# Patient Record
Sex: FEMALE | Race: WHITE | NOT HISPANIC OR LATINO | Employment: OTHER | URBAN - NONMETROPOLITAN AREA
[De-identification: names, ages, dates, MRNs, and addresses within clinical notes are randomized per-mention and may not be internally consistent; named-entity substitution may affect disease eponyms.]

---

## 2017-01-01 ENCOUNTER — TELEPHONE (OUTPATIENT)
Dept: FAMILY MEDICINE CLINIC | Facility: CLINIC | Age: 77
End: 2017-01-01

## 2017-01-01 ENCOUNTER — TRANSCRIBE ORDERS (OUTPATIENT)
Dept: ADMINISTRATIVE | Facility: HOSPITAL | Age: 77
End: 2017-01-01

## 2017-01-01 ENCOUNTER — TELEPHONE (OUTPATIENT)
Dept: CALL CENTER | Facility: HOSPITAL | Age: 77
End: 2017-01-01

## 2017-01-01 ENCOUNTER — APPOINTMENT (OUTPATIENT)
Dept: CT IMAGING | Facility: HOSPITAL | Age: 77
End: 2017-01-01

## 2017-01-01 ENCOUNTER — OFFICE VISIT (OUTPATIENT)
Dept: SURGERY | Facility: CLINIC | Age: 77
End: 2017-01-01

## 2017-01-01 ENCOUNTER — OUTSIDE FACILITY SERVICE (OUTPATIENT)
Dept: INTERNAL MEDICINE | Facility: HOSPITAL | Age: 77
End: 2017-01-01

## 2017-01-01 ENCOUNTER — APPOINTMENT (OUTPATIENT)
Dept: GENERAL RADIOLOGY | Facility: HOSPITAL | Age: 77
End: 2017-01-01
Attending: INTERNAL MEDICINE

## 2017-01-01 ENCOUNTER — LAB REQUISITION (OUTPATIENT)
Dept: LAB | Facility: HOSPITAL | Age: 77
End: 2017-01-01

## 2017-01-01 ENCOUNTER — HOSPITAL ENCOUNTER (EMERGENCY)
Facility: HOSPITAL | Age: 77
Discharge: HOME | End: 2017-08-30
Attending: EMERGENCY MEDICINE | Admitting: EMERGENCY MEDICINE

## 2017-01-01 ENCOUNTER — APPOINTMENT (OUTPATIENT)
Dept: ULTRASOUND IMAGING | Facility: HOSPITAL | Age: 77
End: 2017-01-01

## 2017-01-01 ENCOUNTER — HOSPITAL ENCOUNTER (EMERGENCY)
Facility: HOSPITAL | Age: 77
Discharge: HOME | End: 2017-09-18
Attending: EMERGENCY MEDICINE | Admitting: EMERGENCY MEDICINE

## 2017-01-01 ENCOUNTER — APPOINTMENT (OUTPATIENT)
Dept: GENERAL RADIOLOGY | Facility: HOSPITAL | Age: 77
End: 2017-01-01

## 2017-01-01 ENCOUNTER — HOSPITAL ENCOUNTER (OUTPATIENT)
Dept: INFUSION THERAPY | Facility: HOSPITAL | Age: 77
Discharge: HOME | End: 2017-11-21
Admitting: INTERNAL MEDICINE

## 2017-01-01 ENCOUNTER — TRANSITIONAL CARE MANAGEMENT TELEPHONE ENCOUNTER (OUTPATIENT)
Dept: INTERNAL MEDICINE | Facility: CLINIC | Age: 77
End: 2017-01-01

## 2017-01-01 ENCOUNTER — CLINICAL SUPPORT (OUTPATIENT)
Dept: FAMILY MEDICINE CLINIC | Facility: CLINIC | Age: 77
End: 2017-01-01

## 2017-01-01 ENCOUNTER — OFFICE VISIT (OUTPATIENT)
Dept: FAMILY MEDICINE CLINIC | Facility: CLINIC | Age: 77
End: 2017-01-01

## 2017-01-01 ENCOUNTER — HOSPITAL ENCOUNTER (OUTPATIENT)
Dept: MRI IMAGING | Facility: HOSPITAL | Age: 77
Discharge: HOME | End: 2017-04-06
Admitting: NURSE PRACTITIONER

## 2017-01-01 ENCOUNTER — APPOINTMENT (OUTPATIENT)
Dept: PREADMISSION TESTING | Facility: HOSPITAL | Age: 77
End: 2017-01-01

## 2017-01-01 ENCOUNTER — TRANSITIONAL CARE MANAGEMENT TELEPHONE ENCOUNTER (OUTPATIENT)
Dept: FAMILY MEDICINE CLINIC | Facility: CLINIC | Age: 77
End: 2017-01-01

## 2017-01-01 ENCOUNTER — HOSPITAL ENCOUNTER (EMERGENCY)
Facility: HOSPITAL | Age: 77
Discharge: ANOTHER HOSPITAL | End: 2017-04-10
Attending: EMERGENCY MEDICINE | Admitting: EMERGENCY MEDICINE

## 2017-01-01 ENCOUNTER — HOSPITAL ENCOUNTER (OUTPATIENT)
Dept: INFUSION THERAPY | Facility: HOSPITAL | Age: 77
Discharge: HOME | End: 2017-12-05
Admitting: INTERNAL MEDICINE

## 2017-01-01 ENCOUNTER — HOSPITAL ENCOUNTER (EMERGENCY)
Facility: HOSPITAL | Age: 77
Discharge: HOME | End: 2017-05-09
Attending: EMERGENCY MEDICINE | Admitting: EMERGENCY MEDICINE

## 2017-01-01 ENCOUNTER — HOSPITAL ENCOUNTER (INPATIENT)
Facility: HOSPITAL | Age: 77
LOS: 14 days | Discharge: HOSPICE | End: 2017-12-22
Attending: EMERGENCY MEDICINE | Admitting: INTERNAL MEDICINE

## 2017-01-01 ENCOUNTER — HOSPITAL ENCOUNTER (OUTPATIENT)
Facility: HOSPITAL | Age: 77
Setting detail: OBSERVATION
Discharge: HOME HEALTH | End: 2017-09-28
Attending: EMERGENCY MEDICINE | Admitting: INTERNAL MEDICINE

## 2017-01-01 ENCOUNTER — APPOINTMENT (OUTPATIENT)
Dept: INFUSION THERAPY | Facility: HOSPITAL | Age: 77
End: 2017-01-01

## 2017-01-01 ENCOUNTER — HOSPITAL ENCOUNTER (INPATIENT)
Facility: HOSPITAL | Age: 77
LOS: 4 days | Discharge: HOME HEALTH | End: 2017-11-29
Attending: EMERGENCY MEDICINE | Admitting: INTERNAL MEDICINE

## 2017-01-01 ENCOUNTER — HOSPITAL ENCOUNTER (INPATIENT)
Facility: HOSPITAL | Age: 77
LOS: 2 days | Discharge: HOME HEALTH | End: 2017-10-12
Attending: EMERGENCY MEDICINE | Admitting: INTERNAL MEDICINE

## 2017-01-01 ENCOUNTER — HOSPITAL ENCOUNTER (OUTPATIENT)
Facility: HOSPITAL | Age: 77
Setting detail: HOSPITAL OUTPATIENT SURGERY
End: 2017-01-01
Attending: SURGERY | Admitting: SURGERY

## 2017-01-01 ENCOUNTER — HOSPITAL ENCOUNTER (OUTPATIENT)
Dept: GENERAL RADIOLOGY | Facility: HOSPITAL | Age: 77
Discharge: HOME | End: 2017-01-11
Admitting: NURSE PRACTITIONER

## 2017-01-01 ENCOUNTER — TRANSCRIBE ORDERS (OUTPATIENT)
Dept: MRI IMAGING | Facility: HOSPITAL | Age: 77
End: 2017-01-01

## 2017-01-01 ENCOUNTER — DOCUMENTATION (OUTPATIENT)
Dept: SOCIAL WORK | Facility: HOSPITAL | Age: 77
End: 2017-01-01

## 2017-01-01 ENCOUNTER — HOSPITAL ENCOUNTER (EMERGENCY)
Facility: HOSPITAL | Age: 77
Discharge: HOME | End: 2017-04-06
Attending: EMERGENCY MEDICINE | Admitting: EMERGENCY MEDICINE

## 2017-01-01 ENCOUNTER — HOSPITAL ENCOUNTER (OUTPATIENT)
Dept: GENERAL RADIOLOGY | Facility: HOSPITAL | Age: 77
Discharge: HOME | End: 2017-11-01
Attending: INTERNAL MEDICINE | Admitting: INTERNAL MEDICINE

## 2017-01-01 ENCOUNTER — APPOINTMENT (OUTPATIENT)
Dept: CARDIOLOGY | Facility: HOSPITAL | Age: 77
End: 2017-01-01
Attending: INTERNAL MEDICINE

## 2017-01-01 ENCOUNTER — LAB (OUTPATIENT)
Dept: LAB | Facility: HOSPITAL | Age: 77
End: 2017-01-01
Attending: INTERNAL MEDICINE

## 2017-01-01 ENCOUNTER — HOSPITAL ENCOUNTER (OUTPATIENT)
Facility: HOSPITAL | Age: 77
Setting detail: OBSERVATION
Discharge: ANOTHER HOSPITAL | End: 2017-08-01
Attending: EMERGENCY MEDICINE | Admitting: INTERNAL MEDICINE

## 2017-01-01 ENCOUNTER — DOCUMENTATION (OUTPATIENT)
Dept: INTERNAL MEDICINE | Facility: HOSPITAL | Age: 77
End: 2017-01-01

## 2017-01-01 ENCOUNTER — HOSPITAL ENCOUNTER (INPATIENT)
Facility: HOSPITAL | Age: 77
LOS: 4 days | Discharge: HOME HEALTH | End: 2017-11-07
Attending: EMERGENCY MEDICINE | Admitting: INTERNAL MEDICINE

## 2017-01-01 VITALS
WEIGHT: 224 LBS | OXYGEN SATURATION: 98 % | RESPIRATION RATE: 20 BRPM | TEMPERATURE: 98.6 F | HEIGHT: 67 IN | SYSTOLIC BLOOD PRESSURE: 115 MMHG | HEART RATE: 88 BPM | BODY MASS INDEX: 35.16 KG/M2 | DIASTOLIC BLOOD PRESSURE: 63 MMHG

## 2017-01-01 VITALS
TEMPERATURE: 98.1 F | HEIGHT: 67 IN | DIASTOLIC BLOOD PRESSURE: 77 MMHG | SYSTOLIC BLOOD PRESSURE: 113 MMHG | OXYGEN SATURATION: 98 % | WEIGHT: 224 LBS | HEART RATE: 99 BPM | BODY MASS INDEX: 35.16 KG/M2 | RESPIRATION RATE: 18 BRPM

## 2017-01-01 VITALS
TEMPERATURE: 98.2 F | WEIGHT: 225 LBS | HEART RATE: 64 BPM | SYSTOLIC BLOOD PRESSURE: 138 MMHG | DIASTOLIC BLOOD PRESSURE: 84 MMHG | RESPIRATION RATE: 18 BRPM | HEIGHT: 67 IN | SYSTOLIC BLOOD PRESSURE: 116 MMHG | OXYGEN SATURATION: 95 % | BODY MASS INDEX: 35.31 KG/M2 | TEMPERATURE: 98 F | DIASTOLIC BLOOD PRESSURE: 52 MMHG

## 2017-01-01 VITALS
BODY MASS INDEX: 38.14 KG/M2 | HEIGHT: 67 IN | TEMPERATURE: 98 F | SYSTOLIC BLOOD PRESSURE: 103 MMHG | OXYGEN SATURATION: 98 % | DIASTOLIC BLOOD PRESSURE: 67 MMHG | HEART RATE: 65 BPM | WEIGHT: 243 LBS

## 2017-01-01 VITALS
TEMPERATURE: 97.6 F | HEIGHT: 66 IN | RESPIRATION RATE: 19 BRPM | DIASTOLIC BLOOD PRESSURE: 74 MMHG | WEIGHT: 240.1 LBS | SYSTOLIC BLOOD PRESSURE: 104 MMHG | OXYGEN SATURATION: 100 % | HEART RATE: 103 BPM | BODY MASS INDEX: 38.59 KG/M2

## 2017-01-01 VITALS
TEMPERATURE: 98.2 F | SYSTOLIC BLOOD PRESSURE: 109 MMHG | OXYGEN SATURATION: 98 % | DIASTOLIC BLOOD PRESSURE: 66 MMHG | WEIGHT: 260.2 LBS | BODY MASS INDEX: 40.84 KG/M2 | HEART RATE: 75 BPM | RESPIRATION RATE: 19 BRPM | HEIGHT: 67 IN

## 2017-01-01 VITALS
HEART RATE: 74 BPM | RESPIRATION RATE: 18 BRPM | WEIGHT: 248 LBS | BODY MASS INDEX: 38.92 KG/M2 | OXYGEN SATURATION: 98 % | HEIGHT: 67 IN | TEMPERATURE: 97.8 F | SYSTOLIC BLOOD PRESSURE: 108 MMHG | DIASTOLIC BLOOD PRESSURE: 59 MMHG

## 2017-01-01 VITALS
HEART RATE: 58 BPM | RESPIRATION RATE: 16 BRPM | TEMPERATURE: 98.5 F | DIASTOLIC BLOOD PRESSURE: 45 MMHG | BODY MASS INDEX: 36.02 KG/M2 | HEIGHT: 67 IN | SYSTOLIC BLOOD PRESSURE: 88 MMHG | WEIGHT: 229.5 LBS | OXYGEN SATURATION: 100 %

## 2017-01-01 VITALS
BODY MASS INDEX: 35.16 KG/M2 | RESPIRATION RATE: 18 BRPM | WEIGHT: 224 LBS | DIASTOLIC BLOOD PRESSURE: 81 MMHG | TEMPERATURE: 98.2 F | HEART RATE: 59 BPM | SYSTOLIC BLOOD PRESSURE: 167 MMHG | HEIGHT: 67 IN | OXYGEN SATURATION: 97 %

## 2017-01-01 VITALS
TEMPERATURE: 98.1 F | SYSTOLIC BLOOD PRESSURE: 94 MMHG | HEART RATE: 61 BPM | OXYGEN SATURATION: 98 % | HEIGHT: 67 IN | DIASTOLIC BLOOD PRESSURE: 54 MMHG

## 2017-01-01 VITALS
HEIGHT: 66 IN | HEART RATE: 69 BPM | OXYGEN SATURATION: 100 % | TEMPERATURE: 98 F | DIASTOLIC BLOOD PRESSURE: 46 MMHG | SYSTOLIC BLOOD PRESSURE: 94 MMHG

## 2017-01-01 VITALS
HEART RATE: 65 BPM | OXYGEN SATURATION: 100 % | TEMPERATURE: 97.8 F | SYSTOLIC BLOOD PRESSURE: 86 MMHG | RESPIRATION RATE: 18 BRPM | DIASTOLIC BLOOD PRESSURE: 44 MMHG

## 2017-01-01 VITALS
OXYGEN SATURATION: 98 % | HEART RATE: 74 BPM | BODY MASS INDEX: 38.45 KG/M2 | DIASTOLIC BLOOD PRESSURE: 65 MMHG | TEMPERATURE: 97.9 F | HEIGHT: 67 IN | WEIGHT: 245 LBS | SYSTOLIC BLOOD PRESSURE: 108 MMHG

## 2017-01-01 VITALS
HEART RATE: 80 BPM | DIASTOLIC BLOOD PRESSURE: 65 MMHG | HEIGHT: 67 IN | WEIGHT: 224 LBS | TEMPERATURE: 98.4 F | BODY MASS INDEX: 35.16 KG/M2 | OXYGEN SATURATION: 97 % | RESPIRATION RATE: 20 BRPM | SYSTOLIC BLOOD PRESSURE: 119 MMHG

## 2017-01-01 VITALS
TEMPERATURE: 98.8 F | BODY MASS INDEX: 31.39 KG/M2 | HEART RATE: 71 BPM | HEIGHT: 67 IN | OXYGEN SATURATION: 100 % | SYSTOLIC BLOOD PRESSURE: 109 MMHG | WEIGHT: 200 LBS | DIASTOLIC BLOOD PRESSURE: 58 MMHG | RESPIRATION RATE: 18 BRPM

## 2017-01-01 VITALS
WEIGHT: 248.8 LBS | HEART RATE: 84 BPM | RESPIRATION RATE: 18 BRPM | OXYGEN SATURATION: 100 % | SYSTOLIC BLOOD PRESSURE: 111 MMHG | BODY MASS INDEX: 39.05 KG/M2 | TEMPERATURE: 97.7 F | HEIGHT: 67 IN | DIASTOLIC BLOOD PRESSURE: 59 MMHG

## 2017-01-01 VITALS
HEART RATE: 64 BPM | SYSTOLIC BLOOD PRESSURE: 113 MMHG | OXYGEN SATURATION: 100 % | DIASTOLIC BLOOD PRESSURE: 51 MMHG | HEIGHT: 67 IN | TEMPERATURE: 97.9 F

## 2017-01-01 VITALS
SYSTOLIC BLOOD PRESSURE: 99 MMHG | OXYGEN SATURATION: 99 % | RESPIRATION RATE: 18 BRPM | BODY MASS INDEX: 36.68 KG/M2 | WEIGHT: 233.69 LBS | TEMPERATURE: 97.8 F | HEART RATE: 80 BPM | HEIGHT: 67 IN | DIASTOLIC BLOOD PRESSURE: 59 MMHG

## 2017-01-01 VITALS — BODY MASS INDEX: 35.16 KG/M2 | WEIGHT: 224 LBS | HEIGHT: 67 IN

## 2017-01-01 DIAGNOSIS — I48.91 ATRIAL FIBRILLATION (HCC): ICD-10-CM

## 2017-01-01 DIAGNOSIS — M89.9 CHRONIC KIDNEY DISEASE-MINERAL AND BONE DISORDER: ICD-10-CM

## 2017-01-01 DIAGNOSIS — S89.92XA INJURY OF LEFT KNEE, INITIAL ENCOUNTER: ICD-10-CM

## 2017-01-01 DIAGNOSIS — Z74.09 IMPAIRED MOBILITY AND ADLS: ICD-10-CM

## 2017-01-01 DIAGNOSIS — Z87.19 HX OF CIRRHOSIS: ICD-10-CM

## 2017-01-01 DIAGNOSIS — G89.4 CHRONIC PAIN SYNDROME: Primary | ICD-10-CM

## 2017-01-01 DIAGNOSIS — K76.82 HEPATIC ENCEPHALOPATHY (HCC): Primary | ICD-10-CM

## 2017-01-01 DIAGNOSIS — Z78.9 IMPAIRED MOBILITY AND ADLS: ICD-10-CM

## 2017-01-01 DIAGNOSIS — S89.92XA INJURY OF LEFT KNEE, INITIAL ENCOUNTER: Primary | ICD-10-CM

## 2017-01-01 DIAGNOSIS — K76.82 HEPATIC ENCEPHALOPATHY (HCC): ICD-10-CM

## 2017-01-01 DIAGNOSIS — I48.21 PERMANENT ATRIAL FIBRILLATION (HCC): ICD-10-CM

## 2017-01-01 DIAGNOSIS — J91.8 PLEURAL EFFUSION ASSOCIATED WITH HEPATIC DISORDER: ICD-10-CM

## 2017-01-01 DIAGNOSIS — R53.1 WEAKNESS GENERALIZED: ICD-10-CM

## 2017-01-01 DIAGNOSIS — E83.9 CHRONIC KIDNEY DISEASE-MINERAL AND BONE DISORDER: ICD-10-CM

## 2017-01-01 DIAGNOSIS — N18.9 ACUTE RENAL FAILURE SUPERIMPOSED ON CHRONIC KIDNEY DISEASE, UNSPECIFIED CKD STAGE, UNSPECIFIED ACUTE RENAL FAILURE TYPE (HCC): ICD-10-CM

## 2017-01-01 DIAGNOSIS — N17.9 ACUTE RENAL FAILURE SUPERIMPOSED ON CHRONIC KIDNEY DISEASE, UNSPECIFIED CKD STAGE, UNSPECIFIED ACUTE RENAL FAILURE TYPE (HCC): ICD-10-CM

## 2017-01-01 DIAGNOSIS — R06.00 DYSPNEA, UNSPECIFIED TYPE: Primary | ICD-10-CM

## 2017-01-01 DIAGNOSIS — J41.0 SIMPLE CHRONIC BRONCHITIS (HCC): ICD-10-CM

## 2017-01-01 DIAGNOSIS — Z74.09 IMPAIRED FUNCTIONAL MOBILITY, BALANCE, GAIT, AND ENDURANCE: ICD-10-CM

## 2017-01-01 DIAGNOSIS — R06.02 SHORTNESS OF BREATH: ICD-10-CM

## 2017-01-01 DIAGNOSIS — N39.0 ACUTE UTI: ICD-10-CM

## 2017-01-01 DIAGNOSIS — K74.60 CIRRHOSIS OF LIVER WITH ASCITES, UNSPECIFIED HEPATIC CIRRHOSIS TYPE (HCC): ICD-10-CM

## 2017-01-01 DIAGNOSIS — N18.30 CHRONIC KIDNEY DISEASE, STAGE III (MODERATE) (HCC): ICD-10-CM

## 2017-01-01 DIAGNOSIS — N18.6 ESRF (END STAGE RENAL FAILURE) (HCC): ICD-10-CM

## 2017-01-01 DIAGNOSIS — E86.0 DEHYDRATION: Primary | ICD-10-CM

## 2017-01-01 DIAGNOSIS — R10.13 EPIGASTRIC PAIN: Primary | ICD-10-CM

## 2017-01-01 DIAGNOSIS — R18.8 CIRRHOSIS OF LIVER WITH ASCITES, UNSPECIFIED HEPATIC CIRRHOSIS TYPE (HCC): ICD-10-CM

## 2017-01-01 DIAGNOSIS — L03.115 CELLULITIS OF RIGHT LOWER EXTREMITY: ICD-10-CM

## 2017-01-01 DIAGNOSIS — K76.9 PLEURAL EFFUSION ASSOCIATED WITH HEPATIC DISORDER: ICD-10-CM

## 2017-01-01 DIAGNOSIS — E53.8 VITAMIN B12 DEFICIENCY: Primary | ICD-10-CM

## 2017-01-01 DIAGNOSIS — R52 PAIN: ICD-10-CM

## 2017-01-01 DIAGNOSIS — D68.32 WARFARIN-INDUCED COAGULOPATHY (HCC): ICD-10-CM

## 2017-01-01 DIAGNOSIS — I10 ESSENTIAL HYPERTENSION: ICD-10-CM

## 2017-01-01 DIAGNOSIS — N18.4 CHRONIC KIDNEY DISEASE, STAGE IV (SEVERE) (HCC): ICD-10-CM

## 2017-01-01 DIAGNOSIS — N18.9 CHRONIC KIDNEY DISEASE-MINERAL AND BONE DISORDER: ICD-10-CM

## 2017-01-01 DIAGNOSIS — R53.1 WEAKNESS: ICD-10-CM

## 2017-01-01 DIAGNOSIS — B95.62 METHICILLIN RESISTANT STAPHYLOCOCCUS AUREUS INFECTION AS THE CAUSE OF DISEASES CLASSIFIED ELSEWHERE: ICD-10-CM

## 2017-01-01 DIAGNOSIS — K74.69 OTHER CIRRHOSIS OF LIVER (HCC): Primary | ICD-10-CM

## 2017-01-01 DIAGNOSIS — D64.9 CHRONIC ANEMIA: ICD-10-CM

## 2017-01-01 DIAGNOSIS — K72.10 END STAGE LIVER DISEASE (HCC): ICD-10-CM

## 2017-01-01 DIAGNOSIS — I89.0 LYMPHEDEMA: ICD-10-CM

## 2017-01-01 DIAGNOSIS — J18.9 PNEUMONIA OF LEFT UPPER LOBE DUE TO INFECTIOUS ORGANISM: ICD-10-CM

## 2017-01-01 DIAGNOSIS — I89.0 LYMPHEDEMA OF BOTH LOWER EXTREMITIES: ICD-10-CM

## 2017-01-01 DIAGNOSIS — K74.60 CIRRHOSIS OF LIVER WITHOUT ASCITES, UNSPECIFIED HEPATIC CIRRHOSIS TYPE (HCC): ICD-10-CM

## 2017-01-01 DIAGNOSIS — R19.7 DIARRHEA: ICD-10-CM

## 2017-01-01 DIAGNOSIS — J81.1 CHRONIC PULMONARY EDEMA: ICD-10-CM

## 2017-01-01 DIAGNOSIS — Z79.01 ANTICOAGULATED ON COUMADIN: ICD-10-CM

## 2017-01-01 DIAGNOSIS — K74.60 CIRRHOSIS OF LIVER WITHOUT MENTION OF ALCOHOL: Primary | ICD-10-CM

## 2017-01-01 DIAGNOSIS — N30.01 ACUTE CYSTITIS WITH HEMATURIA: Primary | ICD-10-CM
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
## 2017-01-01 DIAGNOSIS — K76.82 ACUTE HEPATIC ENCEPHALOPATHY (HCC): Primary | ICD-10-CM

## 2017-01-01 DIAGNOSIS — T45.515A WARFARIN-INDUCED COAGULOPATHY (HCC): ICD-10-CM

## 2017-01-01 DIAGNOSIS — K43.6 INCARCERATED VENTRAL HERNIA: Primary | ICD-10-CM

## 2017-01-01 DIAGNOSIS — T81.40XA INFECTION FOLLOWING PROCEDURE: ICD-10-CM

## 2017-01-01 DIAGNOSIS — N17.9 AKI (ACUTE KIDNEY INJURY) (HCC): ICD-10-CM

## 2017-01-01 DIAGNOSIS — E87.79 OTHER HYPERVOLEMIA: ICD-10-CM

## 2017-01-01 DIAGNOSIS — R06.02 SHORTNESS OF BREATH: Primary | ICD-10-CM

## 2017-01-01 DIAGNOSIS — S81.802D OPEN WOUND OF LEFT LOWER EXTREMITY, SUBSEQUENT ENCOUNTER: ICD-10-CM

## 2017-01-01 DIAGNOSIS — E87.1 HYPONATREMIA: ICD-10-CM

## 2017-01-01 DIAGNOSIS — K63.1 SMALL BOWEL PERFORATION (HCC): ICD-10-CM

## 2017-01-01 DIAGNOSIS — N39.0 URINARY TRACT INFECTION: ICD-10-CM

## 2017-01-01 DIAGNOSIS — R18.8 OTHER ASCITES: Primary | ICD-10-CM

## 2017-01-01 DIAGNOSIS — R52 PAIN: Primary | ICD-10-CM

## 2017-01-01 DIAGNOSIS — K43.2 INCISIONAL HERNIA, WITHOUT OBSTRUCTION OR GANGRENE: Primary | ICD-10-CM

## 2017-01-01 DIAGNOSIS — T81.31XD DISRUPTION OF EXTERNAL OPERATION (SURGICAL) WOUND, NOT ELSEWHERE CLASSIFIED, SUBSEQUENT ENCOUNTER: ICD-10-CM

## 2017-01-01 DIAGNOSIS — K43.9 VENTRAL HERNIA WITHOUT OBSTRUCTION OR GANGRENE: ICD-10-CM

## 2017-01-01 DIAGNOSIS — J44.1 CHRONIC OBSTRUCTIVE PULMONARY DISEASE WITH ACUTE EXACERBATION (HCC): ICD-10-CM

## 2017-01-01 DIAGNOSIS — N39.0 URINARY TRACT INFECTION WITHOUT HEMATURIA, SITE UNSPECIFIED: ICD-10-CM

## 2017-01-01 DIAGNOSIS — R53.1 WEAKNESS: Primary | ICD-10-CM

## 2017-01-01 DIAGNOSIS — K74.60 CIRRHOSIS OF LIVER WITHOUT MENTION OF ALCOHOL: ICD-10-CM

## 2017-01-01 DIAGNOSIS — R53.81 DEBILITY: Primary | ICD-10-CM

## 2017-01-01 DIAGNOSIS — I11.0 HYPERTENSIVE HEART DISEASE WITH HEART FAILURE (HCC): ICD-10-CM

## 2017-01-01 LAB
ACINETOBACTER SCREEN CX: NO GROWTH
ALBUMIN SERPL-MCNC: 1.8 G/DL (ref 3.5–5)
ALBUMIN SERPL-MCNC: 1.8 G/DL (ref 3.5–5)
ALBUMIN SERPL-MCNC: 1.9 G/DL (ref 3.5–5)
ALBUMIN SERPL-MCNC: 2 G/DL (ref 3.5–5)
ALBUMIN SERPL-MCNC: 2.1 G/DL (ref 3.5–5)
ALBUMIN SERPL-MCNC: 2.1 G/DL (ref 3.5–5)
ALBUMIN SERPL-MCNC: 2.2 G/DL (ref 3.5–5)
ALBUMIN SERPL-MCNC: 2.3 G/DL (ref 3.5–5)
ALBUMIN SERPL-MCNC: 2.3 G/DL (ref 3.5–5)
ALBUMIN SERPL-MCNC: 2.4 G/DL (ref 3.5–5)
ALBUMIN SERPL-MCNC: 2.4 G/DL (ref 3.5–5)
ALBUMIN SERPL-MCNC: 2.6 G/DL (ref 3.5–5)
ALBUMIN SERPL-MCNC: 3.2 G/DL (ref 3.5–5)
ALBUMIN SERPL-MCNC: 3.2 G/DL (ref 3.5–5)
ALBUMIN/GLOB SERPL: 0.5 G/DL (ref 1–2)
ALBUMIN/GLOB SERPL: 0.6 G/DL (ref 1–2)
ALBUMIN/GLOB SERPL: 0.7 G/DL (ref 1–2)
ALP SERPL-CCNC: 121 U/L (ref 38–126)
ALP SERPL-CCNC: 123 U/L (ref 38–126)
ALP SERPL-CCNC: 124 U/L (ref 38–126)
ALP SERPL-CCNC: 135 U/L (ref 38–126)
ALP SERPL-CCNC: 136 U/L (ref 38–126)
ALP SERPL-CCNC: 136 U/L (ref 38–126)
ALP SERPL-CCNC: 137 U/L (ref 38–126)
ALP SERPL-CCNC: 138 U/L (ref 38–126)
ALP SERPL-CCNC: 145 U/L (ref 38–126)
ALP SERPL-CCNC: 156 U/L (ref 38–126)
ALP SERPL-CCNC: 158 U/L (ref 38–126)
ALP SERPL-CCNC: 159 U/L (ref 38–126)
ALP SERPL-CCNC: 160 U/L (ref 38–126)
ALP SERPL-CCNC: 160 U/L (ref 38–126)
ALP SERPL-CCNC: 165 U/L (ref 38–126)
ALP SERPL-CCNC: 171 U/L (ref 38–126)
ALP SERPL-CCNC: 172 U/L (ref 38–126)
ALP SERPL-CCNC: 173 U/L (ref 38–126)
ALP SERPL-CCNC: 177 U/L (ref 38–126)
ALP SERPL-CCNC: 182 U/L (ref 38–126)
ALP SERPL-CCNC: 184 U/L (ref 38–126)
ALP SERPL-CCNC: 196 U/L (ref 38–126)
ALP SERPL-CCNC: 214 U/L (ref 38–126)
ALP SERPL-CCNC: 217 U/L (ref 38–126)
ALP SERPL-CCNC: 227 U/L (ref 38–126)
ALP SERPL-CCNC: 95 U/L (ref 38–126)
ALT SERPL W P-5'-P-CCNC: 19 U/L (ref 13–69)
ALT SERPL W P-5'-P-CCNC: 19 U/L (ref 13–69)
ALT SERPL W P-5'-P-CCNC: 21 U/L (ref 13–69)
ALT SERPL W P-5'-P-CCNC: 22 U/L (ref 13–69)
ALT SERPL W P-5'-P-CCNC: 24 U/L (ref 13–69)
ALT SERPL W P-5'-P-CCNC: 25 U/L (ref 13–69)
ALT SERPL W P-5'-P-CCNC: 25 U/L (ref 13–69)
ALT SERPL W P-5'-P-CCNC: 26 U/L (ref 13–69)
ALT SERPL W P-5'-P-CCNC: 27 U/L (ref 13–69)
ALT SERPL W P-5'-P-CCNC: 28 U/L (ref 13–69)
ALT SERPL W P-5'-P-CCNC: 28 U/L (ref 13–69)
ALT SERPL W P-5'-P-CCNC: 29 U/L (ref 13–69)
ALT SERPL W P-5'-P-CCNC: 29 U/L (ref 13–69)
ALT SERPL W P-5'-P-CCNC: 30 U/L (ref 13–69)
ALT SERPL W P-5'-P-CCNC: 31 U/L (ref 13–69)
ALT SERPL W P-5'-P-CCNC: 33 U/L (ref 13–69)
ALT SERPL W P-5'-P-CCNC: 34 U/L (ref 13–69)
ALT SERPL W P-5'-P-CCNC: 35 U/L (ref 13–69)
ALT SERPL W P-5'-P-CCNC: 36 U/L (ref 13–69)
ALT SERPL W P-5'-P-CCNC: 40 U/L (ref 13–69)
AMMONIA BLD-SCNC: 10 UMOL/L (ref 9–30)
AMMONIA BLD-SCNC: 13 UMOL/L (ref 9–30)
AMMONIA BLD-SCNC: 17 UMOL/L (ref 9–30)
AMMONIA BLD-SCNC: 17 UMOL/L (ref 9–30)
AMMONIA BLD-SCNC: 178 UMOL/L (ref 9–30)
AMMONIA BLD-SCNC: 19 UMOL/L (ref 9–30)
AMMONIA BLD-SCNC: 19 UMOL/L (ref 9–30)
AMMONIA BLD-SCNC: 20 UMOL/L (ref 9–30)
AMMONIA BLD-SCNC: 41 UMOL/L (ref 9–30)
AMMONIA BLD-SCNC: 69 UMOL/L (ref 9–30)
AMMONIA BLD-SCNC: 84 UMOL/L (ref 9–30)
AMMONIA BLD-SCNC: <9 UMOL/L (ref 9–30)
AMORPH URATE CRY URNS QL MICRO: ABNORMAL /HPF
AMORPH URATE CRY URNS QL MICRO: ABNORMAL /HPF
AMYLASE SERPL-CCNC: 52 U/L (ref 30–110)
ANION GAP SERPL CALCULATED.3IONS-SCNC: 10.1 MMOL/L
ANION GAP SERPL CALCULATED.3IONS-SCNC: 10.3 MMOL/L
ANION GAP SERPL CALCULATED.3IONS-SCNC: 10.4 MMOL/L
ANION GAP SERPL CALCULATED.3IONS-SCNC: 10.4 MMOL/L
ANION GAP SERPL CALCULATED.3IONS-SCNC: 10.6 MMOL/L
ANION GAP SERPL CALCULATED.3IONS-SCNC: 10.7 MMOL/L
ANION GAP SERPL CALCULATED.3IONS-SCNC: 11.4 MMOL/L
ANION GAP SERPL CALCULATED.3IONS-SCNC: 11.8 MMOL/L
ANION GAP SERPL CALCULATED.3IONS-SCNC: 11.9 MMOL/L
ANION GAP SERPL CALCULATED.3IONS-SCNC: 12 MMOL/L
ANION GAP SERPL CALCULATED.3IONS-SCNC: 12.5 MMOL/L
ANION GAP SERPL CALCULATED.3IONS-SCNC: 12.5 MMOL/L
ANION GAP SERPL CALCULATED.3IONS-SCNC: 12.6 MMOL/L
ANION GAP SERPL CALCULATED.3IONS-SCNC: 12.7 MMOL/L
ANION GAP SERPL CALCULATED.3IONS-SCNC: 12.8 MMOL/L
ANION GAP SERPL CALCULATED.3IONS-SCNC: 12.9 MMOL/L
ANION GAP SERPL CALCULATED.3IONS-SCNC: 13.4 MMOL/L
ANION GAP SERPL CALCULATED.3IONS-SCNC: 13.4 MMOL/L
ANION GAP SERPL CALCULATED.3IONS-SCNC: 13.8 MMOL/L
ANION GAP SERPL CALCULATED.3IONS-SCNC: 13.9 MMOL/L
ANION GAP SERPL CALCULATED.3IONS-SCNC: 14.1 MMOL/L
ANION GAP SERPL CALCULATED.3IONS-SCNC: 14.3 MMOL/L
ANION GAP SERPL CALCULATED.3IONS-SCNC: 14.4 MMOL/L
ANION GAP SERPL CALCULATED.3IONS-SCNC: 14.7 MMOL/L
ANION GAP SERPL CALCULATED.3IONS-SCNC: 14.8 MMOL/L
ANION GAP SERPL CALCULATED.3IONS-SCNC: 15 MMOL/L
ANION GAP SERPL CALCULATED.3IONS-SCNC: 15.1 MMOL/L
ANION GAP SERPL CALCULATED.3IONS-SCNC: 15.2 MMOL/L
ANION GAP SERPL CALCULATED.3IONS-SCNC: 15.4 MMOL/L
ANION GAP SERPL CALCULATED.3IONS-SCNC: 15.7 MMOL/L
ANION GAP SERPL CALCULATED.3IONS-SCNC: 16 MMOL/L
ANION GAP SERPL CALCULATED.3IONS-SCNC: 16 MMOL/L
ANION GAP SERPL CALCULATED.3IONS-SCNC: 16.1 MMOL/L
ANION GAP SERPL CALCULATED.3IONS-SCNC: 16.2 MMOL/L
ANION GAP SERPL CALCULATED.3IONS-SCNC: 16.7 MMOL/L
ANION GAP SERPL CALCULATED.3IONS-SCNC: 16.9 MMOL/L
ANION GAP SERPL CALCULATED.3IONS-SCNC: 17.1 MMOL/L
ANION GAP SERPL CALCULATED.3IONS-SCNC: 17.2 MMOL/L
ANION GAP SERPL CALCULATED.3IONS-SCNC: 17.6 MMOL/L
ANION GAP SERPL CALCULATED.3IONS-SCNC: 17.9 MMOL/L
ANION GAP SERPL CALCULATED.3IONS-SCNC: 19.3 MMOL/L
ANION GAP SERPL CALCULATED.3IONS-SCNC: 19.5 MMOL/L
ANISOCYTOSIS BLD QL: ABNORMAL
ANISOCYTOSIS BLD QL: NORMAL
ARTERIAL PATENCY WRIST A: ABNORMAL
ARTERIAL PATENCY WRIST A: POSITIVE
AST SERPL-CCNC: 27 U/L (ref 15–46)
AST SERPL-CCNC: 27 U/L (ref 15–46)
AST SERPL-CCNC: 29 U/L (ref 15–46)
AST SERPL-CCNC: 30 U/L (ref 15–46)
AST SERPL-CCNC: 32 U/L (ref 15–46)
AST SERPL-CCNC: 33 U/L (ref 15–46)
AST SERPL-CCNC: 34 U/L (ref 15–46)
AST SERPL-CCNC: 35 U/L (ref 15–46)
AST SERPL-CCNC: 36 U/L (ref 15–46)
AST SERPL-CCNC: 36 U/L (ref 15–46)
AST SERPL-CCNC: 37 U/L (ref 15–46)
AST SERPL-CCNC: 37 U/L (ref 15–46)
AST SERPL-CCNC: 38 U/L (ref 15–46)
AST SERPL-CCNC: 39 U/L (ref 15–46)
AST SERPL-CCNC: 39 U/L (ref 15–46)
AST SERPL-CCNC: 42 U/L (ref 15–46)
AST SERPL-CCNC: 43 U/L (ref 15–46)
AST SERPL-CCNC: 51 U/L (ref 15–46)
AST SERPL-CCNC: 53 U/L (ref 15–46)
ATMOSPHERIC PRESS: 739 MMHG
BACTERIA SPEC AEROBE CULT: ABNORMAL
BACTERIA SPEC AEROBE CULT: NO GROWTH
BACTERIA SPEC AEROBE CULT: NO GROWTH
BACTERIA SPEC AEROBE CULT: NORMAL
BACTERIA UR QL AUTO: ABNORMAL /HPF
BACTERIA UR QL AUTO: NORMAL /HPF
BASE EXCESS BLDA CALC-SCNC: -7.1 MMOL/L
BASE EXCESS BLDA CALC-SCNC: 4.6 MMOL/L
BASOPHILS # BLD AUTO: 0.01 10*3/MM3 (ref 0–0.2)
BASOPHILS # BLD AUTO: 0.02 10*3/MM3 (ref 0–0.2)
BASOPHILS # BLD AUTO: 0.03 10*3/MM3 (ref 0–0.2)
BASOPHILS # BLD AUTO: 0.04 10*3/MM3 (ref 0–0.2)
BASOPHILS # BLD AUTO: 0.04 10*3/MM3 (ref 0–0.2)
BASOPHILS NFR BLD AUTO: 0.2 % (ref 0–2.5)
BASOPHILS NFR BLD AUTO: 0.3 % (ref 0–2.5)
BASOPHILS NFR BLD AUTO: 0.4 % (ref 0–2.5)
BASOPHILS NFR BLD AUTO: 0.4 % (ref 0–2.5)
BASOPHILS NFR BLD AUTO: 0.5 % (ref 0–2.5)
BASOPHILS NFR BLD AUTO: 0.5 % (ref 0–2.5)
BASOPHILS NFR BLD AUTO: 0.6 % (ref 0–2.5)
BASOPHILS NFR BLD AUTO: 0.8 % (ref 0–2.5)
BASOPHILS NFR BLD AUTO: 1 % (ref 0–2.5)
BASOPHILS NFR BLD AUTO: 1.1 % (ref 0–2.5)
BASOPHILS NFR BLD AUTO: 1.2 % (ref 0–2.5)
BASOPHILS NFR BLD AUTO: 1.2 % (ref 0–2.5)
BDY SITE: ABNORMAL
BDY SITE: ABNORMAL
BH CV ECHO MEAS - % IVS THICK: 41.7 %
BH CV ECHO MEAS - % LVPW THICK: 16.7 %
BH CV ECHO MEAS - ACS: 1.7 CM
BH CV ECHO MEAS - AO ACC SLOPE: 1172 CM/SEC^2
BH CV ECHO MEAS - AO ACC TIME: 0.09 SEC
BH CV ECHO MEAS - AO MAX PG (FULL): 0.65 MMHG
BH CV ECHO MEAS - AO MAX PG: 5.3 MMHG
BH CV ECHO MEAS - AO MEAN PG (FULL): 0.72 MMHG
BH CV ECHO MEAS - AO MEAN PG: 2.7 MMHG
BH CV ECHO MEAS - AO ROOT AREA (BSA CORRECTED): 1.4
BH CV ECHO MEAS - AO ROOT AREA: 7.4 CM^2
BH CV ECHO MEAS - AO ROOT DIAM: 3.1 CM
BH CV ECHO MEAS - AO V2 MAX: 115.4 CM/SEC
BH CV ECHO MEAS - AO V2 MEAN: 75.4 CM/SEC
BH CV ECHO MEAS - AO V2 VTI: 25.2 CM
BH CV ECHO MEAS - AVA(I,A): 2.8 CM^2
BH CV ECHO MEAS - AVA(I,D): 2.8 CM^2
BH CV ECHO MEAS - AVA(V,A): 2.8 CM^2
BH CV ECHO MEAS - AVA(V,D): 2.8 CM^2
BH CV ECHO MEAS - BSA(HAYCOCK): 2.3 M^2
BH CV ECHO MEAS - BSA: 2.2 M^2
BH CV ECHO MEAS - BZI_BMI: 38.5 KILOGRAMS/M^2
BH CV ECHO MEAS - BZI_METRIC_HEIGHT: 170.2 CM
BH CV ECHO MEAS - BZI_METRIC_WEIGHT: 111.6 KG
BH CV ECHO MEAS - EDV(CUBED): 40.3 ML
BH CV ECHO MEAS - EDV(TEICH): 48.4 ML
BH CV ECHO MEAS - EF(CUBED): 82.5 %
BH CV ECHO MEAS - EF(MOD-SP4): 76 %
BH CV ECHO MEAS - EF(TEICH): 76.4 %
BH CV ECHO MEAS - ESV(CUBED): 7 ML
BH CV ECHO MEAS - ESV(TEICH): 11.4 ML
BH CV ECHO MEAS - FS: 44.1 %
BH CV ECHO MEAS - IVS/LVPW: 1
BH CV ECHO MEAS - IVSD: 1.2 CM
BH CV ECHO MEAS - IVSS: 1.7 CM
BH CV ECHO MEAS - LA DIMENSION: 5.8 CM
BH CV ECHO MEAS - LA/AO: 1.9
BH CV ECHO MEAS - LV MASS(C)D: 133.5 GRAMS
BH CV ECHO MEAS - LV MASS(C)DI: 60.5 GRAMS/M^2
BH CV ECHO MEAS - LV MASS(C)S: 101.4 GRAMS
BH CV ECHO MEAS - LV MASS(C)SI: 45.9 GRAMS/M^2
BH CV ECHO MEAS - LV MAX PG: 4.7 MMHG
BH CV ECHO MEAS - LV MEAN PG: 2 MMHG
BH CV ECHO MEAS - LV V1 MAX: 108.1 CM/SEC
BH CV ECHO MEAS - LV V1 MEAN: 62.8 CM/SEC
BH CV ECHO MEAS - LV V1 VTI: 23.4 CM
BH CV ECHO MEAS - LVIDD: 3.4 CM
BH CV ECHO MEAS - LVIDS: 1.9 CM
BH CV ECHO MEAS - LVOT AREA (M): 3.1 CM^2
BH CV ECHO MEAS - LVOT AREA: 3 CM^2
BH CV ECHO MEAS - LVOT DIAM: 2 CM
BH CV ECHO MEAS - LVPWD: 1.2 CM
BH CV ECHO MEAS - LVPWS: 1.4 CM
BH CV ECHO MEAS - MV A MAX VEL: 77 CM/SEC
BH CV ECHO MEAS - MV DEC SLOPE: 436.1 CM/SEC^2
BH CV ECHO MEAS - MV DEC TIME: 0.2 SEC
BH CV ECHO MEAS - MV E MAX VEL: 90.3 CM/SEC
BH CV ECHO MEAS - MV E/A: 1.2
BH CV ECHO MEAS - MV MAX PG: 4.1 MMHG
BH CV ECHO MEAS - MV MEAN PG: 1.8 MMHG
BH CV ECHO MEAS - MV P1/2T MAX VEL: 103.2 CM/SEC
BH CV ECHO MEAS - MV P1/2T: 69.3 MSEC
BH CV ECHO MEAS - MV V2 MAX: 101.8 CM/SEC
BH CV ECHO MEAS - MV V2 MEAN: 63.4 CM/SEC
BH CV ECHO MEAS - MV V2 VTI: 30.2 CM
BH CV ECHO MEAS - MVA P1/2T LCG: 2.1 CM^2
BH CV ECHO MEAS - MVA(P1/2T): 3.2 CM^2
BH CV ECHO MEAS - MVA(VTI): 2.3 CM^2
BH CV ECHO MEAS - PA ACC SLOPE: 530 CM/SEC^2
BH CV ECHO MEAS - PA ACC TIME: 0.14 SEC
BH CV ECHO MEAS - PA MAX PG (FULL): 0.59 MMHG
BH CV ECHO MEAS - PA MAX PG: 3.1 MMHG
BH CV ECHO MEAS - PA MEAN PG (FULL): 0.45 MMHG
BH CV ECHO MEAS - PA MEAN PG: 1.9 MMHG
BH CV ECHO MEAS - PA PR(ACCEL): 14 MMHG
BH CV ECHO MEAS - PA V2 MAX: 88.2 CM/SEC
BH CV ECHO MEAS - PA V2 MEAN: 64.3 CM/SEC
BH CV ECHO MEAS - PA V2 VTI: 24.2 CM
BH CV ECHO MEAS - RV MAX PG: 2.5 MMHG
BH CV ECHO MEAS - RV MEAN PG: 1.5 MMHG
BH CV ECHO MEAS - RV V1 MAX: 79.5 CM/SEC
BH CV ECHO MEAS - RV V1 MEAN: 56.1 CM/SEC
BH CV ECHO MEAS - RV V1 VTI: 16.2 CM
BH CV ECHO MEAS - RVSP: 44 MMHG
BH CV ECHO MEAS - SI(AO): 84.9 ML/M^2
BH CV ECHO MEAS - SI(CUBED): 15.1 ML/M^2
BH CV ECHO MEAS - SI(LVOT): 32 ML/M^2
BH CV ECHO MEAS - SI(TEICH): 16.8 ML/M^2
BH CV ECHO MEAS - SV(AO): 187.4 ML
BH CV ECHO MEAS - SV(CUBED): 33.3 ML
BH CV ECHO MEAS - SV(LVOT): 70.7 ML
BH CV ECHO MEAS - SV(TEICH): 37 ML
BH CV ECHO MEAS - TR MAX VEL: 233.9 CM/SEC
BILIRUB SERPL-MCNC: 0.6 MG/DL (ref 0.2–1.3)
BILIRUB SERPL-MCNC: 0.6 MG/DL (ref 0.2–1.3)
BILIRUB SERPL-MCNC: 0.7 MG/DL (ref 0.2–1.3)
BILIRUB SERPL-MCNC: 0.7 MG/DL (ref 0.2–1.3)
BILIRUB SERPL-MCNC: 0.8 MG/DL (ref 0.2–1.3)
BILIRUB SERPL-MCNC: 0.9 MG/DL (ref 0.2–1.3)
BILIRUB SERPL-MCNC: 1 MG/DL (ref 0.2–1.3)
BILIRUB SERPL-MCNC: 1.1 MG/DL (ref 0.2–1.3)
BILIRUB SERPL-MCNC: 1.2 MG/DL (ref 0.2–1.3)
BILIRUB SERPL-MCNC: 1.3 MG/DL (ref 0.2–1.3)
BILIRUB SERPL-MCNC: 1.4 MG/DL (ref 0.2–1.3)
BILIRUB SERPL-MCNC: 1.5 MG/DL (ref 0.2–1.3)
BILIRUB SERPL-MCNC: 1.6 MG/DL (ref 0.2–1.3)
BILIRUB SERPL-MCNC: 1.6 MG/DL (ref 0.2–1.3)
BILIRUB SERPL-MCNC: 1.8 MG/DL (ref 0.2–1.3)
BILIRUB SERPL-MCNC: 2 MG/DL (ref 0.2–1.3)
BILIRUB SERPL-MCNC: 2.2 MG/DL (ref 0.2–1.3)
BILIRUB SERPL-MCNC: 3 MG/DL (ref 0.2–1.3)
BILIRUB UR QL STRIP: ABNORMAL
BILIRUB UR QL STRIP: ABNORMAL
BILIRUB UR QL STRIP: NEGATIVE
BUN BLD-MCNC: 100 MG/DL (ref 7–20)
BUN BLD-MCNC: 103 MG/DL (ref 7–20)
BUN BLD-MCNC: 113 MG/DL (ref 7–20)
BUN BLD-MCNC: 118 MG/DL (ref 7–20)
BUN BLD-MCNC: 19 MG/DL (ref 7–20)
BUN BLD-MCNC: 20 MG/DL (ref 7–20)
BUN BLD-MCNC: 22 MG/DL (ref 7–20)
BUN BLD-MCNC: 22 MG/DL (ref 7–20)
BUN BLD-MCNC: 23 MG/DL (ref 7–20)
BUN BLD-MCNC: 24 MG/DL (ref 7–20)
BUN BLD-MCNC: 26 MG/DL (ref 7–20)
BUN BLD-MCNC: 27 MG/DL (ref 7–20)
BUN BLD-MCNC: 28 MG/DL (ref 7–20)
BUN BLD-MCNC: 28 MG/DL (ref 7–20)
BUN BLD-MCNC: 30 MG/DL (ref 7–20)
BUN BLD-MCNC: 30 MG/DL (ref 7–20)
BUN BLD-MCNC: 31 MG/DL (ref 7–20)
BUN BLD-MCNC: 33 MG/DL (ref 7–20)
BUN BLD-MCNC: 33 MG/DL (ref 7–20)
BUN BLD-MCNC: 34 MG/DL (ref 7–20)
BUN BLD-MCNC: 36 MG/DL (ref 7–20)
BUN BLD-MCNC: 41 MG/DL (ref 7–20)
BUN BLD-MCNC: 41 MG/DL (ref 7–20)
BUN BLD-MCNC: 42 MG/DL (ref 7–20)
BUN BLD-MCNC: 51 MG/DL (ref 7–20)
BUN BLD-MCNC: 52 MG/DL (ref 7–20)
BUN BLD-MCNC: 53 MG/DL (ref 7–20)
BUN BLD-MCNC: 54 MG/DL (ref 7–20)
BUN BLD-MCNC: 55 MG/DL (ref 7–20)
BUN BLD-MCNC: 74 MG/DL (ref 7–20)
BUN BLD-MCNC: 75 MG/DL (ref 7–20)
BUN BLD-MCNC: 78 MG/DL (ref 7–20)
BUN BLD-MCNC: 79 MG/DL (ref 7–20)
BUN BLD-MCNC: 82 MG/DL (ref 7–20)
BUN BLD-MCNC: 90 MG/DL (ref 7–20)
BUN BLD-MCNC: 90 MG/DL (ref 7–20)
BUN BLD-MCNC: 91 MG/DL (ref 7–20)
BUN BLD-MCNC: 93 MG/DL (ref 7–20)
BUN BLD-MCNC: 95 MG/DL (ref 7–20)
BUN BLD-MCNC: 95 MG/DL (ref 7–20)
BUN BLD-MCNC: 96 MG/DL (ref 7–20)
BUN BLD-MCNC: 97 MG/DL (ref 7–20)
BUN BLD-MCNC: 99 MG/DL (ref 7–20)
BUN/CREAT SERPL: 13.6 (ref 7.1–23.5)
BUN/CREAT SERPL: 14 (ref 7.1–23.5)
BUN/CREAT SERPL: 14.1 (ref 7.1–23.5)
BUN/CREAT SERPL: 14.3 (ref 7.1–23.5)
BUN/CREAT SERPL: 14.4 (ref 7.1–23.5)
BUN/CREAT SERPL: 14.7 (ref 7.1–23.5)
BUN/CREAT SERPL: 14.8 (ref 7.1–23.5)
BUN/CREAT SERPL: 15 (ref 7.1–23.5)
BUN/CREAT SERPL: 15.3 (ref 7.1–23.5)
BUN/CREAT SERPL: 15.6 (ref 7.1–23.5)
BUN/CREAT SERPL: 16.3 (ref 7.1–23.5)
BUN/CREAT SERPL: 16.4 (ref 7.1–23.5)
BUN/CREAT SERPL: 16.7 (ref 7.1–23.5)
BUN/CREAT SERPL: 16.7 (ref 7.1–23.5)
BUN/CREAT SERPL: 16.8 (ref 7.1–23.5)
BUN/CREAT SERPL: 17 (ref 7.1–23.5)
BUN/CREAT SERPL: 17.2 (ref 7.1–23.5)
BUN/CREAT SERPL: 17.2 (ref 7.1–23.5)
BUN/CREAT SERPL: 18 (ref 7.1–23.5)
BUN/CREAT SERPL: 18.2 (ref 7.1–23.5)
BUN/CREAT SERPL: 18.3 (ref 7.1–23.5)
BUN/CREAT SERPL: 18.6 (ref 7.1–23.5)
BUN/CREAT SERPL: 19.5 (ref 7.1–23.5)
BUN/CREAT SERPL: 19.6 (ref 7.1–23.5)
BUN/CREAT SERPL: 20.4 (ref 7.1–23.5)
BUN/CREAT SERPL: 20.5 (ref 7.1–23.5)
BUN/CREAT SERPL: 21.2 (ref 7.1–23.5)
BUN/CREAT SERPL: 22 (ref 7.1–23.5)
BUN/CREAT SERPL: 22.1 (ref 7.1–23.5)
BUN/CREAT SERPL: 22.8 (ref 7.1–23.5)
BUN/CREAT SERPL: 23.1 (ref 7.1–23.5)
BUN/CREAT SERPL: 23.2 (ref 7.1–23.5)
BUN/CREAT SERPL: 23.5 (ref 7.1–23.5)
BUN/CREAT SERPL: 23.8 (ref 7.1–23.5)
BUN/CREAT SERPL: 24.8 (ref 7.1–23.5)
BUN/CREAT SERPL: 25.5 (ref 7.1–23.5)
BUN/CREAT SERPL: 27.4 (ref 7.1–23.5)
BUN/CREAT SERPL: 29.1 (ref 7.1–23.5)
BUN/CREAT SERPL: 30 (ref 7.1–23.5)
BUN/CREAT SERPL: 31.1 (ref 7.1–23.5)
BUN/CREAT SERPL: 33 (ref 7.1–23.5)
BUN/CREAT SERPL: 34.2 (ref 7.1–23.5)
BUN/CREAT SERPL: 34.5 (ref 7.1–23.5)
BUN/CREAT SERPL: 35.5 (ref 7.1–23.5)
C DIFF TOX A+B STL QL IA: NEGATIVE
CALCIUM SPEC-SCNC: 7.3 MG/DL (ref 8.4–10.2)
CALCIUM SPEC-SCNC: 7.4 MG/DL (ref 8.4–10.2)
CALCIUM SPEC-SCNC: 7.4 MG/DL (ref 8.4–10.2)
CALCIUM SPEC-SCNC: 7.5 MG/DL (ref 8.4–10.2)
CALCIUM SPEC-SCNC: 7.6 MG/DL (ref 8.4–10.2)
CALCIUM SPEC-SCNC: 7.6 MG/DL (ref 8.4–10.2)
CALCIUM SPEC-SCNC: 7.7 MG/DL (ref 8.4–10.2)
CALCIUM SPEC-SCNC: 7.8 MG/DL (ref 8.4–10.2)
CALCIUM SPEC-SCNC: 7.9 MG/DL (ref 8.4–10.2)
CALCIUM SPEC-SCNC: 8 MG/DL (ref 8.4–10.2)
CALCIUM SPEC-SCNC: 8.1 MG/DL (ref 8.4–10.2)
CALCIUM SPEC-SCNC: 8.2 MG/DL (ref 8.4–10.2)
CALCIUM SPEC-SCNC: 8.2 MG/DL (ref 8.4–10.2)
CALCIUM SPEC-SCNC: 8.3 MG/DL (ref 8.4–10.2)
CALCIUM SPEC-SCNC: 8.3 MG/DL (ref 8.4–10.2)
CALCIUM SPEC-SCNC: 8.4 MG/DL (ref 8.4–10.2)
CALCIUM SPEC-SCNC: 8.5 MG/DL (ref 8.4–10.2)
CALCIUM SPEC-SCNC: 8.6 MG/DL (ref 8.4–10.2)
CALCIUM SPEC-SCNC: 8.7 MG/DL (ref 8.4–10.2)
CALCIUM SPEC-SCNC: 9.2 MG/DL (ref 8.4–10.2)
CHLORIDE SERPL-SCNC: 101 MMOL/L (ref 98–107)
CHLORIDE SERPL-SCNC: 102 MMOL/L (ref 98–107)
CHLORIDE SERPL-SCNC: 103 MMOL/L (ref 98–107)
CHLORIDE SERPL-SCNC: 104 MMOL/L (ref 98–107)
CHLORIDE SERPL-SCNC: 105 MMOL/L (ref 98–107)
CHLORIDE SERPL-SCNC: 106 MMOL/L (ref 98–107)
CHLORIDE SERPL-SCNC: 107 MMOL/L (ref 98–107)
CHLORIDE SERPL-SCNC: 108 MMOL/L (ref 98–107)
CHLORIDE SERPL-SCNC: 109 MMOL/L (ref 98–107)
CHLORIDE SERPL-SCNC: 110 MMOL/L (ref 98–107)
CHLORIDE SERPL-SCNC: 110 MMOL/L (ref 98–107)
CHLORIDE SERPL-SCNC: 111 MMOL/L (ref 98–107)
CHLORIDE SERPL-SCNC: 111 MMOL/L (ref 98–107)
CHLORIDE SERPL-SCNC: 112 MMOL/L (ref 98–107)
CHLORIDE SERPL-SCNC: 115 MMOL/L (ref 98–107)
CHLORIDE SERPL-SCNC: 96 MMOL/L (ref 98–107)
CHLORIDE SERPL-SCNC: 98 MMOL/L (ref 98–107)
CK SERPL-CCNC: 56 U/L (ref 30–170)
CK SERPL-CCNC: 83 U/L (ref 30–170)
CLARITY UR: ABNORMAL
CLARITY UR: CLEAR
CO2 SERPL-SCNC: 12 MMOL/L (ref 26–30)
CO2 SERPL-SCNC: 13 MMOL/L (ref 26–30)
CO2 SERPL-SCNC: 15 MMOL/L (ref 26–30)
CO2 SERPL-SCNC: 15 MMOL/L (ref 26–30)
CO2 SERPL-SCNC: 16 MMOL/L (ref 26–30)
CO2 SERPL-SCNC: 17 MMOL/L (ref 26–30)
CO2 SERPL-SCNC: 18 MMOL/L (ref 26–30)
CO2 SERPL-SCNC: 19 MMOL/L (ref 26–30)
CO2 SERPL-SCNC: 20 MMOL/L (ref 26–30)
CO2 SERPL-SCNC: 21 MMOL/L (ref 26–30)
CO2 SERPL-SCNC: 22 MMOL/L (ref 26–30)
CO2 SERPL-SCNC: 23 MMOL/L (ref 26–30)
CO2 SERPL-SCNC: 24 MMOL/L (ref 26–30)
CO2 SERPL-SCNC: 25 MMOL/L (ref 26–30)
CO2 SERPL-SCNC: 26 MMOL/L (ref 26–30)
CO2 SERPL-SCNC: 27 MMOL/L (ref 26–30)
CO2 SERPL-SCNC: 28 MMOL/L (ref 26–30)
COHGB MFR BLD: 1.6 %
COLLECT DURATION TIME UR: 24 HRS
COLLECT DURATION TIME UR: 24 HRS
COLOR UR: ABNORMAL
COLOR UR: YELLOW
CREAT BLD-MCNC: 1.2 MG/DL (ref 0.6–1.3)
CREAT BLD-MCNC: 1.2 MG/DL (ref 0.6–1.3)
CREAT BLD-MCNC: 1.3 MG/DL (ref 0.6–1.3)
CREAT BLD-MCNC: 1.4 MG/DL (ref 0.6–1.3)
CREAT BLD-MCNC: 1.5 MG/DL (ref 0.6–1.3)
CREAT BLD-MCNC: 1.5 MG/DL (ref 0.6–1.3)
CREAT BLD-MCNC: 1.7 MG/DL (ref 0.6–1.3)
CREAT BLD-MCNC: 1.7 MG/DL (ref 0.6–1.3)
CREAT BLD-MCNC: 1.8 MG/DL (ref 0.6–1.3)
CREAT BLD-MCNC: 1.9 MG/DL (ref 0.6–1.3)
CREAT BLD-MCNC: 1.9 MG/DL (ref 0.6–1.3)
CREAT BLD-MCNC: 2 MG/DL (ref 0.6–1.3)
CREAT BLD-MCNC: 2 MG/DL (ref 0.6–1.3)
CREAT BLD-MCNC: 2.1 MG/DL (ref 0.6–1.3)
CREAT BLD-MCNC: 2.1 MG/DL (ref 0.6–1.3)
CREAT BLD-MCNC: 2.2 MG/DL (ref 0.6–1.3)
CREAT BLD-MCNC: 2.2 MG/DL (ref 0.6–1.3)
CREAT BLD-MCNC: 2.3 MG/DL (ref 0.6–1.3)
CREAT BLD-MCNC: 2.5 MG/DL (ref 0.6–1.3)
CREAT BLD-MCNC: 2.5 MG/DL (ref 0.6–1.3)
CREAT BLD-MCNC: 2.7 MG/DL (ref 0.6–1.3)
CREAT BLD-MCNC: 2.9 MG/DL (ref 0.6–1.3)
CREAT BLD-MCNC: 2.9 MG/DL (ref 0.6–1.3)
CREAT BLD-MCNC: 3 MG/DL (ref 0.6–1.3)
CREAT BLD-MCNC: 3 MG/DL (ref 0.6–1.3)
CREAT BLD-MCNC: 3.2 MG/DL (ref 0.6–1.3)
CREAT BLD-MCNC: 3.3 MG/DL (ref 0.6–1.3)
CREAT BLD-MCNC: 3.5 MG/DL (ref 0.6–1.3)
CREAT BLD-MCNC: 3.8 MG/DL (ref 0.6–1.3)
CREAT BLD-MCNC: 3.8 MG/DL (ref 0.6–1.3)
CREAT BLD-MCNC: 4 MG/DL (ref 0.6–1.3)
CREAT BLD-MCNC: 4.1 MG/DL (ref 0.6–1.3)
CREAT BLD-MCNC: 4.3 MG/DL (ref 0.6–1.3)
CREAT BLD-MCNC: 4.4 MG/DL (ref 0.6–1.3)
CREAT BLD-MCNC: 4.5 MG/DL (ref 0.6–1.3)
CREAT BLD-MCNC: 4.6 MG/DL (ref 0.6–1.3)
CREAT CL 24H UR+SERPL-VRATE: 6.1 ML/MIN (ref 88–128)
CREAT CL 24H UR+SERPL-VRATE: 8.8 L/24 HR (ref 126.7–184.3)
CREAT UR-MCNC: 105.4 MG/DL
CREATINE 24H UR-MRATE: 0.32 G/24 HR (ref 0.8–2)
CREATINE 24H UR-SRATE: 31 MG/24 HR (ref 0–80)
CREATINE UR-MCNC: 2.5 MG/DL
CRP SERPL-MCNC: 7.3 MG/DL (ref 0–1)
D-LACTATE SERPL-SCNC: 1.6 MMOL/L (ref 0.5–2)
D-LACTATE SERPL-SCNC: 2.1 MMOL/L (ref 0.5–2)
D-LACTATE SERPL-SCNC: 2.2 MMOL/L (ref 0.5–2)
D-LACTATE SERPL-SCNC: 2.8 MMOL/L (ref 0.5–2)
D-LACTATE SERPL-SCNC: 3.1 MMOL/L (ref 0.5–2)
D-LACTATE SERPL-SCNC: 3.6 MMOL/L (ref 0.5–2)
D-LACTATE SERPL-SCNC: 3.7 MMOL/L (ref 0.5–2)
D-LACTATE SERPL-SCNC: 3.8 MMOL/L (ref 0.5–2)
DEPRECATED RDW RBC AUTO: 49.5 FL (ref 37–54)
DEPRECATED RDW RBC AUTO: 50.4 FL (ref 37–54)
DEPRECATED RDW RBC AUTO: 50.4 FL (ref 37–54)
DEPRECATED RDW RBC AUTO: 50.9 FL (ref 37–54)
DEPRECATED RDW RBC AUTO: 51.1 FL (ref 37–54)
DEPRECATED RDW RBC AUTO: 52.3 FL (ref 37–54)
DEPRECATED RDW RBC AUTO: 53.4 FL (ref 37–54)
DEPRECATED RDW RBC AUTO: 53.8 FL (ref 37–54)
DEPRECATED RDW RBC AUTO: 54.4 FL (ref 37–54)
DEPRECATED RDW RBC AUTO: 54.6 FL (ref 37–54)
DEPRECATED RDW RBC AUTO: 54.8 FL (ref 37–54)
DEPRECATED RDW RBC AUTO: 55.4 FL (ref 37–54)
DEPRECATED RDW RBC AUTO: 55.6 FL (ref 37–54)
DEPRECATED RDW RBC AUTO: 55.7 FL (ref 37–54)
DEPRECATED RDW RBC AUTO: 56 FL (ref 37–54)
DEPRECATED RDW RBC AUTO: 56.1 FL (ref 37–54)
DEPRECATED RDW RBC AUTO: 56.4 FL (ref 37–54)
DEPRECATED RDW RBC AUTO: 56.7 FL (ref 37–54)
DEPRECATED RDW RBC AUTO: 56.9 FL (ref 37–54)
DEPRECATED RDW RBC AUTO: 57.2 FL (ref 37–54)
DEPRECATED RDW RBC AUTO: 57.3 FL (ref 37–54)
DEPRECATED RDW RBC AUTO: 57.4 FL (ref 37–54)
DEPRECATED RDW RBC AUTO: 57.4 FL (ref 37–54)
DEPRECATED RDW RBC AUTO: 57.5 FL (ref 37–54)
DEPRECATED RDW RBC AUTO: 58.8 FL (ref 37–54)
DEPRECATED RDW RBC AUTO: 60.2 FL (ref 37–54)
DEPRECATED RDW RBC AUTO: 61.4 FL (ref 37–54)
DEPRECATED RDW RBC AUTO: 61.7 FL (ref 37–54)
DEPRECATED RDW RBC AUTO: 62.7 FL (ref 37–54)
DEPRECATED RDW RBC AUTO: 63.6 FL (ref 37–54)
DEPRECATED RDW RBC AUTO: 64.1 FL (ref 37–54)
DEPRECATED RDW RBC AUTO: 64.5 FL (ref 37–54)
DEPRECATED RDW RBC AUTO: 64.8 FL (ref 37–54)
DEPRECATED RDW RBC AUTO: 64.8 FL (ref 37–54)
DEPRECATED RDW RBC AUTO: 64.9 FL (ref 37–54)
DEPRECATED RDW RBC AUTO: 65.1 FL (ref 37–54)
DEPRECATED RDW RBC AUTO: 65.3 FL (ref 37–54)
DEPRECATED RDW RBC AUTO: 65.5 FL (ref 37–54)
DEPRECATED RDW RBC AUTO: 66.2 FL (ref 37–54)
DEPRECATED RDW RBC AUTO: 66.2 FL (ref 37–54)
DEPRECATED RDW RBC AUTO: 66.3 FL (ref 37–54)
DEPRECATED RDW RBC AUTO: 66.5 FL (ref 37–54)
DEPRECATED RDW RBC AUTO: 66.7 FL (ref 37–54)
DEPRECATED RDW RBC AUTO: 67 FL (ref 37–54)
DEPRECATED RDW RBC AUTO: 68.4 FL (ref 37–54)
DIGOXIN SERPL-MCNC: 0.8 NG/ML (ref 0.8–2)
DIGOXIN SERPL-MCNC: 1.1 NG/ML (ref 0.8–2)
DIGOXIN SERPL-MCNC: 1.3 NG/ML (ref 0.8–2)
EOSINOPHIL # BLD AUTO: 0 10*3/MM3 (ref 0–0.7)
EOSINOPHIL # BLD AUTO: 0.01 10*3/MM3 (ref 0–0.7)
EOSINOPHIL # BLD AUTO: 0.02 10*3/MM3 (ref 0–0.7)
EOSINOPHIL # BLD AUTO: 0.02 10*3/MM3 (ref 0–0.7)
EOSINOPHIL # BLD AUTO: 0.03 10*3/MM3 (ref 0–0.7)
EOSINOPHIL # BLD AUTO: 0.04 10*3/MM3 (ref 0–0.7)
EOSINOPHIL # BLD AUTO: 0.04 10*3/MM3 (ref 0–0.7)
EOSINOPHIL # BLD AUTO: 0.05 10*3/MM3 (ref 0–0.7)
EOSINOPHIL # BLD AUTO: 0.06 10*3/MM3 (ref 0–0.7)
EOSINOPHIL # BLD AUTO: 0.07 10*3/MM3 (ref 0–0.7)
EOSINOPHIL # BLD AUTO: 0.08 10*3/MM3 (ref 0–0.7)
EOSINOPHIL # BLD AUTO: 0.08 10*3/MM3 (ref 0–0.7)
EOSINOPHIL # BLD AUTO: 0.1 10*3/MM3 (ref 0–0.7)
EOSINOPHIL # BLD AUTO: 0.11 10*3/MM3 (ref 0–0.7)
EOSINOPHIL # BLD AUTO: 0.11 10*3/MM3 (ref 0–0.7)
EOSINOPHIL # BLD AUTO: 0.12 10*3/MM3 (ref 0–0.7)
EOSINOPHIL # BLD AUTO: 0.13 10*3/MM3 (ref 0–0.7)
EOSINOPHIL # BLD AUTO: 0.14 10*3/MM3 (ref 0–0.7)
EOSINOPHIL # BLD AUTO: 0.14 10*3/MM3 (ref 0–0.7)
EOSINOPHIL NFR BLD AUTO: 0 % (ref 0–7)
EOSINOPHIL NFR BLD AUTO: 0.2 % (ref 0–7)
EOSINOPHIL NFR BLD AUTO: 0.3 % (ref 0–7)
EOSINOPHIL NFR BLD AUTO: 0.3 % (ref 0–7)
EOSINOPHIL NFR BLD AUTO: 0.4 % (ref 0–7)
EOSINOPHIL NFR BLD AUTO: 0.9 % (ref 0–7)
EOSINOPHIL NFR BLD AUTO: 1.2 % (ref 0–7)
EOSINOPHIL NFR BLD AUTO: 1.2 % (ref 0–7)
EOSINOPHIL NFR BLD AUTO: 1.4 % (ref 0–7)
EOSINOPHIL NFR BLD AUTO: 1.4 % (ref 0–7)
EOSINOPHIL NFR BLD AUTO: 1.6 % (ref 0–7)
EOSINOPHIL NFR BLD AUTO: 1.7 % (ref 0–7)
EOSINOPHIL NFR BLD AUTO: 1.8 % (ref 0–7)
EOSINOPHIL NFR BLD AUTO: 1.9 % (ref 0–7)
EOSINOPHIL NFR BLD AUTO: 1.9 % (ref 0–7)
EOSINOPHIL NFR BLD AUTO: 2 % (ref 0–7)
EOSINOPHIL NFR BLD AUTO: 2.1 % (ref 0–7)
EOSINOPHIL NFR BLD AUTO: 2.2 % (ref 0–7)
EOSINOPHIL NFR BLD AUTO: 2.4 % (ref 0–7)
EOSINOPHIL NFR BLD AUTO: 2.7 % (ref 0–7)
EOSINOPHIL NFR BLD AUTO: 2.8 % (ref 0–7)
EOSINOPHIL NFR BLD AUTO: 2.9 % (ref 0–7)
EOSINOPHIL NFR BLD AUTO: 3.2 % (ref 0–7)
EOSINOPHIL NFR BLD AUTO: 3.8 % (ref 0–7)
EOSINOPHIL NFR BLD AUTO: 4.1 % (ref 0–7)
ERYTHROCYTE [DISTWIDTH] IN BLOOD BY AUTOMATED COUNT: 15.2 % (ref 11.5–14.5)
ERYTHROCYTE [DISTWIDTH] IN BLOOD BY AUTOMATED COUNT: 15.4 % (ref 11.5–14.5)
ERYTHROCYTE [DISTWIDTH] IN BLOOD BY AUTOMATED COUNT: 15.5 % (ref 11.5–14.5)
ERYTHROCYTE [DISTWIDTH] IN BLOOD BY AUTOMATED COUNT: 15.5 % (ref 11.5–14.5)
ERYTHROCYTE [DISTWIDTH] IN BLOOD BY AUTOMATED COUNT: 15.6 % (ref 11.5–14.5)
ERYTHROCYTE [DISTWIDTH] IN BLOOD BY AUTOMATED COUNT: 15.6 % (ref 11.5–14.5)
ERYTHROCYTE [DISTWIDTH] IN BLOOD BY AUTOMATED COUNT: 15.7 % (ref 11.5–14.5)
ERYTHROCYTE [DISTWIDTH] IN BLOOD BY AUTOMATED COUNT: 15.8 % (ref 11.5–14.5)
ERYTHROCYTE [DISTWIDTH] IN BLOOD BY AUTOMATED COUNT: 15.8 % (ref 11.5–14.5)
ERYTHROCYTE [DISTWIDTH] IN BLOOD BY AUTOMATED COUNT: 15.9 % (ref 11.5–14.5)
ERYTHROCYTE [DISTWIDTH] IN BLOOD BY AUTOMATED COUNT: 15.9 % (ref 11.5–14.5)
ERYTHROCYTE [DISTWIDTH] IN BLOOD BY AUTOMATED COUNT: 16.4 % (ref 11.5–14.5)
ERYTHROCYTE [DISTWIDTH] IN BLOOD BY AUTOMATED COUNT: 16.4 % (ref 11.5–14.5)
ERYTHROCYTE [DISTWIDTH] IN BLOOD BY AUTOMATED COUNT: 16.5 % (ref 11.5–14.5)
ERYTHROCYTE [DISTWIDTH] IN BLOOD BY AUTOMATED COUNT: 16.5 % (ref 11.5–14.5)
ERYTHROCYTE [DISTWIDTH] IN BLOOD BY AUTOMATED COUNT: 16.6 % (ref 11.5–14.5)
ERYTHROCYTE [DISTWIDTH] IN BLOOD BY AUTOMATED COUNT: 16.6 % (ref 11.5–14.5)
ERYTHROCYTE [DISTWIDTH] IN BLOOD BY AUTOMATED COUNT: 16.7 % (ref 11.5–14.5)
ERYTHROCYTE [DISTWIDTH] IN BLOOD BY AUTOMATED COUNT: 16.7 % (ref 11.5–14.5)
ERYTHROCYTE [DISTWIDTH] IN BLOOD BY AUTOMATED COUNT: 16.8 % (ref 11.5–14.5)
ERYTHROCYTE [DISTWIDTH] IN BLOOD BY AUTOMATED COUNT: 16.8 % (ref 11.5–14.5)
ERYTHROCYTE [DISTWIDTH] IN BLOOD BY AUTOMATED COUNT: 17.5 % (ref 11.5–14.5)
ERYTHROCYTE [DISTWIDTH] IN BLOOD BY AUTOMATED COUNT: 17.6 % (ref 11.5–14.5)
ERYTHROCYTE [DISTWIDTH] IN BLOOD BY AUTOMATED COUNT: 17.9 % (ref 11.5–14.5)
ERYTHROCYTE [DISTWIDTH] IN BLOOD BY AUTOMATED COUNT: 18 % (ref 11.5–14.5)
ERYTHROCYTE [DISTWIDTH] IN BLOOD BY AUTOMATED COUNT: 18.1 % (ref 11.5–14.5)
ERYTHROCYTE [DISTWIDTH] IN BLOOD BY AUTOMATED COUNT: 18.3 % (ref 11.5–14.5)
ERYTHROCYTE [DISTWIDTH] IN BLOOD BY AUTOMATED COUNT: 18.4 % (ref 11.5–14.5)
ERYTHROCYTE [DISTWIDTH] IN BLOOD BY AUTOMATED COUNT: 18.6 % (ref 11.5–14.5)
ERYTHROCYTE [DISTWIDTH] IN BLOOD BY AUTOMATED COUNT: 18.7 % (ref 11.5–14.5)
ERYTHROCYTE [DISTWIDTH] IN BLOOD BY AUTOMATED COUNT: 18.8 % (ref 11.5–14.5)
ERYTHROCYTE [DISTWIDTH] IN BLOOD BY AUTOMATED COUNT: 18.9 % (ref 11.5–14.5)
ERYTHROCYTE [DISTWIDTH] IN BLOOD BY AUTOMATED COUNT: 18.9 % (ref 11.5–14.5)
ERYTHROCYTE [DISTWIDTH] IN BLOOD BY AUTOMATED COUNT: 19.1 % (ref 11.5–14.5)
ERYTHROCYTE [DISTWIDTH] IN BLOOD BY AUTOMATED COUNT: 19.2 % (ref 11.5–14.5)
ERYTHROCYTE [DISTWIDTH] IN BLOOD BY AUTOMATED COUNT: 19.3 % (ref 11.5–14.5)
ERYTHROCYTE [DISTWIDTH] IN BLOOD BY AUTOMATED COUNT: 19.3 % (ref 11.5–14.5)
ERYTHROCYTE [DISTWIDTH] IN BLOOD BY AUTOMATED COUNT: 19.4 % (ref 11.5–14.5)
ERYTHROCYTE [DISTWIDTH] IN BLOOD BY AUTOMATED COUNT: 19.7 % (ref 11.5–14.5)
ERYTHROCYTE [DISTWIDTH] IN BLOOD BY AUTOMATED COUNT: 19.8 % (ref 11.5–14.5)
ERYTHROCYTE [DISTWIDTH] IN BLOOD BY AUTOMATED COUNT: 20.4 % (ref 11.5–14.5)
ERYTHROCYTE [SEDIMENTATION RATE] IN BLOOD: 30 MM/HR (ref 0–20)
FERRITIN SERPL-MCNC: 129 NG/ML (ref 11.1–264)
FOLATE BLD-MCNC: 299.8 NG/ML
FOLATE RBC-MCNC: 1234 NG/ML
GFR SERPL CREATININE-BSD FRML MDRD: 10 ML/MIN/1.73
GFR SERPL CREATININE-BSD FRML MDRD: 11 ML/MIN/1.73
GFR SERPL CREATININE-BSD FRML MDRD: 13 ML/MIN/1.73
GFR SERPL CREATININE-BSD FRML MDRD: 13 ML/MIN/1.73
GFR SERPL CREATININE-BSD FRML MDRD: 14 ML/MIN/1.73
GFR SERPL CREATININE-BSD FRML MDRD: 15 ML/MIN/1.73
GFR SERPL CREATININE-BSD FRML MDRD: 15 ML/MIN/1.73
GFR SERPL CREATININE-BSD FRML MDRD: 16 ML/MIN/1.73
GFR SERPL CREATININE-BSD FRML MDRD: 16 ML/MIN/1.73
GFR SERPL CREATININE-BSD FRML MDRD: 17 ML/MIN/1.73
GFR SERPL CREATININE-BSD FRML MDRD: 19 ML/MIN/1.73
GFR SERPL CREATININE-BSD FRML MDRD: 19 ML/MIN/1.73
GFR SERPL CREATININE-BSD FRML MDRD: 20 ML/MIN/1.73
GFR SERPL CREATININE-BSD FRML MDRD: 22 ML/MIN/1.73
GFR SERPL CREATININE-BSD FRML MDRD: 22 ML/MIN/1.73
GFR SERPL CREATININE-BSD FRML MDRD: 23 ML/MIN/1.73
GFR SERPL CREATININE-BSD FRML MDRD: 23 ML/MIN/1.73
GFR SERPL CREATININE-BSD FRML MDRD: 24 ML/MIN/1.73
GFR SERPL CREATININE-BSD FRML MDRD: 24 ML/MIN/1.73
GFR SERPL CREATININE-BSD FRML MDRD: 26 ML/MIN/1.73
GFR SERPL CREATININE-BSD FRML MDRD: 26 ML/MIN/1.73
GFR SERPL CREATININE-BSD FRML MDRD: 27 ML/MIN/1.73
GFR SERPL CREATININE-BSD FRML MDRD: 29 ML/MIN/1.73
GFR SERPL CREATININE-BSD FRML MDRD: 29 ML/MIN/1.73
GFR SERPL CREATININE-BSD FRML MDRD: 33 ML/MIN/1.73
GFR SERPL CREATININE-BSD FRML MDRD: 33 ML/MIN/1.73
GFR SERPL CREATININE-BSD FRML MDRD: 36 ML/MIN/1.73
GFR SERPL CREATININE-BSD FRML MDRD: 40 ML/MIN/1.73
GFR SERPL CREATININE-BSD FRML MDRD: 43 ML/MIN/1.73
GFR SERPL CREATININE-BSD FRML MDRD: 43 ML/MIN/1.73
GFR SERPL CREATININE-BSD FRML MDRD: 9 ML/MIN/1.73
GFR SERPL CREATININE-BSD FRML MDRD: 9 ML/MIN/1.73
GLOBULIN UR ELPH-MCNC: 3.2 GM/DL
GLOBULIN UR ELPH-MCNC: 3.5 GM/DL
GLOBULIN UR ELPH-MCNC: 3.5 GM/DL
GLOBULIN UR ELPH-MCNC: 3.6 GM/DL
GLOBULIN UR ELPH-MCNC: 3.7 GM/DL
GLOBULIN UR ELPH-MCNC: 3.8 GM/DL
GLOBULIN UR ELPH-MCNC: 3.9 GM/DL
GLOBULIN UR ELPH-MCNC: 4 GM/DL
GLOBULIN UR ELPH-MCNC: 4.1 GM/DL
GLOBULIN UR ELPH-MCNC: 4.2 GM/DL
GLOBULIN UR ELPH-MCNC: 4.3 GM/DL
GLOBULIN UR ELPH-MCNC: 4.4 GM/DL
GLOBULIN UR ELPH-MCNC: 4.4 GM/DL
GLOBULIN UR ELPH-MCNC: 4.7 GM/DL
GLOBULIN UR ELPH-MCNC: 5 GM/DL
GLUCOSE BLD-MCNC: 101 MG/DL (ref 74–98)
GLUCOSE BLD-MCNC: 101 MG/DL (ref 74–98)
GLUCOSE BLD-MCNC: 102 MG/DL (ref 74–98)
GLUCOSE BLD-MCNC: 102 MG/DL (ref 74–98)
GLUCOSE BLD-MCNC: 104 MG/DL (ref 74–98)
GLUCOSE BLD-MCNC: 105 MG/DL (ref 74–98)
GLUCOSE BLD-MCNC: 109 MG/DL (ref 74–98)
GLUCOSE BLD-MCNC: 110 MG/DL (ref 74–98)
GLUCOSE BLD-MCNC: 110 MG/DL (ref 74–98)
GLUCOSE BLD-MCNC: 112 MG/DL (ref 74–98)
GLUCOSE BLD-MCNC: 114 MG/DL (ref 74–98)
GLUCOSE BLD-MCNC: 115 MG/DL (ref 74–98)
GLUCOSE BLD-MCNC: 117 MG/DL (ref 74–98)
GLUCOSE BLD-MCNC: 121 MG/DL (ref 74–98)
GLUCOSE BLD-MCNC: 124 MG/DL (ref 74–98)
GLUCOSE BLD-MCNC: 124 MG/DL (ref 74–98)
GLUCOSE BLD-MCNC: 66 MG/DL (ref 74–98)
GLUCOSE BLD-MCNC: 70 MG/DL (ref 74–98)
GLUCOSE BLD-MCNC: 76 MG/DL (ref 74–98)
GLUCOSE BLD-MCNC: 77 MG/DL (ref 74–98)
GLUCOSE BLD-MCNC: 77 MG/DL (ref 74–98)
GLUCOSE BLD-MCNC: 78 MG/DL (ref 74–98)
GLUCOSE BLD-MCNC: 78 MG/DL (ref 74–98)
GLUCOSE BLD-MCNC: 79 MG/DL (ref 74–98)
GLUCOSE BLD-MCNC: 79 MG/DL (ref 74–98)
GLUCOSE BLD-MCNC: 81 MG/DL (ref 74–98)
GLUCOSE BLD-MCNC: 81 MG/DL (ref 74–98)
GLUCOSE BLD-MCNC: 82 MG/DL (ref 74–98)
GLUCOSE BLD-MCNC: 84 MG/DL (ref 74–98)
GLUCOSE BLD-MCNC: 86 MG/DL (ref 74–98)
GLUCOSE BLD-MCNC: 86 MG/DL (ref 74–98)
GLUCOSE BLD-MCNC: 87 MG/DL (ref 74–98)
GLUCOSE BLD-MCNC: 87 MG/DL (ref 74–98)
GLUCOSE BLD-MCNC: 88 MG/DL (ref 74–98)
GLUCOSE BLD-MCNC: 89 MG/DL (ref 74–98)
GLUCOSE BLD-MCNC: 90 MG/DL (ref 74–98)
GLUCOSE BLD-MCNC: 92 MG/DL (ref 74–98)
GLUCOSE BLD-MCNC: 93 MG/DL (ref 74–98)
GLUCOSE BLD-MCNC: 93 MG/DL (ref 74–98)
GLUCOSE BLD-MCNC: 94 MG/DL (ref 74–98)
GLUCOSE BLD-MCNC: 96 MG/DL (ref 74–98)
GLUCOSE BLD-MCNC: 97 MG/DL (ref 74–98)
GLUCOSE BLD-MCNC: 98 MG/DL (ref 74–98)
GLUCOSE BLD-MCNC: 99 MG/DL (ref 74–98)
GLUCOSE BLDC GLUCOMTR-MCNC: 112 MG/DL (ref 70–130)
GLUCOSE BLDC GLUCOMTR-MCNC: 87 MG/DL (ref 70–130)
GLUCOSE BLDC GLUCOMTR-MCNC: 90 MG/DL (ref 70–130)
GLUCOSE BLDC GLUCOMTR-MCNC: 95 MG/DL (ref 70–130)
GLUCOSE UR STRIP-MCNC: NEGATIVE MG/DL
GRAN CASTS URNS QL MICRO: ABNORMAL /LPF
GRAN CASTS URNS QL MICRO: ABNORMAL /LPF
HAPTOGLOB SERPL-MCNC: 105 MG/DL (ref 34–200)
HAV IGM SERPL QL IA: NEGATIVE
HBV CORE IGM SERPL QL IA: NEGATIVE
HBV SURFACE AB SER QL: NON REACTIVE
HBV SURFACE AG SERPL QL IA: NEGATIVE
HCO3 BLDA-SCNC: 17.5 MMOL/L (ref 22–28)
HCO3 BLDA-SCNC: 27.7 MMOL/L (ref 22–28)
HCT VFR BLD AUTO: 24.3 % (ref 34–46.6)
HCT VFR BLD AUTO: 25 % (ref 37–47)
HCT VFR BLD AUTO: 25.1 % (ref 37–47)
HCT VFR BLD AUTO: 25.2 % (ref 37–47)
HCT VFR BLD AUTO: 25.4 % (ref 37–47)
HCT VFR BLD AUTO: 25.9 % (ref 37–47)
HCT VFR BLD AUTO: 25.9 % (ref 37–47)
HCT VFR BLD AUTO: 26.1 % (ref 37–47)
HCT VFR BLD AUTO: 26.1 % (ref 37–47)
HCT VFR BLD AUTO: 26.4 % (ref 37–47)
HCT VFR BLD AUTO: 26.4 % (ref 37–47)
HCT VFR BLD AUTO: 26.5 % (ref 37–47)
HCT VFR BLD AUTO: 26.8 % (ref 37–47)
HCT VFR BLD AUTO: 27 % (ref 37–47)
HCT VFR BLD AUTO: 27 % (ref 37–47)
HCT VFR BLD AUTO: 27.1 % (ref 37–47)
HCT VFR BLD AUTO: 27.3 % (ref 37–47)
HCT VFR BLD AUTO: 27.3 % (ref 37–47)
HCT VFR BLD AUTO: 27.6 % (ref 37–47)
HCT VFR BLD AUTO: 27.9 % (ref 37–47)
HCT VFR BLD AUTO: 28 % (ref 37–47)
HCT VFR BLD AUTO: 28 % (ref 37–47)
HCT VFR BLD AUTO: 28.1 % (ref 37–47)
HCT VFR BLD AUTO: 28.2 % (ref 37–47)
HCT VFR BLD AUTO: 28.6 % (ref 37–47)
HCT VFR BLD AUTO: 28.7 % (ref 37–47)
HCT VFR BLD AUTO: 28.9 % (ref 37–47)
HCT VFR BLD AUTO: 29 % (ref 37–47)
HCT VFR BLD AUTO: 29 % (ref 37–47)
HCT VFR BLD AUTO: 29.4 % (ref 37–47)
HCT VFR BLD AUTO: 29.5 % (ref 37–47)
HCT VFR BLD AUTO: 29.5 % (ref 37–47)
HCT VFR BLD AUTO: 29.9 % (ref 37–47)
HCT VFR BLD AUTO: 31.2 % (ref 37–47)
HCT VFR BLD AUTO: 31.4 % (ref 37–47)
HCT VFR BLD AUTO: 31.5 % (ref 37–47)
HCT VFR BLD AUTO: 32.7 % (ref 37–47)
HCT VFR BLD AUTO: 33.2 % (ref 37–47)
HCT VFR BLD AUTO: 33.8 % (ref 37–47)
HCT VFR BLD AUTO: 33.9 % (ref 37–47)
HCV AB S/CO SERPL IA: 0.2 S/CO RATIO (ref 0–0.9)
HGB BLD-MCNC: 10 G/DL (ref 12–16)
HGB BLD-MCNC: 10.1 G/DL (ref 12–16)
HGB BLD-MCNC: 10.1 G/DL (ref 12–16)
HGB BLD-MCNC: 10.7 G/DL (ref 12–16)
HGB BLD-MCNC: 11.1 G/DL (ref 12–16)
HGB BLD-MCNC: 7.7 G/DL (ref 12–16)
HGB BLD-MCNC: 7.8 G/DL (ref 12–16)
HGB BLD-MCNC: 7.9 G/DL (ref 12–16)
HGB BLD-MCNC: 8.2 G/DL (ref 12–16)
HGB BLD-MCNC: 8.3 G/DL (ref 12–16)
HGB BLD-MCNC: 8.4 G/DL (ref 12–16)
HGB BLD-MCNC: 8.5 G/DL (ref 12–16)
HGB BLD-MCNC: 8.6 G/DL (ref 12–16)
HGB BLD-MCNC: 8.7 G/DL (ref 12–16)
HGB BLD-MCNC: 8.7 G/DL (ref 12–16)
HGB BLD-MCNC: 8.8 G/DL (ref 12–16)
HGB BLD-MCNC: 8.8 G/DL (ref 12–16)
HGB BLD-MCNC: 8.9 G/DL (ref 12–16)
HGB BLD-MCNC: 9 G/DL (ref 12–16)
HGB BLD-MCNC: 9.1 G/DL (ref 12–16)
HGB BLD-MCNC: 9.2 G/DL (ref 12–16)
HGB BLD-MCNC: 9.2 G/DL (ref 12–16)
HGB BLD-MCNC: 9.3 G/DL (ref 12–16)
HGB BLD-MCNC: 9.3 G/DL (ref 12–16)
HGB BLD-MCNC: 9.4 G/DL (ref 12–16)
HGB BLD-MCNC: 9.6 G/DL (ref 12–16)
HGB BLD-MCNC: 9.7 G/DL (ref 12–16)
HGB BLD-MCNC: 9.9 G/DL (ref 12–16)
HGB BLDA-MCNC: 8.9 G/DL (ref 12–18)
HGB BLDA-MCNC: 9.4 G/DL (ref 12–18)
HGB UR QL STRIP.AUTO: ABNORMAL
HGB UR QL STRIP.AUTO: NEGATIVE
HOLD SPECIMEN: NORMAL
HOROWITZ INDEX BLD+IHG-RTO: 21 %
HOROWITZ INDEX BLD+IHG-RTO: 21 %
HYALINE CASTS UR QL AUTO: ABNORMAL /LPF
HYALINE CASTS UR QL AUTO: NORMAL /LPF
IMM GRANULOCYTES # BLD: 0.01 10*3/MM3 (ref 0–0.06)
IMM GRANULOCYTES # BLD: 0.02 10*3/MM3 (ref 0–0.06)
IMM GRANULOCYTES # BLD: 0.03 10*3/MM3 (ref 0–0.06)
IMM GRANULOCYTES # BLD: 0.04 10*3/MM3 (ref 0–0.06)
IMM GRANULOCYTES # BLD: 0.04 10*3/MM3 (ref 0–0.06)
IMM GRANULOCYTES # BLD: 0.05 10*3/MM3 (ref 0–0.06)
IMM GRANULOCYTES # BLD: 0.11 10*3/MM3 (ref 0–0.06)
IMM GRANULOCYTES # BLD: 0.12 10*3/MM3 (ref 0–0.06)
IMM GRANULOCYTES NFR BLD: 0.2 % (ref 0–0.6)
IMM GRANULOCYTES NFR BLD: 0.2 % (ref 0–0.6)
IMM GRANULOCYTES NFR BLD: 0.3 % (ref 0–0.6)
IMM GRANULOCYTES NFR BLD: 0.4 % (ref 0–0.6)
IMM GRANULOCYTES NFR BLD: 0.5 % (ref 0–0.6)
IMM GRANULOCYTES NFR BLD: 0.5 % (ref 0–0.6)
IMM GRANULOCYTES NFR BLD: 0.6 % (ref 0–0.6)
IMM GRANULOCYTES NFR BLD: 0.7 % (ref 0–0.6)
IMM GRANULOCYTES NFR BLD: 0.7 % (ref 0–0.6)
IMM GRANULOCYTES NFR BLD: 0.8 % (ref 0–0.6)
IMM GRANULOCYTES NFR BLD: 0.8 % (ref 0–0.6)
IMM GRANULOCYTES NFR BLD: 0.9 % (ref 0–0.6)
IMM GRANULOCYTES NFR BLD: 1 % (ref 0–0.6)
IMM GRANULOCYTES NFR BLD: 1 % (ref 0–0.6)
IMM GRANULOCYTES NFR BLD: 1.2 % (ref 0–0.6)
IMM GRANULOCYTES NFR BLD: 1.3 % (ref 0–0.6)
IMM GRANULOCYTES NFR BLD: 2.3 % (ref 0–0.6)
INR PPP: 1.39 (ref 0.9–1.1)
INR PPP: 1.42 (ref 0.9–1.1)
INR PPP: 1.42 (ref 0.9–1.1)
INR PPP: 1.43 (ref 0.9–1.1)
INR PPP: 1.46 (ref 0.9–1.1)
INR PPP: 1.46 (ref 0.9–1.1)
INR PPP: 1.51 (ref 0.9–1.1)
INR PPP: 1.55 (ref 0.9–1.1)
INR PPP: 1.55 (ref 0.9–1.1)
INR PPP: 1.57 (ref 0.9–1.1)
INR PPP: 1.59 (ref 0.9–1.1)
INR PPP: 1.6 (ref 0.9–1.1)
INR PPP: 1.6 (ref 0.9–1.1)
INR PPP: 1.61 (ref 0.9–1.1)
INR PPP: 1.62 (ref 0.9–1.1)
INR PPP: 1.66 (ref 0.9–1.1)
INR PPP: 1.71 (ref 0.9–1.1)
INR PPP: 1.72 (ref 0.9–1.1)
INR PPP: 1.76 (ref 0.9–1.1)
INR PPP: 1.81 (ref 0.9–1.1)
INR PPP: 1.83 (ref 0.9–1.1)
INR PPP: 1.85 (ref 0.9–1.1)
INR PPP: 1.86 (ref 0.9–1.1)
INR PPP: 1.88 (ref 0.9–1.1)
INR PPP: 1.9 (ref 0.9–1.1)
INR PPP: 2.01 (ref 0.9–1.1)
INR PPP: 2.03 (ref 0.9–1.1)
INR PPP: 2.12 (ref 0.9–1.1)
INR PPP: 2.14 (ref 0.9–1.1)
INR PPP: 2.15 (ref 0.9–1.1)
INR PPP: 2.17 (ref 0.9–1.1)
INR PPP: 2.19 (ref 0.9–1.1)
INR PPP: 2.23 (ref 0.9–1.1)
INR PPP: 2.31 (ref 0.9–1.1)
INR PPP: 2.35 (ref 0.9–1.1)
INR PPP: 2.39 (ref 0.9–1.1)
INR PPP: 2.52 (ref 0.9–1.1)
INR PPP: 2.67 (ref 0.9–1.1)
INR PPP: 2.72 (ref 0.9–1.1)
INR PPP: 2.75 (ref 0.9–1.1)
INR PPP: 2.81 (ref 0.9–1.1)
INR PPP: 2.86 (ref 0.9–1.1)
INR PPP: 3.23 (ref 0.9–1.1)
INR PPP: 3.5 (ref 0.9–1.1)
INR PPP: 3.57 (ref 0.9–1.1)
INR PPP: 3.96 (ref 0.9–1.1)
INR PPP: 4.03 (ref 0.9–1.1)
INR PPP: 4.55 (ref 0.9–1.1)
INR PPP: 5.2 (ref 0.9–1.1)
INR PPP: 5.39 (ref 0.9–1.1)
INR PPP: 5.45 (ref 0.9–1.1)
INR PPP: 6.01 (ref 0.9–1.1)
IRON 24H UR-MRATE: 121 MCG/DL (ref 37–181)
IRON 24H UR-MRATE: 70 MCG/DL (ref 37–181)
IRON SATN MFR SERPL: 38 % (ref 11–46)
IRON SATN MFR SERPL: 58 % (ref 11–46)
KETONES UR QL STRIP: ABNORMAL
KETONES UR QL STRIP: NEGATIVE
LEUKOCYTE ESTERASE UR QL STRIP.AUTO: ABNORMAL
LEUKOCYTE ESTERASE UR QL STRIP.AUTO: NEGATIVE
LIPASE SERPL-CCNC: 102 U/L (ref 23–300)
LIPASE SERPL-CCNC: 113 U/L (ref 23–300)
LIPASE SERPL-CCNC: 268 U/L (ref 23–300)
LYMPHOCYTES # BLD AUTO: 0.39 10*3/MM3 (ref 0.6–3.4)
LYMPHOCYTES # BLD AUTO: 0.5 10*3/MM3 (ref 0.6–3.4)
LYMPHOCYTES # BLD AUTO: 0.55 10*3/MM3 (ref 0.6–3.4)
LYMPHOCYTES # BLD AUTO: 0.57 10*3/MM3 (ref 0.6–3.4)
LYMPHOCYTES # BLD AUTO: 0.58 10*3/MM3 (ref 0.6–3.4)
LYMPHOCYTES # BLD AUTO: 0.61 10*3/MM3 (ref 0.6–3.4)
LYMPHOCYTES # BLD AUTO: 0.61 10*3/MM3 (ref 0.6–3.4)
LYMPHOCYTES # BLD AUTO: 0.62 10*3/MM3 (ref 0.6–3.4)
LYMPHOCYTES # BLD AUTO: 0.65 10*3/MM3 (ref 0.6–3.4)
LYMPHOCYTES # BLD AUTO: 0.69 10*3/MM3 (ref 0.6–3.4)
LYMPHOCYTES # BLD AUTO: 0.73 10*3/MM3 (ref 0.6–3.4)
LYMPHOCYTES # BLD AUTO: 0.75 10*3/MM3 (ref 0.6–3.4)
LYMPHOCYTES # BLD AUTO: 0.76 10*3/MM3 (ref 0.6–3.4)
LYMPHOCYTES # BLD AUTO: 0.77 10*3/MM3 (ref 0.6–3.4)
LYMPHOCYTES # BLD AUTO: 0.86 10*3/MM3 (ref 0.6–3.4)
LYMPHOCYTES # BLD AUTO: 0.87 10*3/MM3 (ref 0.6–3.4)
LYMPHOCYTES # BLD AUTO: 0.87 10*3/MM3 (ref 0.6–3.4)
LYMPHOCYTES # BLD AUTO: 0.92 10*3/MM3 (ref 0.6–3.4)
LYMPHOCYTES # BLD AUTO: 0.93 10*3/MM3 (ref 0.6–3.4)
LYMPHOCYTES # BLD AUTO: 0.96 10*3/MM3 (ref 0.6–3.4)
LYMPHOCYTES # BLD AUTO: 1.01 10*3/MM3 (ref 0.6–3.4)
LYMPHOCYTES # BLD AUTO: 1.05 10*3/MM3 (ref 0.6–3.4)
LYMPHOCYTES # BLD AUTO: 1.08 10*3/MM3 (ref 0.6–3.4)
LYMPHOCYTES # BLD AUTO: 1.12 10*3/MM3 (ref 0.6–3.4)
LYMPHOCYTES # BLD AUTO: 1.22 10*3/MM3 (ref 0.6–3.4)
LYMPHOCYTES # BLD AUTO: 1.25 10*3/MM3 (ref 0.6–3.4)
LYMPHOCYTES # BLD AUTO: 1.41 10*3/MM3 (ref 0.6–3.4)
LYMPHOCYTES # BLD MANUAL: 0.64 10*3/MM3 (ref 0.6–3.4)
LYMPHOCYTES NFR BLD AUTO: 10.3 % (ref 10–50)
LYMPHOCYTES NFR BLD AUTO: 11.9 % (ref 10–50)
LYMPHOCYTES NFR BLD AUTO: 13.3 % (ref 10–50)
LYMPHOCYTES NFR BLD AUTO: 13.9 % (ref 10–50)
LYMPHOCYTES NFR BLD AUTO: 15.9 % (ref 10–50)
LYMPHOCYTES NFR BLD AUTO: 16.3 % (ref 10–50)
LYMPHOCYTES NFR BLD AUTO: 16.4 % (ref 10–50)
LYMPHOCYTES NFR BLD AUTO: 16.9 % (ref 10–50)
LYMPHOCYTES NFR BLD AUTO: 17.7 % (ref 10–50)
LYMPHOCYTES NFR BLD AUTO: 19 % (ref 10–50)
LYMPHOCYTES NFR BLD AUTO: 19.1 % (ref 10–50)
LYMPHOCYTES NFR BLD AUTO: 19.5 % (ref 10–50)
LYMPHOCYTES NFR BLD AUTO: 20.4 % (ref 10–50)
LYMPHOCYTES NFR BLD AUTO: 21.1 % (ref 10–50)
LYMPHOCYTES NFR BLD AUTO: 21.1 % (ref 10–50)
LYMPHOCYTES NFR BLD AUTO: 21.4 % (ref 10–50)
LYMPHOCYTES NFR BLD AUTO: 22.7 % (ref 10–50)
LYMPHOCYTES NFR BLD AUTO: 24 % (ref 10–50)
LYMPHOCYTES NFR BLD AUTO: 24.5 % (ref 10–50)
LYMPHOCYTES NFR BLD AUTO: 24.5 % (ref 10–50)
LYMPHOCYTES NFR BLD AUTO: 24.7 % (ref 10–50)
LYMPHOCYTES NFR BLD AUTO: 24.8 % (ref 10–50)
LYMPHOCYTES NFR BLD AUTO: 26.4 % (ref 10–50)
LYMPHOCYTES NFR BLD AUTO: 27.3 % (ref 10–50)
LYMPHOCYTES NFR BLD AUTO: 29.9 % (ref 10–50)
LYMPHOCYTES NFR BLD AUTO: 3.7 % (ref 10–50)
LYMPHOCYTES NFR BLD AUTO: 33.3 % (ref 10–50)
LYMPHOCYTES NFR BLD MANUAL: 11 % (ref 10–50)
LYMPHOCYTES NFR BLD MANUAL: 3 % (ref 0–12)
MAGNESIUM SERPL-MCNC: 1.5 MG/DL (ref 1.6–2.3)
MAGNESIUM SERPL-MCNC: 1.5 MG/DL (ref 1.6–2.3)
MAGNESIUM SERPL-MCNC: 1.6 MG/DL (ref 1.6–2.3)
MAGNESIUM SERPL-MCNC: 1.7 MG/DL (ref 1.6–2.3)
MAGNESIUM SERPL-MCNC: 1.8 MG/DL (ref 1.6–2.3)
MAGNESIUM SERPL-MCNC: 1.8 MG/DL (ref 1.6–2.3)
MAGNESIUM SERPL-MCNC: 1.9 MG/DL (ref 1.6–2.3)
MAGNESIUM SERPL-MCNC: 1.9 MG/DL (ref 1.6–2.3)
MAGNESIUM SERPL-MCNC: 2.4 MG/DL (ref 1.6–2.3)
MAGNESIUM SERPL-MCNC: 2.4 MG/DL (ref 1.6–2.3)
MAGNESIUM SERPL-MCNC: 2.5 MG/DL (ref 1.6–2.3)
MAGNESIUM SERPL-MCNC: 2.6 MG/DL (ref 1.6–2.3)
MCH RBC QN AUTO: 28.1 PG (ref 27–31)
MCH RBC QN AUTO: 28.1 PG (ref 27–31)
MCH RBC QN AUTO: 29.2 PG (ref 27–31)
MCH RBC QN AUTO: 29.4 PG (ref 27–31)
MCH RBC QN AUTO: 29.5 PG (ref 27–31)
MCH RBC QN AUTO: 29.6 PG (ref 27–31)
MCH RBC QN AUTO: 29.8 PG (ref 27–31)
MCH RBC QN AUTO: 29.9 PG (ref 27–31)
MCH RBC QN AUTO: 30 PG (ref 27–31)
MCH RBC QN AUTO: 30 PG (ref 27–31)
MCH RBC QN AUTO: 30.1 PG (ref 27–31)
MCH RBC QN AUTO: 30.2 PG (ref 27–31)
MCH RBC QN AUTO: 30.3 PG (ref 27–31)
MCH RBC QN AUTO: 30.3 PG (ref 27–31)
MCH RBC QN AUTO: 30.4 PG (ref 27–31)
MCH RBC QN AUTO: 30.5 PG (ref 27–31)
MCH RBC QN AUTO: 30.7 PG (ref 27–31)
MCH RBC QN AUTO: 30.8 PG (ref 27–31)
MCH RBC QN AUTO: 31 PG (ref 27–31)
MCH RBC QN AUTO: 31 PG (ref 27–31)
MCH RBC QN AUTO: 31.3 PG (ref 27–31)
MCH RBC QN AUTO: 31.3 PG (ref 27–31)
MCHC RBC AUTO-ENTMCNC: 29.9 G/DL (ref 30–37)
MCHC RBC AUTO-ENTMCNC: 30.7 G/DL (ref 30–37)
MCHC RBC AUTO-ENTMCNC: 31 G/DL (ref 30–37)
MCHC RBC AUTO-ENTMCNC: 31 G/DL (ref 30–37)
MCHC RBC AUTO-ENTMCNC: 31.1 G/DL (ref 30–37)
MCHC RBC AUTO-ENTMCNC: 31.1 G/DL (ref 30–37)
MCHC RBC AUTO-ENTMCNC: 31.4 G/DL (ref 30–37)
MCHC RBC AUTO-ENTMCNC: 31.4 G/DL (ref 30–37)
MCHC RBC AUTO-ENTMCNC: 31.5 G/DL (ref 30–37)
MCHC RBC AUTO-ENTMCNC: 31.5 G/DL (ref 30–37)
MCHC RBC AUTO-ENTMCNC: 31.6 G/DL (ref 30–37)
MCHC RBC AUTO-ENTMCNC: 31.7 G/DL (ref 30–37)
MCHC RBC AUTO-ENTMCNC: 31.7 G/DL (ref 30–37)
MCHC RBC AUTO-ENTMCNC: 31.8 G/DL (ref 30–37)
MCHC RBC AUTO-ENTMCNC: 31.8 G/DL (ref 30–37)
MCHC RBC AUTO-ENTMCNC: 31.9 G/DL (ref 30–37)
MCHC RBC AUTO-ENTMCNC: 31.9 G/DL (ref 30–37)
MCHC RBC AUTO-ENTMCNC: 32.1 G/DL (ref 30–37)
MCHC RBC AUTO-ENTMCNC: 32.2 G/DL (ref 30–37)
MCHC RBC AUTO-ENTMCNC: 32.5 G/DL (ref 30–37)
MCHC RBC AUTO-ENTMCNC: 32.6 G/DL (ref 30–37)
MCHC RBC AUTO-ENTMCNC: 32.7 G/DL (ref 30–37)
MCHC RBC AUTO-ENTMCNC: 32.8 G/DL (ref 30–37)
MCHC RBC AUTO-ENTMCNC: 33 G/DL (ref 30–37)
MCHC RBC AUTO-ENTMCNC: 33.4 G/DL (ref 30–37)
MCHC RBC AUTO-ENTMCNC: 33.7 G/DL (ref 30–37)
MCHC RBC AUTO-ENTMCNC: 34 G/DL (ref 30–37)
MCV RBC AUTO: 87.5 FL (ref 81–99)
MCV RBC AUTO: 87.7 FL (ref 81–99)
MCV RBC AUTO: 90.1 FL (ref 81–99)
MCV RBC AUTO: 90.3 FL (ref 81–99)
MCV RBC AUTO: 90.6 FL (ref 81–99)
MCV RBC AUTO: 90.9 FL (ref 81–99)
MCV RBC AUTO: 91.1 FL (ref 81–99)
MCV RBC AUTO: 91.4 FL (ref 81–99)
MCV RBC AUTO: 91.8 FL (ref 81–99)
MCV RBC AUTO: 92 FL (ref 81–99)
MCV RBC AUTO: 92 FL (ref 81–99)
MCV RBC AUTO: 92.2 FL (ref 81–99)
MCV RBC AUTO: 92.4 FL (ref 81–99)
MCV RBC AUTO: 92.6 FL (ref 81–99)
MCV RBC AUTO: 92.9 FL (ref 81–99)
MCV RBC AUTO: 93 FL (ref 81–99)
MCV RBC AUTO: 93.2 FL (ref 81–99)
MCV RBC AUTO: 93.3 FL (ref 81–99)
MCV RBC AUTO: 93.4 FL (ref 81–99)
MCV RBC AUTO: 93.5 FL (ref 81–99)
MCV RBC AUTO: 93.8 FL (ref 81–99)
MCV RBC AUTO: 94 FL (ref 81–99)
MCV RBC AUTO: 94.1 FL (ref 81–99)
MCV RBC AUTO: 94.1 FL (ref 81–99)
MCV RBC AUTO: 94.2 FL (ref 81–99)
MCV RBC AUTO: 94.2 FL (ref 81–99)
MCV RBC AUTO: 94.3 FL (ref 81–99)
MCV RBC AUTO: 94.3 FL (ref 81–99)
MCV RBC AUTO: 94.4 FL (ref 81–99)
MCV RBC AUTO: 94.4 FL (ref 81–99)
MCV RBC AUTO: 94.8 FL (ref 81–99)
MCV RBC AUTO: 94.8 FL (ref 81–99)
MCV RBC AUTO: 94.9 FL (ref 81–99)
MCV RBC AUTO: 95 FL (ref 81–99)
MCV RBC AUTO: 95.9 FL (ref 81–99)
MCV RBC AUTO: 96.6 FL (ref 81–99)
MCV RBC AUTO: 96.8 FL (ref 81–99)
MCV RBC AUTO: 97 FL (ref 81–99)
MCV RBC AUTO: 97 FL (ref 81–99)
MCV RBC AUTO: 97.1 FL (ref 81–99)
MCV RBC AUTO: 97.2 FL (ref 81–99)
MCV RBC AUTO: 98.2 FL (ref 81–99)
MCV RBC AUTO: 98.6 FL (ref 81–99)
MCV RBC AUTO: 99.2 FL (ref 81–99)
MCV RBC AUTO: 99.3 FL (ref 81–99)
METHGB BLD QL: 0.9 %
MODALITY: ABNORMAL
MODALITY: ABNORMAL
MONOCYTES # BLD AUTO: 0.07 10*3/MM3 (ref 0–0.9)
MONOCYTES # BLD AUTO: 0.17 10*3/MM3 (ref 0–0.9)
MONOCYTES # BLD AUTO: 0.22 10*3/MM3 (ref 0–0.9)
MONOCYTES # BLD AUTO: 0.22 10*3/MM3 (ref 0–0.9)
MONOCYTES # BLD AUTO: 0.23 10*3/MM3 (ref 0–0.9)
MONOCYTES # BLD AUTO: 0.24 10*3/MM3 (ref 0–0.9)
MONOCYTES # BLD AUTO: 0.25 10*3/MM3 (ref 0–0.9)
MONOCYTES # BLD AUTO: 0.27 10*3/MM3 (ref 0–0.9)
MONOCYTES # BLD AUTO: 0.29 10*3/MM3 (ref 0–0.9)
MONOCYTES # BLD AUTO: 0.3 10*3/MM3 (ref 0–0.9)
MONOCYTES # BLD AUTO: 0.32 10*3/MM3 (ref 0–0.9)
MONOCYTES # BLD AUTO: 0.32 10*3/MM3 (ref 0–0.9)
MONOCYTES # BLD AUTO: 0.34 10*3/MM3 (ref 0–0.9)
MONOCYTES # BLD AUTO: 0.36 10*3/MM3 (ref 0–0.9)
MONOCYTES # BLD AUTO: 0.38 10*3/MM3 (ref 0–0.9)
MONOCYTES # BLD AUTO: 0.4 10*3/MM3 (ref 0–0.9)
MONOCYTES # BLD AUTO: 0.42 10*3/MM3 (ref 0–0.9)
MONOCYTES # BLD AUTO: 0.45 10*3/MM3 (ref 0–0.9)
MONOCYTES # BLD AUTO: 0.49 10*3/MM3 (ref 0–0.9)
MONOCYTES # BLD AUTO: 0.51 10*3/MM3 (ref 0–0.9)
MONOCYTES # BLD AUTO: 0.62 10*3/MM3 (ref 0–0.9)
MONOCYTES # BLD AUTO: 0.65 10*3/MM3 (ref 0–0.9)
MONOCYTES # BLD AUTO: 0.68 10*3/MM3 (ref 0–0.9)
MONOCYTES # BLD AUTO: 0.87 10*3/MM3 (ref 0–0.9)
MONOCYTES NFR BLD AUTO: 10.1 % (ref 0–12)
MONOCYTES NFR BLD AUTO: 10.7 % (ref 0–12)
MONOCYTES NFR BLD AUTO: 11 % (ref 0–12)
MONOCYTES NFR BLD AUTO: 11.1 % (ref 0–12)
MONOCYTES NFR BLD AUTO: 11.1 % (ref 0–12)
MONOCYTES NFR BLD AUTO: 11.2 % (ref 0–12)
MONOCYTES NFR BLD AUTO: 11.5 % (ref 0–12)
MONOCYTES NFR BLD AUTO: 2.4 % (ref 0–12)
MONOCYTES NFR BLD AUTO: 4.6 % (ref 0–12)
MONOCYTES NFR BLD AUTO: 5.1 % (ref 0–12)
MONOCYTES NFR BLD AUTO: 5.3 % (ref 0–12)
MONOCYTES NFR BLD AUTO: 5.6 % (ref 0–12)
MONOCYTES NFR BLD AUTO: 6.2 % (ref 0–12)
MONOCYTES NFR BLD AUTO: 6.4 % (ref 0–12)
MONOCYTES NFR BLD AUTO: 7.6 % (ref 0–12)
MONOCYTES NFR BLD AUTO: 7.7 % (ref 0–12)
MONOCYTES NFR BLD AUTO: 7.8 % (ref 0–12)
MONOCYTES NFR BLD AUTO: 7.8 % (ref 0–12)
MONOCYTES NFR BLD AUTO: 8.2 % (ref 0–12)
MONOCYTES NFR BLD AUTO: 8.3 % (ref 0–12)
MONOCYTES NFR BLD AUTO: 8.9 % (ref 0–12)
MONOCYTES NFR BLD AUTO: 9.1 % (ref 0–12)
MONOCYTES NFR BLD AUTO: 9.4 % (ref 0–12)
MONOCYTES NFR BLD AUTO: 9.6 % (ref 0–12)
MONOCYTES NFR BLD AUTO: 9.7 % (ref 0–12)
MONOCYTES NFR BLD AUTO: 9.8 % (ref 0–12)
MONOCYTES NFR BLD AUTO: 9.9 % (ref 0–12)
MRSA SPEC QL CULT: NO GROWTH
MUCOUS THREADS URNS QL MICRO: ABNORMAL /HPF
NEUTROPHILS # BLD AUTO: 1.42 10*3/MM3 (ref 2–6.9)
NEUTROPHILS # BLD AUTO: 1.47 10*3/MM3 (ref 2–6.9)
NEUTROPHILS # BLD AUTO: 1.49 10*3/MM3 (ref 2–6.9)
NEUTROPHILS # BLD AUTO: 1.49 10*3/MM3 (ref 2–6.9)
NEUTROPHILS # BLD AUTO: 1.57 10*3/MM3 (ref 2–6.9)
NEUTROPHILS # BLD AUTO: 1.89 10*3/MM3 (ref 2–6.9)
NEUTROPHILS # BLD AUTO: 2.11 10*3/MM3 (ref 2–6.9)
NEUTROPHILS # BLD AUTO: 2.12 10*3/MM3 (ref 2–6.9)
NEUTROPHILS # BLD AUTO: 2.17 10*3/MM3 (ref 2–6.9)
NEUTROPHILS # BLD AUTO: 2.25 10*3/MM3 (ref 2–6.9)
NEUTROPHILS # BLD AUTO: 2.26 10*3/MM3 (ref 2–6.9)
NEUTROPHILS # BLD AUTO: 2.37 10*3/MM3 (ref 2–6.9)
NEUTROPHILS # BLD AUTO: 2.61 10*3/MM3 (ref 2–6.9)
NEUTROPHILS # BLD AUTO: 2.85 10*3/MM3 (ref 2–6.9)
NEUTROPHILS # BLD AUTO: 3.1 10*3/MM3 (ref 2–6.9)
NEUTROPHILS # BLD AUTO: 3.19 10*3/MM3 (ref 2–6.9)
NEUTROPHILS # BLD AUTO: 3.44 10*3/MM3 (ref 2–6.9)
NEUTROPHILS # BLD AUTO: 3.54 10*3/MM3 (ref 2–6.9)
NEUTROPHILS # BLD AUTO: 3.54 10*3/MM3 (ref 2–6.9)
NEUTROPHILS # BLD AUTO: 3.8 10*3/MM3 (ref 2–6.9)
NEUTROPHILS # BLD AUTO: 4.05 10*3/MM3 (ref 2–6.9)
NEUTROPHILS # BLD AUTO: 4.37 10*3/MM3 (ref 2–6.9)
NEUTROPHILS # BLD AUTO: 4.96 10*3/MM3 (ref 2–6.9)
NEUTROPHILS # BLD AUTO: 4.98 10*3/MM3 (ref 2–6.9)
NEUTROPHILS # BLD AUTO: 5.05 10*3/MM3 (ref 2–6.9)
NEUTROPHILS # BLD AUTO: 5.67 10*3/MM3 (ref 2–6.9)
NEUTROPHILS # BLD AUTO: 5.85 10*3/MM3 (ref 2–6.9)
NEUTROPHILS # BLD AUTO: 9.64 10*3/MM3 (ref 2–6.9)
NEUTROPHILS NFR BLD AUTO: 51.9 % (ref 37–80)
NEUTROPHILS NFR BLD AUTO: 55.9 % (ref 37–80)
NEUTROPHILS NFR BLD AUTO: 58 % (ref 37–80)
NEUTROPHILS NFR BLD AUTO: 60.2 % (ref 37–80)
NEUTROPHILS NFR BLD AUTO: 60.7 % (ref 37–80)
NEUTROPHILS NFR BLD AUTO: 61.9 % (ref 37–80)
NEUTROPHILS NFR BLD AUTO: 63.1 % (ref 37–80)
NEUTROPHILS NFR BLD AUTO: 63.7 % (ref 37–80)
NEUTROPHILS NFR BLD AUTO: 64.1 % (ref 37–80)
NEUTROPHILS NFR BLD AUTO: 65.5 % (ref 37–80)
NEUTROPHILS NFR BLD AUTO: 65.7 % (ref 37–80)
NEUTROPHILS NFR BLD AUTO: 66.5 % (ref 37–80)
NEUTROPHILS NFR BLD AUTO: 67.4 % (ref 37–80)
NEUTROPHILS NFR BLD AUTO: 67.6 % (ref 37–80)
NEUTROPHILS NFR BLD AUTO: 68.8 % (ref 37–80)
NEUTROPHILS NFR BLD AUTO: 69 % (ref 37–80)
NEUTROPHILS NFR BLD AUTO: 69.4 % (ref 37–80)
NEUTROPHILS NFR BLD AUTO: 70.9 % (ref 37–80)
NEUTROPHILS NFR BLD AUTO: 71.9 % (ref 37–80)
NEUTROPHILS NFR BLD AUTO: 72.4 % (ref 37–80)
NEUTROPHILS NFR BLD AUTO: 75.5 % (ref 37–80)
NEUTROPHILS NFR BLD AUTO: 75.8 % (ref 37–80)
NEUTROPHILS NFR BLD AUTO: 77.6 % (ref 37–80)
NEUTROPHILS NFR BLD AUTO: 78 % (ref 37–80)
NEUTROPHILS NFR BLD AUTO: 80.6 % (ref 37–80)
NEUTROPHILS NFR BLD AUTO: 83.7 % (ref 37–80)
NEUTROPHILS NFR BLD AUTO: 90.5 % (ref 37–80)
NEUTROPHILS NFR BLD MANUAL: 66 % (ref 37–80)
NEUTS BAND NFR BLD MANUAL: 20 % (ref 0–6)
NITRITE UR QL STRIP: NEGATIVE
NRBC BLD MANUAL-RTO: 0 /100 WBC (ref 0–0)
NT-PROBNP SERPL-MCNC: 1730 PG/ML (ref 0–450)
NT-PROBNP SERPL-MCNC: 2020 PG/ML (ref 0–450)
NT-PROBNP SERPL-MCNC: 5900 PG/ML (ref 0–450)
OXYHGB MFR BLDV: 94 % (ref 94–99)
PCO2 BLDA: 27.5 MM HG (ref 35–45)
PCO2 BLDA: 35 MM HG (ref 35–45)
PH BLDA: 7.41 PH UNITS (ref 7.3–7.5)
PH BLDA: 7.51 PH UNITS (ref 7.3–7.5)
PH UR STRIP.AUTO: 5.5 [PH] (ref 5–8)
PH UR STRIP.AUTO: <=5 [PH] (ref 5–8)
PH UR STRIP.AUTO: <=5 [PH] (ref 5–8)
PHOSPHATE SERPL-MCNC: 3.6 MG/DL (ref 2.5–4.5)
PHOSPHATE SERPL-MCNC: 3.7 MG/DL (ref 2.5–4.5)
PHOSPHATE SERPL-MCNC: 3.8 MG/DL (ref 2.5–4.5)
PHOSPHATE SERPL-MCNC: 4.1 MG/DL (ref 2.5–4.5)
PHOSPHATE SERPL-MCNC: 4.6 MG/DL (ref 2.5–4.5)
PHOSPHATE SERPL-MCNC: 4.7 MG/DL (ref 2.5–4.5)
PHOSPHATE SERPL-MCNC: 4.9 MG/DL (ref 2.5–4.5)
PHOSPHATE SERPL-MCNC: 5.4 MG/DL (ref 2.5–4.5)
PHOSPHATE SERPL-MCNC: 5.5 MG/DL (ref 2.5–4.5)
PHOSPHATE SERPL-MCNC: 5.6 MG/DL (ref 2.5–4.5)
PHOSPHATE SERPL-MCNC: 5.6 MG/DL (ref 2.5–4.5)
PHOSPHATE SERPL-MCNC: 5.7 MG/DL (ref 2.5–4.5)
PHOSPHATE SERPL-MCNC: 5.8 MG/DL (ref 2.5–4.5)
PHOSPHATE SERPL-MCNC: 6 MG/DL (ref 2.5–4.5)
PHOSPHATE SERPL-MCNC: 6.3 MG/DL (ref 2.5–4.5)
PHOSPHATE SERPL-MCNC: 6.4 MG/DL (ref 2.5–4.5)
PHOSPHATE SERPL-MCNC: 6.7 MG/DL (ref 2.5–4.5)
PHOSPHATE SERPL-MCNC: 6.8 MG/DL (ref 2.5–4.5)
PLAT MORPH BLD: NORMAL
PLATELET # BLD AUTO: 104 10*3/MM3 (ref 130–400)
PLATELET # BLD AUTO: 108 10*3/MM3 (ref 130–400)
PLATELET # BLD AUTO: 110 10*3/MM3 (ref 130–400)
PLATELET # BLD AUTO: 113 10*3/MM3 (ref 130–400)
PLATELET # BLD AUTO: 116 10*3/MM3 (ref 130–400)
PLATELET # BLD AUTO: 116 10*3/MM3 (ref 130–400)
PLATELET # BLD AUTO: 119 10*3/MM3 (ref 130–400)
PLATELET # BLD AUTO: 120 10*3/MM3 (ref 130–400)
PLATELET # BLD AUTO: 120 10*3/MM3 (ref 130–400)
PLATELET # BLD AUTO: 121 10*3/MM3 (ref 130–400)
PLATELET # BLD AUTO: 121 10*3/MM3 (ref 130–400)
PLATELET # BLD AUTO: 122 10*3/MM3 (ref 130–400)
PLATELET # BLD AUTO: 124 10*3/MM3 (ref 130–400)
PLATELET # BLD AUTO: 126 10*3/MM3 (ref 130–400)
PLATELET # BLD AUTO: 128 10*3/MM3 (ref 130–400)
PLATELET # BLD AUTO: 128 10*3/MM3 (ref 130–400)
PLATELET # BLD AUTO: 129 10*3/MM3 (ref 130–400)
PLATELET # BLD AUTO: 131 10*3/MM3 (ref 130–400)
PLATELET # BLD AUTO: 131 10*3/MM3 (ref 130–400)
PLATELET # BLD AUTO: 132 10*3/MM3 (ref 130–400)
PLATELET # BLD AUTO: 133 10*3/MM3 (ref 130–400)
PLATELET # BLD AUTO: 138 10*3/MM3 (ref 130–400)
PLATELET # BLD AUTO: 139 10*3/MM3 (ref 130–400)
PLATELET # BLD AUTO: 141 10*3/MM3 (ref 130–400)
PLATELET # BLD AUTO: 144 10*3/MM3 (ref 130–400)
PLATELET # BLD AUTO: 150 10*3/MM3 (ref 130–400)
PLATELET # BLD AUTO: 152 10*3/MM3 (ref 130–400)
PLATELET # BLD AUTO: 158 10*3/MM3 (ref 130–400)
PLATELET # BLD AUTO: 160 10*3/MM3 (ref 130–400)
PLATELET # BLD AUTO: 161 10*3/MM3 (ref 130–400)
PLATELET # BLD AUTO: 178 10*3/MM3 (ref 130–400)
PLATELET # BLD AUTO: 60 10*3/MM3 (ref 130–400)
PLATELET # BLD AUTO: 69 10*3/MM3 (ref 130–400)
PLATELET # BLD AUTO: 72 10*3/MM3 (ref 130–400)
PLATELET # BLD AUTO: 76 10*3/MM3 (ref 130–400)
PLATELET # BLD AUTO: 77 10*3/MM3 (ref 130–400)
PLATELET # BLD AUTO: 82 10*3/MM3 (ref 130–400)
PLATELET # BLD AUTO: 88 10*3/MM3 (ref 130–400)
PLATELET # BLD AUTO: 88 10*3/MM3 (ref 130–400)
PLATELET # BLD AUTO: 95 10*3/MM3 (ref 130–400)
PLATELET # BLD AUTO: 95 10*3/MM3 (ref 130–400)
PLATELET # BLD AUTO: 96 10*3/MM3 (ref 130–400)
PLATELET # BLD AUTO: 97 10*3/MM3 (ref 130–400)
PLATELET # BLD AUTO: 97 10*3/MM3 (ref 130–400)
PLATELET # BLD AUTO: 98 10*3/MM3 (ref 130–400)
PMV BLD AUTO: 10 FL (ref 6–12)
PMV BLD AUTO: 10 FL (ref 6–12)
PMV BLD AUTO: 10.1 FL (ref 6–12)
PMV BLD AUTO: 10.2 FL (ref 6–12)
PMV BLD AUTO: 10.3 FL (ref 6–12)
PMV BLD AUTO: 10.4 FL (ref 6–12)
PMV BLD AUTO: 10.5 FL (ref 6–12)
PMV BLD AUTO: 10.7 FL (ref 6–12)
PMV BLD AUTO: 10.8 FL (ref 6–12)
PMV BLD AUTO: 10.9 FL (ref 6–12)
PMV BLD AUTO: 10.9 FL (ref 6–12)
PMV BLD AUTO: 11 FL (ref 6–12)
PMV BLD AUTO: 11.2 FL (ref 6–12)
PMV BLD AUTO: 11.3 FL (ref 6–12)
PMV BLD AUTO: 9.3 FL (ref 6–12)
PMV BLD AUTO: 9.5 FL (ref 6–12)
PMV BLD AUTO: 9.6 FL (ref 6–12)
PMV BLD AUTO: 9.7 FL (ref 6–12)
PMV BLD AUTO: 9.8 FL (ref 6–12)
PMV BLD AUTO: 9.9 FL (ref 6–12)
PO2 BLDA: 75.6 MM HG (ref 75–100)
PO2 BLDA: 96.5 MM HG (ref 75–100)
POTASSIUM BLD-SCNC: 3.4 MMOL/L (ref 3.5–5.1)
POTASSIUM BLD-SCNC: 3.5 MMOL/L (ref 3.5–5.1)
POTASSIUM BLD-SCNC: 3.5 MMOL/L (ref 3.5–5.1)
POTASSIUM BLD-SCNC: 3.6 MMOL/L (ref 3.5–5.1)
POTASSIUM BLD-SCNC: 3.6 MMOL/L (ref 3.5–5.1)
POTASSIUM BLD-SCNC: 3.7 MMOL/L (ref 3.5–5.1)
POTASSIUM BLD-SCNC: 3.8 MMOL/L (ref 3.5–5.1)
POTASSIUM BLD-SCNC: 3.9 MMOL/L (ref 3.5–5.1)
POTASSIUM BLD-SCNC: 4 MMOL/L (ref 3.5–5.1)
POTASSIUM BLD-SCNC: 4.1 MMOL/L (ref 3.5–5.1)
POTASSIUM BLD-SCNC: 4.2 MMOL/L (ref 3.5–5.1)
POTASSIUM BLD-SCNC: 4.2 MMOL/L (ref 3.5–5.1)
POTASSIUM BLD-SCNC: 4.3 MMOL/L (ref 3.5–5.1)
POTASSIUM BLD-SCNC: 4.3 MMOL/L (ref 3.5–5.1)
POTASSIUM BLD-SCNC: 4.4 MMOL/L (ref 3.5–5.1)
POTASSIUM BLD-SCNC: 4.8 MMOL/L (ref 3.5–5.1)
POTASSIUM BLD-SCNC: 4.9 MMOL/L (ref 3.5–5.1)
POTASSIUM BLD-SCNC: 4.9 MMOL/L (ref 3.5–5.1)
POTASSIUM BLD-SCNC: 5 MMOL/L (ref 3.5–5.1)
POTASSIUM BLD-SCNC: 5.1 MMOL/L (ref 3.5–5.1)
POTASSIUM BLD-SCNC: 5.2 MMOL/L (ref 3.5–5.1)
POTASSIUM BLD-SCNC: 5.3 MMOL/L (ref 3.5–5.1)
POTASSIUM BLD-SCNC: 5.5 MMOL/L (ref 3.5–5.1)
POTASSIUM BLD-SCNC: 5.6 MMOL/L (ref 3.5–5.1)
POTASSIUM BLD-SCNC: 5.8 MMOL/L (ref 3.5–5.1)
POTASSIUM BLD-SCNC: 5.8 MMOL/L (ref 3.5–5.1)
PROT 24H UR-MRATE: 105.6 MG/24HOURS (ref 42–225)
PROT SERPL-MCNC: 5.1 G/DL (ref 6.3–8.2)
PROT SERPL-MCNC: 5.4 G/DL (ref 6.3–8.2)
PROT SERPL-MCNC: 5.5 G/DL (ref 6.3–8.2)
PROT SERPL-MCNC: 5.6 G/DL (ref 6.3–8.2)
PROT SERPL-MCNC: 5.7 G/DL (ref 6.3–8.2)
PROT SERPL-MCNC: 5.8 G/DL (ref 6.3–8.2)
PROT SERPL-MCNC: 5.8 G/DL (ref 6.3–8.2)
PROT SERPL-MCNC: 6 G/DL (ref 6.3–8.2)
PROT SERPL-MCNC: 6.2 G/DL (ref 6.3–8.2)
PROT SERPL-MCNC: 6.2 G/DL (ref 6.3–8.2)
PROT SERPL-MCNC: 6.3 G/DL (ref 6.3–8.2)
PROT SERPL-MCNC: 6.5 G/DL (ref 6.3–8.2)
PROT SERPL-MCNC: 6.6 G/DL (ref 6.3–8.2)
PROT SERPL-MCNC: 6.7 G/DL (ref 6.3–8.2)
PROT SERPL-MCNC: 6.7 G/DL (ref 6.3–8.2)
PROT SERPL-MCNC: 6.8 G/DL (ref 6.3–8.2)
PROT SERPL-MCNC: 6.9 G/DL (ref 6.3–8.2)
PROT SERPL-MCNC: 6.9 G/DL (ref 6.3–8.2)
PROT SERPL-MCNC: 7 G/DL (ref 6.3–8.2)
PROT SERPL-MCNC: 7.9 G/DL (ref 6.3–8.2)
PROT SERPL-MCNC: 8.2 G/DL (ref 6.3–8.2)
PROT UR QL STRIP: ABNORMAL
PROT UR QL STRIP: NEGATIVE
PROT UR-MCNC: 8 MG/DL (ref 0–11.9)
PROTHROMBIN TIME: 15.6 SECONDS (ref 9.3–12.1)
PROTHROMBIN TIME: 15.6 SECONDS (ref 9.3–12.1)
PROTHROMBIN TIME: 16 SECONDS (ref 9.3–12.1)
PROTHROMBIN TIME: 16 SECONDS (ref 9.3–12.1)
PROTHROMBIN TIME: 16.1 SECONDS (ref 9.3–12.1)
PROTHROMBIN TIME: 16.4 SECONDS (ref 9.3–12.1)
PROTHROMBIN TIME: 16.5 SECONDS (ref 9.3–12.1)
PROTHROMBIN TIME: 17 SECONDS (ref 9.3–12.1)
PROTHROMBIN TIME: 17.2 SECONDS (ref 9.3–12.1)
PROTHROMBIN TIME: 17.4 SECONDS (ref 9.3–12.1)
PROTHROMBIN TIME: 17.5 SECONDS (ref 9.3–12.1)
PROTHROMBIN TIME: 17.6 SECONDS (ref 9.3–12.1)
PROTHROMBIN TIME: 17.8 SECONDS (ref 9.3–12.1)
PROTHROMBIN TIME: 18.2 SECONDS (ref 9.3–12.1)
PROTHROMBIN TIME: 18.7 SECONDS (ref 9.3–12.1)
PROTHROMBIN TIME: 18.8 SECONDS (ref 9.3–12.1)
PROTHROMBIN TIME: 19.8 SECONDS (ref 9.3–12.1)
PROTHROMBIN TIME: 19.9 SECONDS (ref 9.3–12.1)
PROTHROMBIN TIME: 20 SECONDS (ref 9.3–12.1)
PROTHROMBIN TIME: 20.3 SECONDS (ref 9.3–12.1)
PROTHROMBIN TIME: 20.4 SECONDS (ref 9.3–12.1)
PROTHROMBIN TIME: 20.6 SECONDS (ref 9.3–12.1)
PROTHROMBIN TIME: 20.8 SECONDS (ref 9.3–12.1)
PROTHROMBIN TIME: 22 SECONDS (ref 9.3–12.1)
PROTHROMBIN TIME: 22.2 SECONDS (ref 9.3–12.1)
PROTHROMBIN TIME: 23.5 SECONDS (ref 9.3–12.1)
PROTHROMBIN TIME: 23.6 SECONDS (ref 9.3–12.1)
PROTHROMBIN TIME: 24 SECONDS (ref 9.3–12.1)
PROTHROMBIN TIME: 24 SECONDS (ref 9.3–12.1)
PROTHROMBIN TIME: 24.4 SECONDS (ref 9.3–12.1)
PROTHROMBIN TIME: 24.5 SECONDS (ref 9.3–12.1)
PROTHROMBIN TIME: 25.3 SECONDS (ref 9.3–12.1)
PROTHROMBIN TIME: 25.7 SECONDS (ref 9.3–12.1)
PROTHROMBIN TIME: 26.2 SECONDS (ref 9.3–12.1)
PROTHROMBIN TIME: 28.5 SECONDS (ref 9.3–12.1)
PROTHROMBIN TIME: 29.8 SECONDS (ref 9.3–12.1)
PROTHROMBIN TIME: 30.1 SECONDS (ref 9.3–12.1)
PROTHROMBIN TIME: 30.3 SECONDS (ref 9.3–12.1)
PROTHROMBIN TIME: 31.3 SECONDS (ref 9.3–12.1)
PROTHROMBIN TIME: 31.8 SECONDS (ref 9.3–12.1)
PROTHROMBIN TIME: 36.7 SECONDS (ref 9.3–12.1)
PROTHROMBIN TIME: 38.4 SECONDS (ref 9.3–12.1)
PROTHROMBIN TIME: 40.6 SECONDS (ref 9.3–12.1)
PROTHROMBIN TIME: 44.2 SECONDS (ref 9.3–12.1)
PROTHROMBIN TIME: 45.1 SECONDS (ref 9.3–12.1)
PROTHROMBIN TIME: 49.9 SECONDS (ref 9.3–12.1)
PROTHROMBIN TIME: 59.3 SECONDS (ref 9.3–12.1)
PROTHROMBIN TIME: 61.5 SECONDS (ref 9.3–12.1)
PROTHROMBIN TIME: 63.1 SECONDS (ref 9.3–12.1)
PROTHROMBIN TIME: 65.9 SECONDS (ref 9.3–12.1)
PTH-INTACT SERPL-MCNC: 47 PG/ML (ref 15–65)
RBC # BLD AUTO: 2.53 10*6/MM3 (ref 4.2–5.4)
RBC # BLD AUTO: 2.65 10*6/MM3 (ref 4.2–5.4)
RBC # BLD AUTO: 2.67 10*6/MM3 (ref 4.2–5.4)
RBC # BLD AUTO: 2.69 10*6/MM3 (ref 4.2–5.4)
RBC # BLD AUTO: 2.76 10*6/MM3 (ref 4.2–5.4)
RBC # BLD AUTO: 2.76 10*6/MM3 (ref 4.2–5.4)
RBC # BLD AUTO: 2.77 10*6/MM3 (ref 4.2–5.4)
RBC # BLD AUTO: 2.78 10*6/MM3 (ref 4.2–5.4)
RBC # BLD AUTO: 2.79 10*6/MM3 (ref 4.2–5.4)
RBC # BLD AUTO: 2.8 10*6/MM3 (ref 4.2–5.4)
RBC # BLD AUTO: 2.81 10*6/MM3 (ref 4.2–5.4)
RBC # BLD AUTO: 2.82 10*6/MM3 (ref 4.2–5.4)
RBC # BLD AUTO: 2.83 10*6/MM3 (ref 4.2–5.4)
RBC # BLD AUTO: 2.84 10*6/MM3 (ref 4.2–5.4)
RBC # BLD AUTO: 2.85 10*6/MM3 (ref 4.2–5.4)
RBC # BLD AUTO: 2.85 10*6/MM3 (ref 4.2–5.4)
RBC # BLD AUTO: 2.86 10*6/MM3 (ref 4.2–5.4)
RBC # BLD AUTO: 2.86 10*6/MM3 (ref 4.2–5.4)
RBC # BLD AUTO: 2.87 10*6/MM3 (ref 4.2–5.4)
RBC # BLD AUTO: 2.88 10*6/MM3 (ref 4.2–5.4)
RBC # BLD AUTO: 2.9 10*6/MM3 (ref 4.2–5.4)
RBC # BLD AUTO: 2.94 10*6/MM3 (ref 4.2–5.4)
RBC # BLD AUTO: 2.96 10*6/MM3 (ref 4.2–5.4)
RBC # BLD AUTO: 2.98 10*6/MM3 (ref 4.2–5.4)
RBC # BLD AUTO: 2.99 10*6/MM3 (ref 4.2–5.4)
RBC # BLD AUTO: 3 10*6/MM3 (ref 4.2–5.4)
RBC # BLD AUTO: 3.03 10*6/MM3 (ref 4.2–5.4)
RBC # BLD AUTO: 3.03 10*6/MM3 (ref 4.2–5.4)
RBC # BLD AUTO: 3.05 10*6/MM3 (ref 4.2–5.4)
RBC # BLD AUTO: 3.05 10*6/MM3 (ref 4.2–5.4)
RBC # BLD AUTO: 3.07 10*6/MM3 (ref 4.2–5.4)
RBC # BLD AUTO: 3.09 10*6/MM3 (ref 4.2–5.4)
RBC # BLD AUTO: 3.11 10*6/MM3 (ref 4.2–5.4)
RBC # BLD AUTO: 3.12 10*6/MM3 (ref 4.2–5.4)
RBC # BLD AUTO: 3.14 10*6/MM3 (ref 4.2–5.4)
RBC # BLD AUTO: 3.2 10*6/MM3 (ref 4.2–5.4)
RBC # BLD AUTO: 3.21 10*6/MM3 (ref 4.2–5.4)
RBC # BLD AUTO: 3.23 10*6/MM3 (ref 4.2–5.4)
RBC # BLD AUTO: 3.29 10*6/MM3 (ref 4.2–5.4)
RBC # BLD AUTO: 3.52 10*6/MM3 (ref 4.2–5.4)
RBC # BLD AUTO: 3.59 10*6/MM3 (ref 4.2–5.4)
RBC # BLD AUTO: 3.59 10*6/MM3 (ref 4.2–5.4)
RBC # BLD AUTO: 3.61 10*6/MM3 (ref 4.2–5.4)
RBC # BLD AUTO: 3.71 10*6/MM3 (ref 4.2–5.4)
RBC # BLD AUTO: 3.72 10*6/MM3 (ref 4.2–5.4)
RBC # UR: ABNORMAL /HPF
RBC # UR: NORMAL /HPF
RBC MORPH BLD: NORMAL
RBC MORPH BLD: NORMAL
REF LAB TEST METHOD: ABNORMAL
REF LAB TEST METHOD: NORMAL
RETICS #: 0.07 10*6/MM3 (ref 0.02–0.13)
RETICS #: 0.09 10*6/MM3 (ref 0.02–0.13)
RETICS/RBC NFR AUTO: 2.34 % (ref 0.5–1.5)
RETICS/RBC NFR AUTO: 3.36 % (ref 0.5–1.5)
SAO2 % BLDCOA: 96.4 %
SAO2 % BLDCOA: 98.1 %
SCAN SLIDE: NORMAL
SMALL PLATELETS BLD QL SMEAR: ABNORMAL
SMALL PLATELETS BLD QL SMEAR: NORMAL
SMALL PLATELETS BLD QL SMEAR: NORMAL
SODIUM BLD-SCNC: 124 MMOL/L (ref 137–145)
SODIUM BLD-SCNC: 125 MMOL/L (ref 137–145)
SODIUM BLD-SCNC: 125 MMOL/L (ref 137–145)
SODIUM BLD-SCNC: 126 MMOL/L (ref 137–145)
SODIUM BLD-SCNC: 127 MMOL/L (ref 137–145)
SODIUM BLD-SCNC: 127 MMOL/L (ref 137–145)
SODIUM BLD-SCNC: 128 MMOL/L (ref 137–145)
SODIUM BLD-SCNC: 128 MMOL/L (ref 137–145)
SODIUM BLD-SCNC: 129 MMOL/L (ref 137–145)
SODIUM BLD-SCNC: 130 MMOL/L (ref 137–145)
SODIUM BLD-SCNC: 131 MMOL/L (ref 137–145)
SODIUM BLD-SCNC: 133 MMOL/L (ref 137–145)
SODIUM BLD-SCNC: 135 MMOL/L (ref 137–145)
SODIUM BLD-SCNC: 136 MMOL/L (ref 137–145)
SODIUM BLD-SCNC: 137 MMOL/L (ref 137–145)
SODIUM BLD-SCNC: 137 MMOL/L (ref 137–145)
SODIUM BLD-SCNC: 138 MMOL/L (ref 137–145)
SODIUM BLD-SCNC: 138 MMOL/L (ref 137–145)
SODIUM BLD-SCNC: 139 MMOL/L (ref 137–145)
SODIUM BLD-SCNC: 140 MMOL/L (ref 137–145)
SODIUM BLD-SCNC: 141 MMOL/L (ref 137–145)
SODIUM BLD-SCNC: 142 MMOL/L (ref 137–145)
SODIUM BLD-SCNC: 142 MMOL/L (ref 137–145)
SODIUM BLD-SCNC: 143 MMOL/L (ref 137–145)
SODIUM BLD-SCNC: 143 MMOL/L (ref 137–145)
SODIUM UR-SCNC: 19 MMOL/L (ref 30–90)
SODIUM UR-SCNC: 23 MMOL/L (ref 30–90)
SODIUM UR-SCNC: 28 MMOL/L (ref 30–90)
SODIUM UR-SCNC: 50 MMOL/L (ref 30–90)
SODIUM UR-SCNC: 73 MMOL/L (ref 30–90)
SODIUM UR-SCNC: 79 MMOL/L (ref 30–90)
SP GR UR STRIP: 1.01 (ref 1–1.03)
SP GR UR STRIP: 1.02 (ref 1–1.03)
SP GR UR STRIP: >=1.03 (ref 1–1.03)
SPECIMEN VOL 24H UR: 1320 ML
SQUAMOUS #/AREA URNS HPF: ABNORMAL /HPF
SQUAMOUS #/AREA URNS HPF: NORMAL /HPF
TIBC SERPL-MCNC: 182 MCG/DL (ref 261–497)
TIBC SERPL-MCNC: 208 MCG/DL (ref 261–497)
TROPONIN I SERPL-MCNC: 0.01 NG/ML (ref 0–0.03)
TROPONIN I SERPL-MCNC: 0.02 NG/ML (ref 0–0.03)
TROPONIN I SERPL-MCNC: 0.03 NG/ML (ref 0–0.03)
TROPONIN I SERPL-MCNC: 0.05 NG/ML (ref 0–0.03)
TROPONIN I SERPL-MCNC: 0.06 NG/ML (ref 0–0.03)
TROPONIN I SERPL-MCNC: 0.06 NG/ML (ref 0–0.03)
TROPONIN I SERPL-MCNC: 0.07 NG/ML (ref 0–0.03)
TROPONIN I SERPL-MCNC: 0.07 NG/ML (ref 0–0.03)
TROPONIN I SERPL-MCNC: 0.11 NG/ML (ref 0–0.03)
TROPONIN I SERPL-MCNC: 0.11 NG/ML (ref 0–0.03)
TROPONIN I SERPL-MCNC: 0.13 NG/ML (ref 0–0.03)
TROPONIN I SERPL-MCNC: 0.14 NG/ML (ref 0–0.03)
TROPONIN I SERPL-MCNC: 0.14 NG/ML (ref 0–0.03)
TROPONIN I SERPL-MCNC: 0.17 NG/ML (ref 0–0.03)
TROPONIN I SERPL-MCNC: 0.17 NG/ML (ref 0–0.03)
URATE SERPL-MCNC: 10.2 MG/DL (ref 2.5–8.5)
URATE SERPL-MCNC: 10.4 MG/DL (ref 2.5–8.5)
URATE SERPL-MCNC: 10.8 MG/DL (ref 2.5–8.5)
UROBILINOGEN UR QL STRIP: ABNORMAL
UROBILINOGEN UR QL STRIP: NORMAL
VANCOMYCIN SERPL-MCNC: 10.23 MCG/ML (ref 5–40)
VANCOMYCIN SERPL-MCNC: 14.15 MCG/ML (ref 5–40)
VIT B12 BLD-MCNC: 342 PG/ML (ref 239–931)
VRE SPEC QL CULT: NORMAL
WBC MORPH BLD: NORMAL
WBC NRBC COR # BLD: 10.65 10*3/MM3 (ref 4.8–10.8)
WBC NRBC COR # BLD: 11.14 10*3/MM3 (ref 4.8–10.8)
WBC NRBC COR # BLD: 11.34 10*3/MM3 (ref 4.8–10.8)
WBC NRBC COR # BLD: 2.06 10*3/MM3 (ref 4.8–10.8)
WBC NRBC COR # BLD: 2.2 10*3/MM3 (ref 4.8–10.8)
WBC NRBC COR # BLD: 2.46 10*3/MM3 (ref 4.8–10.8)
WBC NRBC COR # BLD: 2.49 10*3/MM3 (ref 4.8–10.8)
WBC NRBC COR # BLD: 2.53 10*3/MM3 (ref 4.8–10.8)
WBC NRBC COR # BLD: 2.54 10*3/MM3 (ref 4.8–10.8)
WBC NRBC COR # BLD: 2.88 10*3/MM3 (ref 4.8–10.8)
WBC NRBC COR # BLD: 2.9 10*3/MM3 (ref 4.8–10.8)
WBC NRBC COR # BLD: 3.14 10*3/MM3 (ref 4.8–10.8)
WBC NRBC COR # BLD: 3.19 10*3/MM3 (ref 4.8–10.8)
WBC NRBC COR # BLD: 3.31 10*3/MM3 (ref 4.8–10.8)
WBC NRBC COR # BLD: 3.32 10*3/MM3 (ref 4.8–10.8)
WBC NRBC COR # BLD: 3.4 10*3/MM3 (ref 4.8–10.8)
WBC NRBC COR # BLD: 3.41 10*3/MM3 (ref 4.8–10.8)
WBC NRBC COR # BLD: 3.44 10*3/MM3 (ref 4.8–10.8)
WBC NRBC COR # BLD: 3.51 10*3/MM3 (ref 4.8–10.8)
WBC NRBC COR # BLD: 3.72 10*3/MM3 (ref 4.8–10.8)
WBC NRBC COR # BLD: 3.77 10*3/MM3 (ref 4.8–10.8)
WBC NRBC COR # BLD: 4.07 10*3/MM3 (ref 4.8–10.8)
WBC NRBC COR # BLD: 4.11 10*3/MM3 (ref 4.8–10.8)
WBC NRBC COR # BLD: 4.47 10*3/MM3 (ref 4.8–10.8)
WBC NRBC COR # BLD: 4.94 10*3/MM3 (ref 4.8–10.8)
WBC NRBC COR # BLD: 5.11 10*3/MM3 (ref 4.8–10.8)
WBC NRBC COR # BLD: 5.14 10*3/MM3 (ref 4.8–10.8)
WBC NRBC COR # BLD: 5.24 10*3/MM3 (ref 4.8–10.8)
WBC NRBC COR # BLD: 5.39 10*3/MM3 (ref 4.8–10.8)
WBC NRBC COR # BLD: 5.71 10*3/MM3 (ref 4.8–10.8)
WBC NRBC COR # BLD: 5.79 10*3/MM3 (ref 4.8–10.8)
WBC NRBC COR # BLD: 5.79 10*3/MM3 (ref 4.8–10.8)
WBC NRBC COR # BLD: 5.87 10*3/MM3 (ref 4.8–10.8)
WBC NRBC COR # BLD: 6.03 10*3/MM3 (ref 4.8–10.8)
WBC NRBC COR # BLD: 6.14 10*3/MM3 (ref 4.8–10.8)
WBC NRBC COR # BLD: 6.16 10*3/MM3 (ref 4.8–10.8)
WBC NRBC COR # BLD: 6.41 10*3/MM3 (ref 4.8–10.8)
WBC NRBC COR # BLD: 6.8 10*3/MM3 (ref 4.8–10.8)
WBC NRBC COR # BLD: 6.86 10*3/MM3 (ref 4.8–10.8)
WBC NRBC COR # BLD: 7.25 10*3/MM3 (ref 4.8–10.8)
WBC NRBC COR # BLD: 7.3 10*3/MM3 (ref 4.8–10.8)
WBC NRBC COR # BLD: 7.76 10*3/MM3 (ref 4.8–10.8)
WBC NRBC COR # BLD: 7.88 10*3/MM3 (ref 4.8–10.8)
WBC NRBC COR # BLD: 7.88 10*3/MM3 (ref 4.8–10.8)
WBC NRBC COR # BLD: 9.57 10*3/MM3 (ref 4.8–10.8)
WBC UR QL AUTO: ABNORMAL /HPF
WBC UR QL AUTO: NORMAL /HPF
WHOLE BLOOD HOLD SPECIMEN: NORMAL
YEAST URNS QL MICRO: ABNORMAL /HPF
YEAST URNS QL MICRO: NORMAL /HPF

## 2017-01-01 PROCEDURE — 84550 ASSAY OF BLOOD/URIC ACID: CPT | Performed by: INTERNAL MEDICINE

## 2017-01-01 PROCEDURE — 82962 GLUCOSE BLOOD TEST: CPT

## 2017-01-01 PROCEDURE — 87147 CULTURE TYPE IMMUNOLOGIC: CPT | Performed by: INTERNAL MEDICINE

## 2017-01-01 PROCEDURE — 80069 RENAL FUNCTION PANEL: CPT | Performed by: INTERNAL MEDICINE

## 2017-01-01 PROCEDURE — 99223 1ST HOSP IP/OBS HIGH 75: CPT | Performed by: INTERNAL MEDICINE

## 2017-01-01 PROCEDURE — 74176 CT ABD & PELVIS W/O CONTRAST: CPT

## 2017-01-01 PROCEDURE — 96376 TX/PRO/DX INJ SAME DRUG ADON: CPT

## 2017-01-01 PROCEDURE — 99204 OFFICE O/P NEW MOD 45 MIN: CPT | Performed by: SURGERY

## 2017-01-01 PROCEDURE — 81001 URINALYSIS AUTO W/SCOPE: CPT | Performed by: FAMILY MEDICINE

## 2017-01-01 PROCEDURE — 25010000002 CYANOCOBALAMIN PER 1000 MCG: Performed by: INTERNAL MEDICINE

## 2017-01-01 PROCEDURE — 25010000002 FUROSEMIDE PER 20 MG: Performed by: INTERNAL MEDICINE

## 2017-01-01 PROCEDURE — 83605 ASSAY OF LACTIC ACID: CPT | Performed by: INTERNAL MEDICINE

## 2017-01-01 PROCEDURE — 93970 EXTREMITY STUDY: CPT

## 2017-01-01 PROCEDURE — 85025 COMPLETE CBC W/AUTO DIFF WBC: CPT | Performed by: FAMILY MEDICINE

## 2017-01-01 PROCEDURE — 99232 SBSQ HOSP IP/OBS MODERATE 35: CPT | Performed by: FAMILY MEDICINE

## 2017-01-01 PROCEDURE — 86140 C-REACTIVE PROTEIN: CPT | Performed by: NURSE PRACTITIONER

## 2017-01-01 PROCEDURE — 99232 SBSQ HOSP IP/OBS MODERATE 35: CPT | Performed by: INTERNAL MEDICINE

## 2017-01-01 PROCEDURE — 94799 UNLISTED PULMONARY SVC/PX: CPT

## 2017-01-01 PROCEDURE — 87070 CULTURE OTHR SPECIMN AEROBIC: CPT | Performed by: INTERNAL MEDICINE

## 2017-01-01 PROCEDURE — 85610 PROTHROMBIN TIME: CPT | Performed by: FAMILY MEDICINE

## 2017-01-01 PROCEDURE — 87186 SC STD MICRODIL/AGAR DIL: CPT | Performed by: INTERNAL MEDICINE

## 2017-01-01 PROCEDURE — 85610 PROTHROMBIN TIME: CPT | Performed by: INTERNAL MEDICINE

## 2017-01-01 PROCEDURE — 97110 THERAPEUTIC EXERCISES: CPT

## 2017-01-01 PROCEDURE — 97530 THERAPEUTIC ACTIVITIES: CPT

## 2017-01-01 PROCEDURE — 96375 TX/PRO/DX INJ NEW DRUG ADDON: CPT

## 2017-01-01 PROCEDURE — 99220 PR INITIAL OBSERVATION CARE/DAY 70 MINUTES: CPT | Performed by: INTERNAL MEDICINE

## 2017-01-01 PROCEDURE — 84484 ASSAY OF TROPONIN QUANT: CPT | Performed by: INTERNAL MEDICINE

## 2017-01-01 PROCEDURE — 83735 ASSAY OF MAGNESIUM: CPT | Performed by: NURSE PRACTITIONER

## 2017-01-01 PROCEDURE — 25010000002 VANCOMYCIN PER 500 MG: Performed by: EMERGENCY MEDICINE

## 2017-01-01 PROCEDURE — 87086 URINE CULTURE/COLONY COUNT: CPT | Performed by: FAMILY MEDICINE

## 2017-01-01 PROCEDURE — 93923 UPR/LXTR ART STDY 3+ LVLS: CPT

## 2017-01-01 PROCEDURE — 87086 URINE CULTURE/COLONY COUNT: CPT | Performed by: INTERNAL MEDICINE

## 2017-01-01 PROCEDURE — 80048 BASIC METABOLIC PNL TOTAL CA: CPT | Performed by: INTERNAL MEDICINE

## 2017-01-01 PROCEDURE — 25010000002 FLUCONAZOLE 100-0.9 MG/50ML-% SOLUTION: Performed by: INTERNAL MEDICINE

## 2017-01-01 PROCEDURE — 84100 ASSAY OF PHOSPHORUS: CPT | Performed by: INTERNAL MEDICINE

## 2017-01-01 PROCEDURE — 80053 COMPREHEN METABOLIC PANEL: CPT | Performed by: FAMILY MEDICINE

## 2017-01-01 PROCEDURE — 81001 URINALYSIS AUTO W/SCOPE: CPT | Performed by: EMERGENCY MEDICINE

## 2017-01-01 PROCEDURE — 83605 ASSAY OF LACTIC ACID: CPT | Performed by: NURSE PRACTITIONER

## 2017-01-01 PROCEDURE — 85025 COMPLETE CBC W/AUTO DIFF WBC: CPT | Performed by: INTERNAL MEDICINE

## 2017-01-01 PROCEDURE — 87040 BLOOD CULTURE FOR BACTERIA: CPT | Performed by: INTERNAL MEDICINE

## 2017-01-01 PROCEDURE — 25010000002 AMIODARONE IN DEXTROSE 5% 150-4.21 MG/100ML-% SOLUTION: Performed by: INTERNAL MEDICINE

## 2017-01-01 PROCEDURE — 84300 ASSAY OF URINE SODIUM: CPT | Performed by: INTERNAL MEDICINE

## 2017-01-01 PROCEDURE — 63510000001 EPOETIN ALFA PER 1000 UNITS: Performed by: INTERNAL MEDICINE

## 2017-01-01 PROCEDURE — 80053 COMPREHEN METABOLIC PANEL: CPT | Performed by: INTERNAL MEDICINE

## 2017-01-01 PROCEDURE — 87040 BLOOD CULTURE FOR BACTERIA: CPT | Performed by: EMERGENCY MEDICINE

## 2017-01-01 PROCEDURE — 96366 THER/PROPH/DIAG IV INF ADDON: CPT

## 2017-01-01 PROCEDURE — 99219 PR INITIAL OBSERVATION CARE/DAY 50 MINUTES: CPT | Performed by: INTERNAL MEDICINE

## 2017-01-01 PROCEDURE — 80053 COMPREHEN METABOLIC PANEL: CPT | Performed by: EMERGENCY MEDICINE

## 2017-01-01 PROCEDURE — 25010000002 AMIODARONE IN DEXTROSE 5% 360-4.14 MG/200ML-% SOLUTION: Performed by: INTERNAL MEDICINE

## 2017-01-01 PROCEDURE — G0378 HOSPITAL OBSERVATION PER HR: HCPCS

## 2017-01-01 PROCEDURE — 83735 ASSAY OF MAGNESIUM: CPT | Performed by: EMERGENCY MEDICINE

## 2017-01-01 PROCEDURE — 84484 ASSAY OF TROPONIN QUANT: CPT | Performed by: EMERGENCY MEDICINE

## 2017-01-01 PROCEDURE — 25010000002 FUROSEMIDE PER 20 MG: Performed by: NURSE PRACTITIONER

## 2017-01-01 PROCEDURE — 25010000002 ENOXAPARIN PER 10 MG: Performed by: INTERNAL MEDICINE

## 2017-01-01 PROCEDURE — 93005 ELECTROCARDIOGRAM TRACING: CPT | Performed by: EMERGENCY MEDICINE

## 2017-01-01 PROCEDURE — 87106 FUNGI IDENTIFICATION YEAST: CPT | Performed by: INTERNAL MEDICINE

## 2017-01-01 PROCEDURE — 3E0G76Z INTRODUCTION OF NUTRITIONAL SUBSTANCE INTO UPPER GI, VIA NATURAL OR ARTIFICIAL OPENING: ICD-10-PCS | Performed by: INTERNAL MEDICINE

## 2017-01-01 PROCEDURE — 82140 ASSAY OF AMMONIA: CPT | Performed by: PHYSICIAN ASSISTANT

## 2017-01-01 PROCEDURE — 85610 PROTHROMBIN TIME: CPT | Performed by: NURSE PRACTITIONER

## 2017-01-01 PROCEDURE — 73502 X-RAY EXAM HIP UNI 2-3 VIEWS: CPT

## 2017-01-01 PROCEDURE — 96361 HYDRATE IV INFUSION ADD-ON: CPT

## 2017-01-01 PROCEDURE — 25010000002 VANCOMYCIN PER 500 MG: Performed by: INTERNAL MEDICINE

## 2017-01-01 PROCEDURE — 87086 URINE CULTURE/COLONY COUNT: CPT | Performed by: NURSE PRACTITIONER

## 2017-01-01 PROCEDURE — 82540 ASSAY OF CREATINE: CPT | Performed by: INTERNAL MEDICINE

## 2017-01-01 PROCEDURE — 85027 COMPLETE CBC AUTOMATED: CPT | Performed by: INTERNAL MEDICINE

## 2017-01-01 PROCEDURE — 97162 PT EVAL MOD COMPLEX 30 MIN: CPT

## 2017-01-01 PROCEDURE — 99217 PR OBSERVATION CARE DISCHARGE MANAGEMENT: CPT | Performed by: INTERNAL MEDICINE

## 2017-01-01 PROCEDURE — 87070 CULTURE OTHR SPECIMN AEROBIC: CPT | Performed by: NURSE PRACTITIONER

## 2017-01-01 PROCEDURE — 80053 COMPREHEN METABOLIC PANEL: CPT | Performed by: NURSE PRACTITIONER

## 2017-01-01 PROCEDURE — 82607 VITAMIN B-12: CPT | Performed by: INTERNAL MEDICINE

## 2017-01-01 PROCEDURE — 83735 ASSAY OF MAGNESIUM: CPT | Performed by: INTERNAL MEDICINE

## 2017-01-01 PROCEDURE — 84484 ASSAY OF TROPONIN QUANT: CPT | Performed by: NURSE PRACTITIONER

## 2017-01-01 PROCEDURE — 87086 URINE CULTURE/COLONY COUNT: CPT | Performed by: PHYSICIAN ASSISTANT

## 2017-01-01 PROCEDURE — 87081 CULTURE SCREEN ONLY: CPT | Performed by: INTERNAL MEDICINE

## 2017-01-01 PROCEDURE — 93005 ELECTROCARDIOGRAM TRACING: CPT | Performed by: NURSE PRACTITIONER

## 2017-01-01 PROCEDURE — 99239 HOSP IP/OBS DSCHRG MGMT >30: CPT | Performed by: INTERNAL MEDICINE

## 2017-01-01 PROCEDURE — 85025 COMPLETE CBC W/AUTO DIFF WBC: CPT | Performed by: EMERGENCY MEDICINE

## 2017-01-01 PROCEDURE — 96367 TX/PROPH/DG ADDL SEQ IV INF: CPT

## 2017-01-01 PROCEDURE — 25010000002 FLUCONAZOLE PER 200 MG: Performed by: INTERNAL MEDICINE

## 2017-01-01 PROCEDURE — 71010 HC CHEST PA OR AP: CPT

## 2017-01-01 PROCEDURE — 99284 EMERGENCY DEPT VISIT MOD MDM: CPT

## 2017-01-01 PROCEDURE — 87077 CULTURE AEROBIC IDENTIFY: CPT | Performed by: NURSE PRACTITIONER

## 2017-01-01 PROCEDURE — 74183 MRI ABD W/O CNTR FLWD CNTR: CPT

## 2017-01-01 PROCEDURE — 70450 CT HEAD/BRAIN W/O DYE: CPT

## 2017-01-01 PROCEDURE — 71020 HC CHEST PA AND LATERAL: CPT

## 2017-01-01 PROCEDURE — 87077 CULTURE AEROBIC IDENTIFY: CPT | Performed by: INTERNAL MEDICINE

## 2017-01-01 PROCEDURE — 83550 IRON BINDING TEST: CPT | Performed by: INTERNAL MEDICINE

## 2017-01-01 PROCEDURE — 25010000002 DIGOXIN PER 500 MCG: Performed by: INTERNAL MEDICINE

## 2017-01-01 PROCEDURE — 80202 ASSAY OF VANCOMYCIN: CPT | Performed by: INTERNAL MEDICINE

## 2017-01-01 PROCEDURE — 83540 ASSAY OF IRON: CPT | Performed by: INTERNAL MEDICINE

## 2017-01-01 PROCEDURE — 97116 GAIT TRAINING THERAPY: CPT

## 2017-01-01 PROCEDURE — 96374 THER/PROPH/DIAG INJ IV PUSH: CPT

## 2017-01-01 PROCEDURE — 99233 SBSQ HOSP IP/OBS HIGH 50: CPT | Performed by: FAMILY MEDICINE

## 2017-01-01 PROCEDURE — 87040 BLOOD CULTURE FOR BACTERIA: CPT | Performed by: FAMILY MEDICINE

## 2017-01-01 PROCEDURE — 87186 SC STD MICRODIL/AGAR DIL: CPT | Performed by: NURSE PRACTITIONER

## 2017-01-01 PROCEDURE — 99285 EMERGENCY DEPT VISIT HI MDM: CPT

## 2017-01-01 PROCEDURE — 81003 URINALYSIS AUTO W/O SCOPE: CPT | Performed by: FAMILY MEDICINE

## 2017-01-01 PROCEDURE — 82575 CREATININE CLEARANCE TEST: CPT | Performed by: INTERNAL MEDICINE

## 2017-01-01 PROCEDURE — 82150 ASSAY OF AMYLASE: CPT | Performed by: NURSE PRACTITIONER

## 2017-01-01 PROCEDURE — 99222 1ST HOSP IP/OBS MODERATE 55: CPT | Performed by: INTERNAL MEDICINE

## 2017-01-01 PROCEDURE — G8987 SELF CARE CURRENT STATUS: HCPCS

## 2017-01-01 PROCEDURE — 85025 COMPLETE CBC W/AUTO DIFF WBC: CPT | Performed by: PHYSICIAN ASSISTANT

## 2017-01-01 PROCEDURE — 85610 PROTHROMBIN TIME: CPT | Performed by: PHYSICIAN ASSISTANT

## 2017-01-01 PROCEDURE — 85007 BL SMEAR W/DIFF WBC COUNT: CPT | Performed by: FAMILY MEDICINE

## 2017-01-01 PROCEDURE — 25010000002 CEFTRIAXONE: Performed by: INTERNAL MEDICINE

## 2017-01-01 PROCEDURE — 73590 X-RAY EXAM OF LOWER LEG: CPT

## 2017-01-01 PROCEDURE — 83690 ASSAY OF LIPASE: CPT | Performed by: NURSE PRACTITIONER

## 2017-01-01 PROCEDURE — 25010000002 ONDANSETRON PER 1 MG: Performed by: INTERNAL MEDICINE

## 2017-01-01 PROCEDURE — 36600 WITHDRAWAL OF ARTERIAL BLOOD: CPT

## 2017-01-01 PROCEDURE — 99498 ADVNCD CARE PLAN ADDL 30 MIN: CPT | Performed by: INTERNAL MEDICINE

## 2017-01-01 PROCEDURE — 97165 OT EVAL LOW COMPLEX 30 MIN: CPT

## 2017-01-01 PROCEDURE — 82805 BLOOD GASES W/O2 SATURATION: CPT

## 2017-01-01 PROCEDURE — 97164 PT RE-EVAL EST PLAN CARE: CPT

## 2017-01-01 PROCEDURE — 25010000002 PIPERACILLIN SOD-TAZOBACTAM PER 1 G: Performed by: NURSE PRACTITIONER

## 2017-01-01 PROCEDURE — 99495 TRANSJ CARE MGMT MOD F2F 14D: CPT | Performed by: INTERNAL MEDICINE

## 2017-01-01 PROCEDURE — 82140 ASSAY OF AMMONIA: CPT | Performed by: INTERNAL MEDICINE

## 2017-01-01 PROCEDURE — 85045 AUTOMATED RETICULOCYTE COUNT: CPT | Performed by: INTERNAL MEDICINE

## 2017-01-01 PROCEDURE — 96365 THER/PROPH/DIAG IV INF INIT: CPT

## 2017-01-01 PROCEDURE — 85610 PROTHROMBIN TIME: CPT | Performed by: EMERGENCY MEDICINE

## 2017-01-01 PROCEDURE — 25010000002 FUROSEMIDE PER 20 MG: Performed by: EMERGENCY MEDICINE

## 2017-01-01 PROCEDURE — 99225 PR SBSQ OBSERVATION CARE/DAY 25 MINUTES: CPT | Performed by: INTERNAL MEDICINE

## 2017-01-01 PROCEDURE — 82140 ASSAY OF AMMONIA: CPT | Performed by: FAMILY MEDICINE

## 2017-01-01 PROCEDURE — 99496 TRANSJ CARE MGMT HIGH F2F 7D: CPT | Performed by: INTERNAL MEDICINE

## 2017-01-01 PROCEDURE — G8979 MOBILITY GOAL STATUS: HCPCS

## 2017-01-01 PROCEDURE — 80162 ASSAY OF DIGOXIN TOTAL: CPT | Performed by: EMERGENCY MEDICINE

## 2017-01-01 PROCEDURE — 83605 ASSAY OF LACTIC ACID: CPT | Performed by: EMERGENCY MEDICINE

## 2017-01-01 PROCEDURE — 87040 BLOOD CULTURE FOR BACTERIA: CPT | Performed by: NURSE PRACTITIONER

## 2017-01-01 PROCEDURE — 87147 CULTURE TYPE IMMUNOLOGIC: CPT | Performed by: NURSE PRACTITIONER

## 2017-01-01 PROCEDURE — 83970 ASSAY OF PARATHORMONE: CPT | Performed by: INTERNAL MEDICINE

## 2017-01-01 PROCEDURE — 99233 SBSQ HOSP IP/OBS HIGH 50: CPT | Performed by: INTERNAL MEDICINE

## 2017-01-01 PROCEDURE — 25010000002 CEFTRIAXONE PER 250 MG: Performed by: INTERNAL MEDICINE

## 2017-01-01 PROCEDURE — 99283 EMERGENCY DEPT VISIT LOW MDM: CPT

## 2017-01-01 PROCEDURE — 87086 URINE CULTURE/COLONY COUNT: CPT | Performed by: EMERGENCY MEDICINE

## 2017-01-01 PROCEDURE — G8989 SELF CARE D/C STATUS: HCPCS

## 2017-01-01 PROCEDURE — 82140 ASSAY OF AMMONIA: CPT | Performed by: NURSE PRACTITIONER

## 2017-01-01 PROCEDURE — 80053 COMPREHEN METABOLIC PANEL: CPT | Performed by: SURGERY

## 2017-01-01 PROCEDURE — A9577 INJ MULTIHANCE: HCPCS | Performed by: NURSE PRACTITIONER

## 2017-01-01 PROCEDURE — 85610 PROTHROMBIN TIME: CPT | Performed by: SURGERY

## 2017-01-01 PROCEDURE — 99214 OFFICE O/P EST MOD 30 MIN: CPT | Performed by: INTERNAL MEDICINE

## 2017-01-01 PROCEDURE — 83880 ASSAY OF NATRIURETIC PEPTIDE: CPT | Performed by: EMERGENCY MEDICINE

## 2017-01-01 PROCEDURE — 93005 ELECTROCARDIOGRAM TRACING: CPT | Performed by: INTERNAL MEDICINE

## 2017-01-01 PROCEDURE — 82550 ASSAY OF CK (CPK): CPT | Performed by: NURSE PRACTITIONER

## 2017-01-01 PROCEDURE — 87147 CULTURE TYPE IMMUNOLOGIC: CPT | Performed by: FAMILY MEDICINE

## 2017-01-01 PROCEDURE — 87324 CLOSTRIDIUM AG IA: CPT | Performed by: FAMILY MEDICINE

## 2017-01-01 PROCEDURE — 87186 SC STD MICRODIL/AGAR DIL: CPT | Performed by: FAMILY MEDICINE

## 2017-01-01 PROCEDURE — G8978 MOBILITY CURRENT STATUS: HCPCS

## 2017-01-01 PROCEDURE — 96372 THER/PROPH/DIAG INJ SC/IM: CPT

## 2017-01-01 PROCEDURE — 25010000003 AMPICILLIN-SULBACTAM PER 1.5 G: Performed by: INTERNAL MEDICINE

## 2017-01-01 PROCEDURE — 99238 HOSP IP/OBS DSCHRG MGMT 30/<: CPT | Performed by: INTERNAL MEDICINE

## 2017-01-01 PROCEDURE — 80053 COMPREHEN METABOLIC PANEL: CPT | Performed by: PHYSICIAN ASSISTANT

## 2017-01-01 PROCEDURE — 96372 THER/PROPH/DIAG INJ SC/IM: CPT | Performed by: INTERNAL MEDICINE

## 2017-01-01 PROCEDURE — 25010000002 MAGNESIUM SULFATE 2 GM/50ML SOLUTION: Performed by: FAMILY MEDICINE

## 2017-01-01 PROCEDURE — 25010000002 CEFTRIAXONE PER 250 MG: Performed by: NURSE PRACTITIONER

## 2017-01-01 PROCEDURE — 85651 RBC SED RATE NONAUTOMATED: CPT | Performed by: NURSE PRACTITIONER

## 2017-01-01 PROCEDURE — 87077 CULTURE AEROBIC IDENTIFY: CPT | Performed by: FAMILY MEDICINE

## 2017-01-01 PROCEDURE — 93306 TTE W/DOPPLER COMPLETE: CPT

## 2017-01-01 PROCEDURE — 80048 BASIC METABOLIC PNL TOTAL CA: CPT | Performed by: FAMILY MEDICINE

## 2017-01-01 PROCEDURE — 81001 URINALYSIS AUTO W/SCOPE: CPT | Performed by: NURSE PRACTITIONER

## 2017-01-01 PROCEDURE — 99226 PR SBSQ OBSERVATION CARE/DAY 35 MINUTES: CPT | Performed by: FAMILY MEDICINE

## 2017-01-01 PROCEDURE — 94640 AIRWAY INHALATION TREATMENT: CPT

## 2017-01-01 PROCEDURE — 99497 ADVNCD CARE PLAN 30 MIN: CPT | Performed by: INTERNAL MEDICINE

## 2017-01-01 PROCEDURE — 0 GADOBENATE DIMEGLUMINE 529 MG/ML SOLUTION: Performed by: NURSE PRACTITIONER

## 2017-01-01 PROCEDURE — 76775 US EXAM ABDO BACK WALL LIM: CPT

## 2017-01-01 PROCEDURE — G8980 MOBILITY D/C STATUS: HCPCS

## 2017-01-01 PROCEDURE — 80074 ACUTE HEPATITIS PANEL: CPT | Performed by: NURSE PRACTITIONER

## 2017-01-01 PROCEDURE — 84156 ASSAY OF PROTEIN URINE: CPT | Performed by: INTERNAL MEDICINE

## 2017-01-01 PROCEDURE — 81050 URINALYSIS VOLUME MEASURE: CPT | Performed by: INTERNAL MEDICINE

## 2017-01-01 PROCEDURE — 73562 X-RAY EXAM OF KNEE 3: CPT

## 2017-01-01 PROCEDURE — 83735 ASSAY OF MAGNESIUM: CPT | Performed by: FAMILY MEDICINE

## 2017-01-01 PROCEDURE — 82375 ASSAY CARBOXYHB QUANT: CPT

## 2017-01-01 PROCEDURE — 71250 CT THORAX DX C-: CPT

## 2017-01-01 PROCEDURE — 25010000002 PIPERACILLIN SOD-TAZOBACTAM PER 1 G: Performed by: INTERNAL MEDICINE

## 2017-01-01 PROCEDURE — 81003 URINALYSIS AUTO W/O SCOPE: CPT | Performed by: INTERNAL MEDICINE

## 2017-01-01 PROCEDURE — 85027 COMPLETE CBC AUTOMATED: CPT | Performed by: SURGERY

## 2017-01-01 PROCEDURE — 25010000002 MAGNESIUM SULFATE 2 GM/50ML SOLUTION: Performed by: INTERNAL MEDICINE

## 2017-01-01 PROCEDURE — 85025 COMPLETE CBC W/AUTO DIFF WBC: CPT | Performed by: NURSE PRACTITIONER

## 2017-01-01 PROCEDURE — 36415 COLL VENOUS BLD VENIPUNCTURE: CPT

## 2017-01-01 PROCEDURE — 0DH67UZ INSERTION OF FEEDING DEVICE INTO STOMACH, VIA NATURAL OR ARTIFICIAL OPENING: ICD-10-PCS | Performed by: INTERNAL MEDICINE

## 2017-01-01 PROCEDURE — 83010 ASSAY OF HAPTOGLOBIN QUANT: CPT | Performed by: INTERNAL MEDICINE

## 2017-01-01 PROCEDURE — 83050 HGB METHEMOGLOBIN QUAN: CPT

## 2017-01-01 PROCEDURE — 80162 ASSAY OF DIGOXIN TOTAL: CPT | Performed by: INTERNAL MEDICINE

## 2017-01-01 PROCEDURE — 99215 OFFICE O/P EST HI 40 MIN: CPT | Performed by: INTERNAL MEDICINE

## 2017-01-01 PROCEDURE — 87070 CULTURE OTHR SPECIMN AEROBIC: CPT | Performed by: FAMILY MEDICINE

## 2017-01-01 PROCEDURE — 82550 ASSAY OF CK (CPK): CPT | Performed by: INTERNAL MEDICINE

## 2017-01-01 PROCEDURE — G0179 MD RECERTIFICATION HHA PT: HCPCS | Performed by: INTERNAL MEDICINE

## 2017-01-01 PROCEDURE — 72110 X-RAY EXAM L-2 SPINE 4/>VWS: CPT

## 2017-01-01 PROCEDURE — 25010000002 LEVOFLOXACIN PER 250 MG: Performed by: INTERNAL MEDICINE

## 2017-01-01 PROCEDURE — 0 IOPAMIDOL 61 % SOLUTION: Performed by: EMERGENCY MEDICINE

## 2017-01-01 PROCEDURE — 71275 CT ANGIOGRAPHY CHEST: CPT

## 2017-01-01 PROCEDURE — 25010000002 CEFTRIAXONE: Performed by: PHYSICIAN ASSISTANT

## 2017-01-01 PROCEDURE — 84484 ASSAY OF TROPONIN QUANT: CPT | Performed by: PHYSICIAN ASSISTANT

## 2017-01-01 PROCEDURE — 25010000002 VANCOMYCIN PER 500 MG: Performed by: FAMILY MEDICINE

## 2017-01-01 PROCEDURE — 83880 ASSAY OF NATRIURETIC PEPTIDE: CPT | Performed by: NURSE PRACTITIONER

## 2017-01-01 PROCEDURE — 83605 ASSAY OF LACTIC ACID: CPT | Performed by: FAMILY MEDICINE

## 2017-01-01 PROCEDURE — 81003 URINALYSIS AUTO W/O SCOPE: CPT | Performed by: NURSE PRACTITIONER

## 2017-01-01 PROCEDURE — 73552 X-RAY EXAM OF FEMUR 2/>: CPT

## 2017-01-01 PROCEDURE — 80202 ASSAY OF VANCOMYCIN: CPT

## 2017-01-01 PROCEDURE — 81001 URINALYSIS AUTO W/SCOPE: CPT | Performed by: INTERNAL MEDICINE

## 2017-01-01 PROCEDURE — 82140 ASSAY OF AMMONIA: CPT | Performed by: EMERGENCY MEDICINE

## 2017-01-01 PROCEDURE — 82728 ASSAY OF FERRITIN: CPT | Performed by: INTERNAL MEDICINE

## 2017-01-01 PROCEDURE — 36415 COLL VENOUS BLD VENIPUNCTURE: CPT | Performed by: INTERNAL MEDICINE

## 2017-01-01 PROCEDURE — 25010000002 LORAZEPAM PER 2 MG: Performed by: EMERGENCY MEDICINE

## 2017-01-01 PROCEDURE — 25010000002 LEVOFLOXACIN PER 250 MG: Performed by: EMERGENCY MEDICINE

## 2017-01-01 PROCEDURE — 81001 URINALYSIS AUTO W/SCOPE: CPT | Performed by: PHYSICIAN ASSISTANT

## 2017-01-01 PROCEDURE — G8988 SELF CARE GOAL STATUS: HCPCS

## 2017-01-01 PROCEDURE — 85007 BL SMEAR W/DIFF WBC COUNT: CPT | Performed by: EMERGENCY MEDICINE

## 2017-01-01 PROCEDURE — 99238 HOSP IP/OBS DSCHRG MGMT 30/<: CPT | Performed by: FAMILY MEDICINE

## 2017-01-01 PROCEDURE — 99204 OFFICE O/P NEW MOD 45 MIN: CPT | Performed by: INTERNAL MEDICINE

## 2017-01-01 PROCEDURE — 85014 HEMATOCRIT: CPT | Performed by: INTERNAL MEDICINE

## 2017-01-01 PROCEDURE — 99232 SBSQ HOSP IP/OBS MODERATE 35: CPT | Performed by: NURSE PRACTITIONER

## 2017-01-01 PROCEDURE — 86706 HEP B SURFACE ANTIBODY: CPT | Performed by: INTERNAL MEDICINE

## 2017-01-01 PROCEDURE — 97161 PT EVAL LOW COMPLEX 20 MIN: CPT

## 2017-01-01 PROCEDURE — 74000 HC ABDOMEN KUB: CPT

## 2017-01-01 PROCEDURE — 82747 ASSAY OF FOLIC ACID RBC: CPT | Performed by: INTERNAL MEDICINE

## 2017-01-01 PROCEDURE — 25010000002 CEFEPIME PER 500 MG: Performed by: EMERGENCY MEDICINE

## 2017-01-01 PROCEDURE — 80048 BASIC METABOLIC PNL TOTAL CA: CPT | Performed by: EMERGENCY MEDICINE

## 2017-01-01 RX ORDER — LACTULOSE 20 G/30ML
10 SOLUTION ORAL
Status: DISPENSED | OUTPATIENT
Start: 2017-01-01 | End: 2017-01-01

## 2017-01-01 RX ORDER — POTASSIUM CHLORIDE 750 MG/1
40 CAPSULE, EXTENDED RELEASE ORAL ONCE
Status: COMPLETED | OUTPATIENT
Start: 2017-01-01 | End: 2017-01-01

## 2017-01-01 RX ORDER — LACTULOSE 20 G/30ML
20 SOLUTION ORAL 3 TIMES DAILY
Status: DISCONTINUED | OUTPATIENT
Start: 2017-01-01 | End: 2017-01-01

## 2017-01-01 RX ORDER — SODIUM CHLORIDE 9 MG/ML
50 INJECTION, SOLUTION INTRAVENOUS CONTINUOUS
Status: DISCONTINUED | OUTPATIENT
Start: 2017-01-01 | End: 2017-01-01

## 2017-01-01 RX ORDER — WARFARIN SODIUM 1 MG/1
1 TABLET ORAL
Status: DISCONTINUED | OUTPATIENT
Start: 2017-01-01 | End: 2017-01-01 | Stop reason: HOSPADM

## 2017-01-01 RX ORDER — FUROSEMIDE 10 MG/ML
20 INJECTION INTRAMUSCULAR; INTRAVENOUS ONCE
Status: COMPLETED | OUTPATIENT
Start: 2017-01-01 | End: 2017-01-01

## 2017-01-01 RX ORDER — ATORVASTATIN CALCIUM 10 MG/1
10 TABLET, FILM COATED ORAL NIGHTLY
Status: DISCONTINUED | OUTPATIENT
Start: 2017-01-01 | End: 2017-01-01 | Stop reason: HOSPADM

## 2017-01-01 RX ORDER — FLUCONAZOLE 100 MG/1
100 TABLET ORAL EVERY 24 HOURS
Status: DISCONTINUED | OUTPATIENT
Start: 2017-01-01 | End: 2017-01-01 | Stop reason: HOSPADM

## 2017-01-01 RX ORDER — PROPRANOLOL HYDROCHLORIDE 20 MG/1
40 TABLET ORAL EVERY 12 HOURS SCHEDULED
Status: DISCONTINUED | OUTPATIENT
Start: 2017-01-01 | End: 2017-01-01

## 2017-01-01 RX ORDER — FLUCONAZOLE 100 MG/1
100 TABLET ORAL EVERY 24 HOURS
Status: COMPLETED | OUTPATIENT
Start: 2017-01-01 | End: 2017-01-01

## 2017-01-01 RX ORDER — WARFARIN SODIUM 1 MG/1
1 TABLET ORAL
Status: DISCONTINUED | OUTPATIENT
Start: 2017-01-01 | End: 2017-01-01 | Stop reason: DRUGHIGH

## 2017-01-01 RX ORDER — BUMETANIDE 1 MG/1
1 TABLET ORAL ONCE
Status: COMPLETED | OUTPATIENT
Start: 2017-01-01 | End: 2017-01-01

## 2017-01-01 RX ORDER — SODIUM CHLORIDE 0.9 % (FLUSH) 0.9 %
10 SYRINGE (ML) INJECTION AS NEEDED
Status: DISCONTINUED | OUTPATIENT
Start: 2017-01-01 | End: 2017-01-01

## 2017-01-01 RX ORDER — ONDANSETRON 4 MG/1
4 TABLET, FILM COATED ORAL EVERY 8 HOURS PRN
Status: DISCONTINUED | OUTPATIENT
Start: 2017-01-01 | End: 2017-01-01 | Stop reason: HOSPADM

## 2017-01-01 RX ORDER — DIGOXIN 0.25 MG/ML
500 INJECTION INTRAMUSCULAR; INTRAVENOUS ONCE
Status: DISCONTINUED | OUTPATIENT
Start: 2017-01-01 | End: 2017-01-01

## 2017-01-01 RX ORDER — ACETAMINOPHEN 325 MG/1
650 TABLET ORAL EVERY 4 HOURS PRN
Status: DISCONTINUED | OUTPATIENT
Start: 2017-01-01 | End: 2017-01-01 | Stop reason: HOSPADM

## 2017-01-01 RX ORDER — ONDANSETRON 4 MG/1
4 TABLET, FILM COATED ORAL EVERY 6 HOURS PRN
Status: DISCONTINUED | OUTPATIENT
Start: 2017-01-01 | End: 2017-01-01 | Stop reason: HOSPADM

## 2017-01-01 RX ORDER — MIDODRINE HYDROCHLORIDE 10 MG/1
10 TABLET ORAL
Qty: 21 TABLET | Refills: 0 | Status: SHIPPED | OUTPATIENT
Start: 2017-01-01

## 2017-01-01 RX ORDER — FUROSEMIDE 10 MG/ML
160 INJECTION INTRAMUSCULAR; INTRAVENOUS EVERY 8 HOURS
Status: COMPLETED | OUTPATIENT
Start: 2017-01-01 | End: 2017-01-01

## 2017-01-01 RX ORDER — PANTOPRAZOLE SODIUM 40 MG/1
40 TABLET, DELAYED RELEASE ORAL DAILY
Qty: 30 TABLET | Refills: 3 | Status: SHIPPED | OUTPATIENT
Start: 2017-01-01 | End: 2017-01-01

## 2017-01-01 RX ORDER — HYDROCHLOROTHIAZIDE 50 MG/1
50 TABLET ORAL DAILY
COMMUNITY
Start: 2017-01-01 | End: 2017-01-01 | Stop reason: HOSPADM

## 2017-01-01 RX ORDER — FUROSEMIDE 10 MG/ML
40 INJECTION INTRAMUSCULAR; INTRAVENOUS ONCE
Status: COMPLETED | OUTPATIENT
Start: 2017-01-01 | End: 2017-01-01

## 2017-01-01 RX ORDER — LACTULOSE 20 G/30ML
20 SOLUTION ORAL 2 TIMES DAILY
Status: DISCONTINUED | OUTPATIENT
Start: 2017-01-01 | End: 2017-01-01 | Stop reason: HOSPADM

## 2017-01-01 RX ORDER — SPIRONOLACTONE 25 MG/1
100 TABLET ORAL 2 TIMES DAILY
Status: DISCONTINUED | OUTPATIENT
Start: 2017-01-01 | End: 2017-01-01

## 2017-01-01 RX ORDER — BUMETANIDE 1 MG/1
1 TABLET ORAL 2 TIMES DAILY
Qty: 60 TABLET | Refills: 0 | Status: SHIPPED | OUTPATIENT
Start: 2017-01-01 | End: 2017-01-01 | Stop reason: HOSPADM

## 2017-01-01 RX ORDER — SODIUM POLYSTYRENE SULFONATE 15 G/60ML
15 SUSPENSION ORAL; RECTAL ONCE
Status: COMPLETED | OUTPATIENT
Start: 2017-01-01 | End: 2017-01-01

## 2017-01-01 RX ORDER — BUMETANIDE 1 MG/1
1 TABLET ORAL 3 TIMES DAILY
Status: DISCONTINUED | OUTPATIENT
Start: 2017-01-01 | End: 2017-01-01

## 2017-01-01 RX ORDER — ONDANSETRON 2 MG/ML
4 INJECTION INTRAMUSCULAR; INTRAVENOUS EVERY 6 HOURS PRN
Status: DISCONTINUED | OUTPATIENT
Start: 2017-01-01 | End: 2017-01-01 | Stop reason: HOSPADM

## 2017-01-01 RX ORDER — FLUCONAZOLE, SODIUM CHLORIDE 2 MG/ML
100 INJECTION INTRAVENOUS EVERY 24 HOURS
Status: DISCONTINUED | OUTPATIENT
Start: 2017-01-01 | End: 2017-01-01

## 2017-01-01 RX ORDER — DIGOXIN 0.25 MG/ML
125 INJECTION INTRAMUSCULAR; INTRAVENOUS EVERY 6 HOURS
Status: COMPLETED | OUTPATIENT
Start: 2017-01-01 | End: 2017-01-01

## 2017-01-01 RX ORDER — WARFARIN SODIUM 2 MG/1
TABLET ORAL
Qty: 30 TABLET | Refills: 0
Start: 2017-01-01 | End: 2017-01-01 | Stop reason: HOSPADM

## 2017-01-01 RX ORDER — SODIUM CHLORIDE 0.9 % (FLUSH) 0.9 %
1-10 SYRINGE (ML) INJECTION AS NEEDED
Status: DISCONTINUED | OUTPATIENT
Start: 2017-01-01 | End: 2017-01-01 | Stop reason: HOSPADM

## 2017-01-01 RX ORDER — LACTULOSE 10 G/15ML
30 SOLUTION ORAL; RECTAL ONCE
Status: COMPLETED | OUTPATIENT
Start: 2017-01-01 | End: 2017-01-01

## 2017-01-01 RX ORDER — ONDANSETRON 4 MG/1
4 TABLET, ORALLY DISINTEGRATING ORAL EVERY 6 HOURS PRN
Status: DISCONTINUED | OUTPATIENT
Start: 2017-01-01 | End: 2017-01-01 | Stop reason: HOSPADM

## 2017-01-01 RX ORDER — WARFARIN SODIUM 2 MG/1
2 TABLET ORAL
Status: DISCONTINUED | OUTPATIENT
Start: 2017-01-01 | End: 2017-01-01

## 2017-01-01 RX ORDER — PROPRANOLOL HYDROCHLORIDE 20 MG/1
40 TABLET ORAL EVERY 8 HOURS SCHEDULED
Status: DISCONTINUED | OUTPATIENT
Start: 2017-01-01 | End: 2017-01-01 | Stop reason: HOSPADM

## 2017-01-01 RX ORDER — WARFARIN SODIUM 1 MG/1
1 TABLET ORAL
Status: COMPLETED | OUTPATIENT
Start: 2017-01-01 | End: 2017-01-01

## 2017-01-01 RX ORDER — HYDROCODONE BITARTRATE AND ACETAMINOPHEN 10; 325 MG/1; MG/1
1 TABLET ORAL EVERY 6 HOURS PRN
Status: DISCONTINUED | OUTPATIENT
Start: 2017-01-01 | End: 2017-01-01 | Stop reason: HOSPADM

## 2017-01-01 RX ORDER — LACTULOSE 10 G/15ML
30 SOLUTION ORAL; RECTAL ONCE
Status: DISCONTINUED | OUTPATIENT
Start: 2017-01-01 | End: 2017-01-01 | Stop reason: SDUPTHER

## 2017-01-01 RX ORDER — BUMETANIDE 1 MG/1
1 TABLET ORAL DAILY
Status: DISCONTINUED | OUTPATIENT
Start: 2017-01-01 | End: 2017-01-01 | Stop reason: HOSPADM

## 2017-01-01 RX ORDER — FUROSEMIDE 10 MG/ML
80 INJECTION INTRAMUSCULAR; INTRAVENOUS ONCE
Status: COMPLETED | OUTPATIENT
Start: 2017-01-01 | End: 2017-01-01

## 2017-01-01 RX ORDER — PROPRANOLOL HYDROCHLORIDE 20 MG/1
20 TABLET ORAL ONCE
Status: COMPLETED | OUTPATIENT
Start: 2017-01-01 | End: 2017-01-01

## 2017-01-01 RX ORDER — CEPHALEXIN 500 MG/1
CAPSULE ORAL
COMMUNITY
Start: 2017-01-01 | End: 2017-01-01 | Stop reason: HOSPADM

## 2017-01-01 RX ORDER — HYDRALAZINE HYDROCHLORIDE 25 MG/1
12.5 TABLET, FILM COATED ORAL DAILY
Status: DISCONTINUED | OUTPATIENT
Start: 2017-01-01 | End: 2017-01-01 | Stop reason: HOSPADM

## 2017-01-01 RX ORDER — CYANOCOBALAMIN 1000 UG/ML
1000 INJECTION, SOLUTION INTRAMUSCULAR; SUBCUTANEOUS
Status: DISCONTINUED | OUTPATIENT
Start: 2017-01-01 | End: 2017-01-01 | Stop reason: HOSPADM

## 2017-01-01 RX ORDER — DIGOXIN 125 MCG
125 TABLET ORAL EVERY OTHER DAY
Status: DISCONTINUED | OUTPATIENT
Start: 2017-01-01 | End: 2017-01-01 | Stop reason: HOSPADM

## 2017-01-01 RX ORDER — LACTULOSE 10 G/15ML
20 SOLUTION ORAL 3 TIMES DAILY
Status: DISCONTINUED | OUTPATIENT
Start: 2017-01-01 | End: 2017-01-01

## 2017-01-01 RX ORDER — LACTULOSE 10 G/15ML
10 SOLUTION ORAL 3 TIMES DAILY
Qty: 1892 ML | Refills: 6 | Status: SHIPPED | OUTPATIENT
Start: 2017-01-01

## 2017-01-01 RX ORDER — PROPRANOLOL HYDROCHLORIDE 10 MG/1
10 TABLET ORAL EVERY 12 HOURS SCHEDULED
Status: DISCONTINUED | OUTPATIENT
Start: 2017-01-01 | End: 2017-01-01 | Stop reason: HOSPADM

## 2017-01-01 RX ORDER — CYANOCOBALAMIN 1000 UG/ML
1000 INJECTION, SOLUTION INTRAMUSCULAR; SUBCUTANEOUS ONCE
Status: COMPLETED | OUTPATIENT
Start: 2017-01-01 | End: 2017-01-01

## 2017-01-01 RX ORDER — FUROSEMIDE 40 MG/1
40 TABLET ORAL DAILY
Qty: 30 TABLET | Refills: 0 | Status: SHIPPED | OUTPATIENT
Start: 2017-01-01 | End: 2017-01-01 | Stop reason: HOSPADM

## 2017-01-01 RX ORDER — FOLIC ACID/VIT B COMPLEX AND C 0.8 MG
1 TABLET ORAL DAILY
Status: DISCONTINUED | OUTPATIENT
Start: 2017-01-01 | End: 2017-01-01 | Stop reason: HOSPADM

## 2017-01-01 RX ORDER — SPIRONOLACTONE 100 MG/1
100 TABLET, FILM COATED ORAL 2 TIMES DAILY
Qty: 60 TABLET | Refills: 0 | Status: SHIPPED | OUTPATIENT
Start: 2017-01-01 | End: 2017-01-01 | Stop reason: SDUPTHER

## 2017-01-01 RX ORDER — AMPICILLIN AND SULBACTAM 2; 1 G/1; G/1
3 INJECTION, POWDER, FOR SOLUTION INTRAMUSCULAR; INTRAVENOUS EVERY 6 HOURS SCHEDULED
Status: DISCONTINUED | OUTPATIENT
Start: 2017-01-01 | End: 2017-01-01 | Stop reason: ALTCHOICE

## 2017-01-01 RX ORDER — TRAMADOL HYDROCHLORIDE 50 MG/1
50 TABLET ORAL EVERY 12 HOURS PRN
Status: DISCONTINUED | OUTPATIENT
Start: 2017-01-01 | End: 2017-01-01 | Stop reason: HOSPADM

## 2017-01-01 RX ORDER — LACTULOSE 10 G/15ML
10 SOLUTION ORAL 3 TIMES DAILY
Status: DISCONTINUED | OUTPATIENT
Start: 2017-01-01 | End: 2017-01-01 | Stop reason: HOSPADM

## 2017-01-01 RX ORDER — PROPRANOLOL HYDROCHLORIDE 20 MG/1
40 TABLET ORAL 2 TIMES DAILY
COMMUNITY
Start: 2017-01-01 | End: 2017-01-01 | Stop reason: HOSPADM

## 2017-01-01 RX ORDER — WARFARIN SODIUM 1 MG/1
3 TABLET ORAL
Status: DISCONTINUED | OUTPATIENT
Start: 2017-01-01 | End: 2017-01-01 | Stop reason: HOSPADM

## 2017-01-01 RX ORDER — LACTULOSE 20 G/30ML
20 SOLUTION ORAL ONCE
Status: COMPLETED | OUTPATIENT
Start: 2017-01-01 | End: 2017-01-01

## 2017-01-01 RX ORDER — FUROSEMIDE 10 MG/ML
40 INJECTION INTRAMUSCULAR; INTRAVENOUS EVERY 6 HOURS
Status: DISCONTINUED | OUTPATIENT
Start: 2017-01-01 | End: 2017-01-01

## 2017-01-01 RX ORDER — METOPROLOL TARTRATE 5 MG/5ML
5 INJECTION INTRAVENOUS EVERY 6 HOURS PRN
Status: DISCONTINUED | OUTPATIENT
Start: 2017-01-01 | End: 2017-01-01 | Stop reason: HOSPADM

## 2017-01-01 RX ORDER — MAGNESIUM SULFATE HEPTAHYDRATE 40 MG/ML
2 INJECTION, SOLUTION INTRAVENOUS ONCE
Status: COMPLETED | OUTPATIENT
Start: 2017-01-01 | End: 2017-01-01

## 2017-01-01 RX ORDER — LACTULOSE 10 G/15ML
10 SOLUTION ORAL; RECTAL ONCE
Status: DISCONTINUED | OUTPATIENT
Start: 2017-01-01 | End: 2017-01-01 | Stop reason: SDUPTHER

## 2017-01-01 RX ORDER — HYDROCODONE BITARTRATE AND ACETAMINOPHEN 10; 325 MG/1; MG/1
1 TABLET ORAL EVERY 6 HOURS PRN
OUTPATIENT
Start: 2017-01-01

## 2017-01-01 RX ORDER — OXYCODONE HYDROCHLORIDE 5 MG/1
2.5 TABLET ORAL EVERY 8 HOURS PRN
Status: DISCONTINUED | OUTPATIENT
Start: 2017-01-01 | End: 2017-01-01 | Stop reason: HOSPADM

## 2017-01-01 RX ORDER — ALLOPURINOL 100 MG/1
100 TABLET ORAL DAILY
Status: DISCONTINUED | OUTPATIENT
Start: 2017-01-01 | End: 2017-01-01 | Stop reason: HOSPADM

## 2017-01-01 RX ORDER — DIGOXIN 125 MCG
125 TABLET ORAL EVERY OTHER DAY
Qty: 30 TABLET | Refills: 0 | Status: SHIPPED | OUTPATIENT
Start: 2017-01-01

## 2017-01-01 RX ORDER — SODIUM POLYSTYRENE SULFONATE 15 G/60ML
30 SUSPENSION ORAL; RECTAL DAILY
Status: DISCONTINUED | OUTPATIENT
Start: 2017-01-01 | End: 2017-01-01

## 2017-01-01 RX ORDER — SODIUM CHLORIDE 0.9 % (FLUSH) 0.9 %
10 SYRINGE (ML) INJECTION AS NEEDED
Status: DISCONTINUED | OUTPATIENT
Start: 2017-01-01 | End: 2017-01-01 | Stop reason: HOSPADM

## 2017-01-01 RX ORDER — BUMETANIDE 1 MG/1
0.5 TABLET ORAL 2 TIMES DAILY
Status: DISCONTINUED | OUTPATIENT
Start: 2017-01-01 | End: 2017-01-01

## 2017-01-01 RX ORDER — ONDANSETRON 4 MG/1
4 TABLET, FILM COATED ORAL ONCE
Status: COMPLETED | OUTPATIENT
Start: 2017-01-01 | End: 2017-01-01

## 2017-01-01 RX ORDER — SPIRONOLACTONE 100 MG/1
100 TABLET, FILM COATED ORAL 2 TIMES DAILY
Qty: 60 TABLET | Refills: 3 | Status: SHIPPED | OUTPATIENT
Start: 2017-01-01 | End: 2017-01-01 | Stop reason: HOSPADM

## 2017-01-01 RX ORDER — SPIRONOLACTONE 25 MG/1
25 TABLET ORAL 2 TIMES DAILY
Status: DISCONTINUED | OUTPATIENT
Start: 2017-01-01 | End: 2017-01-01

## 2017-01-01 RX ORDER — ALLOPURINOL 100 MG/1
100 TABLET ORAL DAILY
Qty: 7 TABLET | Refills: 0 | Status: SHIPPED | OUTPATIENT
Start: 2017-01-01

## 2017-01-01 RX ORDER — BUMETANIDE 1 MG/1
1 TABLET ORAL 2 TIMES DAILY
Status: DISCONTINUED | OUTPATIENT
Start: 2017-01-01 | End: 2017-01-01 | Stop reason: HOSPADM

## 2017-01-01 RX ORDER — LACTULOSE 20 G/30ML
10 SOLUTION ORAL ONCE
Status: COMPLETED | OUTPATIENT
Start: 2017-01-01 | End: 2017-01-01

## 2017-01-01 RX ORDER — LEVOFLOXACIN 750 MG/1
750 TABLET ORAL DAILY
Qty: 4 TABLET | Refills: 0 | Status: SHIPPED | OUTPATIENT
Start: 2017-01-01 | End: 2017-01-01 | Stop reason: HOSPADM

## 2017-01-01 RX ORDER — PANTOPRAZOLE SODIUM 40 MG/1
40 TABLET, DELAYED RELEASE ORAL DAILY
Status: DISCONTINUED | OUTPATIENT
Start: 2017-01-01 | End: 2017-01-01

## 2017-01-01 RX ORDER — PROPRANOLOL HYDROCHLORIDE 20 MG/1
20 TABLET ORAL EVERY 8 HOURS SCHEDULED
Status: DISCONTINUED | OUTPATIENT
Start: 2017-01-01 | End: 2017-01-01

## 2017-01-01 RX ORDER — SODIUM CHLORIDE 9 MG/ML
100 INJECTION, SOLUTION INTRAVENOUS CONTINUOUS
Status: DISCONTINUED | OUTPATIENT
Start: 2017-01-01 | End: 2017-01-01

## 2017-01-01 RX ORDER — OXYCODONE HYDROCHLORIDE AND ACETAMINOPHEN 5; 325 MG/1; MG/1
TABLET ORAL
COMMUNITY
Start: 2017-01-01 | End: 2017-01-01

## 2017-01-01 RX ORDER — ATORVASTATIN CALCIUM 10 MG/1
10 TABLET, FILM COATED ORAL NIGHTLY
Qty: 30 TABLET | Refills: 0 | Status: SHIPPED | OUTPATIENT
Start: 2017-01-01 | End: 2017-01-01 | Stop reason: SDUPTHER

## 2017-01-01 RX ORDER — WARFARIN SODIUM 1 MG/1
1 TABLET ORAL
Status: DISCONTINUED | OUTPATIENT
Start: 2017-01-01 | End: 2017-01-01

## 2017-01-01 RX ORDER — PANTOPRAZOLE SODIUM 40 MG/1
40 TABLET, DELAYED RELEASE ORAL
Status: DISCONTINUED | OUTPATIENT
Start: 2017-01-01 | End: 2017-01-01

## 2017-01-01 RX ORDER — DOXYCYCLINE 100 MG/1
100 CAPSULE ORAL 2 TIMES DAILY
Qty: 14 CAPSULE | Refills: 0 | Status: SHIPPED | OUTPATIENT
Start: 2017-01-01 | End: 2017-01-01

## 2017-01-01 RX ORDER — BUMETANIDE 0.5 MG/1
0.5 TABLET ORAL 2 TIMES DAILY
Qty: 60 TABLET | Refills: 0 | Status: ON HOLD | OUTPATIENT
Start: 2017-01-01 | End: 2017-01-01

## 2017-01-01 RX ORDER — ONDANSETRON 4 MG/1
4 TABLET, FILM COATED ORAL EVERY 8 HOURS PRN
COMMUNITY

## 2017-01-01 RX ORDER — METOPROLOL TARTRATE 5 MG/5ML
5 INJECTION INTRAVENOUS ONCE
Status: COMPLETED | OUTPATIENT
Start: 2017-01-01 | End: 2017-01-01

## 2017-01-01 RX ORDER — AMIODARONE HYDROCHLORIDE 200 MG/1
200 TABLET ORAL DAILY
Status: DISCONTINUED | OUTPATIENT
Start: 2018-01-10 | End: 2017-01-01

## 2017-01-01 RX ORDER — WARFARIN SODIUM 2 MG/1
2 TABLET ORAL
Status: DISCONTINUED | OUTPATIENT
Start: 2017-01-01 | End: 2017-01-01 | Stop reason: HOSPADM

## 2017-01-01 RX ORDER — WARFARIN SODIUM 2.5 MG/1
1 TABLET ORAL
Status: ON HOLD | COMMUNITY
Start: 2017-01-01 | End: 2017-01-01

## 2017-01-01 RX ORDER — HYDRALAZINE HYDROCHLORIDE 25 MG/1
12.5 TABLET, FILM COATED ORAL DAILY
Status: ON HOLD | COMMUNITY
End: 2017-01-01

## 2017-01-01 RX ORDER — PROPRANOLOL HYDROCHLORIDE 20 MG/1
20 TABLET ORAL EVERY 12 HOURS SCHEDULED
Status: DISCONTINUED | OUTPATIENT
Start: 2017-01-01 | End: 2017-01-01

## 2017-01-01 RX ORDER — OXYCODONE HYDROCHLORIDE 5 MG/1
5 TABLET ORAL EVERY 8 HOURS PRN
Status: DISCONTINUED | OUTPATIENT
Start: 2017-01-01 | End: 2017-01-01

## 2017-01-01 RX ORDER — BISACODYL 5 MG/1
5 TABLET, DELAYED RELEASE ORAL DAILY PRN
Status: DISCONTINUED | OUTPATIENT
Start: 2017-01-01 | End: 2017-01-01 | Stop reason: HOSPADM

## 2017-01-01 RX ORDER — AMIODARONE HYDROCHLORIDE 200 MG/1
200 TABLET ORAL EVERY 12 HOURS
Status: DISCONTINUED | OUTPATIENT
Start: 2017-12-26 | End: 2017-01-01

## 2017-01-01 RX ORDER — LEVOFLOXACIN 250 MG/1
250 TABLET ORAL EVERY 24 HOURS
Qty: 4 TABLET | Refills: 0 | Status: SHIPPED | OUTPATIENT
Start: 2017-01-01 | End: 2017-01-01

## 2017-01-01 RX ORDER — FUROSEMIDE 10 MG/ML
200 INJECTION INTRAMUSCULAR; INTRAVENOUS ONCE
Status: DISCONTINUED | OUTPATIENT
Start: 2017-01-01 | End: 2017-01-01 | Stop reason: CLARIF

## 2017-01-01 RX ORDER — HYDROCODONE BITARTRATE AND ACETAMINOPHEN 10; 325 MG/1; MG/1
1 TABLET ORAL EVERY 6 HOURS PRN
Qty: 90 TABLET | Refills: 0 | Status: SHIPPED | OUTPATIENT
Start: 2017-01-01

## 2017-01-01 RX ORDER — DEXTROSE MONOHYDRATE 25 G/50ML
25 INJECTION, SOLUTION INTRAVENOUS
Status: DISCONTINUED | OUTPATIENT
Start: 2017-01-01 | End: 2017-01-01 | Stop reason: HOSPADM

## 2017-01-01 RX ORDER — IPRATROPIUM BROMIDE AND ALBUTEROL SULFATE 2.5; .5 MG/3ML; MG/3ML
3 SOLUTION RESPIRATORY (INHALATION) EVERY 4 HOURS PRN
Status: DISCONTINUED | OUTPATIENT
Start: 2017-01-01 | End: 2017-01-01 | Stop reason: HOSPADM

## 2017-01-01 RX ORDER — MIDODRINE HYDROCHLORIDE 5 MG/1
10 TABLET ORAL
Status: DISCONTINUED | OUTPATIENT
Start: 2017-01-01 | End: 2017-01-01 | Stop reason: HOSPADM

## 2017-01-01 RX ORDER — BUMETANIDE 1 MG/1
1 TABLET ORAL 2 TIMES DAILY
Status: DISCONTINUED | OUTPATIENT
Start: 2017-01-01 | End: 2017-01-01

## 2017-01-01 RX ORDER — PROPRANOLOL HYDROCHLORIDE 10 MG/1
20 TABLET ORAL 2 TIMES DAILY
Status: DISCONTINUED | OUTPATIENT
Start: 2017-01-01 | End: 2017-01-01 | Stop reason: HOSPADM

## 2017-01-01 RX ORDER — LACTULOSE 10 G/15ML
10 SOLUTION ORAL; RECTAL
Status: DISCONTINUED | OUTPATIENT
Start: 2017-01-01 | End: 2017-01-01 | Stop reason: SDUPTHER

## 2017-01-01 RX ORDER — CALCIUM ACETATE 667 MG/1
667 CAPSULE ORAL
Qty: 21 CAPSULE | Refills: 0 | Status: SHIPPED | OUTPATIENT
Start: 2017-01-01

## 2017-01-01 RX ORDER — SODIUM CHLORIDE 9 MG/ML
125 INJECTION, SOLUTION INTRAVENOUS CONTINUOUS
Status: DISCONTINUED | OUTPATIENT
Start: 2017-01-01 | End: 2017-01-01

## 2017-01-01 RX ORDER — LACTULOSE 20 G/30ML
30 SOLUTION ORAL ONCE
Status: COMPLETED | OUTPATIENT
Start: 2017-01-01 | End: 2017-01-01

## 2017-01-01 RX ORDER — LEVOFLOXACIN 250 MG/1
250 TABLET ORAL EVERY 24 HOURS
Status: DISCONTINUED | OUTPATIENT
Start: 2017-01-01 | End: 2017-01-01 | Stop reason: HOSPADM

## 2017-01-01 RX ORDER — PROPRANOLOL HYDROCHLORIDE 40 MG/1
40 TABLET ORAL EVERY 8 HOURS SCHEDULED
Qty: 90 TABLET | Refills: 0 | Status: SHIPPED | OUTPATIENT
Start: 2017-01-01 | End: 2017-01-01 | Stop reason: SDUPTHER

## 2017-01-01 RX ORDER — HYDROCODONE BITARTRATE AND ACETAMINOPHEN 5; 325 MG/1; MG/1
1 TABLET ORAL EVERY 4 HOURS PRN
Status: DISCONTINUED | OUTPATIENT
Start: 2017-01-01 | End: 2017-01-01 | Stop reason: HOSPADM

## 2017-01-01 RX ORDER — PANTOPRAZOLE SODIUM 40 MG/1
40 TABLET, DELAYED RELEASE ORAL
Status: DISCONTINUED | OUTPATIENT
Start: 2017-01-01 | End: 2017-01-01 | Stop reason: HOSPADM

## 2017-01-01 RX ORDER — PROPRANOLOL HYDROCHLORIDE 20 MG/1
40 TABLET ORAL EVERY 12 HOURS SCHEDULED
Status: DISCONTINUED | OUTPATIENT
Start: 2017-01-01 | End: 2017-01-01 | Stop reason: HOSPADM

## 2017-01-01 RX ORDER — AMOXICILLIN AND CLAVULANATE POTASSIUM 875; 125 MG/1; MG/1
1 TABLET, FILM COATED ORAL 2 TIMES DAILY
COMMUNITY
Start: 2017-01-01 | End: 2017-01-01 | Stop reason: HOSPADM

## 2017-01-01 RX ORDER — PANTOPRAZOLE SODIUM 40 MG/1
40 TABLET, DELAYED RELEASE ORAL DAILY
Status: DISCONTINUED | OUTPATIENT
Start: 2017-01-01 | End: 2017-01-01 | Stop reason: HOSPADM

## 2017-01-01 RX ORDER — FLUCONAZOLE 2 MG/ML
200 INJECTION, SOLUTION INTRAVENOUS EVERY 24 HOURS
Status: DISCONTINUED | OUTPATIENT
Start: 2017-01-01 | End: 2017-01-01

## 2017-01-01 RX ORDER — CIPROFLOXACIN 500 MG/1
500 TABLET, FILM COATED ORAL 2 TIMES DAILY
COMMUNITY
End: 2017-01-01 | Stop reason: HOSPADM

## 2017-01-01 RX ORDER — PROPRANOLOL HYDROCHLORIDE 20 MG/1
40 TABLET ORAL EVERY 8 HOURS SCHEDULED
Status: DISCONTINUED | OUTPATIENT
Start: 2017-01-01 | End: 2017-01-01

## 2017-01-01 RX ORDER — METOPROLOL TARTRATE 5 MG/5ML
2.5 INJECTION INTRAVENOUS EVERY 6 HOURS
Status: DISCONTINUED | OUTPATIENT
Start: 2017-01-01 | End: 2017-01-01

## 2017-01-01 RX ORDER — HYDROCODONE BITARTRATE AND ACETAMINOPHEN 10; 325 MG/1; MG/1
1 TABLET ORAL EVERY 6 HOURS PRN
COMMUNITY
Start: 2017-01-01 | End: 2017-01-01 | Stop reason: SDUPTHER

## 2017-01-01 RX ORDER — DIGOXIN 125 MCG
125 TABLET ORAL EVERY OTHER DAY
Status: DISCONTINUED | OUTPATIENT
Start: 2017-01-01 | End: 2017-01-01

## 2017-01-01 RX ORDER — AMIODARONE HYDROCHLORIDE 200 MG/1
200 TABLET ORAL EVERY 12 HOURS SCHEDULED
Status: DISCONTINUED | OUTPATIENT
Start: 2017-01-01 | End: 2017-01-01

## 2017-01-01 RX ORDER — SPIRONOLACTONE 50 MG/1
100 TABLET, FILM COATED ORAL DAILY
COMMUNITY
Start: 2017-01-01 | End: 2017-01-01 | Stop reason: HOSPADM

## 2017-01-01 RX ORDER — ALUMINA, MAGNESIA, AND SIMETHICONE 2400; 2400; 240 MG/30ML; MG/30ML; MG/30ML
15 SUSPENSION ORAL EVERY 6 HOURS PRN
Status: DISCONTINUED | OUTPATIENT
Start: 2017-01-01 | End: 2017-01-01 | Stop reason: HOSPADM

## 2017-01-01 RX ORDER — LACTULOSE 20 G/30ML
20 SOLUTION ORAL 4 TIMES DAILY
Status: DISCONTINUED | OUTPATIENT
Start: 2017-01-01 | End: 2017-01-01

## 2017-01-01 RX ORDER — SODIUM CHLORIDE 9 MG/ML
75 INJECTION, SOLUTION INTRAVENOUS CONTINUOUS
Status: DISCONTINUED | OUTPATIENT
Start: 2017-01-01 | End: 2017-01-01

## 2017-01-01 RX ORDER — WARFARIN SODIUM 1 MG/1
1 TABLET ORAL
COMMUNITY
End: 2017-01-01 | Stop reason: HOSPADM

## 2017-01-01 RX ORDER — LORAZEPAM 2 MG/ML
0.5 INJECTION INTRAMUSCULAR ONCE
Status: COMPLETED | OUTPATIENT
Start: 2017-01-01 | End: 2017-01-01

## 2017-01-01 RX ORDER — HYDROCODONE BITARTRATE AND ACETAMINOPHEN 10; 325 MG/1; MG/1
1 TABLET ORAL EVERY 6 HOURS PRN
Status: DISCONTINUED | OUTPATIENT
Start: 2017-01-01 | End: 2017-01-01

## 2017-01-01 RX ORDER — HYDRALAZINE HYDROCHLORIDE 25 MG/1
12.5 TABLET, FILM COATED ORAL DAILY
Status: DISCONTINUED | OUTPATIENT
Start: 2017-01-01 | End: 2017-01-01

## 2017-01-01 RX ORDER — ATORVASTATIN CALCIUM 10 MG/1
10 TABLET, FILM COATED ORAL NIGHTLY
Qty: 30 TABLET | Refills: 0 | Status: SHIPPED | OUTPATIENT
Start: 2017-01-01 | End: 2017-01-01 | Stop reason: HOSPADM

## 2017-01-01 RX ORDER — HYDROCODONE BITARTRATE AND ACETAMINOPHEN 10; 325 MG/1; MG/1
0.5 TABLET ORAL EVERY 6 HOURS PRN
Status: DISCONTINUED | OUTPATIENT
Start: 2017-01-01 | End: 2017-01-01 | Stop reason: HOSPADM

## 2017-01-01 RX ORDER — SPIRONOLACTONE 25 MG/1
50 TABLET ORAL 2 TIMES DAILY
Status: DISCONTINUED | OUTPATIENT
Start: 2017-01-01 | End: 2017-01-01 | Stop reason: HOSPADM

## 2017-01-01 RX ORDER — LEVOFLOXACIN 5 MG/ML
500 INJECTION, SOLUTION INTRAVENOUS
Status: DISCONTINUED | OUTPATIENT
Start: 2017-01-01 | End: 2017-01-01 | Stop reason: HOSPADM

## 2017-01-01 RX ORDER — FOLIC ACID/VIT B COMPLEX AND C 0.8 MG
1 TABLET ORAL DAILY
Qty: 30 TABLET | Refills: 0 | Status: SHIPPED | OUTPATIENT
Start: 2017-01-01

## 2017-01-01 RX ORDER — LACTULOSE 10 G/15ML
20 SOLUTION ORAL 2 TIMES DAILY
Qty: 1892 ML | Refills: 0 | Status: SHIPPED | OUTPATIENT
Start: 2017-01-01 | End: 2017-01-01

## 2017-01-01 RX ORDER — FAMOTIDINE 20 MG/1
40 TABLET, FILM COATED ORAL DAILY
Status: DISCONTINUED | OUTPATIENT
Start: 2017-01-01 | End: 2017-01-01 | Stop reason: HOSPADM

## 2017-01-01 RX ORDER — MIDODRINE HYDROCHLORIDE 5 MG/1
5 TABLET ORAL
Status: DISCONTINUED | OUTPATIENT
Start: 2017-01-01 | End: 2017-01-01

## 2017-01-01 RX ORDER — LEVOFLOXACIN 5 MG/ML
750 INJECTION, SOLUTION INTRAVENOUS ONCE
Status: COMPLETED | OUTPATIENT
Start: 2017-01-01 | End: 2017-01-01

## 2017-01-01 RX ORDER — BUMETANIDE 1 MG/1
1 TABLET ORAL 2 TIMES DAILY
Qty: 60 TABLET | Refills: 0 | Status: ON HOLD | OUTPATIENT
Start: 2017-01-01 | End: 2017-01-01

## 2017-01-01 RX ORDER — WARFARIN SODIUM 3 MG/1
TABLET ORAL
Qty: 30 TABLET | Refills: 0 | Status: SHIPPED | OUTPATIENT
Start: 2017-01-01

## 2017-01-01 RX ORDER — PROPRANOLOL HYDROCHLORIDE 40 MG/1
40 TABLET ORAL EVERY 8 HOURS SCHEDULED
Qty: 90 TABLET | Refills: 0 | Status: SHIPPED | OUTPATIENT
Start: 2017-01-01 | End: 2017-01-01 | Stop reason: HOSPADM

## 2017-01-01 RX ORDER — LORAZEPAM 0.5 MG/1
0.25 TABLET ORAL EVERY 6 HOURS PRN
Status: DISCONTINUED | OUTPATIENT
Start: 2017-01-01 | End: 2017-01-01 | Stop reason: HOSPADM

## 2017-01-01 RX ORDER — CYANOCOBALAMIN 1000 UG/ML
1000 INJECTION, SOLUTION INTRAMUSCULAR; SUBCUTANEOUS
Qty: 1 ML | Refills: 0
Start: 2017-01-01

## 2017-01-01 RX ORDER — DIGOXIN 0.25 MG/ML
125 INJECTION INTRAMUSCULAR; INTRAVENOUS ONCE
Status: COMPLETED | OUTPATIENT
Start: 2017-01-01 | End: 2017-01-01

## 2017-01-01 RX ORDER — OXYCODONE HYDROCHLORIDE 5 MG/1
10 TABLET ORAL ONCE
Status: COMPLETED | OUTPATIENT
Start: 2017-01-01 | End: 2017-01-01

## 2017-01-01 RX ORDER — LACTULOSE 10 G/15ML
20 SOLUTION ORAL; RECTAL 4 TIMES DAILY
Status: DISCONTINUED | OUTPATIENT
Start: 2017-01-01 | End: 2017-01-01 | Stop reason: CLARIF

## 2017-01-01 RX ORDER — WARFARIN SODIUM 2 MG/1
2 TABLET ORAL ONCE
Status: COMPLETED | OUTPATIENT
Start: 2017-01-01 | End: 2017-01-01

## 2017-01-01 RX ORDER — IPRATROPIUM BROMIDE AND ALBUTEROL SULFATE 2.5; .5 MG/3ML; MG/3ML
3 SOLUTION RESPIRATORY (INHALATION)
Qty: 180 ML | Refills: 0 | Status: SHIPPED | OUTPATIENT
Start: 2017-01-01

## 2017-01-01 RX ORDER — CEPHALEXIN 500 MG/1
500 CAPSULE ORAL 2 TIMES DAILY
Qty: 14 CAPSULE | Refills: 0 | Status: SHIPPED | OUTPATIENT
Start: 2017-01-01 | End: 2017-01-01

## 2017-01-01 RX ORDER — PANTOPRAZOLE SODIUM 40 MG/1
40 TABLET, DELAYED RELEASE ORAL DAILY
COMMUNITY
Start: 2017-01-01 | End: 2017-01-01 | Stop reason: SDUPTHER

## 2017-01-01 RX ORDER — CYANOCOBALAMIN 1000 UG/ML
1000 INJECTION, SOLUTION INTRAMUSCULAR; SUBCUTANEOUS
Status: DISCONTINUED | OUTPATIENT
Start: 2017-01-01 | End: 2017-01-01

## 2017-01-01 RX ORDER — DIGOXIN 125 MCG
125 TABLET ORAL
Status: DISCONTINUED | OUTPATIENT
Start: 2017-01-01 | End: 2017-01-01

## 2017-01-01 RX ORDER — SPIRONOLACTONE 25 MG/1
100 TABLET ORAL 2 TIMES DAILY
Status: DISCONTINUED | OUTPATIENT
Start: 2017-01-01 | End: 2017-01-01 | Stop reason: HOSPADM

## 2017-01-01 RX ORDER — METOPROLOL TARTRATE 5 MG/5ML
2.5 INJECTION INTRAVENOUS ONCE
Status: COMPLETED | OUTPATIENT
Start: 2017-01-01 | End: 2017-01-01

## 2017-01-01 RX ORDER — PANTOPRAZOLE SODIUM 40 MG/1
40 TABLET, DELAYED RELEASE ORAL EVERY MORNING
Status: DISCONTINUED | OUTPATIENT
Start: 2017-01-01 | End: 2017-01-01 | Stop reason: HOSPADM

## 2017-01-01 RX ORDER — AMIODARONE HYDROCHLORIDE 200 MG/1
200 TABLET ORAL ONCE
Status: DISCONTINUED | OUTPATIENT
Start: 2017-01-01 | End: 2017-01-01

## 2017-01-01 RX ORDER — ATORVASTATIN CALCIUM 10 MG/1
TABLET, FILM COATED ORAL
Qty: 30 TABLET | Refills: 0 | OUTPATIENT
Start: 2017-01-01

## 2017-01-01 RX ORDER — SODIUM CHLORIDE 9 MG/ML
50 INJECTION, SOLUTION INTRAVENOUS CONTINUOUS
Status: ACTIVE | OUTPATIENT
Start: 2017-01-01 | End: 2017-01-01

## 2017-01-01 RX ORDER — NICOTINE POLACRILEX 4 MG
1 LOZENGE BUCCAL
Status: DISCONTINUED | OUTPATIENT
Start: 2017-01-01 | End: 2017-01-01 | Stop reason: HOSPADM

## 2017-01-01 RX ORDER — MORPHINE SULFATE 2 MG/ML
1 INJECTION, SOLUTION INTRAMUSCULAR; INTRAVENOUS EVERY 4 HOURS PRN
Status: DISCONTINUED | OUTPATIENT
Start: 2017-01-01 | End: 2017-01-01 | Stop reason: HOSPADM

## 2017-01-01 RX ORDER — ALLOPURINOL 100 MG/1
100 TABLET ORAL 2 TIMES DAILY
Status: DISCONTINUED | OUTPATIENT
Start: 2017-01-01 | End: 2017-01-01

## 2017-01-01 RX ORDER — WARFARIN SODIUM 2.5 MG/1
2.5 TABLET ORAL
Status: DISCONTINUED | OUTPATIENT
Start: 2017-01-01 | End: 2017-01-01 | Stop reason: HOSPADM

## 2017-01-01 RX ORDER — WARFARIN SODIUM 1 MG/1
3 TABLET ORAL
Status: DISCONTINUED | OUTPATIENT
Start: 2017-01-01 | End: 2017-01-01

## 2017-01-01 RX ORDER — CALCIUM ACETATE 667 MG/1
667 CAPSULE ORAL
Status: DISCONTINUED | OUTPATIENT
Start: 2017-01-01 | End: 2017-01-01 | Stop reason: HOSPADM

## 2017-01-01 RX ORDER — LACTULOSE 10 G/15ML
10 SOLUTION ORAL 2 TIMES DAILY
Status: DISCONTINUED | OUTPATIENT
Start: 2017-01-01 | End: 2017-01-01 | Stop reason: HOSPADM

## 2017-01-01 RX ORDER — IPRATROPIUM BROMIDE AND ALBUTEROL SULFATE 2.5; .5 MG/3ML; MG/3ML
3 SOLUTION RESPIRATORY (INHALATION) EVERY 6 HOURS PRN
Status: DISCONTINUED | OUTPATIENT
Start: 2017-01-01 | End: 2017-01-01 | Stop reason: HOSPADM

## 2017-01-01 RX ORDER — SPIRONOLACTONE 25 MG/1
100 TABLET ORAL DAILY
Status: DISCONTINUED | OUTPATIENT
Start: 2017-01-01 | End: 2017-01-01 | Stop reason: HOSPADM

## 2017-01-01 RX ORDER — PANTOPRAZOLE SODIUM 40 MG/1
40 TABLET, DELAYED RELEASE ORAL DAILY
COMMUNITY

## 2017-01-01 RX ORDER — FUROSEMIDE 20 MG/1
20 TABLET ORAL ONCE
Status: COMPLETED | OUTPATIENT
Start: 2017-01-01 | End: 2017-01-01

## 2017-01-01 RX ORDER — FAMOTIDINE 10 MG/ML
20 INJECTION, SOLUTION INTRAVENOUS DAILY
Status: DISCONTINUED | OUTPATIENT
Start: 2017-01-01 | End: 2017-01-01 | Stop reason: HOSPADM

## 2017-01-01 RX ORDER — IPRATROPIUM BROMIDE AND ALBUTEROL SULFATE 2.5; .5 MG/3ML; MG/3ML
3 SOLUTION RESPIRATORY (INHALATION)
Status: DISCONTINUED | OUTPATIENT
Start: 2017-01-01 | End: 2017-01-01 | Stop reason: HOSPADM

## 2017-01-01 RX ADMIN — HYDROCODONE BITARTRATE AND ACETAMINOPHEN 1 TABLET: 5; 325 TABLET ORAL at 00:05

## 2017-01-01 RX ADMIN — METOPROLOL TARTRATE 2.5 MG: 5 INJECTION, SOLUTION INTRAVENOUS at 08:20

## 2017-01-01 RX ADMIN — ERYTHROPOIETIN 40000 UNITS: 40000 INJECTION, SOLUTION INTRAVENOUS; SUBCUTANEOUS at 17:54

## 2017-01-01 RX ADMIN — LACTULOSE 20 G: 20 SOLUTION ORAL at 17:37

## 2017-01-01 RX ADMIN — CALCIUM ACETATE 667 MG: 667 CAPSULE ORAL at 09:49

## 2017-01-01 RX ADMIN — ERYTHROPOIETIN 20000 UNITS: 20000 INJECTION, SOLUTION INTRAVENOUS; SUBCUTANEOUS at 13:47

## 2017-01-01 RX ADMIN — FAMOTIDINE 20 MG: 10 INJECTION, SOLUTION INTRAVENOUS at 08:14

## 2017-01-01 RX ADMIN — MIDODRINE HYDROCHLORIDE 10 MG: 5 TABLET ORAL at 06:58

## 2017-01-01 RX ADMIN — DIGOXIN 125 MCG: 125 TABLET ORAL at 09:57

## 2017-01-01 RX ADMIN — MAGNESIUM SULFATE HEPTAHYDRATE 2 G: 40 INJECTION, SOLUTION INTRAVENOUS at 09:06

## 2017-01-01 RX ADMIN — SPIRONOLACTONE 50 MG: 25 TABLET, FILM COATED ORAL at 08:40

## 2017-01-01 RX ADMIN — LACTULOSE 10 G: 20 SOLUTION ORAL at 17:00

## 2017-01-01 RX ADMIN — CYANOCOBALAMIN 1000 MCG: 1000 INJECTION, SOLUTION INTRAMUSCULAR; SUBCUTANEOUS at 10:02

## 2017-01-01 RX ADMIN — MIDODRINE HYDROCHLORIDE 10 MG: 5 TABLET ORAL at 16:50

## 2017-01-01 RX ADMIN — MIDODRINE HYDROCHLORIDE 10 MG: 5 TABLET ORAL at 17:19

## 2017-01-01 RX ADMIN — MIDODRINE HYDROCHLORIDE 5 MG: 5 TABLET ORAL at 17:11

## 2017-01-01 RX ADMIN — HYDROCODONE BITARTRATE AND ACETAMINOPHEN 1 TABLET: 10; 325 TABLET ORAL at 05:59

## 2017-01-01 RX ADMIN — RIFAXIMIN 550 MG: 550 TABLET ORAL at 10:21

## 2017-01-01 RX ADMIN — SODIUM CHLORIDE 250 ML: 9 INJECTION, SOLUTION INTRAVENOUS at 04:10

## 2017-01-01 RX ADMIN — MIDODRINE HYDROCHLORIDE 10 MG: 5 TABLET ORAL at 17:04

## 2017-01-01 RX ADMIN — LACTULOSE 20 G: 20 SOLUTION ORAL at 17:43

## 2017-01-01 RX ADMIN — HYDRALAZINE HYDROCHLORIDE 12.5 MG: 25 TABLET ORAL at 09:51

## 2017-01-01 RX ADMIN — DIGOXIN 125 MCG: 125 TABLET ORAL at 10:28

## 2017-01-01 RX ADMIN — ONDANSETRON 4 MG: 2 INJECTION INTRAMUSCULAR; INTRAVENOUS at 06:42

## 2017-01-01 RX ADMIN — CALCIUM ACETATE 667 MG: 667 CAPSULE ORAL at 17:43

## 2017-01-01 RX ADMIN — CEFTRIAXONE 1 G: 1 INJECTION, SOLUTION INTRAVENOUS at 14:30

## 2017-01-01 RX ADMIN — ACETAMINOPHEN 650 MG: 325 TABLET, FILM COATED ORAL at 20:00

## 2017-01-01 RX ADMIN — HYDRALAZINE HYDROCHLORIDE 12.5 MG: 25 TABLET ORAL at 17:38

## 2017-01-01 RX ADMIN — SODIUM CHLORIDE 3 G: 9 INJECTION, SOLUTION INTRAVENOUS at 04:45

## 2017-01-01 RX ADMIN — LACTULOSE 30 ML: 10 SOLUTION ORAL; RECTAL at 14:09

## 2017-01-01 RX ADMIN — Medication 10 ML: at 23:47

## 2017-01-01 RX ADMIN — FUROSEMIDE 160 MG: 10 INJECTION, SOLUTION INTRAMUSCULAR; INTRAVENOUS at 18:48

## 2017-01-01 RX ADMIN — LACTULOSE 10 G: 20 SOLUTION ORAL at 20:00

## 2017-01-01 RX ADMIN — PANTOPRAZOLE SODIUM 40 MG: 40 TABLET, DELAYED RELEASE ORAL at 06:14

## 2017-01-01 RX ADMIN — CYANOCOBALAMIN 1000 MCG: 1000 INJECTION, SOLUTION INTRAMUSCULAR at 17:53

## 2017-01-01 RX ADMIN — BUMETANIDE 1 MG: 1 TABLET ORAL at 08:15

## 2017-01-01 RX ADMIN — LACTULOSE 10 G: 20 SOLUTION ORAL at 09:49

## 2017-01-01 RX ADMIN — BUMETANIDE 1 MG: 1 TABLET ORAL at 21:50

## 2017-01-01 RX ADMIN — LACTULOSE 10 G: 20 SOLUTION ORAL at 22:00

## 2017-01-01 RX ADMIN — RIFAXIMIN 550 MG: 550 TABLET ORAL at 08:41

## 2017-01-01 RX ADMIN — BUMETANIDE 1 MG: 1 TABLET ORAL at 10:21

## 2017-01-01 RX ADMIN — ATORVASTATIN CALCIUM 10 MG: 10 TABLET, FILM COATED ORAL at 21:37

## 2017-01-01 RX ADMIN — LACTULOSE 10 G: 20 SOLUTION ORAL at 17:19

## 2017-01-01 RX ADMIN — PROPRANOLOL HYDROCHLORIDE 40 MG: 20 TABLET ORAL at 21:34

## 2017-01-01 RX ADMIN — CYANOCOBALAMIN 1000 MCG: 1000 INJECTION, SOLUTION INTRAMUSCULAR; SUBCUTANEOUS at 14:00

## 2017-01-01 RX ADMIN — HYDROCODONE BITARTRATE AND ACETAMINOPHEN 0.5 TABLET: 10; 325 TABLET ORAL at 04:08

## 2017-01-01 RX ADMIN — PANTOPRAZOLE SODIUM 40 MG: 40 TABLET, DELAYED RELEASE ORAL at 09:13

## 2017-01-01 RX ADMIN — WARFARIN SODIUM 3 MG: 2 TABLET ORAL at 16:50

## 2017-01-01 RX ADMIN — FLUCONAZOLE 100 MG: 100 TABLET ORAL at 14:55

## 2017-01-01 RX ADMIN — FUROSEMIDE 160 MG: 10 INJECTION, SOLUTION INTRAMUSCULAR; INTRAVENOUS at 02:03

## 2017-01-01 RX ADMIN — FAMOTIDINE 40 MG: 20 TABLET, FILM COATED ORAL at 10:09

## 2017-01-01 RX ADMIN — PANTOPRAZOLE SODIUM 40 MG: 40 TABLET, DELAYED RELEASE ORAL at 09:26

## 2017-01-01 RX ADMIN — HYDROCODONE BITARTRATE AND ACETAMINOPHEN 1 TABLET: 10; 325 TABLET ORAL at 05:29

## 2017-01-01 RX ADMIN — ENOXAPARIN SODIUM 30 MG: 30 INJECTION SUBCUTANEOUS at 16:49

## 2017-01-01 RX ADMIN — RIFAXIMIN 550 MG: 550 TABLET ORAL at 21:05

## 2017-01-01 RX ADMIN — PROPRANOLOL HYDROCHLORIDE 20 MG: 10 TABLET ORAL at 22:19

## 2017-01-01 RX ADMIN — TRAMADOL HYDROCHLORIDE 50 MG: 50 TABLET, FILM COATED ORAL at 21:56

## 2017-01-01 RX ADMIN — DIGOXIN 125 MCG: 125 TABLET ORAL at 09:01

## 2017-01-01 RX ADMIN — RIFAXIMIN 550 MG: 550 TABLET ORAL at 08:53

## 2017-01-01 RX ADMIN — LACTULOSE 20 G: 20 SOLUTION ORAL at 20:29

## 2017-01-01 RX ADMIN — LACTULOSE 20 G: 20 SOLUTION ORAL at 14:30

## 2017-01-01 RX ADMIN — SODIUM CHLORIDE 250 ML: 9 INJECTION, SOLUTION INTRAVENOUS at 21:21

## 2017-01-01 RX ADMIN — WARFARIN SODIUM 3 MG: 2 TABLET ORAL at 17:52

## 2017-01-01 RX ADMIN — Medication 1 TABLET: at 09:01

## 2017-01-01 RX ADMIN — CALCIUM ACETATE 667 MG: 667 CAPSULE ORAL at 09:17

## 2017-01-01 RX ADMIN — HYDROCODONE BITARTRATE AND ACETAMINOPHEN 1 TABLET: 10; 325 TABLET ORAL at 12:54

## 2017-01-01 RX ADMIN — PROPRANOLOL HYDROCHLORIDE 40 MG: 20 TABLET ORAL at 06:01

## 2017-01-01 RX ADMIN — ENOXAPARIN SODIUM 30 MG: 30 INJECTION SUBCUTANEOUS at 17:09

## 2017-01-01 RX ADMIN — PANTOPRAZOLE SODIUM 40 MG: 40 TABLET, DELAYED RELEASE ORAL at 06:01

## 2017-01-01 RX ADMIN — WARFARIN SODIUM 1 MG: 1 TABLET ORAL at 17:38

## 2017-01-01 RX ADMIN — RIFAXIMIN 550 MG: 550 TABLET ORAL at 09:17

## 2017-01-01 RX ADMIN — AMPICILLIN 250 MG: 50 SUSPENSION ORAL at 12:56

## 2017-01-01 RX ADMIN — HYDRALAZINE HYDROCHLORIDE 12.5 MG: 25 TABLET ORAL at 08:39

## 2017-01-01 RX ADMIN — RIFAXIMIN 550 MG: 550 TABLET ORAL at 08:48

## 2017-01-01 RX ADMIN — PANTOPRAZOLE SODIUM 40 MG: 40 TABLET, DELAYED RELEASE ORAL at 10:21

## 2017-01-01 RX ADMIN — CALCIUM ACETATE 667 MG: 667 CAPSULE ORAL at 09:13

## 2017-01-01 RX ADMIN — LEVOFLOXACIN 250 MG: 250 TABLET, FILM COATED ORAL at 10:33

## 2017-01-01 RX ADMIN — METOPROLOL TARTRATE 12.5 MG: 25 TABLET ORAL at 18:08

## 2017-01-01 RX ADMIN — MIDODRINE HYDROCHLORIDE 10 MG: 5 TABLET ORAL at 16:52

## 2017-01-01 RX ADMIN — LEVOFLOXACIN 250 MG: 250 TABLET, FILM COATED ORAL at 10:22

## 2017-01-01 RX ADMIN — LACTULOSE 10 G: 20 SOLUTION ORAL at 10:21

## 2017-01-01 RX ADMIN — IPRATROPIUM BROMIDE AND ALBUTEROL SULFATE 3 ML: .5; 3 SOLUTION RESPIRATORY (INHALATION) at 00:46

## 2017-01-01 RX ADMIN — DIGOXIN 125 MCG: 0.25 INJECTION INTRAMUSCULAR; INTRAVENOUS at 20:27

## 2017-01-01 RX ADMIN — RIFAXIMIN 550 MG: 550 TABLET ORAL at 21:19

## 2017-01-01 RX ADMIN — LACTULOSE 30 G: 20 SOLUTION ORAL at 14:57

## 2017-01-01 RX ADMIN — DIGOXIN 125 MCG: 125 TABLET ORAL at 10:21

## 2017-01-01 RX ADMIN — CEFTRIAXONE 1 G: 1 INJECTION, POWDER, FOR SOLUTION INTRAMUSCULAR; INTRAVENOUS at 23:47

## 2017-01-01 RX ADMIN — SODIUM POLYSTYRENE SULFONATE 30 G: 15 SUSPENSION ORAL; RECTAL at 09:18

## 2017-01-01 RX ADMIN — LACTULOSE 10 G: 20 SOLUTION ORAL at 08:47

## 2017-01-01 RX ADMIN — FLUCONAZOLE 100 MG: 100 TABLET ORAL at 14:24

## 2017-01-01 RX ADMIN — LACTULOSE 20 G: 20 SOLUTION ORAL at 09:03

## 2017-01-01 RX ADMIN — CALCIUM ACETATE 667 MG: 667 CAPSULE ORAL at 17:11

## 2017-01-01 RX ADMIN — MIDODRINE HYDROCHLORIDE 10 MG: 5 TABLET ORAL at 17:55

## 2017-01-01 RX ADMIN — PROPRANOLOL HYDROCHLORIDE 10 MG: 10 TABLET ORAL at 10:47

## 2017-01-01 RX ADMIN — RIFAXIMIN 550 MG: 550 TABLET ORAL at 20:00

## 2017-01-01 RX ADMIN — PROPRANOLOL HYDROCHLORIDE 20 MG: 20 TABLET ORAL at 06:58

## 2017-01-01 RX ADMIN — IPRATROPIUM BROMIDE AND ALBUTEROL SULFATE 3 ML: .5; 3 SOLUTION RESPIRATORY (INHALATION) at 07:26

## 2017-01-01 RX ADMIN — WARFARIN SODIUM 3 MG: 2 TABLET ORAL at 18:18

## 2017-01-01 RX ADMIN — ALLOPURINOL 100 MG: 100 TABLET ORAL at 08:09

## 2017-01-01 RX ADMIN — SODIUM CHLORIDE 250 ML: 9 INJECTION, SOLUTION INTRAVENOUS at 04:40

## 2017-01-01 RX ADMIN — LACTULOSE 20 G: 20 SOLUTION ORAL at 09:30

## 2017-01-01 RX ADMIN — ATORVASTATIN CALCIUM 10 MG: 10 TABLET, FILM COATED ORAL at 21:30

## 2017-01-01 RX ADMIN — PANTOPRAZOLE SODIUM 40 MG: 40 TABLET, DELAYED RELEASE ORAL at 08:57

## 2017-01-01 RX ADMIN — SPIRONOLACTONE 100 MG: 25 TABLET, FILM COATED ORAL at 18:29

## 2017-01-01 RX ADMIN — MIDODRINE HYDROCHLORIDE 10 MG: 5 TABLET ORAL at 11:58

## 2017-01-01 RX ADMIN — CALCIUM ACETATE 667 MG: 667 CAPSULE ORAL at 12:23

## 2017-01-01 RX ADMIN — RIFAXIMIN 550 MG: 550 TABLET ORAL at 23:08

## 2017-01-01 RX ADMIN — MIDODRINE HYDROCHLORIDE 10 MG: 5 TABLET ORAL at 12:51

## 2017-01-01 RX ADMIN — RIFAXIMIN 550 MG: 550 TABLET ORAL at 09:13

## 2017-01-01 RX ADMIN — PROPRANOLOL HYDROCHLORIDE 40 MG: 20 TABLET ORAL at 23:05

## 2017-01-01 RX ADMIN — DIGOXIN 125 MCG: 0.25 INJECTION INTRAMUSCULAR; INTRAVENOUS at 14:41

## 2017-01-01 RX ADMIN — SODIUM CHLORIDE 50 ML/HR: 9 INJECTION, SOLUTION INTRAVENOUS at 23:07

## 2017-01-01 RX ADMIN — LACTULOSE 10 G: 20 SOLUTION ORAL at 16:49

## 2017-01-01 RX ADMIN — CEFEPIME HYDROCHLORIDE 1 G: 1 INJECTION, SOLUTION INTRAVENOUS at 04:32

## 2017-01-01 RX ADMIN — PROPRANOLOL HYDROCHLORIDE 40 MG: 20 TABLET ORAL at 14:08

## 2017-01-01 RX ADMIN — WARFARIN SODIUM 3 MG: 2 TABLET ORAL at 17:55

## 2017-01-01 RX ADMIN — LEVOFLOXACIN 250 MG: 250 TABLET, FILM COATED ORAL at 10:10

## 2017-01-01 RX ADMIN — ACETAMINOPHEN 650 MG: 325 TABLET, FILM COATED ORAL at 16:25

## 2017-01-01 RX ADMIN — RIFAXIMIN 550 MG: 550 TABLET ORAL at 08:07

## 2017-01-01 RX ADMIN — CALCIUM ACETATE 667 MG: 667 CAPSULE ORAL at 17:52

## 2017-01-01 RX ADMIN — CALCIUM ACETATE 667 MG: 667 CAPSULE ORAL at 12:07

## 2017-01-01 RX ADMIN — MIDODRINE HYDROCHLORIDE 10 MG: 5 TABLET ORAL at 06:44

## 2017-01-01 RX ADMIN — ACETAMINOPHEN 650 MG: 325 TABLET, FILM COATED ORAL at 08:56

## 2017-01-01 RX ADMIN — POTASSIUM CHLORIDE 40 MEQ: 750 CAPSULE, EXTENDED RELEASE ORAL at 22:08

## 2017-01-01 RX ADMIN — PANTOPRAZOLE SODIUM 40 MG: 40 TABLET, DELAYED RELEASE ORAL at 06:10

## 2017-01-01 RX ADMIN — METOPROLOL TARTRATE 12.5 MG: 25 TABLET ORAL at 06:56

## 2017-01-01 RX ADMIN — MIDODRINE HYDROCHLORIDE 5 MG: 5 TABLET ORAL at 12:22

## 2017-01-01 RX ADMIN — DILTIAZEM HYDROCHLORIDE 30 MG: 30 TABLET, FILM COATED ORAL at 09:03

## 2017-01-01 RX ADMIN — LACTULOSE 10 G: 20 SOLUTION ORAL at 21:45

## 2017-01-01 RX ADMIN — FUROSEMIDE 40 MG: 10 INJECTION, SOLUTION INTRAMUSCULAR; INTRAVENOUS at 03:35

## 2017-01-01 RX ADMIN — ERYTHROPOIETIN 20000 UNITS: 20000 INJECTION, SOLUTION INTRAVENOUS; SUBCUTANEOUS at 10:56

## 2017-01-01 RX ADMIN — TRAMADOL HYDROCHLORIDE 50 MG: 50 TABLET, FILM COATED ORAL at 08:39

## 2017-01-01 RX ADMIN — HYDROCODONE BITARTRATE AND ACETAMINOPHEN 1 TABLET: 10; 325 TABLET ORAL at 22:19

## 2017-01-01 RX ADMIN — OXYCODONE HYDROCHLORIDE 2.5 MG: 5 TABLET ORAL at 21:38

## 2017-01-01 RX ADMIN — FLUCONAZOLE 100 MG: 100 TABLET ORAL at 17:41

## 2017-01-01 RX ADMIN — HYDROCODONE BITARTRATE AND ACETAMINOPHEN 0.5 TABLET: 10; 325 TABLET ORAL at 03:17

## 2017-01-01 RX ADMIN — PANTOPRAZOLE SODIUM 40 MG: 40 TABLET, DELAYED RELEASE ORAL at 20:42

## 2017-01-01 RX ADMIN — PROPRANOLOL HYDROCHLORIDE 20 MG: 20 TABLET ORAL at 04:50

## 2017-01-01 RX ADMIN — LACTULOSE 10 G: 20 SOLUTION ORAL at 21:00

## 2017-01-01 RX ADMIN — PROPRANOLOL HYDROCHLORIDE 40 MG: 20 TABLET ORAL at 23:06

## 2017-01-01 RX ADMIN — SODIUM CHLORIDE 75 ML/HR: 9 INJECTION, SOLUTION INTRAVENOUS at 02:59

## 2017-01-01 RX ADMIN — ATORVASTATIN CALCIUM 10 MG: 10 TABLET, FILM COATED ORAL at 20:58

## 2017-01-01 RX ADMIN — IPRATROPIUM BROMIDE AND ALBUTEROL SULFATE 3 ML: .5; 3 SOLUTION RESPIRATORY (INHALATION) at 22:03

## 2017-01-01 RX ADMIN — METOPROLOL TARTRATE 5 MG: 1 INJECTION, SOLUTION INTRAVENOUS at 17:55

## 2017-01-01 RX ADMIN — LACTULOSE 20 G: 20 SOLUTION ORAL at 09:50

## 2017-01-01 RX ADMIN — LACTULOSE 20 G: 20 SOLUTION ORAL at 08:14

## 2017-01-01 RX ADMIN — LACTULOSE 20 G: 20 SOLUTION ORAL at 17:58

## 2017-01-01 RX ADMIN — MIDODRINE HYDROCHLORIDE 10 MG: 5 TABLET ORAL at 11:28

## 2017-01-01 RX ADMIN — SODIUM CHLORIDE 3 G: 9 INJECTION, SOLUTION INTRAVENOUS at 15:55

## 2017-01-01 RX ADMIN — PROPRANOLOL HYDROCHLORIDE 40 MG: 20 TABLET ORAL at 14:02

## 2017-01-01 RX ADMIN — VANCOMYCIN HYDROCHLORIDE 1500 MG: 500 INJECTION, POWDER, LYOPHILIZED, FOR SOLUTION INTRAVENOUS at 12:31

## 2017-01-01 RX ADMIN — RIFAXIMIN 550 MG: 550 TABLET ORAL at 20:38

## 2017-01-01 RX ADMIN — FUROSEMIDE 160 MG: 10 INJECTION, SOLUTION INTRAMUSCULAR; INTRAVENOUS at 08:47

## 2017-01-01 RX ADMIN — MIDODRINE HYDROCHLORIDE 10 MG: 5 TABLET ORAL at 11:52

## 2017-01-01 RX ADMIN — HYDROCODONE BITARTRATE AND ACETAMINOPHEN 1 TABLET: 10; 325 TABLET ORAL at 10:21

## 2017-01-01 RX ADMIN — OXYCODONE HYDROCHLORIDE 2.5 MG: 5 TABLET ORAL at 22:55

## 2017-01-01 RX ADMIN — DIGOXIN 125 MCG: 125 TABLET ORAL at 10:22

## 2017-01-01 RX ADMIN — WARFARIN SODIUM 2 MG: 2 TABLET ORAL at 18:30

## 2017-01-01 RX ADMIN — FLUCONAZOLE 100 MG: 100 TABLET ORAL at 12:44

## 2017-01-01 RX ADMIN — METOPROLOL TARTRATE 12.5 MG: 25 TABLET ORAL at 12:43

## 2017-01-01 RX ADMIN — PANTOPRAZOLE SODIUM 40 MG: 40 TABLET, DELAYED RELEASE ORAL at 09:02

## 2017-01-01 RX ADMIN — IPRATROPIUM BROMIDE AND ALBUTEROL SULFATE 3 ML: .5; 3 SOLUTION RESPIRATORY (INHALATION) at 00:38

## 2017-01-01 RX ADMIN — RIFAXIMIN 550 MG: 550 TABLET ORAL at 10:02

## 2017-01-01 RX ADMIN — HYDRALAZINE HYDROCHLORIDE 12.5 MG: 25 TABLET ORAL at 09:05

## 2017-01-01 RX ADMIN — PANTOPRAZOLE SODIUM 40 MG: 40 TABLET, DELAYED RELEASE ORAL at 08:08

## 2017-01-01 RX ADMIN — SODIUM CHLORIDE 50 ML/HR: 9 INJECTION, SOLUTION INTRAVENOUS at 22:43

## 2017-01-01 RX ADMIN — ALLOPURINOL 100 MG: 100 TABLET ORAL at 08:41

## 2017-01-01 RX ADMIN — LACTULOSE 10 G: 20 SOLUTION ORAL at 08:06

## 2017-01-01 RX ADMIN — ONDANSETRON 4 MG: 2 INJECTION INTRAMUSCULAR; INTRAVENOUS at 20:34

## 2017-01-01 RX ADMIN — LACTULOSE 10 G: 20 SOLUTION ORAL at 10:22

## 2017-01-01 RX ADMIN — METOPROLOL TARTRATE 12.5 MG: 25 TABLET ORAL at 13:11

## 2017-01-01 RX ADMIN — LEVOFLOXACIN 750 MG: 5 INJECTION, SOLUTION INTRAVENOUS at 05:33

## 2017-01-01 RX ADMIN — ALLOPURINOL 100 MG: 100 TABLET ORAL at 08:53

## 2017-01-01 RX ADMIN — AMPICILLIN 250 MG: 50 SUSPENSION ORAL at 20:10

## 2017-01-01 RX ADMIN — LACTULOSE 10 G: 20 SOLUTION ORAL at 21:05

## 2017-01-01 RX ADMIN — PROPRANOLOL HYDROCHLORIDE 40 MG: 20 TABLET ORAL at 14:17

## 2017-01-01 RX ADMIN — DIGOXIN 125 MCG: 0.25 INJECTION INTRAMUSCULAR; INTRAVENOUS at 19:51

## 2017-01-01 RX ADMIN — GADOBENATE DIMEGLUMINE 15 ML: 529 INJECTION, SOLUTION INTRAVENOUS at 10:08

## 2017-01-01 RX ADMIN — CALCIUM ACETATE 667 MG: 667 CAPSULE ORAL at 08:53

## 2017-01-01 RX ADMIN — PROPRANOLOL HYDROCHLORIDE 40 MG: 20 TABLET ORAL at 09:04

## 2017-01-01 RX ADMIN — BUMETANIDE 1 MG: 1 TABLET ORAL at 15:12

## 2017-01-01 RX ADMIN — METOPROLOL TARTRATE 2.5 MG: 5 INJECTION, SOLUTION INTRAVENOUS at 21:05

## 2017-01-01 RX ADMIN — SODIUM CHLORIDE 1000 ML: 9 INJECTION, SOLUTION INTRAVENOUS at 12:43

## 2017-01-01 RX ADMIN — LACTULOSE 10 G: 20 SOLUTION ORAL at 21:19

## 2017-01-01 RX ADMIN — RIFAXIMIN 550 MG: 550 TABLET ORAL at 20:48

## 2017-01-01 RX ADMIN — LACTULOSE 20 G: 20 SOLUTION ORAL at 15:46

## 2017-01-01 RX ADMIN — FUROSEMIDE 20 MG: 10 INJECTION, SOLUTION INTRAMUSCULAR; INTRAVENOUS at 13:10

## 2017-01-01 RX ADMIN — IPRATROPIUM BROMIDE AND ALBUTEROL SULFATE 3 ML: .5; 3 SOLUTION RESPIRATORY (INHALATION) at 01:04

## 2017-01-01 RX ADMIN — Medication 1 TABLET: at 08:09

## 2017-01-01 RX ADMIN — SODIUM CHLORIDE 50 ML/HR: 9 INJECTION, SOLUTION INTRAVENOUS at 10:30

## 2017-01-01 RX ADMIN — LACTULOSE 20 G: 20 SOLUTION ORAL at 10:25

## 2017-01-01 RX ADMIN — RIFAXIMIN 550 MG: 550 TABLET ORAL at 08:09

## 2017-01-01 RX ADMIN — Medication 1 TABLET: at 08:53

## 2017-01-01 RX ADMIN — RIFAXIMIN 550 MG: 550 TABLET ORAL at 21:29

## 2017-01-01 RX ADMIN — RIFAXIMIN 550 MG: 550 TABLET ORAL at 10:48

## 2017-01-01 RX ADMIN — PROPRANOLOL HYDROCHLORIDE 40 MG: 20 TABLET ORAL at 20:38

## 2017-01-01 RX ADMIN — LACTULOSE 10 G: 20 SOLUTION ORAL at 23:08

## 2017-01-01 RX ADMIN — ALLOPURINOL 100 MG: 100 TABLET ORAL at 08:07

## 2017-01-01 RX ADMIN — SODIUM POLYSTYRENE SULFONATE 15 G: 15 SUSPENSION ORAL; RECTAL at 09:18

## 2017-01-01 RX ADMIN — PANTOPRAZOLE SODIUM 40 MG: 40 TABLET, DELAYED RELEASE ORAL at 06:02

## 2017-01-01 RX ADMIN — CALCIUM ACETATE 667 MG: 667 CAPSULE ORAL at 12:51

## 2017-01-01 RX ADMIN — LACTULOSE 10 G: 20 SOLUTION ORAL at 08:39

## 2017-01-01 RX ADMIN — ERYTHROPOIETIN 20000 UNITS: 20000 INJECTION, SOLUTION INTRAVENOUS; SUBCUTANEOUS at 09:05

## 2017-01-01 RX ADMIN — IPRATROPIUM BROMIDE AND ALBUTEROL SULFATE 3 ML: .5; 3 SOLUTION RESPIRATORY (INHALATION) at 13:20

## 2017-01-01 RX ADMIN — BUMETANIDE 1 MG: 1 TABLET ORAL at 10:10

## 2017-01-01 RX ADMIN — SPIRONOLACTONE 50 MG: 25 TABLET, FILM COATED ORAL at 10:22

## 2017-01-01 RX ADMIN — TAZOBACTAM SODIUM AND PIPERACILLIN SODIUM 3.38 G: 375; 3 INJECTION, SOLUTION INTRAVENOUS at 12:43

## 2017-01-01 RX ADMIN — CEFTRIAXONE 1 G: 1 INJECTION, POWDER, FOR SOLUTION INTRAMUSCULAR; INTRAVENOUS at 23:38

## 2017-01-01 RX ADMIN — CEFTRIAXONE 1 G: 1 INJECTION, POWDER, FOR SOLUTION INTRAMUSCULAR; INTRAVENOUS at 01:31

## 2017-01-01 RX ADMIN — PROPRANOLOL HYDROCHLORIDE 40 MG: 20 TABLET ORAL at 08:12

## 2017-01-01 RX ADMIN — RIFAXIMIN 550 MG: 550 TABLET ORAL at 08:14

## 2017-01-01 RX ADMIN — ALLOPURINOL 100 MG: 100 TABLET ORAL at 10:47

## 2017-01-01 RX ADMIN — LACTULOSE 10 G: 20 SOLUTION ORAL at 21:30

## 2017-01-01 RX ADMIN — LEVOFLOXACIN 500 MG: 5 INJECTION, SOLUTION INTRAVENOUS at 08:36

## 2017-01-01 RX ADMIN — HYDROCODONE BITARTRATE AND ACETAMINOPHEN 1 TABLET: 10; 325 TABLET ORAL at 22:22

## 2017-01-01 RX ADMIN — LACTULOSE 10 G: 20 SOLUTION ORAL at 16:02

## 2017-01-01 RX ADMIN — SODIUM POLYSTYRENE SULFONATE 30 G: 15 SUSPENSION ORAL; RECTAL at 09:14

## 2017-01-01 RX ADMIN — SODIUM CHLORIDE 250 ML: 9 INJECTION, SOLUTION INTRAVENOUS at 06:42

## 2017-01-01 RX ADMIN — DIGOXIN 125 MCG: 0.25 INJECTION INTRAMUSCULAR; INTRAVENOUS at 02:45

## 2017-01-01 RX ADMIN — FUROSEMIDE 40 MG: 10 INJECTION, SOLUTION INTRAMUSCULAR; INTRAVENOUS at 00:59

## 2017-01-01 RX ADMIN — IPRATROPIUM BROMIDE AND ALBUTEROL SULFATE 3 ML: .5; 3 SOLUTION RESPIRATORY (INHALATION) at 19:55

## 2017-01-01 RX ADMIN — LACTULOSE 10 G: 20 SOLUTION ORAL at 17:13

## 2017-01-01 RX ADMIN — BUMETANIDE 1 MG: 1 TABLET ORAL at 08:28

## 2017-01-01 RX ADMIN — LACTULOSE 10 G: 20 SOLUTION ORAL at 20:10

## 2017-01-01 RX ADMIN — CALCIUM ACETATE 667 MG: 667 CAPSULE ORAL at 17:19

## 2017-01-01 RX ADMIN — HYDROCODONE BITARTRATE AND ACETAMINOPHEN 1 TABLET: 5; 325 TABLET ORAL at 18:31

## 2017-01-01 RX ADMIN — IPRATROPIUM BROMIDE AND ALBUTEROL SULFATE 3 ML: .5; 3 SOLUTION RESPIRATORY (INHALATION) at 07:38

## 2017-01-01 RX ADMIN — SPIRONOLACTONE 50 MG: 25 TABLET, FILM COATED ORAL at 21:29

## 2017-01-01 RX ADMIN — HYDRALAZINE HYDROCHLORIDE 12.5 MG: 25 TABLET ORAL at 10:22

## 2017-01-01 RX ADMIN — DIGOXIN 125 MCG: 125 TABLET ORAL at 09:13

## 2017-01-01 RX ADMIN — PANTOPRAZOLE SODIUM 40 MG: 40 TABLET, DELAYED RELEASE ORAL at 08:07

## 2017-01-01 RX ADMIN — RIFAXIMIN 550 MG: 550 TABLET ORAL at 09:12

## 2017-01-01 RX ADMIN — METOPROLOL TARTRATE 12.5 MG: 25 TABLET ORAL at 23:46

## 2017-01-01 RX ADMIN — ENOXAPARIN SODIUM 30 MG: 30 INJECTION SUBCUTANEOUS at 18:13

## 2017-01-01 RX ADMIN — SODIUM CHLORIDE 3 G: 9 INJECTION, SOLUTION INTRAVENOUS at 10:56

## 2017-01-01 RX ADMIN — PANTOPRAZOLE SODIUM 40 MG: 40 TABLET, DELAYED RELEASE ORAL at 08:41

## 2017-01-01 RX ADMIN — LACTULOSE 45 ML: 10 SOLUTION ORAL; RECTAL at 04:47

## 2017-01-01 RX ADMIN — CALCIUM ACETATE 667 MG: 667 CAPSULE ORAL at 12:05

## 2017-01-01 RX ADMIN — PANTOPRAZOLE SODIUM 40 MG: 40 TABLET, DELAYED RELEASE ORAL at 06:47

## 2017-01-01 RX ADMIN — LACTULOSE 10 G: 20 SOLUTION ORAL at 17:54

## 2017-01-01 RX ADMIN — FAMOTIDINE 40 MG: 20 TABLET, FILM COATED ORAL at 09:00

## 2017-01-01 RX ADMIN — DIGOXIN 125 MCG: 125 TABLET ORAL at 08:57

## 2017-01-01 RX ADMIN — CALCIUM ACETATE 667 MG: 667 CAPSULE ORAL at 11:52

## 2017-01-01 RX ADMIN — METOPROLOL TARTRATE 12.5 MG: 25 TABLET ORAL at 18:29

## 2017-01-01 RX ADMIN — MIDODRINE HYDROCHLORIDE 10 MG: 5 TABLET ORAL at 12:44

## 2017-01-01 RX ADMIN — RIFAXIMIN 550 MG: 550 TABLET ORAL at 08:31

## 2017-01-01 RX ADMIN — CALCIUM ACETATE 667 MG: 667 CAPSULE ORAL at 12:00

## 2017-01-01 RX ADMIN — HYDROCODONE BITARTRATE AND ACETAMINOPHEN 0.5 TABLET: 10; 325 TABLET ORAL at 18:18

## 2017-01-01 RX ADMIN — SODIUM CHLORIDE 1000 ML: 9 INJECTION, SOLUTION INTRAVENOUS at 01:26

## 2017-01-01 RX ADMIN — POTASSIUM CHLORIDE 40 MEQ: 750 CAPSULE, EXTENDED RELEASE ORAL at 00:46

## 2017-01-01 RX ADMIN — WARFARIN SODIUM 1 MG: 1 TABLET ORAL at 20:42

## 2017-01-01 RX ADMIN — OXYCODONE HYDROCHLORIDE 10 MG: 5 TABLET ORAL at 22:30

## 2017-01-01 RX ADMIN — CALCIUM ACETATE 667 MG: 667 CAPSULE ORAL at 10:46

## 2017-01-01 RX ADMIN — FLUCONAZOLE 100 MG: 100 TABLET ORAL at 15:53

## 2017-01-01 RX ADMIN — MIDODRINE HYDROCHLORIDE 5 MG: 5 TABLET ORAL at 08:42

## 2017-01-01 RX ADMIN — SODIUM CHLORIDE 500 ML: 9 INJECTION, SOLUTION INTRAVENOUS at 11:28

## 2017-01-01 RX ADMIN — BUMETANIDE 1 MG: 1 TABLET ORAL at 17:25

## 2017-01-01 RX ADMIN — HYDROCODONE BITARTRATE AND ACETAMINOPHEN 1 TABLET: 10; 325 TABLET ORAL at 14:59

## 2017-01-01 RX ADMIN — FAMOTIDINE 40 MG: 20 TABLET, FILM COATED ORAL at 08:31

## 2017-01-01 RX ADMIN — MIDODRINE HYDROCHLORIDE 10 MG: 5 TABLET ORAL at 06:46

## 2017-01-01 RX ADMIN — HYDROCODONE BITARTRATE AND ACETAMINOPHEN 1 TABLET: 10; 325 TABLET ORAL at 15:53

## 2017-01-01 RX ADMIN — FUROSEMIDE 20 MG: 20 TABLET ORAL at 13:11

## 2017-01-01 RX ADMIN — FUROSEMIDE 160 MG: 10 INJECTION, SOLUTION INTRAMUSCULAR; INTRAVENOUS at 17:19

## 2017-01-01 RX ADMIN — FAMOTIDINE 20 MG: 10 INJECTION, SOLUTION INTRAVENOUS at 10:07

## 2017-01-01 RX ADMIN — HYDROCODONE BITARTRATE AND ACETAMINOPHEN 1 TABLET: 10; 325 TABLET ORAL at 11:20

## 2017-01-01 RX ADMIN — CEFTRIAXONE 1 G: 1 INJECTION, POWDER, FOR SOLUTION INTRAMUSCULAR; INTRAVENOUS at 23:26

## 2017-01-01 RX ADMIN — ONDANSETRON 4 MG: 4 TABLET, FILM COATED ORAL at 16:25

## 2017-01-01 RX ADMIN — METOPROLOL TARTRATE 12.5 MG: 25 TABLET ORAL at 12:26

## 2017-01-01 RX ADMIN — WARFARIN SODIUM 2.5 MG: 2.5 TABLET ORAL at 17:58

## 2017-01-01 RX ADMIN — HYDROCODONE BITARTRATE AND ACETAMINOPHEN 1 TABLET: 10; 325 TABLET ORAL at 12:23

## 2017-01-01 RX ADMIN — SPIRONOLACTONE 50 MG: 25 TABLET, FILM COATED ORAL at 20:11

## 2017-01-01 RX ADMIN — SODIUM CHLORIDE 50 ML/HR: 9 INJECTION, SOLUTION INTRAVENOUS at 16:07

## 2017-01-01 RX ADMIN — IPRATROPIUM BROMIDE AND ALBUTEROL SULFATE 3 ML: .5; 3 SOLUTION RESPIRATORY (INHALATION) at 19:29

## 2017-01-01 RX ADMIN — FUROSEMIDE 40 MG: 10 INJECTION, SOLUTION INTRAMUSCULAR; INTRAVENOUS at 22:10

## 2017-01-01 RX ADMIN — FUROSEMIDE 200 MG: 10 INJECTION, SOLUTION INTRAMUSCULAR; INTRAVENOUS at 10:54

## 2017-01-01 RX ADMIN — RIFAXIMIN 550 MG: 550 TABLET ORAL at 16:23

## 2017-01-01 RX ADMIN — RIFAXIMIN 550 MG: 550 TABLET ORAL at 21:45

## 2017-01-01 RX ADMIN — LACTULOSE 10 G: 20 SOLUTION ORAL at 16:21

## 2017-01-01 RX ADMIN — SODIUM CHLORIDE 3 G: 9 INJECTION, SOLUTION INTRAVENOUS at 11:02

## 2017-01-01 RX ADMIN — HYDROCODONE BITARTRATE AND ACETAMINOPHEN 0.5 TABLET: 10; 325 TABLET ORAL at 17:53

## 2017-01-01 RX ADMIN — PROPRANOLOL HYDROCHLORIDE 40 MG: 20 TABLET ORAL at 14:41

## 2017-01-01 RX ADMIN — VANCOMYCIN HYDROCHLORIDE 1750 MG: 500 INJECTION, POWDER, LYOPHILIZED, FOR SOLUTION INTRAVENOUS at 13:31

## 2017-01-01 RX ADMIN — RIFAXIMIN 550 MG: 550 TABLET ORAL at 21:18

## 2017-01-01 RX ADMIN — LACTULOSE 20 G: 20 SOLUTION ORAL at 08:30

## 2017-01-01 RX ADMIN — METOPROLOL TARTRATE 12.5 MG: 25 TABLET ORAL at 11:54

## 2017-01-01 RX ADMIN — LACTULOSE 20 G: 20 SOLUTION ORAL at 08:40

## 2017-01-01 RX ADMIN — ALLOPURINOL 100 MG: 100 TABLET ORAL at 21:18

## 2017-01-01 RX ADMIN — CEFTRIAXONE 1 G: 1 INJECTION, SOLUTION INTRAVENOUS at 14:38

## 2017-01-01 RX ADMIN — PROPRANOLOL HYDROCHLORIDE 40 MG: 20 TABLET ORAL at 17:54

## 2017-01-01 RX ADMIN — ALLOPURINOL 100 MG: 100 TABLET ORAL at 22:22

## 2017-01-01 RX ADMIN — SODIUM CHLORIDE 500 ML: 9 INJECTION, SOLUTION INTRAVENOUS at 15:31

## 2017-01-01 RX ADMIN — SODIUM CHLORIDE 500 ML: 9 INJECTION, SOLUTION INTRAVENOUS at 11:54

## 2017-01-01 RX ADMIN — IPRATROPIUM BROMIDE AND ALBUTEROL SULFATE 3 ML: .5; 3 SOLUTION RESPIRATORY (INHALATION) at 12:16

## 2017-01-01 RX ADMIN — RIFAXIMIN 550 MG: 550 TABLET ORAL at 20:59

## 2017-01-01 RX ADMIN — ATORVASTATIN CALCIUM 10 MG: 10 TABLET, FILM COATED ORAL at 20:38

## 2017-01-01 RX ADMIN — FLUCONAZOLE 100 MG: 100 TABLET ORAL at 16:21

## 2017-01-01 RX ADMIN — Medication 1 TABLET: at 09:50

## 2017-01-01 RX ADMIN — SPIRONOLACTONE 100 MG: 25 TABLET, FILM COATED ORAL at 10:11

## 2017-01-01 RX ADMIN — SODIUM CHLORIDE 50 ML/HR: 9 INJECTION, SOLUTION INTRAVENOUS at 05:46

## 2017-01-01 RX ADMIN — HYDROCODONE BITARTRATE AND ACETAMINOPHEN 1 TABLET: 5; 325 TABLET ORAL at 00:35

## 2017-01-01 RX ADMIN — LACTULOSE 20 G: 20 SOLUTION ORAL at 17:27

## 2017-01-01 RX ADMIN — LACTULOSE 10 G: 20 SOLUTION ORAL at 17:41

## 2017-01-01 RX ADMIN — CALCIUM ACETATE 667 MG: 667 CAPSULE ORAL at 16:25

## 2017-01-01 RX ADMIN — FUROSEMIDE 160 MG: 10 INJECTION, SOLUTION INTRAMUSCULAR; INTRAVENOUS at 23:56

## 2017-01-01 RX ADMIN — LACTULOSE 10 G: 20 SOLUTION ORAL at 15:08

## 2017-01-01 RX ADMIN — CEFTRIAXONE 1 G: 1 INJECTION, POWDER, FOR SOLUTION INTRAMUSCULAR; INTRAVENOUS at 23:40

## 2017-01-01 RX ADMIN — PROPRANOLOL HYDROCHLORIDE 40 MG: 20 TABLET ORAL at 08:30

## 2017-01-01 RX ADMIN — CALCIUM ACETATE 667 MG: 667 CAPSULE ORAL at 17:57

## 2017-01-01 RX ADMIN — ALLOPURINOL 100 MG: 100 TABLET ORAL at 10:21

## 2017-01-01 RX ADMIN — CALCIUM ACETATE 667 MG: 667 CAPSULE ORAL at 12:31

## 2017-01-01 RX ADMIN — IPRATROPIUM BROMIDE AND ALBUTEROL SULFATE 3 ML: .5; 3 SOLUTION RESPIRATORY (INHALATION) at 07:11

## 2017-01-01 RX ADMIN — FUROSEMIDE 80 MG: 10 INJECTION, SOLUTION INTRAMUSCULAR; INTRAVENOUS at 09:28

## 2017-01-01 RX ADMIN — OXYCODONE HYDROCHLORIDE 2.5 MG: 5 TABLET ORAL at 13:01

## 2017-01-01 RX ADMIN — LACTULOSE 20 G: 20 SOLUTION ORAL at 09:51

## 2017-01-01 RX ADMIN — SPIRONOLACTONE 100 MG: 25 TABLET, FILM COATED ORAL at 09:00

## 2017-01-01 RX ADMIN — PROPRANOLOL HYDROCHLORIDE 40 MG: 20 TABLET ORAL at 08:16

## 2017-01-01 RX ADMIN — PROPRANOLOL HYDROCHLORIDE 10 MG: 10 TABLET ORAL at 08:53

## 2017-01-01 RX ADMIN — RIFAXIMIN 550 MG: 550 TABLET ORAL at 09:50

## 2017-01-01 RX ADMIN — SODIUM POLYSTYRENE SULFONATE 30 G: 15 SUSPENSION ORAL; RECTAL at 12:31

## 2017-01-01 RX ADMIN — LACTULOSE 10 G: 20 SOLUTION ORAL at 09:01

## 2017-01-01 RX ADMIN — LEVOFLOXACIN 500 MG: 5 INJECTION, SOLUTION INTRAVENOUS at 10:02

## 2017-01-01 RX ADMIN — SPIRONOLACTONE 100 MG: 25 TABLET, FILM COATED ORAL at 10:05

## 2017-01-01 RX ADMIN — ONDANSETRON 4 MG: 2 INJECTION INTRAMUSCULAR; INTRAVENOUS at 21:40

## 2017-01-01 RX ADMIN — HYDROCODONE BITARTRATE AND ACETAMINOPHEN 1 TABLET: 5; 325 TABLET ORAL at 08:29

## 2017-01-01 RX ADMIN — SPIRONOLACTONE 25 MG: 25 TABLET, FILM COATED ORAL at 08:40

## 2017-01-01 RX ADMIN — FUROSEMIDE 20 MG: 10 INJECTION, SOLUTION INTRAMUSCULAR; INTRAVENOUS at 17:26

## 2017-01-01 RX ADMIN — GADOBENATE DIMEGLUMINE 6 ML: 529 INJECTION, SOLUTION INTRAVENOUS at 10:08

## 2017-01-01 RX ADMIN — IPRATROPIUM BROMIDE AND ALBUTEROL SULFATE 3 ML: .5; 3 SOLUTION RESPIRATORY (INHALATION) at 19:10

## 2017-01-01 RX ADMIN — VANCOMYCIN HYDROCHLORIDE 2000 MG: 500 INJECTION, POWDER, LYOPHILIZED, FOR SOLUTION INTRAVENOUS at 07:16

## 2017-01-01 RX ADMIN — DIGOXIN 125 MCG: 125 TABLET ORAL at 10:48

## 2017-01-01 RX ADMIN — RIFAXIMIN 550 MG: 550 TABLET ORAL at 08:11

## 2017-01-01 RX ADMIN — HYDROCODONE BITARTRATE AND ACETAMINOPHEN 1 TABLET: 10; 325 TABLET ORAL at 16:25

## 2017-01-01 RX ADMIN — ATORVASTATIN CALCIUM 10 MG: 10 TABLET, FILM COATED ORAL at 20:29

## 2017-01-01 RX ADMIN — LACTULOSE 20 G: 20 SOLUTION ORAL at 17:45

## 2017-01-01 RX ADMIN — LACTULOSE 20 G: 20 SOLUTION ORAL at 20:48

## 2017-01-01 RX ADMIN — Medication 1 TABLET: at 10:48

## 2017-01-01 RX ADMIN — MIDODRINE HYDROCHLORIDE 5 MG: 5 TABLET ORAL at 09:46

## 2017-01-01 RX ADMIN — ATORVASTATIN CALCIUM 10 MG: 10 TABLET, FILM COATED ORAL at 21:46

## 2017-01-01 RX ADMIN — PIPERACILLIN SODIUM AND TAZOBACTAM SODIUM 3.38 G: 3; .375 INJECTION, POWDER, FOR SOLUTION INTRAVENOUS at 11:56

## 2017-01-01 RX ADMIN — HYDROCODONE BITARTRATE AND ACETAMINOPHEN 1 TABLET: 10; 325 TABLET ORAL at 21:06

## 2017-01-01 RX ADMIN — FAMOTIDINE 20 MG: 10 INJECTION, SOLUTION INTRAVENOUS at 08:30

## 2017-01-01 RX ADMIN — PANTOPRAZOLE SODIUM 40 MG: 40 TABLET, DELAYED RELEASE ORAL at 08:48

## 2017-01-01 RX ADMIN — METOPROLOL TARTRATE 5 MG: 1 INJECTION, SOLUTION INTRAVENOUS at 16:07

## 2017-01-01 RX ADMIN — PROPRANOLOL HYDROCHLORIDE 20 MG: 20 TABLET ORAL at 20:54

## 2017-01-01 RX ADMIN — SPIRONOLACTONE 100 MG: 25 TABLET, FILM COATED ORAL at 08:28

## 2017-01-01 RX ADMIN — FLUCONAZOLE 100 MG: 100 TABLET ORAL at 14:11

## 2017-01-01 RX ADMIN — WARFARIN SODIUM 1 MG: 1 TABLET ORAL at 18:08

## 2017-01-01 RX ADMIN — LACTULOSE 20 G: 20 SOLUTION ORAL at 21:45

## 2017-01-01 RX ADMIN — WARFARIN SODIUM 2.5 MG: 2.5 TABLET ORAL at 17:19

## 2017-01-01 RX ADMIN — PANTOPRAZOLE SODIUM 40 MG: 40 TABLET, DELAYED RELEASE ORAL at 10:47

## 2017-01-01 RX ADMIN — LACTULOSE 10 G: 20 SOLUTION ORAL at 09:12

## 2017-01-01 RX ADMIN — SPIRONOLACTONE 100 MG: 25 TABLET, FILM COATED ORAL at 17:38

## 2017-01-01 RX ADMIN — WARFARIN SODIUM 2 MG: 2 TABLET ORAL at 14:10

## 2017-01-01 RX ADMIN — AMPICILLIN 250 MG: 50 SUSPENSION ORAL at 21:30

## 2017-01-01 RX ADMIN — LACTULOSE 10 G: 20 SOLUTION ORAL at 16:25

## 2017-01-01 RX ADMIN — DIGOXIN 125 MCG: 125 TABLET ORAL at 08:14

## 2017-01-01 RX ADMIN — SODIUM CHLORIDE 100 ML/HR: 9 INJECTION, SOLUTION INTRAVENOUS at 10:33

## 2017-01-01 RX ADMIN — LACTULOSE 30 ML: 10 SOLUTION ORAL; RECTAL at 09:18

## 2017-01-01 RX ADMIN — FLUCONAZOLE 100 MG: 2 INJECTION INTRAVENOUS at 20:46

## 2017-01-01 RX ADMIN — PROPRANOLOL HYDROCHLORIDE 20 MG: 20 TABLET ORAL at 21:44

## 2017-01-01 RX ADMIN — DIGOXIN 125 MCG: 0.25 INJECTION INTRAMUSCULAR; INTRAVENOUS at 19:22

## 2017-01-01 RX ADMIN — SODIUM CHLORIDE 100 ML/HR: 9 INJECTION, SOLUTION INTRAVENOUS at 18:52

## 2017-01-01 RX ADMIN — IOPAMIDOL 100 ML: 612 INJECTION, SOLUTION INTRAVENOUS at 04:14

## 2017-01-01 RX ADMIN — CALCIUM ACETATE 667 MG: 667 CAPSULE ORAL at 08:40

## 2017-01-01 RX ADMIN — WARFARIN SODIUM 1 MG: 1 TABLET ORAL at 14:52

## 2017-01-01 RX ADMIN — LACTULOSE 10 G: 20 SOLUTION ORAL at 16:39

## 2017-01-01 RX ADMIN — FAMOTIDINE 40 MG: 20 TABLET, FILM COATED ORAL at 09:53

## 2017-01-01 RX ADMIN — Medication 10 ML: at 16:39

## 2017-01-01 RX ADMIN — Medication 10 ML: at 08:31

## 2017-01-01 RX ADMIN — RIFAXIMIN 550 MG: 550 TABLET ORAL at 20:34

## 2017-01-01 RX ADMIN — HYDROCODONE BITARTRATE AND ACETAMINOPHEN 0.5 TABLET: 10; 325 TABLET ORAL at 23:12

## 2017-01-01 RX ADMIN — MIDODRINE HYDROCHLORIDE 10 MG: 5 TABLET ORAL at 10:47

## 2017-01-01 RX ADMIN — LACTULOSE 10 G: 20 SOLUTION ORAL at 21:24

## 2017-01-01 RX ADMIN — HYDROCODONE BITARTRATE AND ACETAMINOPHEN 1 TABLET: 10; 325 TABLET ORAL at 20:38

## 2017-01-01 RX ADMIN — METOPROLOL TARTRATE 2.5 MG: 5 INJECTION, SOLUTION INTRAVENOUS at 03:09

## 2017-01-01 RX ADMIN — HYDROCODONE BITARTRATE AND ACETAMINOPHEN 0.5 TABLET: 10; 325 TABLET ORAL at 12:07

## 2017-01-01 RX ADMIN — FLUCONAZOLE 200 MG: 2 INJECTION, SOLUTION INTRAVENOUS at 17:36

## 2017-01-01 RX ADMIN — FUROSEMIDE 40 MG: 10 INJECTION, SOLUTION INTRAMUSCULAR; INTRAVENOUS at 09:01

## 2017-01-01 RX ADMIN — LACTULOSE 10 G: 20 SOLUTION ORAL at 15:53

## 2017-01-01 RX ADMIN — FLUCONAZOLE 100 MG: 100 TABLET ORAL at 12:10

## 2017-01-01 RX ADMIN — RIFAXIMIN 550 MG: 550 TABLET ORAL at 09:49

## 2017-01-01 RX ADMIN — LACTULOSE 10 G: 20 SOLUTION ORAL at 22:22

## 2017-01-01 RX ADMIN — SPIRONOLACTONE 25 MG: 25 TABLET, FILM COATED ORAL at 12:31

## 2017-01-01 RX ADMIN — ALLOPURINOL 100 MG: 100 TABLET ORAL at 20:41

## 2017-01-01 RX ADMIN — WARFARIN SODIUM 2.5 MG: 2.5 TABLET ORAL at 17:20

## 2017-01-01 RX ADMIN — DILTIAZEM HYDROCHLORIDE 30 MG: 30 TABLET, FILM COATED ORAL at 20:18

## 2017-01-01 RX ADMIN — ALLOPURINOL 100 MG: 100 TABLET ORAL at 09:50

## 2017-01-01 RX ADMIN — RIFAXIMIN 550 MG: 550 TABLET ORAL at 09:05

## 2017-01-01 RX ADMIN — MIDODRINE HYDROCHLORIDE 10 MG: 5 TABLET ORAL at 06:38

## 2017-01-01 RX ADMIN — RIFAXIMIN 550 MG: 550 TABLET ORAL at 21:24

## 2017-01-01 RX ADMIN — METOPROLOL TARTRATE 12.5 MG: 25 TABLET ORAL at 00:16

## 2017-01-01 RX ADMIN — SPIRONOLACTONE 100 MG: 25 TABLET, FILM COATED ORAL at 08:26

## 2017-01-01 RX ADMIN — DIGOXIN 125 MCG: 125 TABLET ORAL at 08:41

## 2017-01-01 RX ADMIN — PROPRANOLOL HYDROCHLORIDE 20 MG: 20 TABLET ORAL at 10:05

## 2017-01-01 RX ADMIN — IPRATROPIUM BROMIDE AND ALBUTEROL SULFATE 3 ML: .5; 3 SOLUTION RESPIRATORY (INHALATION) at 13:26

## 2017-01-01 RX ADMIN — HYDROCODONE BITARTRATE AND ACETAMINOPHEN 1 TABLET: 10; 325 TABLET ORAL at 22:53

## 2017-01-01 RX ADMIN — Medication 1 TABLET: at 08:07

## 2017-01-01 RX ADMIN — LACTULOSE 10 G: 20 SOLUTION ORAL at 09:15

## 2017-01-01 RX ADMIN — RIFAXIMIN 550 MG: 550 TABLET ORAL at 21:37

## 2017-01-01 RX ADMIN — LACTULOSE 10 G: 20 SOLUTION ORAL at 21:29

## 2017-01-01 RX ADMIN — WARFARIN SODIUM 2.5 MG: 2.5 TABLET ORAL at 18:03

## 2017-01-01 RX ADMIN — CALCIUM ACETATE 667 MG: 667 CAPSULE ORAL at 10:21

## 2017-01-01 RX ADMIN — AMIODARONE HYDROCHLORIDE 1 MG/MIN: 1.8 INJECTION, SOLUTION INTRAVENOUS at 06:40

## 2017-01-01 RX ADMIN — SPIRONOLACTONE 100 MG: 25 TABLET, FILM COATED ORAL at 18:46

## 2017-01-01 RX ADMIN — LACTULOSE 10 G: 20 SOLUTION ORAL at 08:57

## 2017-01-01 RX ADMIN — METOPROLOL TARTRATE 12.5 MG: 25 TABLET ORAL at 01:00

## 2017-01-01 RX ADMIN — SPIRONOLACTONE 100 MG: 25 TABLET, FILM COATED ORAL at 18:08

## 2017-01-01 RX ADMIN — LACTULOSE 45 ML: 10 SOLUTION ORAL; RECTAL at 01:46

## 2017-01-01 RX ADMIN — Medication 1 TABLET: at 11:53

## 2017-01-01 RX ADMIN — IPRATROPIUM BROMIDE AND ALBUTEROL SULFATE 3 ML: .5; 3 SOLUTION RESPIRATORY (INHALATION) at 01:33

## 2017-01-01 RX ADMIN — CALCIUM ACETATE 667 MG: 667 CAPSULE ORAL at 12:44

## 2017-01-01 RX ADMIN — PROPRANOLOL HYDROCHLORIDE 40 MG: 20 TABLET ORAL at 06:10

## 2017-01-01 RX ADMIN — AMPICILLIN 250 MG: 50 SUSPENSION ORAL at 14:35

## 2017-01-01 RX ADMIN — IPRATROPIUM BROMIDE AND ALBUTEROL SULFATE 3 ML: .5; 3 SOLUTION RESPIRATORY (INHALATION) at 07:10

## 2017-01-01 RX ADMIN — RIFAXIMIN 550 MG: 550 TABLET ORAL at 21:34

## 2017-01-01 RX ADMIN — VANCOMYCIN HYDROCHLORIDE 1750 MG: 500 INJECTION, POWDER, LYOPHILIZED, FOR SOLUTION INTRAVENOUS at 12:43

## 2017-01-01 RX ADMIN — FLUCONAZOLE 100 MG: 100 TABLET ORAL at 17:11

## 2017-01-01 RX ADMIN — ERYTHROPOIETIN 20000 UNITS: 20000 INJECTION, SOLUTION INTRAVENOUS; SUBCUTANEOUS at 14:59

## 2017-01-01 RX ADMIN — RIFAXIMIN 550 MG: 550 TABLET ORAL at 09:01

## 2017-01-01 RX ADMIN — POTASSIUM CHLORIDE 40 MEQ: 750 CAPSULE, EXTENDED RELEASE ORAL at 10:00

## 2017-01-01 RX ADMIN — CALCIUM ACETATE 667 MG: 667 CAPSULE ORAL at 17:09

## 2017-01-01 RX ADMIN — BUMETANIDE 1 MG: 1 TABLET ORAL at 17:39

## 2017-01-01 RX ADMIN — LACTULOSE 20 G: 20 SOLUTION ORAL at 17:42

## 2017-01-01 RX ADMIN — ONDANSETRON 4 MG: 4 TABLET, FILM COATED ORAL at 22:30

## 2017-01-01 RX ADMIN — HYDROCODONE BITARTRATE AND ACETAMINOPHEN 1 TABLET: 10; 325 TABLET ORAL at 18:48

## 2017-01-01 RX ADMIN — ALLOPURINOL 100 MG: 100 TABLET ORAL at 09:13

## 2017-01-01 RX ADMIN — LACTULOSE 10 G: 20 SOLUTION ORAL at 20:42

## 2017-01-01 RX ADMIN — PROPRANOLOL HYDROCHLORIDE 40 MG: 20 TABLET ORAL at 14:22

## 2017-01-01 RX ADMIN — PROPRANOLOL HYDROCHLORIDE 40 MG: 20 TABLET ORAL at 20:58

## 2017-01-01 RX ADMIN — HYDROCODONE BITARTRATE AND ACETAMINOPHEN 1 TABLET: 10; 325 TABLET ORAL at 20:58

## 2017-01-01 RX ADMIN — DIGOXIN 125 MCG: 125 TABLET ORAL at 14:08

## 2017-01-01 RX ADMIN — CEFTRIAXONE 1 G: 1 INJECTION, POWDER, FOR SOLUTION INTRAMUSCULAR; INTRAVENOUS at 23:59

## 2017-01-01 RX ADMIN — VANCOMYCIN HYDROCHLORIDE 2000 MG: 500 INJECTION, POWDER, LYOPHILIZED, FOR SOLUTION INTRAVENOUS at 13:03

## 2017-01-01 RX ADMIN — METOPROLOL TARTRATE 12.5 MG: 25 TABLET ORAL at 06:13

## 2017-01-01 RX ADMIN — PANTOPRAZOLE SODIUM 40 MG: 40 TABLET, DELAYED RELEASE ORAL at 09:41

## 2017-01-01 RX ADMIN — PROPRANOLOL HYDROCHLORIDE 20 MG: 20 TABLET ORAL at 14:10

## 2017-01-01 RX ADMIN — FAMOTIDINE 20 MG: 10 INJECTION, SOLUTION INTRAVENOUS at 11:49

## 2017-01-01 RX ADMIN — Medication 1 TABLET: at 08:41

## 2017-01-01 RX ADMIN — BUMETANIDE 0.5 MG: 1 TABLET ORAL at 17:19

## 2017-01-01 RX ADMIN — HYDROCODONE BITARTRATE AND ACETAMINOPHEN 1 TABLET: 10; 325 TABLET ORAL at 05:19

## 2017-01-01 RX ADMIN — ALLOPURINOL 100 MG: 100 TABLET ORAL at 21:29

## 2017-01-01 RX ADMIN — RIFAXIMIN 550 MG: 550 TABLET ORAL at 22:00

## 2017-01-01 RX ADMIN — MIDODRINE HYDROCHLORIDE 10 MG: 5 TABLET ORAL at 06:42

## 2017-01-01 RX ADMIN — PROPRANOLOL HYDROCHLORIDE 40 MG: 20 TABLET ORAL at 21:45

## 2017-01-01 RX ADMIN — FAMOTIDINE 40 MG: 20 TABLET, FILM COATED ORAL at 08:28

## 2017-01-01 RX ADMIN — Medication 10 ML: at 21:51

## 2017-01-01 RX ADMIN — TRAMADOL HYDROCHLORIDE 50 MG: 50 TABLET, FILM COATED ORAL at 10:34

## 2017-01-01 RX ADMIN — SPIRONOLACTONE 100 MG: 25 TABLET, FILM COATED ORAL at 17:19

## 2017-01-01 RX ADMIN — METOPROLOL TARTRATE 12.5 MG: 25 TABLET ORAL at 05:48

## 2017-01-01 RX ADMIN — ONDANSETRON 4 MG: 2 INJECTION INTRAMUSCULAR; INTRAVENOUS at 09:21

## 2017-01-01 RX ADMIN — LACTULOSE 10 G: 20 SOLUTION ORAL at 21:18

## 2017-01-01 RX ADMIN — AMPICILLIN 250 MG: 50 SUSPENSION ORAL at 12:07

## 2017-01-01 RX ADMIN — CALCIUM ACETATE 667 MG: 667 CAPSULE ORAL at 08:09

## 2017-01-01 RX ADMIN — BUMETANIDE 1 MG: 1 TABLET ORAL at 17:53

## 2017-01-01 RX ADMIN — RIFAXIMIN 550 MG: 550 TABLET ORAL at 08:57

## 2017-01-01 RX ADMIN — PROPRANOLOL HYDROCHLORIDE 40 MG: 20 TABLET ORAL at 06:57

## 2017-01-01 RX ADMIN — SPIRONOLACTONE 25 MG: 25 TABLET, FILM COATED ORAL at 20:29

## 2017-01-01 RX ADMIN — LACTULOSE 10 G: 20 SOLUTION ORAL at 16:51

## 2017-01-01 RX ADMIN — FLUCONAZOLE 100 MG: 2 INJECTION INTRAVENOUS at 22:05

## 2017-01-01 RX ADMIN — FUROSEMIDE 160 MG: 10 INJECTION, SOLUTION INTRAMUSCULAR; INTRAVENOUS at 07:11

## 2017-01-01 RX ADMIN — AMIODARONE HYDROCHLORIDE 150 MG: 1.5 INJECTION, SOLUTION INTRAVENOUS at 06:33

## 2017-01-01 RX ADMIN — ATORVASTATIN CALCIUM 10 MG: 10 TABLET, FILM COATED ORAL at 21:05

## 2017-01-01 RX ADMIN — LACTULOSE 10 G: 20 SOLUTION ORAL at 20:34

## 2017-01-01 RX ADMIN — PANTOPRAZOLE SODIUM 40 MG: 40 TABLET, DELAYED RELEASE ORAL at 06:57

## 2017-01-01 RX ADMIN — CALCIUM ACETATE 667 MG: 667 CAPSULE ORAL at 17:41

## 2017-01-01 RX ADMIN — PANTOPRAZOLE SODIUM 40 MG: 40 TABLET, DELAYED RELEASE ORAL at 09:12

## 2017-01-01 RX ADMIN — SODIUM CHLORIDE 3 G: 9 INJECTION, SOLUTION INTRAVENOUS at 21:45

## 2017-01-01 RX ADMIN — MAGNESIUM SULFATE HEPTAHYDRATE 2 G: 40 INJECTION, SOLUTION INTRAVENOUS at 09:59

## 2017-01-01 RX ADMIN — LACTULOSE 10 G: 20 SOLUTION ORAL at 08:08

## 2017-01-01 RX ADMIN — ALLOPURINOL 100 MG: 100 TABLET ORAL at 09:02

## 2017-01-01 RX ADMIN — SODIUM CHLORIDE 500 ML: 9 INJECTION, SOLUTION INTRAVENOUS at 14:29

## 2017-01-01 RX ADMIN — PROPRANOLOL HYDROCHLORIDE 40 MG: 20 TABLET ORAL at 21:37

## 2017-01-01 RX ADMIN — RIFAXIMIN 550 MG: 550 TABLET ORAL at 09:41

## 2017-01-01 RX ADMIN — FLUCONAZOLE 100 MG: 2 INJECTION INTRAVENOUS at 22:19

## 2017-01-01 RX ADMIN — CALCIUM ACETATE 667 MG: 667 CAPSULE ORAL at 08:48

## 2017-01-01 RX ADMIN — ATORVASTATIN CALCIUM 10 MG: 10 TABLET, FILM COATED ORAL at 20:12

## 2017-01-01 RX ADMIN — PROPRANOLOL HYDROCHLORIDE 40 MG: 20 TABLET ORAL at 22:09

## 2017-01-01 RX ADMIN — CALCIUM ACETATE 667 MG: 667 CAPSULE ORAL at 09:02

## 2017-01-01 RX ADMIN — PANTOPRAZOLE SODIUM 40 MG: 40 TABLET, DELAYED RELEASE ORAL at 08:53

## 2017-01-01 RX ADMIN — CALCIUM ACETATE 667 MG: 667 CAPSULE ORAL at 08:07

## 2017-01-01 RX ADMIN — PROPRANOLOL HYDROCHLORIDE 40 MG: 20 TABLET ORAL at 06:15

## 2017-01-01 RX ADMIN — CALCIUM ACETATE 667 MG: 667 CAPSULE ORAL at 11:28

## 2017-01-01 RX ADMIN — LACTULOSE 30 ML: 10 SOLUTION ORAL; RECTAL at 21:37

## 2017-01-01 RX ADMIN — LACTULOSE 10 G: 20 SOLUTION ORAL at 09:14

## 2017-01-01 RX ADMIN — ALLOPURINOL 100 MG: 100 TABLET ORAL at 08:48

## 2017-01-01 RX ADMIN — ALLOPURINOL 100 MG: 100 TABLET ORAL at 21:19

## 2017-01-01 RX ADMIN — BUMETANIDE 1 MG: 1 TABLET ORAL at 14:06

## 2017-01-01 RX ADMIN — HYDROCODONE BITARTRATE AND ACETAMINOPHEN 1 TABLET: 10; 325 TABLET ORAL at 11:34

## 2017-01-01 RX ADMIN — HYDROCODONE BITARTRATE AND ACETAMINOPHEN 1 TABLET: 5; 325 TABLET ORAL at 15:39

## 2017-01-01 RX ADMIN — ENOXAPARIN SODIUM 30 MG: 30 INJECTION SUBCUTANEOUS at 17:52

## 2017-01-01 RX ADMIN — MIDODRINE HYDROCHLORIDE 10 MG: 5 TABLET ORAL at 06:32

## 2017-01-01 RX ADMIN — RIFAXIMIN 550 MG: 550 TABLET ORAL at 20:42

## 2017-01-01 RX ADMIN — FLUCONAZOLE 200 MG: 2 INJECTION, SOLUTION INTRAVENOUS at 17:42

## 2017-01-01 RX ADMIN — LACTULOSE 10 G: 20 SOLUTION ORAL at 10:48

## 2017-01-01 RX ADMIN — RIFAXIMIN 550 MG: 550 TABLET ORAL at 21:17

## 2017-01-01 RX ADMIN — LORAZEPAM 0.5 MG: 2 INJECTION, SOLUTION INTRAMUSCULAR; INTRAVENOUS at 03:52

## 2017-01-01 RX ADMIN — LACTULOSE 30 ML: 10 SOLUTION ORAL; RECTAL at 18:52

## 2017-01-01 RX ADMIN — LACTULOSE 10 G: 20 SOLUTION ORAL at 09:41

## 2017-01-01 RX ADMIN — WARFARIN SODIUM 1 MG: 1 TABLET ORAL at 17:42

## 2017-01-01 RX ADMIN — PANTOPRAZOLE SODIUM 40 MG: 40 TABLET, DELAYED RELEASE ORAL at 09:50

## 2017-01-01 RX ADMIN — WARFARIN SODIUM 3 MG: 2 TABLET ORAL at 17:19

## 2017-01-01 RX ADMIN — CALCIUM ACETATE 667 MG: 667 CAPSULE ORAL at 11:57

## 2017-01-01 RX ADMIN — HYDROCODONE BITARTRATE AND ACETAMINOPHEN 1 TABLET: 5; 325 TABLET ORAL at 09:01

## 2017-01-01 RX ADMIN — ALLOPURINOL 100 MG: 100 TABLET ORAL at 09:17

## 2017-01-01 RX ADMIN — SODIUM CHLORIDE 250 ML: 9 INJECTION, SOLUTION INTRAVENOUS at 20:00

## 2017-01-01 RX ADMIN — PROPRANOLOL HYDROCHLORIDE 40 MG: 20 TABLET ORAL at 20:48

## 2017-01-01 RX ADMIN — HYDROCODONE BITARTRATE AND ACETAMINOPHEN 1 TABLET: 5; 325 TABLET ORAL at 21:13

## 2017-01-01 RX ADMIN — LACTULOSE 10 G: 20 SOLUTION ORAL at 08:53

## 2017-01-01 RX ADMIN — PROPRANOLOL HYDROCHLORIDE 40 MG: 20 TABLET ORAL at 10:00

## 2017-01-01 RX ADMIN — PANTOPRAZOLE SODIUM 40 MG: 40 TABLET, DELAYED RELEASE ORAL at 09:17

## 2017-01-01 RX ADMIN — DIGOXIN 125 MCG: 0.25 INJECTION INTRAMUSCULAR; INTRAVENOUS at 09:51

## 2017-01-01 RX ADMIN — RIFAXIMIN 550 MG: 550 TABLET ORAL at 22:21

## 2017-03-24 NOTE — PROGRESS NOTES
Reason for Consultation: Incisional hernia, periumbilical region      Chief complaint   Chief Complaint   Patient presents with   • Hernia     Umbilical hernia x 6 months. Complains of bulge but denies pain. No known injury or prior incision at the umbilicus.        Subjective .     History of present illness:  I saw the patient in the office today as a consultation for evaluation and treatment of a large periumbilical hernia, present for many years, increasing in size and painful.  Unable to reduce hernia completely.  Patient had a hysterectomy in the past, also had an open cholecystectomy.  Pain severe with lifting, straining or walking.  Relieved by sitting with her legs elevated.  Pain described as 7 out of 10.  No evidence of bowel obstruction.  No rectal bleed.  No anemia or weight loss.    Review of Systems   Constitutional: Negative for chills, fever and unexpected weight change.   HENT: Negative for hearing loss, trouble swallowing and voice change.    Eyes: Negative for visual disturbance.   Respiratory: Negative for apnea, cough, chest tightness, shortness of breath and wheezing.    Cardiovascular: Positive for leg swelling. Negative for chest pain and palpitations.   Gastrointestinal: Positive for diarrhea. Negative for abdominal distention, abdominal pain, anal bleeding, blood in stool, constipation, nausea, rectal pain and vomiting.   Endocrine: Negative for cold intolerance and heat intolerance.   Genitourinary: Negative for difficulty urinating, dysuria and flank pain.   Musculoskeletal: Positive for back pain. Negative for gait problem.   Skin: Negative for color change, rash and wound.   Neurological: Negative for dizziness, syncope, speech difficulty, weakness, light-headedness, numbness and headaches.   Hematological: Negative for adenopathy. Does not bruise/bleed easily.   Psychiatric/Behavioral: Negative for confusion. The patient is not nervous/anxious.        History  Past Medical  History:   Diagnosis Date   • Cancer     uterin    • Hypertension        Past Surgical History:   Procedure Laterality Date   • GALLBLADDER SURGERY     • HYSTERECTOMY     • REPLACEMENT TOTAL KNEE Right        Family History   Problem Relation Age of Onset   • Hypertension Mother    • Cancer Father      prostate   • Hypertension Father    • Diabetes Sister    • Hypertension Sister    • Hypertension Brother    • Cancer Daughter      breast       Social History   Substance Use Topics   • Smoking status: Never Smoker   • Smokeless tobacco: Never Used   • Alcohol use No       Review of patient's allergies indicates no known allergies.    Objective     Vital Signs   Temp:  [98.2 °F (36.8 °C)] 98.2 °F (36.8 °C)  BP: (138)/(84) 138/84    Physical Exam:     General Appearance:    Alert, cooperative, in no acute distress   Head:    Normocephalic, without obvious abnormality, atraumatic   Eyes:            Lids and lashes normal, conjunctivae and sclerae normal, no   icterus, no pallor, corneas clear, PERRLA   Ears:    Ears appear intact with no abnormalities noted   Throat:   No oral lesions, no thrush, oral mucosa moist   Neck:   No adenopathy, supple, trachea midline, no thyromegaly, no   carotid bruit, no JVD   Back:     No kyphosis present, no scoliosis present, no skin lesions,      erythema or scars, no tenderness to percussion or                   palpation,   range of motion normal   Lungs:     Clear to auscultation,respirations regular, even and                  unlabored    Heart:    Regular rhythm and normal rate, normal S1 and S2, no            murmur   Abdomen:     no masses, no organomegaly, soft non-tender, non-distended, no guarding, there is evidence of a large 18 cm hernia at the periumbilical region, unable to reduce completely.  Appears to be incisional in nature    Extremities:   Moves all extremities well, no edema, no cyanosis, no             redness   Pulses:   Pulses palpable and equal bilaterally    Skin:   No bleeding, bruising or rash   Lymph nodes:   No palpable adenopathy   Neurologic:   Cranial nerves 2 - 12 grossly intact, sensation intact        Results Review:   I reviewed the patient's new clinical results.      Assessment/Plan :    1. Incisional hernia, without obstruction or gangrene     will obtain cardiac clearance with Dr. Knott, scheduled for repair of the hernia with mesh in 2 weeks.    I had a detailed and extensive discussion with the patient in the office and they understand that they need to undergo inguinal hernia repair with mesh.  Full risks and benefits of operative versus nonoperative intervention were discussed with the patient and these included things such as nonresolution of symptoms and possible worsening of symptoms without surgical intervention versus infection, bleeding, possible recurrent hernia, possible postoperative neuralgia from nerve damage or involvement with scar tissue, etc.  The patient understands, agrees, and had no questions for me at the end of the office visit.     I discussed the patients findings and my recommendations with patient.        Lizzy Ortega MD  03/24/17

## 2017-04-07 NOTE — ED PROVIDER NOTES
Subjective   HPI Comments: 76-year-old female with right upper quadrant and epigastric abdominal pain.  He has a history of liver cirrhosis and had an MR I of her abdomen today.  She is on pain medication but is not helping.  She denies any vomiting but does have some nausea.  She has a primary care physician and GI doctor but is following her for her cirrhosis.  She describes the pain as crampy nonradiating type pain      History provided by:  Patient   used: No        Review of Systems   Gastrointestinal: Positive for abdominal pain.   All other systems reviewed and are negative.      Past Medical History:   Diagnosis Date   • Arthritis    • Cancer     uterin    • Cirrhosis of liver    • Effect, radiation    • GERD (gastroesophageal reflux disease)    • Hernia of abdominal wall     JUST BELOW UMBILICUS   • History of pneumonia    • Hypertension    • Wears dentures     UPPER ONLY       No Known Allergies    Past Surgical History:   Procedure Laterality Date   • ENDOSCOPY     • GALLBLADDER SURGERY     • HYSTERECTOMY     • REPLACEMENT TOTAL KNEE Right        Family History   Problem Relation Age of Onset   • Hypertension Mother    • Cancer Father      prostate   • Hypertension Father    • Diabetes Sister    • Hypertension Sister    • Hypertension Brother    • Cancer Daughter      breast       Social History     Social History   • Marital status:      Spouse name: N/A   • Number of children: N/A   • Years of education: N/A     Social History Main Topics   • Smoking status: Never Smoker   • Smokeless tobacco: Never Used   • Alcohol use No   • Drug use: Defer   • Sexual activity: Defer     Other Topics Concern   • None     Social History Narrative           Objective   Physical Exam   Constitutional: She is oriented to person, place, and time. She appears well-developed and well-nourished.   Eyes: EOM are normal.   Neck: Normal range of motion. Neck supple.   Cardiovascular: Normal rate and  regular rhythm.    Pulmonary/Chest: Effort normal and breath sounds normal.   Abdominal: Soft. Bowel sounds are normal.   Musculoskeletal: Normal range of motion.   Neurological: She is alert and oriented to person, place, and time. She has normal reflexes.   Skin: Skin is warm and dry.   Psychiatric: She has a normal mood and affect.   Nursing note and vitals reviewed.      Procedures         ED Course  ED Course                  MDM    Final diagnoses:   Epigastric pain   Cirrhosis of liver without ascites, unspecified hepatic cirrhosis type            Chele Martin Jr., PA-C  04/06/17 8608

## 2017-04-10 NOTE — ED PROVIDER NOTES
"Subjective   HPI Comments: Patient presents with complaint of bleeding and pain and redness starting at the umbilicus with redness spreading over the left abdominal wall since yesterday, preceeded by \"not feeling well since her MRI was done on Thursday\".  No fever of vomiting.  Patient has had poor appetite.      Patient's a past medical history of cirrhosis as well as pulmonary embolus am for which she takes Coumadin for anticoagulation therapy.  Patient's last INR was normal on Wednesday according to family.  Patient had an MRI with contrast on Thursday as an outpatient for further evaluation of her liver.  Patient states she was seen the following day and the emergency department for not feeling well which she attributed to the contrast from the study the day before.  He has been anticipating an umbilical hernia repair surgery soon.      History provided by:  Patient and relative   used: No        Review of Systems   Constitutional: Positive for chills. Negative for diaphoresis and fever.   HENT: Negative.    Eyes: Negative.  Negative for redness.   Respiratory: Negative.  Negative for cough and shortness of breath.    Cardiovascular: Negative for chest pain and leg swelling.   Gastrointestinal: Positive for abdominal distention, abdominal pain and nausea. Negative for rectal pain and vomiting.   Endocrine: Negative.    Genitourinary: Negative.         Ascending urinary check infection according to outpatient management by Dr. Espino.  He has her on antibiotics currently.   Allergic/Immunologic: Negative.    Neurological: Negative for dizziness and light-headedness.   Hematological: Bruises/bleeds easily: atient is on Coumadin as aforementioned.   Psychiatric/Behavioral: Negative.        Past Medical History:   Diagnosis Date   • Arthritis    • Cancer     uterin    • Cirrhosis of liver    • Effect, radiation    • GERD (gastroesophageal reflux disease)    • Hernia of abdominal wall     JUST " BELOW UMBILICUS   • History of pneumonia    • Hypertension    • Wears dentures     UPPER ONLY       No Known Allergies    Past Surgical History:   Procedure Laterality Date   • ENDOSCOPY     • GALLBLADDER SURGERY     • HYSTERECTOMY     • REPLACEMENT TOTAL KNEE Right        Family History   Problem Relation Age of Onset   • Hypertension Mother    • Cancer Father      prostate   • Hypertension Father    • Diabetes Sister    • Hypertension Sister    • Hypertension Brother    • Cancer Daughter      breast       Social History     Social History   • Marital status:      Spouse name: N/A   • Number of children: N/A   • Years of education: N/A     Social History Main Topics   • Smoking status: Never Smoker   • Smokeless tobacco: Never Used   • Alcohol use No   • Drug use: Defer   • Sexual activity: Defer     Other Topics Concern   • None     Social History Narrative           Objective   Physical Exam   Constitutional: She is oriented to person, place, and time. She appears well-developed.   HENT:   Head: Normocephalic.   Eyes: Scleral icterus is present.   Neck: Normal range of motion. Neck supple.   Cardiovascular: Normal rate and regular rhythm.    Pulmonary/Chest: Effort normal.   Abdominal: Soft.   There is dry blood around the distended umbilicus.  There is local tenderness, mild without peritoneal signs.  There is extensive redness/cellulitis to the left of the umbilicus on the abdominal wall   Musculoskeletal: Normal range of motion.   Neurological: She is alert and oriented to person, place, and time.   Skin: Skin is warm.   Patient has color consistent with someone with chronic liver disease.  Patient states this is her baseline   Psychiatric:   Patient is alert and oriented.  She is in no distress.  She states she is not an unusual pain currently.       Procedures         ED Course  ED Course   Comment By Time   1200:  conferred with Sweta Ordonez in the ER followed by surgeon, Dr. Ortega who requests  transfer to . RICH Hung 04/10 1235   1220:  Surgeon, Dr. Oilvas at  accepted the patient, request transport to the ER.  Patient and family understand and concur with plan of care.  IV and IV antibiotics are ordered. Jyotsna Jeronimo, APRN 04/10 1235      Radiology report: Hurt from the radiologist regarding acute findings of incarcerated ventral hernia with perforation.            MDM  Number of Diagnoses or Management Options  Anticoagulated on Coumadin:   Hx of cirrhosis:   Incarcerated ventral hernia:   Small bowel perforation:       Final diagnoses:   Incarcerated ventral hernia   Small bowel perforation   Hx of cirrhosis   Anticoagulated on Coumadin            Jyotsna Jeronimo, RICH  04/10/17 1244       Jyotsna Jeronimo, APRNOEMÍ  04/10/17 1241

## 2017-07-31 PROBLEM — N18.30 CHRONIC KIDNEY DISEASE, STAGE III (MODERATE) (HCC): Status: ACTIVE | Noted: 2017-01-01

## 2017-07-31 PROBLEM — R18.8 CIRRHOSIS OF LIVER WITH ASCITES (HCC): Status: ACTIVE | Noted: 2017-01-01

## 2017-07-31 PROBLEM — K74.60 CIRRHOSIS OF LIVER WITH ASCITES (HCC): Status: ACTIVE | Noted: 2017-01-01

## 2017-07-31 PROBLEM — I10 ESSENTIAL HYPERTENSION: Status: ACTIVE | Noted: 2017-01-01

## 2017-07-31 PROBLEM — I48.21 PERMANENT ATRIAL FIBRILLATION (HCC): Status: ACTIVE | Noted: 2017-01-01

## 2017-07-31 PROBLEM — R18.8 ASCITES: Status: ACTIVE | Noted: 2017-01-01

## 2017-07-31 PROBLEM — D64.9 CHRONIC ANEMIA: Status: ACTIVE | Noted: 2017-01-01

## 2017-08-01 PROBLEM — L02.219 CELLULITIS AND ABSCESS OF TRUNK: Status: ACTIVE | Noted: 2017-01-01

## 2017-08-01 PROBLEM — L03.319 CELLULITIS AND ABSCESS OF TRUNK: Status: ACTIVE | Noted: 2017-01-01

## 2017-08-01 NOTE — PROGRESS NOTES
"Adult Nutrition  Assessment/PES    Patient Name:  Hodan Carter  YOB: 1940  MRN: [id number]  Admit Date:  7/31/2017    Assessment Date:  8/1/2017        Reason for Assessment       08/01/17 1036    Reason for Assessment    Reason For Assessment/Visit diagnosis/disease state    Identified At Risk By Screening Criteria BMI    Diagnosis Diagnosis    Cardiac Other (comment);HTN   A-Fib    Gastrointestinal Other (comment)   Fluid retention    Hematological Chronic anemia    Hepatic Cirrhosis    Renal CKD                Anthropometrics       08/01/17 1037    Anthropometrics    Height Method Stated    Height 170.2 cm (67\")    Weight Method Bed scale    Weight 106 kg (233 lb 11 oz)    Ideal Body Weight (IBW)    Ideal Body Weight (IBW), Female 62.26    % Ideal Body Weight 170.6    Body Mass Index (BMI)    BMI (kg/m2) 36.68    BMI Grade 35 - 39.9 - obesity - grade II            Labs/Tests/Procedures/Meds       08/01/17 1038    Labs/Tests/Procedures/Meds    Labs/Tests Review Reviewed   Low: Na, Glu, Alb   High: BUN, Creat, Tot. Bilirubin    Medication Review Reviewed, pertinent            Physical Findings       08/01/17 1039    Physical Findings/Assessment    Additional Documentation Physical Appearance (Group)    Physical Appearance    Skin other (see comments)   Open wound            Estimated/Assessed Needs       08/01/17 1040    Calculation Measurements    Weight Used For Calculations 62.3 kg (137 lb 4.1 oz)   IBW    Height Used for Calculations 1.702 m (5' 7\")    Estimated/Assessed Energy Needs    Energy Need Method [place] Jeor    Age 77    RMR ([place] Je[place] Equation) 1130.22    Activity Factors ([place] Jeor)  Confined to bed  1.2    Estimated Kcal Range  ~6064-5081 kca    Estimated/Assessed Protein Needs    Weight Used for Protein Calculation 62.3 kg (137 lb 4.1 oz)    Protein (gm/kg) 1.2    1.2 Gm Protein (gm) 74.71    Estimated Protein Range ~62-75 gm    Estimated/Assessed Fluid Needs    " Fluid Need Method RDA method    RDA Method (mL)  1650            Nutrition Prescription Ordered       08/01/17 1041    Nutrition Prescription PO    Current PO Diet Regular    Common Modifiers Cardiac            Evaluation of Received Nutrient/Fluid Intake       08/01/17 1041    PO Evaluation    Number of Days PO Intake Evaluated --    Number of Meals --    % PO Intake --      08/01/17 1040    PO Evaluation    Number of Days PO Intake Evaluated 1 day    Number of Meals 1    % PO Intake 25              Problem/Interventions:        Problem 1       08/01/17 1042    Nutrition Diagnoses Problem 1    Problem 1 Overweight/Obesity    Etiology (related to) Factors Affecting Nutrition    Food Habit/Preferences Large Meals    Signs/Symptoms (evidenced by) BMI    BMI 35 - 39.9            Problem 2       08/01/17 1042    Nutrition Diagnoses Problem 2    Problem 2 Impaired Nutrient Utilization    Etiology (related to) Medical Diagnosis    Renal CKD    Signs/Symptoms (evidenced by) Biochemical    Specific Labs Noted BUN;Na+;Creatinine                  Intervention Goal       08/01/17 1043    Intervention Goal    General Meet nutritional needs for age/condition    PO Meet estimated needs;Increase intake;PO intake (%)    PO Intake % 50 %    Weight No significant weight loss            Nutrition Intervention       08/01/17 1043    Nutrition Intervention    RD/Tech Action Care plan reviewd;Encourage intake;Follow Tx progress;Recommend/ordered    Recommended/Ordered Supplement            Nutrition Prescription       08/01/17 1043    Nutrition Prescription PO    PO Prescription Begin/change supplement    Supplement Ensure Complete    Supplement Frequency 2 times a day    New PO Prescription Ordered? Yes    Other Orders    Obtain Weight Daily    Obtain Weight Ordered? No, recommended    Supplement Vitamin mineral supplement    Supplement Ordered? No, recommended            Education/Evaluation       08/01/17 1044    Education     Education Will Instruct as appropriate    Monitor/Evaluation    Monitor Per protocol;I&O;PO intake;Supplement intake;Pertinent labs;Weight        Comments:  Rec #1: Continue current diet order; Encouraging intake. Pt receiving 25% PO intake over one meal. Nutritional supplement ordered Ensure BID to promote PO intake. Rec #2: Consider MVI with minerals daily. Rec #3: Continue to monitor/replace electrolytes and Glucose PRN. RD to follow pt. Consult RD PRN.       Electronically signed by:  Jessica Carlisle RD  08/01/17 10:44 AM

## 2017-08-01 NOTE — PROGRESS NOTES
Continued Stay Note   Diego     Patient Name: Hodan Carter  MRN: [id number]  Today's Date: 8/1/2017    Admit Date: 7/31/2017          Discharge Plan       08/01/17 1129    Case Management/Social Work Plan    Plan Spoke to pt in room.  Lives in a 1 story house and son lives with her.  Has steps but does not have to use them, has a WC,Walker and Cane, also has o2 which she wears at 2 liters only at night. O2 is provided by [employer].  Also has a lifeline button.  Has Home Health with [facility], states was suppose to be stopping services this week,  but says they told her  could start over.  Pt is being transferred to  today, is on  liver transplant list  there and surgeon who has been seeing is there.  Will be transferred per EMS later today when bed available.        08/01/17 7984    Case Management/Social Work Plan    Plan Called [facility] transfer line to check on status of bed status per Demetrice from transfer line they called at 8:45 PM to offer bed at [facility] and was told by nurse that patient was being admitted here. Per Demetrice they have patient listed as a lost transfer  and that the MD here will have to call to re request a transfer at this time, notified DR. Franco of situation.               Discharge Codes     None        Expected Discharge Date and Time     Expected Discharge Date Expected Discharge Time    Aug 1, 2017             Bhavna Donohue

## 2017-08-01 NOTE — H&P
[facility]   HISTORY AND PHYSICAL      Name:  Hodan Carter   Age:  77 y.o.  Sex:  female  :  1940  MRN:  [id number]   Visit Number:  [id number]  Admission Date:  2017  Date Of Service:  17  Primary Care Physician:  Juan Espino MD   Primary gastroenterologist: Dr. Carter.  Primary cardiologist: Dr. Abdelrahman Knott.    Chief Complaint:     Abdominal swelling since 2 weeks with increased draining from the abdominal wound since one day.    History Of Presenting Illness:      This is a 77-year-old unfortunate female with history of cirrhosis of uncertain etiology with portal hypertension and ascites, chronic kidney disease was brought to the emergency room by her daughter with the above complaints.  The history is obtained from the patient as well as the daughter who is at the bedside.    Patient apparently had ventral hernia repair surgery done in [facility] in April of this year.  Patient apparently had a nonhealing abdominal wound and was subsequently discharged home with wound Vac.  According to the daughter, the wound slowly improved but continued to had one small opening that did not heal.  Once the wound VAC was removed, the daughter states that the home health used to dress the wound daily with minimal drainage.  Patient started having increasing abdominal swelling in the last couple weeks and today, she had significant amount of blood-tinged to drainage coming from her wound and required multiple gauze dressing today.  She denies any history of fevers, hematemesis or melena.  No history of foul-smelling drainage.  Because of the increasing drainage, she was brought to the emergency room.    Patient was evaluated in the emergency room and was hemodynamically stable.  Blood work showed mild hyperbilirubinemia of 2.2 and hypoalbuminemia of 2.3.  She was noted to have chronic renal failure with a baseline creatinine of 1.4.   Hemoglobin was 10 and platelet count was 128.  White count was normal at 7.  Patient does have history of atrial fibrillation and is on Coumadin and her INR was 1.85.    Patient was initially being planned be transferred to [facility] as per patient request but they did not have any beds available.  Family subsequently wanted to stay here in the ED provider contacted Dr. Varghese from gastroenterology who agreed to consult.  And the patient is currently being admitted to the regular medical floor for further evaluation and management.  She is currently sitting up on the bed and is comfortable at rest.  She denies any chest pain.    Patient has had a diagnosis of cirrhosis for several years.  According to the daughter, the exact etiology was not found.  Patient has never consumed alcohol.  She does not smoke.    Review Of Systems:     General ROS: Patient denies any fevers, chills or loss of consciousness. Complains of generalized weakness.   Psychological ROS: No history of any hallucinations and delusions.  Ophthalmic ROS: No history of any diplopia or transient loss of vision.  ENT ROS: No history of sore throat, nasal congestion or ear pain.   Allergy and Immunology ROS: No history of rash or itching.  Hematological and Lymphatic ROS: No history of neck swelling or easy bleeding.  Endocrine ROS: No history of any recent unintentional weight gain or loss.  Respiratory ROS: No history of cough or shortness of breath.   Cardiovascular ROS: No history of chest pain or palpitations.   Gastrointestinal ROS: As per history of present illness.  Genito-Urinary ROS: No history of dysuria or hematuria.  Musculoskeletal ROS: No muscle pain. No calf pain.  Chronic bilateral leg swelling.  Neurological ROS: No history of any focal weakness. No loss of consciousness. Denies any numbness. Denies neck pain.  Dermatological ROS: No history of any redness or pruritis.     Past Medical History:    Past Medical History:   Diagnosis Date   •  Arthritis    • Cancer     uterin    • Cirrhosis of liver    • Effect, radiation    • GERD (gastroesophageal reflux disease)    • Hernia of abdominal wall     JUST BELOW UMBILICUS   • History of pneumonia    • Hypertension    • Pulmonary embolism    • Wears dentures     UPPER ONLY     Past Surgical history:    Past Surgical History:   Procedure Laterality Date   • ENDOSCOPY     • GALLBLADDER SURGERY     • HERNIA REPAIR     • HYSTERECTOMY     • REPLACEMENT TOTAL KNEE Right      Social History:    Social History     Social History   • Marital status:      Spouse name: N/A   • Number of children: N/A   • Years of education: N/A     Occupational History   • Not on file.     Social History Main Topics   • Smoking status: Never Smoker   • Smokeless tobacco: Never Used   • Alcohol use No   • Drug use: Defer   • Sexual activity: Defer     Other Topics Concern   • Not on file     Social History Narrative   • No narrative on file     Family History:    Family History   Problem Relation Age of Onset   • Hypertension Mother    • Cancer Father      prostate   • Hypertension Father    • Diabetes Sister    • Hypertension Sister    • Hypertension Brother    • Cancer Daughter      breast     Allergies:      Review of patient's allergies indicates no known allergies.    Home Medications:    Prior to Admission Medications     Prescriptions Last Dose Informant Patient Reported? Taking?    hydrALAZINE (APRESOLINE) 25 MG tablet 7/31/2017 Medication Bottle Yes Yes    Take 12.5 mg by mouth Daily.    hydrochlorothiazide (HYDRODIURIL) 50 MG tablet 7/31/2017 Medication Bottle Yes Yes    50 mg Daily.    HYDROcodone-acetaminophen (NORCO)  MG per tablet 7/30/2017  Yes Yes    ondansetron (ZOFRAN) 4 MG tablet 7/30/2017  Yes Yes    Take 4 mg by mouth Every 8 (Eight) Hours As Needed for Nausea or Vomiting.    pantoprazole (PROTONIX) 40 MG EC tablet 7/31/2017  Yes Yes    40 mg Daily.    propranolol (INDERAL) 20 MG tablet 7/31/2017  Medication Bottle Yes Yes    20 mg 2 (Two) Times a Day.    spironolactone (ALDACTONE) 50 MG tablet 7/31/2017 Medication Bottle Yes Yes    100 mg Daily.    warfarin (COUMADIN) 2.5 MG tablet 7/31/2017 Medication Bottle Yes Yes    2.5 mg Daily.    amoxicillin-clavulanate (AUGMENTIN) 875-125 MG per tablet More than a month Medication Bottle Yes No    1 tablet 2 (Two) Times a Day.    cephalexin (KEFLEX) 500 MG capsule More than a month  Yes No    ciprofloxacin (CIPRO) 500 MG tablet More than a month  Yes No    Take 500 mg by mouth 2 (Two) Times a Day.           ED Medications:    Medications - No data to display    Vital Signs:    Temp:  [98.1 °F (36.7 °C)-99.7 °F (37.6 °C)] 98.6 °F (37 °C)  Heart Rate:  [] 102  Resp:  [16-18] 16  BP: ()/(59-76) 95/59    Last 3 weights    07/31/17  1513 07/31/17  2112   Weight: 214 lb (97.1 kg) 234 lb 9 oz (106 kg)     Body mass index is 36.74 kg/(m^2).    Physical Exam:    General Appearance:  Alert and cooperative, not in any acute distress.   Head:  Atraumatic and normocephalic, without obvious abnormality.   Eyes:          PERRLA, conjunctivae and sclerae normal, 1+ Icterus. No pallor. Extra-occular movements are within normal limits.   Ears:  Ears appear intact with no abnormalities noted.   Throat: No oral lesions, no thrush, oral mucosa moist.   Neck: Supple, trachea midline, no thyromegaly, no carotid bruit.   Back:   No kyphoscoliosis present. No tenderness to palpation,   range of motion normal.   Lungs:   Chest shape is normal. Breath sounds heard bilaterally equally.  No crackles or wheezing. No Pleural rub or bronchial breathing.   Heart:  Normal S1 and S2, no murmur, no gallop, no rub. No JVD.   Abdomen:   Abdomen is soft but distended.  No significant tenderness noted.  An ulcer with a ascites fluid drainage noted in the left lower quadrant with surrounding erythema.  No significant bleeding noted at this time.  Normal bowel sounds heard.  Right upper quadrant  surgical scar is noted.  Flanks are dull.     Extremities: Moves all extremities well, no cyanosis, no clubbing.  3+ pitting edema noted in bilateral lower extremities.   Pulses: Pulses palpable and equal bilaterally.   Skin: No bleeding, bruising or rash.   Neurologic: Alert and oriented x 3. Moves all four limbs equally. No tremors. No facial asymetry.     Laboratory data:    I have reviewed the labs done in the emergency room.      Results from last 7 days  Lab Units 07/31/17  1600   SODIUM mmol/L 135*   POTASSIUM mmol/L 3.4*   CHLORIDE mmol/L 103   CO2 mmol/L 24.0*   BUN mg/dL 19   CREATININE mg/dL 1.40*   CALCIUM mg/dL 7.8*   BILIRUBIN mg/dL 2.2*   ALK PHOS U/L 158*   ALT (SGPT) U/L 19   AST (SGOT) U/L 29   GLUCOSE mg/dL 101*       Results from last 7 days  Lab Units 07/31/17  1600   WBC 10*3/mm3 7.25   HEMOGLOBIN g/dL 10.1*   HEMATOCRIT % 31.4*   PLATELETS 10*3/mm3 128*       Results from last 7 days  Lab Units 07/31/17  1600   INR  1.85*                           Results from last 7 days  Lab Units 07/31/17  1737   COLOR UA  Capri*   GLUCOSE UA  Negative   KETONES UA  Trace*   LEUKOCYTES UA  Small (1+)*   PH, URINE  5.5   BILIRUBIN UA  Small (1+)*   UROBILINOGEN UA  1.0 E.U./dL           EKG:      EKG done in the emergency room was reviewed by me.  It shows atrial fibrillation with a ventricular rate of 70.  Normal axis.  rS complex is noted in the anterior leads without any abnormal Q waves.  Nonspecific ST-T changes were noted.    Radiology:    Imaging Results (last 72 hours)     Procedure Component Value Units Date/Time    CT Abdomen Pelvis Without Contrast [[id number]] Collected:  07/31/17 1745     Updated:  07/31/17 1747    Narrative:       FINAL REPORT    CLINICAL HISTORY:  INCREASE DRAINAGE FROM SURGICAL SITE    FINDINGS:  Multiple contiguous transaxial slices through the abdomen and pelvis were obtained without intravenous or oral administration of contrast with coronal reformatted images.    There is  diffuse body wall edema. There is laxity of the rectus fascia a midline fluid, fat and bowel herniating within this defect. There is thickening of the rectus fascia left of midline with a complex small tubular linear air collection extending to   the skin surface consistent with a small abscess and fistula tract.    There is a large right pleural effusion. There is a moderate to large amount of ascites.    There is adjacent airspace disease. The liver is low in attenuation, shrunken and nodular in contour. There multiple tiny high attenuation nodules, nonspecific. There are paraesophageal varices and thickening of the distal esophagus. The spleen, adrenal   glands appear normal. Pancreas is atrophic. Kidneys are atrophic. There is a nonobstructive right renal calcification. The bowel gas pattern is nonobstructive. The appendix is not visualized but there are no secondary signs to suggest appendicitis.   Bladder wall is minimally thickened.    Impression:    Ventral hernia containing fat and fluid with adjacent fistulous tract extending from the rectus fascia to the skin but not discretely contiguous with bowel    Anasarca and moderate ascites    Right effusion    Cirrhosis with portal venous hypertension varices and nonspecific hepatic nodularity      Impression:       Authenticated by Olaf Pillai MD on 07/31/2017 05:45:44 PM          Assessment:    1.  Cirrhosis of the liver with portal hypertension and worsening ascites, present on admission.  2.  Chronic nonhealing abdominal wound following ventral hernia repair in April 2017.  3.  Chronic kidney disease stage III.  4.  Permanent atrial fibrillation on warfarin.  5.  Chronic anemia and thrombocytopenia.  6.  Essential hypertension.    Plan:     Patient is currently being admitted to the medical floor.  We will continue her home medications.  I will hold Coumadin today for possible abdominal paracentesis tomorrow.  At this time there is no evidence of  sepsis.  There is no clinical evidence of fistulous tract with connection to the bowel.  I have discussed the patient's condition with Dr. Varghese and he will be consulted.    Patient may need adjustment of her medications especially diuretic therapy.  We will start her on Lasix from a.m.  Patient states that she was once on Lasix but however it was discontinued.  Patient does not know the exact reason for discontinuation.  It is possible that the patient may have had worsening renal failure which may have necessitated the discontinuation at that time.  I have discussed the patient's condition with her daughter who is at the bedside.  Her CODE STATUS is full code.    Twin Morales MD  07/31/17  9:44 PM    Portions of this note may have been completed with Dragon, a voice recognition program. Not all errors in transcription may have been detected prior to signing.

## 2017-08-01 NOTE — ED NOTES
Contacted [facility] to get update on bed status for transfer.  Per [facility] MDs, no bed available yet.  They will call us when more info is available.     Radha Monsalve  07/31/17 2007

## 2017-08-01 NOTE — PROGRESS NOTES
Continued Stay Note  JULIO Cortez     Patient Name: Hodan Carter  MRN: [id number]  Today's Date: 8/1/2017    Admit Date: 7/31/2017          Discharge Plan       08/01/17 0925    Case Management/Social Work Plan    Plan Called  transfer line to check on status of bed status per Demetrice from transfer line they called at 8:45 PM to offer bed at [facility] and was told by nurse that patient was being admitted here. Per Demetrice they have patient listed as a lost transfer  and that the MD here will have to call to re request a transfer at this time, notified DR. Franco of situation.               Discharge Codes     None            Anh Benavides

## 2017-08-01 NOTE — ED NOTES
Contacted [facility] to inform them that patient is being admitted here at our facility.     April Sofia Monsalve  07/31/17 2037

## 2017-08-01 NOTE — DISCHARGE SUMMARY
[facility]   DISCHARGE SUMMARY      Name:  Hodan Carter   Age:  77 y.o.  Sex:  female  :  1940  MRN:  [id number]   Visit Number:  [id number]  Primary Care Physician:  Juan Espino MD  Date of Discharge:  2017  Admission Date:  2017      Discharge Diagnosis:       Principal Problem:    Ascites  Active Problems:    Cirrhosis of liver with ascites    Chronic kidney disease, stage III (moderate)    Permanent atrial fibrillation    Essential hypertension    Chronic anemia    Cellulitis and abscess of trunk          Consults:   Consulting Physician(s)     Provider Relationship    Gilberto Varghese MD Consulting Physician        Consults     Date and Time Order Name Status Description    2017 Gastroenterology (on-call MD unless specified)            Procedures Performed:               Hospital Course:   The patient was admitted on 2017  Please see H&P for details on admission HPI and ROS.    This is a 77-year-old unfortunate female with history of cirrhosis of uncertain etiology with portal hypertension and ascites, chronic kidney disease was brought to the emergency room by her daughter with the above complaints.  The history is obtained from the patient as well as the daughter who is at the bedside.     Patient apparently had ventral hernia repair surgery done in [facility] in April of this year.  Patient apparently had a nonhealing abdominal wound and was subsequently discharged home with wound Vac.  According to the daughter, the wound slowly improved but continued to had one small opening that did not heal.  Once the wound VAC was removed, the daughter states that the home health used to dress the wound daily with minimal drainage.  Patient started having increasing abdominal swelling in the last couple weeks and today, she had significant amount of blood-tinged to drainage coming from her wound and required multiple  gauze dressing today.  She denies any history of fevers, hematemesis or melena.  No history of foul-smelling drainage.  Because of the increasing drainage, she was brought to the emergency room.    On the morning of discharge it was noted that surrounding skin of the open area in the abdomen is starting to be warm to touch and significant redness was noted.  Her blood pressure was also noted to be low.  She was started on IV antibiotics vancomycin as well as Zosyn and will be continued through the transfer.    All abnormal medications were held which include labetalol, spironolactone as well as hydralazine.  The initial call was placed yesterday and patient could not be transferred this morning when I called the  M.D. and initially had to talk with medicine team Dr. Daniels and he suggested to talk to blue team from surgical side, Dr. Piedra is the accepting physician at [facility].    Vital Signs:    Temp:  [97.7 °F (36.5 °C)-99.7 °F (37.6 °C)] 97.8 °F (36.6 °C)  Heart Rate:  [] 76  Resp:  [16-18] 18  BP: ()/(46-76) 107/46            Physical Exam:   General Appearance: alert, oriented x 3, no acute distress,   HEENT: pupils round and reactive to light, oral mucosa dry, extra occular movements intact.  Neck: supple, no JVD, trachea midline  Lungs: Clear to Auscultation, unlabored breathing effort  Heart: RRR, normal S1 and S2, no S3, no rub  Abdomen: Obese soft, non-tender, no palpable bladder, present bowel sounds to auscultation.  She does have a jean pierre size hole below the umbilicus that is draining serosanguineous fluid and about 8-10 inches of skin around the wound is warm to touch and is red.  Also has positive fluid thrill.  Extremities: no edema, cyanosis or clubbing.   Neuro: normal speech and mental status, grossly non focal.    Pertinent Lab Results:       Results from last 7 days  Lab Units 08/01/17  0502 07/31/17  1600   SODIUM mmol/L 136* 135*   POTASSIUM mmol/L 3.7 3.4*   CHLORIDE  mmol/L 106 103   CO2 mmol/L 23.0* 24.0*   BUN mg/dL 23* 19   CREATININE mg/dL 1.50* 1.40*   CALCIUM mg/dL 7.5* 7.8*   BILIRUBIN mg/dL 1.6* 2.2*   ALK PHOS U/L 138* 158*   ALT (SGPT) U/L 24 19   AST (SGOT) U/L 27 29   GLUCOSE mg/dL 66* 101*       Results from last 7 days  Lab Units 08/01/17  0502 07/31/17  1600   WBC 10*3/mm3 5.87 7.25   HEMOGLOBIN g/dL 10.1* 10.1*   HEMATOCRIT % 31.5* 31.4*   PLATELETS 10*3/mm3 138 128*       Results from last 7 days  Lab Units 08/01/17  0502 07/31/17  1600   INR  1.81* 1.85*          Pertinent Radiology Results:  Imaging Results (most recent)     Procedure Component Value Units Date/Time    CT Abdomen Pelvis Without Contrast [[id number]] Collected:  07/31/17 1745     Updated:  07/31/17 1747    Narrative:       FINAL REPORT    CLINICAL HISTORY:  INCREASE DRAINAGE FROM SURGICAL SITE    FINDINGS:  Multiple contiguous transaxial slices through the abdomen and pelvis were obtained without intravenous or oral administration of contrast with coronal reformatted images.    There is diffuse body wall edema. There is laxity of the rectus fascia a midline fluid, fat and bowel herniating within this defect. There is thickening of the rectus fascia left of midline with a complex small tubular linear air collection extending to   the skin surface consistent with a small abscess and fistula tract.    There is a large right pleural effusion. There is a moderate to large amount of ascites.    There is adjacent airspace disease. The liver is low in attenuation, shrunken and nodular in contour. There multiple tiny high attenuation nodules, nonspecific. There are paraesophageal varices and thickening of the distal esophagus. The spleen, adrenal   glands appear normal. Pancreas is atrophic. Kidneys are atrophic. There is a nonobstructive right renal calcification. The bowel gas pattern is nonobstructive. The appendix is not visualized but there are no secondary signs to suggest appendicitis.   Bladder  wall is minimally thickened.    Impression:    Ventral hernia containing fat and fluid with adjacent fistulous tract extending from the rectus fascia to the skin but not discretely contiguous with bowel    Anasarca and moderate ascites    Right effusion    Cirrhosis with portal venous hypertension varices and nonspecific hepatic nodularity      Impression:       Authenticated by Olaf Pillai MD on 07/31/2017 05:45:44 PM                  Discharge Disposition:    Medical Facility (DC - External) [facility] surgical services    Discharge Medication:     Hodan Carter   Home Medication Instructions AG:[id number]    Printed on:08/01/17 1121   Medication Information                      amoxicillin-clavulanate (AUGMENTIN) 875-125 MG per tablet  1 tablet 2 (Two) Times a Day.             cephalexin (KEFLEX) 500 MG capsule               ciprofloxacin (CIPRO) 500 MG tablet  Take 500 mg by mouth 2 (Two) Times a Day.             hydrALAZINE (APRESOLINE) 25 MG tablet  Take 12.5 mg by mouth Daily.             hydrochlorothiazide (HYDRODIURIL) 50 MG tablet  50 mg Daily.             HYDROcodone-acetaminophen (NORCO)  MG per tablet               ondansetron (ZOFRAN) 4 MG tablet  Take 4 mg by mouth Every 8 (Eight) Hours As Needed for Nausea or Vomiting.             pantoprazole (PROTONIX) 40 MG EC tablet  40 mg Daily.             propranolol (INDERAL) 20 MG tablet  20 mg 2 (Two) Times a Day.             spironolactone (ALDACTONE) 50 MG tablet  100 mg Daily.             warfarin (COUMADIN) 2.5 MG tablet  2.5 mg Daily.                 Discharge Diet:             Follow-up Appointments:    No future appointments.      Test Results Pending at Discharge:     Order Current Status    Urine Culture In process    Wound Culture In process             Julio Franco MD  08/01/17  11:21 AM    Time: Discharge 35 min

## 2017-08-01 NOTE — PLAN OF CARE
Problem: Liver Failure, Acute/Chronic (Adult)  Goal: Signs and Symptoms of Listed Potential Problems Will be Absent or Manageable (Liver Failure, Acute/Chronic)    08/01/17 0138   Liver Failure, Acute/Chronic   Problems Assessed (Liver Failure, Acute/Chronic) fluid/electrolyte imbalance;gastrointestinal complications;respiratory compromise   Problems Present (Liver Failure, Acute/Chronic) renal dysfunction;gastrointestinal complications

## 2017-09-18 NOTE — ED PROVIDER NOTES
Subjective   HPI Comments: 77-year-old female presents with generalized weakness.  She states that she began to have the symptoms are she woke up this morning.  She states that she is currently being treated for urinary tract infection but she's had difficulty with a bowel movement in the last 3 days.  She denies fever and chills.  No vomiting.  No chest pain no shortness of breath.      History provided by:  Patient   used: No        Review of Systems   Neurological: Positive for weakness.   All other systems reviewed and are negative.      Past Medical History:   Diagnosis Date   • Arthritis    • Cancer     uterin    • Cirrhosis of liver    • Effect, radiation    • GERD (gastroesophageal reflux disease)    • Hernia of abdominal wall     JUST BELOW UMBILICUS   • History of pneumonia    • Hypertension    • Pulmonary embolism    • Wears dentures     UPPER ONLY       No Known Allergies    Past Surgical History:   Procedure Laterality Date   • ENDOSCOPY     • GALLBLADDER SURGERY     • HERNIA REPAIR     • HYSTERECTOMY     • REPLACEMENT TOTAL KNEE Right        Family History   Problem Relation Age of Onset   • Hypertension Mother    • Cancer Father      prostate   • Hypertension Father    • Diabetes Sister    • Hypertension Sister    • Hypertension Brother    • Cancer Daughter      breast       Social History     Social History   • Marital status:      Spouse name: N/A   • Number of children: N/A   • Years of education: N/A     Social History Main Topics   • Smoking status: Never Smoker   • Smokeless tobacco: Never Used   • Alcohol use No   • Drug use: Defer   • Sexual activity: Defer     Other Topics Concern   • None     Social History Narrative           Objective   Physical Exam   Constitutional: She is oriented to person, place, and time. She appears well-developed and well-nourished.   HENT:   Head: Normocephalic.   Eyes: EOM are normal.   Neck: Normal range of motion. Neck supple.    Cardiovascular: Normal rate and regular rhythm.    Pulmonary/Chest: Effort normal and breath sounds normal.   Abdominal: Soft. Bowel sounds are normal. There is no tenderness. There is no rebound and no guarding.   Musculoskeletal: Normal range of motion.   Neurological: She is alert and oriented to person, place, and time. She has normal reflexes.   Skin: Skin is warm and dry.   Psychiatric: She has a normal mood and affect.   Nursing note and vitals reviewed.      Procedures         ED Course  ED Course      Patient has a ventral hernia with fluid and fat, this is been present for some time now reviewed the records previous surgeries and admissions at [facility] as well as the recent transfer from [address] at the end of July.  She was at [facility] from August 1 through August 3 for wound care with this known ventral hernia that contains fat of fluid that time as well.  She appeared to be high risk for repair of the digital hernia and there is no signs of strangulation of bowel.  The ventral hernia appears unchanged over several months.    Nonspecific opacity a CT scan in the right lung no fever chills white count no complaint of cough patient is asymptomatic no signs of pneumonia her weakness appears to be coming from her current UTI, she has been on medication for 2 days so the UTIs not resolved this time she needs to continue current medication for treatment            MDM    Final diagnoses:   Acute cystitis with hematuria   Weakness   Ventral hernia without obstruction or gangrene            Chele Martin Jr., PA-C  09/18/17 0148

## 2017-09-23 PROBLEM — R06.02 SHORTNESS OF BREATH: Status: ACTIVE | Noted: 2017-01-01

## 2017-09-24 PROBLEM — M89.9 CHRONIC KIDNEY DISEASE-MINERAL AND BONE DISORDER: Status: ACTIVE | Noted: 2017-01-01

## 2017-09-24 PROBLEM — I21.4 NSTEMI (NON-ST ELEVATED MYOCARDIAL INFARCTION) (HCC): Status: ACTIVE | Noted: 2017-01-01

## 2017-09-24 PROBLEM — E83.9 CHRONIC KIDNEY DISEASE-MINERAL AND BONE DISORDER: Status: ACTIVE | Noted: 2017-01-01

## 2017-09-24 PROBLEM — N18.9 CHRONIC KIDNEY DISEASE-MINERAL AND BONE DISORDER: Status: ACTIVE | Noted: 2017-01-01

## 2017-09-24 PROBLEM — N18.4 CHRONIC KIDNEY DISEASE, STAGE IV (SEVERE) (HCC): Status: ACTIVE | Noted: 2017-01-01

## 2017-09-25 PROBLEM — B37.49 CANDIDA UTI: Status: ACTIVE | Noted: 2017-01-01

## 2017-09-27 PROBLEM — E87.70 VOLUME OVERLOAD: Status: ACTIVE | Noted: 2017-01-01

## 2017-10-05 NOTE — TELEPHONE ENCOUNTER
[place] [facility] 2 times a week for 3 weeks.  Patient has hosptial follow up scheduled for this month.  Verbal Orders given.

## 2017-10-09 NOTE — TELEPHONE ENCOUNTER
Patient has never been seen in the office seen in the office by Dr. Farias.  We were unable to contact the patient to notify her that she needs to contact Dr. Espino.  I contacted [facility] and spoke with a nurse   Zarina and  they are part of her care team and they have a signed HIPPA with her daughters name on it.  Zarina will contact the family and have them follow up with Srinivas.

## 2017-10-10 PROBLEM — K76.82 ACUTE HEPATIC ENCEPHALOPATHY (HCC): Status: ACTIVE | Noted: 2017-01-01

## 2017-10-10 NOTE — PLAN OF CARE
Problem: Fluid Volume Excess (Adult,Obstetrics,Pediatric)  Goal: Balanced Intake/Output  Outcome: Ongoing (interventions implemented as appropriate)

## 2017-10-10 NOTE — ED PROVIDER NOTES
Subjective   History of Present Illness  This is a 77-year-old female who comes in today brought in by her family with complaints of increased confusion.  They report that she was discharged from the hospital here approximately 9 days ago and since that time she has gradually had worsening confusion.  They state that the confusion has gotten worse over the past 2 days and state that she does look some stairs when you ask her question.  She denies any pain, shortness of breath, headache nausea, vomiting or fever.  She has a history of end-stage liver disease and was recently in the hospital for a non-ST elevated MI, pleural effusion, ascites and acute renal failure.  Review of Systems   Constitutional: Positive for activity change and fatigue. Negative for fever.   Psychiatric/Behavioral: Positive for agitation, confusion and decreased concentration. Negative for behavioral problems, hallucinations, self-injury, sleep disturbance and suicidal ideas. The patient is not nervous/anxious and is not hyperactive.          Past Medical History:   Diagnosis Date   • Arthritis    • Cancer     uterin    • Cirrhosis of liver    • Effect, radiation    • GERD (gastroesophageal reflux disease)    • Hernia of abdominal wall     JUST BELOW UMBILICUS   • History of pneumonia    • Hypertension    • NSTEMI (non-ST elevated myocardial infarction) 9/24/2017   • Pulmonary embolism    • Wears dentures     UPPER ONLY       No Known Allergies    Past Surgical History:   Procedure Laterality Date   • ENDOSCOPY     • GALLBLADDER SURGERY     • HERNIA REPAIR     • HYSTERECTOMY     • REPLACEMENT TOTAL KNEE Right        Family History   Problem Relation Age of Onset   • Hypertension Mother    • Cancer Father      prostate   • Hypertension Father    • Diabetes Sister    • Hypertension Sister    • Hypertension Brother    • Cancer Daughter      breast       Social History     Social History   • Marital status:      Spouse name: N/A   • Number  of children: N/A   • Years of education: N/A     Social History Main Topics   • Smoking status: Never Smoker   • Smokeless tobacco: Never Used   • Alcohol use No   • Drug use: Defer   • Sexual activity: Defer     Other Topics Concern   • None     Social History Narrative           Objective   Physical Exam   Constitutional: She appears well-developed and well-nourished.   Nursing note and vitals reviewed.  GEN: No acute distress  Head: Normocephalic, atraumatic  Eyes: Pupils equal round reactive to light  ENT: Posterior pharynx normal in appearance, oral mucosa is moist  Chest: Nontender to palpation  Cardiovascular: Regular rate  Lungs: decreased bilat  Abdomen: Soft, nontender, distended, no peritoneal signs  Extremities: bilat edema  Neuro: GCS 14 oriented to person only.  Psych: pleasant and confused      Procedures         ED Course  ED Course   Comment By Time   MELDS 24  [place] HE score 2 Hue Acosta, RICH 10/10 1404   Consult with Dr. Gardner will admit. Discussed hospice with family and they will get with other family members and discuss with Dr. Gardner upon admission. Dr. Gardner aware. Hue Aocsta, APRN 10/10 1405                  MDM  Number of Diagnoses or Management Options     Amount and/or Complexity of Data Reviewed  Clinical lab tests: ordered and reviewed  Tests in the radiology section of CPT®: ordered and reviewed  Discussion of test results with the performing providers: yes  Obtain history from someone other than the patient: yes  Review and summarize past medical records: yes  Discuss the patient with other providers: yes  Independent visualization of images, tracings, or specimens: yes    Risk of Complications, Morbidity, and/or Mortality  Presenting problems: high  Diagnostic procedures: high  Management options: high        Final diagnoses:   Acute hepatic encephalopathy   Acute UTI            RICH Hyatt  10/10/17 140

## 2017-10-10 NOTE — TELEPHONE ENCOUNTER
Pt daughter Arianna Catrer called and patient is confused, home health nurse there.  This patient has not been seen in our office. Nata has appt for 10/16/17 with Dr. Farias.  Pt. And daughter stated that hospital changed her provider.  I instructed them that they need to contact [employer] to make sure Dr. Farias is listed on her card since they only transfer at the first of the month then let us know to make sure her visit will be covered.  Also instructed that we cannot advise until we have seen pt in office.  There are no available appt at the moment but to keep the 10/16/17.  She may have to use Urgent Care or ER until insurance has been switched.

## 2017-10-10 NOTE — PAYOR COMM NOTE
"Please review for inpatient auth  Anh FARIAS [facility]  [phone]  Fax [phone]    Jonathan Carter (77 y.o. Female)     Date of Birth Social Security Number Address Home Phone MRN    1940  [address] [id number] [phone] [id number]    Jainism Marital Status          None        Admission Date Admission Type Admitting Provider Attending Provider Department, Room/Bed    10/10/17 Emergency Twin Morales MD Pais, Roshan, MD [facility] MED SURG  3, 319/1    Discharge Date Discharge Disposition Discharge Destination                      Attending Provider: Twin Morales MD     Allergies:  No Known Allergies    Isolation:  Contact   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)   Code Status:  FULL    Ht:  67\" (170.2 cm)   Wt:  248 lb (112 kg)    Admission Cmt:  None   Principal Problem:  None                Active Insurance as of 10/10/2017     Primary Coverage     Payor Plan Insurance Group Employer/Plan Group    HUMANA MEDICARE REPLACEMENT HUMANA MEDICARE REPL [id number]     Payor Plan Address Payor Plan Phone Number Effective From Effective To    [address] [address] [phone] 2014     [place] [address]       Subscriber Name Subscriber Birth Date Member ID       JONATHAN CARTER 1940 [id number]                 Emergency Contacts      (Rel.) Home Phone Work Phone Mobile Phone    Rita Mead (Daughter) -- -- [phone]               History & Physical      Frankie Winchester DO at 10/10/2017  4:35 PM              [facility] HOSPITALIST   HISTORY AND PHYSICAL      Name:  Jonathan Carter   Age:  77 y.o.  Sex:  female  :  1940  MRN:  [id number]   Visit Number:  [id number]  Admission Date:  10/10/2017  Date Of Service:  10/10/17  Primary Care Physician:  Juan Espino MD    History Obtained From:    patient and family    Chief Complaint:     Hepatic encephalopathy     History Of Presenting Illness:      Patient is a 77-year-old "  female just released from the hospital on 2017 for problems related to her cirrhosis who comes in with confusion since Saturday.  She is able to communicate but has trouble relating doing a good history.  Most of the history is from her daughters.  She has not had a bowel movement for 3 days until she was given lactulose in the emergency room.  Her daughter states she had worsening confusion to the point where it was moderately severe.  Nothing was improving it.  She also has had a 19 pound weight loss since the last hospital stay with adjustment of her diuretics.  She is still very edematous.  She has ascites as well as peripheral edema.  She is very fatigued at the present time.  She's had decreased appetite and has not been taking in much to eat or drink.  She has seen the transplant team at the [facility], but due to her advanced age she is not a candidate for a liver transplant.  She never drank alcohol, and the etiology of her liver failure is unknown.  Specialist have told her that they thought was from taking Tylenol over the course of many years.  However, her sister had  of cirrhosis.  She currently denies any chest pressure, shortness breath, nausea or vomiting.  She denies any pain.  She denies any fevers or chills.  She saw Dr. Franco from nephrology last time she was here, and had a follow-up appointment with him.  Her creatinine is 2.3, it was 1.8 when she left here 12 days ago.  She also had a urinary tract infection at that time for which she was treated with Rocephin and Diflucan.  She again has increased WBCs and yeast in her urine.  However the specimen does have 7-12 squamous epithelial cells and will need repeating.  Her INR today is 2.01, she is on Coumadin for PE as well as permanent atrial fibrillation.  She also has a history of uterine cancer many many years ago for which had a hysterectomy.  She had a PE many many years ago as well.  She has chronic  pancytopenia from her cirrhosis.  However her bilirubin is 1.8 today.  She has been told also she has esophageal varices, although she has never had bleeding varices.  She is on Protonix as well as Inderal.  She has never been told to take lactulose in the past, and has never had hepatic encephalopathy in the past.  When she did have a bowel movement today reportedly there was blood in the stool.  She is also very weak, and having trouble ambulating at the present time.    Review Of Systems:     General ROS: positive for  - fatigue and malaise  Psychological ROS: positive for - concentration difficulties  Ophthalmic ROS: negative  ENT ROS: negative  Allergy and Immunology ROS: negative  Hematological and Lymphatic ROS: negative  Endocrine ROS: negative  Breast ROS: negative  Respiratory ROS: negative  Cardiovascular ROS: negative  Gastrointestinal ROS: positive for - blood in stools  Genito-Urinary ROS: negative  Musculoskeletal ROS: positive for - muscular weakness  Neurological ROS: negative  Dermatological ROS: negative       Past Medical History:    Uterine cancer, cirrhosis, GERD, abdominal hernia, coronary artery disease, pulmonary embolism, hypertension, permanent atrial fibrillation, chronic pancytopenia    Past Surgical history:    EGD, cholecystectomy, hernia repair [facility] last year, total abdominal hysterectomy, right TKA      Social History:    Never smoked, does not drink or use drugs.  Lives with her son.  She wants to be a full code at this point    Family History:    Mother  of heart disease.  Father had prostate cancer and hypertension.  Sister with diabetes mellitus hypertension.  Sister  of cirrhosis.  Daughter with breast cancer    Allergies:      Review of patient's allergies indicates no known allergies.    Home Medications:    Prior to Admission Medications     Prescriptions Last Dose Informant Patient Reported? Taking?    atorvastatin (LIPITOR) 10 MG tablet 10/9/2017  No Yes    Take 1  tablet by mouth Every Night.    bumetanide (BUMEX) 1 MG tablet 10/10/2017  No Yes    Take 1 tablet by mouth 2 (Two) Times a Day.    hydrALAZINE (APRESOLINE) 25 MG tablet  Medication Bottle Yes Yes    Take 12.5 mg by mouth Daily.    HYDROcodone-acetaminophen (NORCO)  MG per tablet 10/9/2017  Yes Yes    Take 1 tablet by mouth Every 6 (Six) Hours As Needed for Moderate Pain .    ondansetron (ZOFRAN) 4 MG tablet 10/9/2017  Yes Yes    Take 4 mg by mouth Every 8 (Eight) Hours As Needed for Nausea or Vomiting.    pantoprazole (PROTONIX) 40 MG EC tablet 10/10/2017  Yes Yes    40 mg Daily.    propranolol (INDERAL) 20 MG tablet 10/10/2017 Medication Bottle Yes Yes    40 mg 2 (Two) Times a Day.    spironolactone (ALDACTONE) 100 MG tablet 10/10/2017  No Yes    Take 1 tablet by mouth 2 (Two) Times a Day.    warfarin (COUMADIN) 2.5 MG tablet 10/9/2017 Medication Bottle Yes Yes    Take 1 mg by mouth Daily.    cyanocobalamin 1000 MCG/ML injection Unknown  No No    Inject 1 mL into the shoulder, thigh, or buttocks Every 28 (Twenty-Eight) Days.    epoetin parveen (EPOGEN,PROCRIT) 33360 UNIT/ML injection Unknown  No No    Inject 1 mL under the skin Every 14 (Fourteen) Days. Indications: Chronic Hemolytic Anemia    warfarin (COUMADIN) 2 MG tablet Not Taking  No No    2 mg PO daily    Patient not taking:  Reported on 10/10/2017             Hospital Scheduled Meds:      bumetanide 1 mg Oral BID   [START ON 10/11/2017] ceftriaxone 1 g Intravenous Q24H   cyanocobalamin 1,000 mcg Intramuscular Q28 Days   [START ON 10/11/2017] epoetin parveen 20,000 Units Subcutaneous Q14 Days   fluconazole 200 mg Intravenous Daily   hydrALAZINE 12.5 mg Oral Daily   lactulose 20 g Oral TID   [START ON 10/11/2017] pantoprazole 40 mg Oral Q AM   propranolol 40 mg Oral Q12H   rifaximin 550 mg Oral Q12H   spironolactone 100 mg Oral BID   warfarin 1 mg Oral Daily             Vital Signs:    Temp:  [97.6 °F (36.4 °C)] 97.6 °F (36.4 °C)  Heart Rate:  [74-89]  89  Resp:  [18] 18  BP: (106-121)/(71-76) 121/75    Last 3 weights    10/10/17  1142   Weight: 248 lb (112 kg)       Body mass index is 38.84 kg/(m^2).    Physical Exam:      General Appearance:    Alert, cooperative, in no acute distress, Somewhat confused at times.     Head:    Normocephalic, without obvious abnormality, atraumatic   Eyes:            Lids and lashes normal, conjunctivae and sclerae normal, no   icterus, no pallor, corneas clear, PERRLA   Ears:    Ears appear intact with no abnormalities noted   Throat:   No oral lesions, no thrush, oral mucosa moist   Neck:   No adenopathy, supple, trachea midline, no thyromegaly, no     carotid bruit, no JVD   Back:     No kyphosis present, no scoliosis present, no skin lesions,       erythema or scars, no tenderness to percussion or                   palpation,   range of motion normal   Lungs:     Clear to auscultation,respirations regular, even and                   unlabored    Heart:    Regular rhythm and normal rate, normal S1 and S2, no            murmur, no gallop, no rub, no click   Breast Exam:    Deferred   Abdomen:     Normal bowel sounds, no masses, no organomegaly, soft        non-tender, non-distended, no guarding, no rebound                 Tenderness Ascites noted.     Genitalia:    Deferred   Extremities:   4/5 strength in upper/lower extremities bilaterally.  Pitting edema noted bilaterally.     Pulses:   Pulses palpable and equal bilaterally   Skin:   No bleeding, bruising or rash   Lymph nodes:   No palpable adenopathy   Neurologic:   Cranial nerves 2 - 12 grossly intact, sensation intact, DTR        present and equal bilaterally         Telemetry:      nsr    I have personally looked at  the telemetry strips.    Labs:    Results from last 7 days  Lab Units 10/10/17  1230 10/10/17  1148   LACTATE mmol/L 3.1*  --    WBC 10*3/mm3  --  3.51*   HEMOGLOBIN g/dL  --  9.9*   HEMATOCRIT %  --  31.2*   MCV fL  --  94.8   MCHC g/dL  --  31.7    PLATELETS 10*3/mm3  --  161   INR   --  2.01*       Results from last 7 days  Lab Units 10/10/17  1224   PH, ARTERIAL pH units 7.507*   PO2 ART mm Hg 75.6   PCO2, ARTERIAL mm Hg 35.0   HCO3 ART mmol/L 27.7       Results from last 7 days  Lab Units 10/10/17  1148   SODIUM mmol/L 137   POTASSIUM mmol/L 4.4   CHLORIDE mmol/L 102   CO2 mmol/L 25.0*   BUN mg/dL 33*   CREATININE mg/dL 2.30*   EGFR IF NONAFRICN AM mL/min/1.73 20*   CALCIUM mg/dL 8.1*   GLUCOSE mg/dL 121*   ALBUMIN g/dL 2.60*   BILIRUBIN mg/dL 1.8*   ALK PHOS U/L 182*   AST (SGOT) U/L 51*   ALT (SGPT) U/L 28   Estimated Creatinine Clearance: 25.6 mL/min (by C-G formula based on Cr of 2.3).  Ammonia   Date Value Ref Range Status   10/10/2017 41 (H) 9 - 30 umol/L Final       Results from last 7 days  Lab Units 10/10/17  1148   TROPONIN I ng/mL 0.051*         No results found for: HGBA1C  No results found for: TSH, FREET4  No results found for: PREGTESTUR, PREGSERUM, HCG, HCGQUANT  Pain Management Panel     There is no flowsheet data to display.                          Radiology:    Imaging Results (last 7 days)     Procedure Component Value Units Date/Time    CT Head Without Contrast [[id number]] Collected:  10/10/17 1325     Updated:  10/10/17 1328    Narrative:       PROCEDURE: CT HEAD WO CONTRAST-     HISTORY: confusion     TECHNIQUE: Axial images were obtained from the skull vertex through the  base without contrast.      COMPARISON: None.     FINDINGS: There is generalized cerebral volume loss. There are patchy  hypodensities in the periventricular white matter consistent with  chronic small vessel ischemic change. There is no CT evidence of acute  hemorrhage. There is no mass, mass effect, or midline shift. There is no  hydrocephalus. Bone windows reveal no osseous abnormalities. The  visualized paranasal sinuses are clear.       Impression:       Findings consistent with chronic small vessel ischemic  change with no acute intracranial abnormality.         This study was performed with techniques to keep radiation doses as low  as reasonably achievable (ALARA). Individualized dose reduction  techniques using automated exposure control or adjustment of mA and/or  kV according to the patient size were employed.      This report was finalized on 10/10/2017 1:26 PM by Mae Barrios M.D..    XR Chest 2 View [[id number]] Collected:  10/10/17 1326     Updated:  10/10/17 1334    Narrative:       PROCEDURE: XR CHEST 2 VW-        HISTORY: recent pleural effusion     COMPARISON: September 28, 2017.     FINDINGS: The heart is normal in size. The mediastinum is unremarkable.  The lungs are clear. There is no pneumothorax. There are no acute  osseous abnormalities.           Impression:       No acute cardiopulmonary process.           This report was finalized on 10/10/2017 1:31 PM by Mae Barrios M.D..          Assessment:    1.  Hepatic encephalopathy  2.  Cirrhosis of unknown etiology   3.  Chronic kidney disease stage IV, worsening  4.  UTI, present on admission, recurrent  5.  Permanent atrial fibrillation on Coumadin  6.  History of PE many years ago; Coumadin  7.  History of coronary artery disease  8.  History of uterine cancer status post hysterectomy in the past  9.  Chronic pancytopenia due to #2  10.  Increased lactic acid  11.  Debility  12.  Hematochezia    Plan:     We will admit this patient to telemetry.  We will add lactulose 20 g 3 times a day to ensure bowel movements 3-4 times per day.  Add rifaximin 550 mg twice a day and monitor lab work.  We will get a catheter specimen UA.  Continue with her home medications.  Consult Dr. Franco regarding her kidney disease.  Check stool for occult blood.  If her hemoglobin drops and she continues to bleed would consult surgery for endoscopy.  Have PT and OT see this patient for weakness.  Check ammonia level in the a.m.  Continue with Rocephin as well as Diflucan for her UTI.  Continue with her diuresis.   Anticipated stay is greater than  2 midnights.  Further recommendations will depend on the clinical course.    Frankie Winchester DO  10/10/17  4:35 PM     Electronically signed by Frankie Winchester DO at 10/10/2017  4:51 PM           Emergency Department Notes      Hue TONG Dave, RICH at 10/10/2017  1:08 PM     Attestation signed by Tigre Velasquez MD at 10/10/2017  4:33 PM              For this patient encounter, I reviewed the NP or PA documentation, treatment plan, and medical decision making. Tigre Velasquez MD 10/10/2017 4:33 PM                               Subjective   History of Present Illness  This is a 77-year-old female who comes in today brought in by her family with complaints of increased confusion.  They report that she was discharged from the hospital here approximately 9 days ago and since that time she has gradually had worsening confusion.  They state that the confusion has gotten worse over the past 2 days and state that she does look some stairs when you ask her question.  She denies any pain, shortness of breath, headache nausea, vomiting or fever.  She has a history of end-stage liver disease and was recently in the hospital for a non-ST elevated MI, pleural effusion, ascites and acute renal failure.  Review of Systems   Constitutional: Positive for activity change and fatigue. Negative for fever.   Psychiatric/Behavioral: Positive for agitation, confusion and decreased concentration. Negative for behavioral problems, hallucinations, self-injury, sleep disturbance and suicidal ideas. The patient is not nervous/anxious and is not hyperactive.          Past Medical History:   Diagnosis Date   • Arthritis    • Cancer     uterin    • Cirrhosis of liver    • Effect, radiation    • GERD (gastroesophageal reflux disease)    • Hernia of abdominal wall     JUST BELOW UMBILICUS   • History of pneumonia    • Hypertension    • NSTEMI (non-ST elevated myocardial infarction) 9/24/2017    • Pulmonary embolism    • Wears dentures     UPPER ONLY       No Known Allergies    Past Surgical History:   Procedure Laterality Date   • ENDOSCOPY     • GALLBLADDER SURGERY     • HERNIA REPAIR     • HYSTERECTOMY     • REPLACEMENT TOTAL KNEE Right        Family History   Problem Relation Age of Onset   • Hypertension Mother    • Cancer Father      prostate   • Hypertension Father    • Diabetes Sister    • Hypertension Sister    • Hypertension Brother    • Cancer Daughter      breast       Social History     Social History   • Marital status:      Spouse name: N/A   • Number of children: N/A   • Years of education: N/A     Social History Main Topics   • Smoking status: Never Smoker   • Smokeless tobacco: Never Used   • Alcohol use No   • Drug use: Defer   • Sexual activity: Defer     Other Topics Concern   • None     Social History Narrative           Objective   Physical Exam   Constitutional: She appears well-developed and well-nourished.   Nursing note and vitals reviewed.  GEN: No acute distress  Head: Normocephalic, atraumatic  Eyes: Pupils equal round reactive to light  ENT: Posterior pharynx normal in appearance, oral mucosa is moist  Chest: Nontender to palpation  Cardiovascular: Regular rate  Lungs: decreased bilat  Abdomen: Soft, nontender, distended, no peritoneal signs  Extremities: bilat edema  Neuro: GCS 14 oriented to person only.  Psych: pleasant and confused      Procedures        ED Course  ED Course   Comment By Time   MELDS 24  [place] HE score 2 Hue Acosta, APRN 10/10 140   Consult with Dr. Gardner will admit. Discussed hospice with family and they will get with other family members and discuss with Dr. Gardner upon admission. Dr. Gardner aware. Hue Acosta, APRN 10/10 1406                  MDM  Number of Diagnoses or Management Options     Amount and/or Complexity of Data Reviewed  Clinical lab tests: ordered and reviewed  Tests in the radiology section of CPT®:  ordered and  reviewed  Discussion of test results with the performing providers: yes  Obtain history from someone other than the patient: yes  Review and summarize past medical records: yes  Discuss the patient with other providers: yes  Independent visualization of images, tracings, or specimens: yes    Risk of Complications, Morbidity, and/or Mortality  Presenting problems: high  Diagnostic procedures: high  Management options: high        Final diagnoses:   Acute hepatic encephalopathy   Acute UTI            Hue Pratherhead, APRN  10/10/17 1401       Electronically signed by Tigre Velasquez MD at 10/10/2017  4:33 PM      Justyna Anaya RN at 10/10/2017  2:26 PM          Report called.      Justyna Anaya RN  10/10/17 1429       Electronically signed by Justyna Anaya RN at 10/10/2017  2:26 PM        Hospital Medications (all)       Dose Frequency Start End    bumetanide (BUMEX) tablet 1 mg 1 mg 2 Times Daily 10/10/2017     Sig - Route: Take 1 tablet by mouth 2 (Two) Times a Day. - Oral    cefTRIAXone (ROCEPHIN) IVPB 1 g/50ml dextrose (premix) 1 g Once 10/10/2017 10/10/2017    Sig - Route: Infuse 50 mL into a venous catheter 1 (One) Time. - Intravenous    Cosign for Ordering: Accepted by Tigre Velasquez MD on 10/10/2017  4:34 PM    cefTRIAXone (ROCEPHIN) IVPB 1 g/50ml dextrose (premix) 1 g Every 24 Hours 10/11/2017     Sig - Route: Infuse 50 mL into a venous catheter Daily. - Intravenous    cyanocobalamin injection 1,000 mcg 1,000 mcg Every 28 Days 10/24/2017     Sig - Route: Inject 1 mL into the shoulder, thigh, or buttocks Every 28 (Twenty-Eight) Days. - Intramuscular    epoetin parveen (EPOGEN,PROCRIT) injection 20,000 Units 20,000 Units Every 14 Days 10/11/2017     Sig - Route: Inject 1 mL under the skin Every 14 (Fourteen) Days. - Subcutaneous    fluconazole (DIFLUCAN) IVPB 200 mg 200 mg Every 24 Hours 10/10/2017     Sig - Route: Infuse 100 mL into a venous catheter Daily. - Intravenous    hydrALAZINE  "(APRESOLINE) tablet 12.5 mg 12.5 mg Daily 10/10/2017     Sig - Route: Take 0.5 tablets by mouth Daily. - Oral    lactulose solution 20 g 20 g Once 10/10/2017 10/10/2017    Sig - Route: Take 30 mL by mouth 1 (One) Time. - Oral    Cosign for Ordering: Accepted by Tigre Velasquez MD on 10/10/2017  4:34 PM    lactulose solution 20 g 20 g 3 Times Daily 10/10/2017     Sig - Route: Take 30 mL by mouth 3 (Three) Times a Day. - Oral    ondansetron (ZOFRAN) injection 4 mg 4 mg Every 6 Hours PRN 10/10/2017     Sig - Route: Infuse 2 mL into a venous catheter Every 6 (Six) Hours As Needed for Nausea or Vomiting. - Intravenous    pantoprazole (PROTONIX) EC tablet 40 mg 40 mg Every Early Morning 10/11/2017     Sig - Route: Take 1 tablet by mouth Every Morning. - Oral    propranolol (INDERAL) tablet 40 mg 40 mg Every 12 Hours Scheduled 10/10/2017     Sig - Route: Take 2 tablets by mouth Every 12 (Twelve) Hours. - Oral    rifaximin (XIFAXAN) tablet 550 mg 550 mg Every 12 Hours Scheduled 10/10/2017     Sig - Route: Take 1 tablet by mouth Every 12 (Twelve) Hours. - Oral    sodium chloride 0.9 % flush 1-10 mL 1-10 mL As Needed 10/10/2017     Sig - Route: Infuse 1-10 mL into a venous catheter As Needed for Line Care. - Intravenous    sodium chloride 0.9 % flush 10 mL 10 mL As Needed 10/10/2017     Sig - Route: Infuse 10 mL into a venous catheter As Needed for Line Care. - Intravenous    Cosign for Ordering: Accepted by Tigre Velasquez MD on 10/10/2017 12:51 PM    sodium chloride 0.9 % flush 10 mL 10 mL As Needed 10/10/2017     Sig - Route: Infuse 10 mL into a venous catheter As Needed for Line Care. - Intravenous    Cosign for Ordering: Accepted by Tigre Velasquez MD on 10/10/2017 12:51 PM    Linked Group 1:  \"And\" Linked Group Details        spironolactone (ALDACTONE) tablet 100 mg 100 mg 2 Times Daily 10/10/2017     Sig - Route: Take 4 tablets by mouth 2 (Two) Times a Day. - Oral    warfarin (COUMADIN) tablet 1 mg 1 " mg Daily Warfarin 10/10/2017     Sig - Route: Take 1 tablet by mouth Daily. - Oral            Lab Results (last 24 hours)     Procedure Component Value Units Date/Time    POC Glucose Fingerstick [[id number]]  (Normal) Collected:  10/10/17 1146    Specimen:  Blood Updated:  10/10/17 1151     Glucose 90 mg/dL       Serial Number: [id number]:  [id number]       Protime-INR [[id number]]  (Abnormal) Collected:  10/10/17 1148    Specimen:  Blood Updated:  10/10/17 1208     Protime 22.0 (H) Seconds      INR 2.01 (H)    Lipase [[id number]]  (Normal) Collected:  10/10/17 1148    Specimen:  Blood Updated:  10/10/17 1208     Lipase 113 U/L     Comprehensive Metabolic Panel [[id number]]  (Abnormal) Collected:  10/10/17 1148    Specimen:  Blood Updated:  10/10/17 1215     Glucose 121 (H) mg/dL      BUN 33 (H) mg/dL       Specimen hemolyzed. Results may be affected.        Creatinine 2.30 (H) mg/dL      Sodium 137 mmol/L      Potassium 4.4 mmol/L       Specimen hemolyzed.  Results may be affected.        Chloride 102 mmol/L      CO2 25.0 (L) mmol/L      Calcium 8.1 (L) mg/dL      Total Protein 7.0 g/dL      Albumin 2.60 (L) g/dL      ALT (SGPT) 28 U/L       Specimen hemolyzed.  Results may be affected.        AST (SGOT) 51 (H) U/L       Specimen hemolyzed.  Results may be affected.        Alkaline Phosphatase 182 (H) U/L       Specimen hemolyzed. Results may be affected.        Total Bilirubin 1.8 (H) mg/dL      eGFR Non African Amer 20 (L) mL/min/1.73      Globulin 4.4 gm/dL      A/G Ratio 0.6 (L) g/dL      BUN/Creatinine Ratio 14.3     Anion Gap 14.4 mmol/L     Narrative:       The MDRD GFR formula is only valid for adults with stable renal function between ages 18 and 70.    CBC Auto Differential [[id number]]  (Abnormal) Collected:  10/10/17 1148    Specimen:  Blood Updated:  10/10/17 1219     WBC 3.51 (L) 10*3/mm3      RBC 3.29 (L) 10*6/mm3      Hemoglobin 9.9 (L) g/dL      Hematocrit 31.2 (L) %      MCV 94.8 fL      MCH 30.1  pg      MCHC 31.7 g/dL      RDW 16.6 (H) %      RDW-SD 57.4 (H) fl      MPV 10.9 fL      Platelets 161 10*3/mm3      Neutrophil % 64.1 %      Lymphocyte % 24.8 %      Monocyte % 8.3 %      Eosinophil % 1.4 %      Basophil % 1.1 %      Immature Grans % 0.3 %      Neutrophils, Absolute 2.25 10*3/mm3      Lymphocytes, Absolute 0.87 10*3/mm3      Monocytes, Absolute 0.29 10*3/mm3      Eosinophils, Absolute 0.05 10*3/mm3      Basophils, Absolute 0.04 10*3/mm3      Immature Grans, Absolute 0.01 10*3/mm3      nRBC 0.0 /100 WBC     CBC & Differential [[id number]] Collected:  10/10/17 1148    Specimen:  Blood Updated:  10/10/17 1219    Narrative:       The following orders were created for panel order CBC & Differential.  Procedure                               Abnormality         Status                     ---------                               -----------         ------                     CBC Auto Differential[[id number]]        Abnormal            Final result                 Please view results for these tests on the individual orders.    Blood Gas, Arterial With Co-Ox [[id number]]  (Abnormal) Collected:  10/10/17 1224    Specimen:  Arterial Blood Updated:  10/10/17 1224     Site Arterial: left brachial     Darrell's Test N/A     pH, Arterial 7.507 (C) pH units      pCO2, Arterial 35.0 mm Hg      pO2, Arterial 75.6 mm Hg      HCO3, Arterial 27.7 mmol/L      Base Excess, Arterial 4.6 mmol/L      O2 Saturation, Arterial 96.4 %      Hemoglobin, Blood Gas 9.4 (L) g/dL      Oxyhemoglobin 94.0 %      Methemoglobin 0.90 %      Carboxyhemoglobin 1.6 %      Modality Room Air     FIO2 21 %     Troponin [[id number]]  (Abnormal) Collected:  10/10/17 1148    Specimen:  Blood Updated:  10/10/17 1240     Troponin I 0.051 (H) ng/mL     Urine Culture - Urine, Urine, Clean Catch [[id number]] Collected:  10/10/17 1238    Specimen:  Urine from Urine, Catheter Updated:  10/10/17 1248    Urinalysis With / Culture If Indicated - Urine, Catheter  [[id number]]  (Abnormal) Collected:  10/10/17 1238    Specimen:  Urine from Urine, Catheter Updated:  10/10/17 1251     Color, UA Yellow     Appearance, UA Cloudy (A)     pH, UA 5.5     Specific Gravity, UA 1.010     Glucose, UA Negative     Ketones, UA Negative     Bilirubin, UA Negative     Blood, UA Trace (A)     Protein, UA Negative     Leuk Esterase, UA Moderate (2+) (A)     Nitrite, UA Negative     Urobilinogen, UA 1.0 E.U./dL    Ammonia [[id number]]  (Abnormal) Collected:  10/10/17 1230    Specimen:  Blood Updated:  10/10/17 1253     Ammonia 41 (H) umol/L     Urinalysis, Microscopic Only - Urine, Clean Catch [[id number]]  (Abnormal) Collected:  10/10/17 1238    Specimen:  Urine from Urine, Catheter Updated:  10/10/17 1300     RBC, UA 0-2 (A) /HPF      WBC, UA 31-50 (A) /HPF      Bacteria, UA 1+ (A) /HPF      Squamous Epithelial Cells, UA 7-12 (A) /HPF      Yeast, UA Moderate/2+ Budding Yeast /HPF      Hyaline Casts, UA 0-2 /LPF      Methodology Manual Light Microscopy    Lactic Acid, Plasma [[id number]]  (Abnormal) Collected:  10/10/17 1230    Specimen:  Blood Updated:  10/10/17 1301     Lactate 3.1 (C) mmol/L     [facility] Draw [[id number]] Collected:  10/10/17 1148    Specimen:  Blood Updated:  10/10/17 1301    Narrative:       The following orders were created for panel order [facility] Draw.  Procedure                               Abnormality         Status                     ---------                               -----------         ------                     Light Blue Top[[id number]]                                   Final result               Lavender Top[[id number]]                                     Final result               Gold Top - SST[[id number]]                                   Final result               Green Top (No Gel)[[id number]]                               Final result                 Please view results for these tests on the individual orders.    Light Blue Top [[id number]] Collected:   10/10/17 1148    Specimen:  Blood Updated:  10/10/17 1301     Extra Tube hold for add-on      Auto resulted       Lavender Top [[id number]] Collected:  10/10/17 1148    Specimen:  Blood Updated:  10/10/17 1301     Extra Tube hold for add-on      Auto resulted       Gold Top - SST [[id number]] Collected:  10/10/17 1148    Specimen:  Blood Updated:  10/10/17 1301     Extra Tube Hold for add-ons.      Auto resulted.       Green Top (No Gel) [[id number]] Collected:  10/10/17 1148    Specimen:  Blood Updated:  10/10/17 1301     Extra Tube Hold for add-ons.      Auto resulted.       Lactic Acid, Plasma [[id number]] Collected:  10/10/17 1230    Specimen:  Blood Updated:  10/10/17 1601     Extra Tube Hold for add-ons.      Auto resulted.       Lactic Acid, Reflex [[id number]]  (Abnormal) Collected:  10/10/17 1623    Specimen:  Blood Updated:  10/10/17 1706     Lactate 3.6 (C) mmol/L       Continued Stay Note  [facility]     Patient Name: Hodan Carter  MRN: [id number]  Today's Date: 10/10/2017    Admit Date: 10/10/2017          Discharge Plan     None              Discharge Codes     None            Genoveva Krishnamurthy RN    Imaging Results (last 24 hours)     Procedure Component Value Units Date/Time    CT Head Without Contrast [[id number]] Collected:  10/10/17 1325     Updated:  10/10/17 1328    Narrative:       PROCEDURE: CT HEAD WO CONTRAST-     HISTORY: confusion     TECHNIQUE: Axial images were obtained from the skull vertex through the  base without contrast.      COMPARISON: None.     FINDINGS: There is generalized cerebral volume loss. There are patchy  hypodensities in the periventricular white matter consistent with  chronic small vessel ischemic change. There is no CT evidence of acute  hemorrhage. There is no mass, mass effect, or midline shift. There is no  hydrocephalus. Bone windows reveal no osseous abnormalities. The  visualized paranasal sinuses are clear.       Impression:       Findings consistent with chronic  small vessel ischemic  change with no acute intracranial abnormality.        This study was performed with techniques to keep radiation doses as low  as reasonably achievable (ALARA). Individualized dose reduction  techniques using automated exposure control or adjustment of mA and/or  kV according to the patient size were employed.      This report was finalized on 10/10/2017 1:26 PM by Mae Barrios M.D..    XR Chest 2 View [[id number]] Collected:  10/10/17 1326     Updated:  10/10/17 1334    Narrative:       PROCEDURE: XR CHEST 2 VW-        HISTORY: recent pleural effusion     COMPARISON: September 28, 2017.     FINDINGS: The heart is normal in size. The mediastinum is unremarkable.  The lungs are clear. There is no pneumothorax. There are no acute  osseous abnormalities.           Impression:       No acute cardiopulmonary process.           This report was finalized on 10/10/2017 1:31 PM by Mae Barrios M.D..

## 2017-10-10 NOTE — H&P
[facility]   HISTORY AND PHYSICAL          Name:  Hodan Carter   Age:  77 y.o.  Sex:  female  :  1940  MRN:  [id number]   Visit Number:  [id number]  Admission Date:  10/10/2017  Date Of Service:  10/10/17  Primary Care Physician:  Juan Espino MD    Chief Complaint:         History Of Presenting Illness:          Review Of Systems:     General ROS: Patient denies any fevers, chills or loss of consciousness.   Psychological ROS: Denies any hallucinations and delusions.  Ophthalmic ROS: Denies any diplopia or transient loss of vision.  ENT ROS: Denies sore throat, nasal congestion or ear pain.   Allergy and Immunology ROS: Denies rash or itching.  Hematological and Lymphatic ROS: Denies neck swelling or easy bleeding.  Endocrine ROS: Denies any recent unintentional weight gain or loss.  Breast ROS: Denies any pain or swelling.  Respiratory ROS: Denies cough or shortness of breath.   Cardiovascular ROS: Denies chest pain or palpitations. No history of exertional chest pain.   Gastrointestinal ROS: Denies nausea and vomiting. Denies any abdominal pain. No diarrhea.   Genito-Urinary ROS: Denies dysuria or hematuria.  Musculoskeletal ROS: Complains of chronic back pain. No muscle pain. No calf pain.   Neurological ROS: Denies any focal weakness. No loss of consciousness. Denies any numbness. Denies neck pain.   Dermatological ROS: Denies any redness or pruritis.     Past Medical History:    Past Medical History:   Diagnosis Date   • Arthritis    • Cancer     uterin    • Cirrhosis of liver    • Effect, radiation    • GERD (gastroesophageal reflux disease)    • Hernia of abdominal wall     JUST BELOW UMBILICUS   • History of pneumonia    • Hypertension    • NSTEMI (non-ST elevated myocardial infarction) 2017   • Pulmonary embolism    • Wears dentures     UPPER ONLY       Past Surgical history:    Past Surgical History:   Procedure Laterality Date   • ENDOSCOPY     •  GALLBLADDER SURGERY     • HERNIA REPAIR     • HYSTERECTOMY     • REPLACEMENT TOTAL KNEE Right        Social History:        Family History:    Family History   Problem Relation Age of Onset   • Hypertension Mother    • Cancer Father      prostate   • Hypertension Father    • Diabetes Sister    • Hypertension Sister    • Hypertension Brother    • Cancer Daughter      breast       Allergies:      Review of patient's allergies indicates no known allergies.    Home Medications:    Prior to Admission Medications     Prescriptions Last Dose Informant Patient Reported? Taking?    atorvastatin (LIPITOR) 10 MG tablet   No Yes    Take 1 tablet by mouth Every Night.    bumetanide (BUMEX) 1 MG tablet   No Yes    Take 1 tablet by mouth 2 (Two) Times a Day.    cyanocobalamin 1000 MCG/ML injection   No Yes    Inject 1 mL into the shoulder, thigh, or buttocks Every 28 (Twenty-Eight) Days.    epoetin parveen (EPOGEN,PROCRIT) 50626 UNIT/ML injection   No Yes    Inject 1 mL under the skin Every 14 (Fourteen) Days. Indications: Chronic Hemolytic Anemia    hydrALAZINE (APRESOLINE) 25 MG tablet  Medication Bottle Yes Yes    Take 12.5 mg by mouth Daily.    HYDROcodone-acetaminophen (NORCO)  MG per tablet   Yes Yes    Take 1 tablet by mouth Every 6 (Six) Hours As Needed for Moderate Pain .    ondansetron (ZOFRAN) 4 MG tablet   Yes Yes    Take 4 mg by mouth Every 8 (Eight) Hours As Needed for Nausea or Vomiting.    pantoprazole (PROTONIX) 40 MG EC tablet   Yes Yes    40 mg Daily.    propranolol (INDERAL) 20 MG tablet  Medication Bottle Yes Yes    40 mg 2 (Two) Times a Day.    spironolactone (ALDACTONE) 100 MG tablet   No Yes    Take 1 tablet by mouth 2 (Two) Times a Day.    warfarin (COUMADIN) 2 MG tablet   No Yes    2 mg PO daily    warfarin (COUMADIN) 2.5 MG tablet  Medication Bottle Yes Yes    1 mg Daily.             ED Medications:    Medications   sodium chloride 0.9 % flush 10 mL (not administered)   sodium chloride 0.9 % flush 10  mL (not administered)   cefTRIAXone (ROCEPHIN) IVPB 1 g/50ml dextrose (premix) (1 g Intravenous New Bag 10/10/17 1430)   lactulose solution 20 g (20 g Oral Given 10/10/17 1430)       Vital Signs:    Temp:  [97.6 °F (36.4 °C)] 97.6 °F (36.4 °C)  Heart Rate:  [74-89] 89  Resp:  [18] 18  BP: (106-121)/(71-76) 121/75    Last 3 weights    10/10/17  1142   Weight: 248 lb (112 kg)       Body mass index is 38.84 kg/(m^2).    Physical Exam:      General Appearance:  Alert and cooperative, not in any acute distress.   Head:  Atraumatic and normocephalic, without obvious abnormality.   Eyes:          PERRLA, conjunctivae and sclerae normal, no Icterus. No pallor. Extra-occular movements are within normal limits.   Ears:  Ears appear intact with no abnormalities noted.   Throat: No oral lesions, no thrush, oral mucosa moist.   Neck: Supple, trachea midline, no thyromegaly, no carotid bruit.   Back:   No kyphoscoliosis present. No tenderness to palpation,   range of motion normal.   Lungs:   Chest shape is normal. Breath sounds heard bilaterally equally.  No crackles or wheezing. No Pleural rub or bronchial breathing.   Heart:  Normal S1 and S2, no murmur, no gallop, no rub. No JVD   Abdomen:   Normal bowel sounds, no masses, no organomegaly. Soft     non-tender, non-distended, no guarding, no rebound                tenderness   Extremities: Moves all extremities well, no edema, no cyanosis, no clubbing.   Pulses: Pulses palpable and equal bilaterally   Skin: No bleeding, bruising or rash   Lymph nodes:  Psychiatric/Behavior:    No palpable adenopathy  Mood normal, behavior normal   Neurologic: Cranial nerves 2 - 12 grossly intact, sensation intact, Motor power is normal and equal bilaterally.         EKG:          Labs:    Lab Results (last 24 hours)     Procedure Component Value Units Date/Time    POC Glucose Fingerstick [[id number]]  (Normal) Collected:  10/10/17 1146    Specimen:  Blood Updated:  10/10/17 1151     Glucose 90  mg/dL       Serial Number: [id number]:  [id number]       Protime-INR [[id number]]  (Abnormal) Collected:  10/10/17 1148    Specimen:  Blood Updated:  10/10/17 1208     Protime 22.0 (H) Seconds      INR 2.01 (H)    Lipase [[id number]]  (Normal) Collected:  10/10/17 1148    Specimen:  Blood Updated:  10/10/17 1208     Lipase 113 U/L     Comprehensive Metabolic Panel [[id number]]  (Abnormal) Collected:  10/10/17 1148    Specimen:  Blood Updated:  10/10/17 1215     Glucose 121 (H) mg/dL      BUN 33 (H) mg/dL       Specimen hemolyzed. Results may be affected.        Creatinine 2.30 (H) mg/dL      Sodium 137 mmol/L      Potassium 4.4 mmol/L       Specimen hemolyzed.  Results may be affected.        Chloride 102 mmol/L      CO2 25.0 (L) mmol/L      Calcium 8.1 (L) mg/dL      Total Protein 7.0 g/dL      Albumin 2.60 (L) g/dL      ALT (SGPT) 28 U/L       Specimen hemolyzed.  Results may be affected.        AST (SGOT) 51 (H) U/L       Specimen hemolyzed.  Results may be affected.        Alkaline Phosphatase 182 (H) U/L       Specimen hemolyzed. Results may be affected.        Total Bilirubin 1.8 (H) mg/dL      eGFR Non African Amer 20 (L) mL/min/1.73      Globulin 4.4 gm/dL      A/G Ratio 0.6 (L) g/dL      BUN/Creatinine Ratio 14.3     Anion Gap 14.4 mmol/L     Narrative:       The MDRD GFR formula is only valid for adults with stable renal function between ages 18 and 70.    CBC Auto Differential [[id number]]  (Abnormal) Collected:  10/10/17 1148    Specimen:  Blood Updated:  10/10/17 1219     WBC 3.51 (L) 10*3/mm3      RBC 3.29 (L) 10*6/mm3      Hemoglobin 9.9 (L) g/dL      Hematocrit 31.2 (L) %      MCV 94.8 fL      MCH 30.1 pg      MCHC 31.7 g/dL      RDW 16.6 (H) %      RDW-SD 57.4 (H) fl      MPV 10.9 fL      Platelets 161 10*3/mm3      Neutrophil % 64.1 %      Lymphocyte % 24.8 %      Monocyte % 8.3 %      Eosinophil % 1.4 %      Basophil % 1.1 %      Immature Grans % 0.3 %      Neutrophils, Absolute 2.25 10*3/mm3       Lymphocytes, Absolute 0.87 10*3/mm3      Monocytes, Absolute 0.29 10*3/mm3      Eosinophils, Absolute 0.05 10*3/mm3      Basophils, Absolute 0.04 10*3/mm3      Immature Grans, Absolute 0.01 10*3/mm3      nRBC 0.0 /100 WBC     CBC & Differential [[id number]] Collected:  10/10/17 1148    Specimen:  Blood Updated:  10/10/17 1219    Narrative:       The following orders were created for panel order CBC & Differential.  Procedure                               Abnormality         Status                     ---------                               -----------         ------                     CBC Auto Differential[[id number]]        Abnormal            Final result                 Please view results for these tests on the individual orders.    Blood Gas, Arterial With Co-Ox [[id number]]  (Abnormal) Collected:  10/10/17 1224    Specimen:  Arterial Blood Updated:  10/10/17 1224     Site Arterial: left brachial     Darrell's Test N/A     pH, Arterial 7.507 (C) pH units      pCO2, Arterial 35.0 mm Hg      pO2, Arterial 75.6 mm Hg      HCO3, Arterial 27.7 mmol/L      Base Excess, Arterial 4.6 mmol/L      O2 Saturation, Arterial 96.4 %      Hemoglobin, Blood Gas 9.4 (L) g/dL      Oxyhemoglobin 94.0 %      Methemoglobin 0.90 %      Carboxyhemoglobin 1.6 %      Modality Room Air     FIO2 21 %     Troponin [[id number]]  (Abnormal) Collected:  10/10/17 1148    Specimen:  Blood Updated:  10/10/17 1240     Troponin I 0.051 (H) ng/mL     Urine Culture - Urine, Urine, Clean Catch [[id number]] Collected:  10/10/17 1238    Specimen:  Urine from Urine, Catheter Updated:  10/10/17 1248    Urinalysis With / Culture If Indicated - Urine, Catheter [[id number]]  (Abnormal) Collected:  10/10/17 1238    Specimen:  Urine from Urine, Catheter Updated:  10/10/17 1251     Color, UA Yellow     Appearance, UA Cloudy (A)     pH, UA 5.5     Specific Gravity, UA 1.010     Glucose, UA Negative     Ketones, UA Negative     Bilirubin, UA Negative     Blood, UA  Trace (A)     Protein, UA Negative     Leuk Esterase, UA Moderate (2+) (A)     Nitrite, UA Negative     Urobilinogen, UA 1.0 E.U./dL    Ammonia [[id number]]  (Abnormal) Collected:  10/10/17 1230    Specimen:  Blood Updated:  10/10/17 1253     Ammonia 41 (H) umol/L     Urinalysis, Microscopic Only - Urine, Clean Catch [[id number]]  (Abnormal) Collected:  10/10/17 1238    Specimen:  Urine from Urine, Catheter Updated:  10/10/17 1300     RBC, UA 0-2 (A) /HPF      WBC, UA 31-50 (A) /HPF      Bacteria, UA 1+ (A) /HPF      Squamous Epithelial Cells, UA 7-12 (A) /HPF      Yeast, UA Moderate/2+ Budding Yeast /HPF      Hyaline Casts, UA 0-2 /LPF      Methodology Manual Light Microscopy    Lactic Acid, Plasma [[id number]] Collected:  10/10/17 1230    Specimen:  Blood Updated:  10/10/17 1301    Lactic Acid, Plasma [[id number]]  (Abnormal) Collected:  10/10/17 1230    Specimen:  Blood Updated:  10/10/17 1301     Lactate 3.1 (C) mmol/L     [facility] Draw [[id number]] Collected:  10/10/17 1148    Specimen:  Blood Updated:  10/10/17 1301    Narrative:       The following orders were created for panel order [facility] Draw.  Procedure                               Abnormality         Status                     ---------                               -----------         ------                     Light Blue Top[[id number]]                                   Final result               Lavender Top[[id number]]                                     Final result               Gold Top - SST[[id number]]                                   Final result               Green Top (No Gel)[[id number]]                               Final result                 Please view results for these tests on the individual orders.    Light Blue Top [[id number]] Collected:  10/10/17 1148    Specimen:  Blood Updated:  10/10/17 1301     Extra Tube hold for add-on      Auto resulted       Lavender Top [[id number]] Collected:  10/10/17 1148    Specimen:  Blood Updated:  10/10/17  1301     Extra Tube hold for add-on      Auto resulted       Gold Top - SST [[id number]] Collected:  10/10/17 1148    Specimen:  Blood Updated:  10/10/17 1301     Extra Tube Hold for add-ons.      Auto resulted.       Green Top (No Gel) [[id number]] Collected:  10/10/17 1148    Specimen:  Blood Updated:  10/10/17 1301     Extra Tube Hold for add-ons.      Auto resulted.             Radiology:    Imaging Results (last 72 hours)     Procedure Component Value Units Date/Time    CT Head Without Contrast [[id number]] Collected:  10/10/17 1325     Updated:  10/10/17 1328    Narrative:       PROCEDURE: CT HEAD WO CONTRAST-     HISTORY: confusion     TECHNIQUE: Axial images were obtained from the skull vertex through the  base without contrast.      COMPARISON: None.     FINDINGS: There is generalized cerebral volume loss. There are patchy  hypodensities in the periventricular white matter consistent with  chronic small vessel ischemic change. There is no CT evidence of acute  hemorrhage. There is no mass, mass effect, or midline shift. There is no  hydrocephalus. Bone windows reveal no osseous abnormalities. The  visualized paranasal sinuses are clear.       Impression:       Findings consistent with chronic small vessel ischemic  change with no acute intracranial abnormality.        This study was performed with techniques to keep radiation doses as low  as reasonably achievable (ALARA). Individualized dose reduction  techniques using automated exposure control or adjustment of mA and/or  kV according to the patient size were employed.      This report was finalized on 10/10/2017 1:26 PM by Mae Barrios M.D..    XR Chest 2 View [[id number]] Collected:  10/10/17 1326     Updated:  10/10/17 1334    Narrative:       PROCEDURE: XR CHEST 2 VW-        HISTORY: recent pleural effusion     COMPARISON: September 28, 2017.     FINDINGS: The heart is normal in size. The mediastinum is unremarkable.  The lungs are clear. There is  no pneumothorax. There are no acute  osseous abnormalities.           Impression:       No acute cardiopulmonary process.           This report was finalized on 10/10/2017 1:31 PM by Mae Barrios M.D..          Assessment:         Plan:         RICH Lennon  10/10/17  2:49 PM

## 2017-10-10 NOTE — MEDICAL STUDENT
[facility]   HISTORY AND PHYSICAL      Name:  Hodan Carter   Age:  77 y.o.  Sex:  female  :  1940  MRN:  [id number]   Visit Number:  [id number]  Admission Date:  10/10/2017  Date Of Service:  10/10/17  Primary Care Physician:  Juan Espino MD    Chief Complaint:     Confusion    History Of Presenting Illness:      Patient is a 77 year old female presenting today with confusion. She has past medical history significant for cirrhosis of the liver, chronic kidney disease stage IV, atrial fibrillation, PE, abdominal hernia, and essential hypertension. Her daughter states that her confusion started . She states that confusion has worsened since that time. She states that at times the patient has not recognized her one of her daughters or her granddaughter. She states that there is not a time or day or activity that makes it better or worse. Patients ammonia level is noted to be elevated to 41. Her daughter states that she wore her oxygen all night  and most of the day Monday. Her daughter also states that she has been constipated but had a bowel movement in the ER today and blood was present. Patient admits to lack of appetite, nausea, vomiting, and fatigue. Patient denies fever or chills. Patient has previously been to [facility] for analysis of her liver cirrhosis. Her daughter states they would told that this issue was due to persistent tylenol use. She is not a candidate for liver transplant at this time.   Patient was previously hospitalized due to fluid overload and was placed on diuretic therapy by Dr. Franco upon discharge. Patient had an appointment to see Dr. Lopez later this month. Patient is on coumadin due to PE history.     Review Of Systems:     General ROS: Patient admits to fatigue. Patient denies any fevers, chills or loss of consciousness.   Psychological ROS: No history of any hallucinations and delusions.  Ophthalmic ROS: No  history of any diplopia or transient loss of vision.  ENT ROS: No history of sore throat, nasal congestion or ear pain.   Allergy and Immunology ROS: No history of rash or itching.  Hematological and Lymphatic ROS: No history of neck swelling.  Endocrine ROS: Patient admits to recently losing 19 pounds.  Respiratory ROS: No history of cough, shortness of breath, or wheezing.  Cardiovascular ROS: Patient admits to edema in lower extremities. No history of chest pain or palpitations.  Gastrointestinal ROS: Patient admits to nausea, vomiting, and constipation. Admits to some bright red blood per rectum.   Genito-Urinary ROS: No history of dysuria or hematuria.  Musculoskeletal ROS: Patient admits to generalized weakness. Denies muscle pain.   Neurological ROS: No history of any focal weakness. No loss of consciousness. Denies any numbness.  Dermatological ROS: No history of any redness or pruritis.     Past Medical History:    Past Medical History:   Diagnosis Date   • Arthritis    • Cancer     uterin    • Cirrhosis of liver    • Effect, radiation    • GERD (gastroesophageal reflux disease)    • Hernia of abdominal wall     JUST BELOW UMBILICUS   • History of pneumonia    • Hypertension    • NSTEMI (non-ST elevated myocardial infarction) 9/24/2017   • Pulmonary embolism    • Wears dentures     UPPER ONLY       Past Surgical history:    Past Surgical History:   Procedure Laterality Date   • ENDOSCOPY     • GALLBLADDER SURGERY     • HERNIA REPAIR     • HYSTERECTOMY     • REPLACEMENT TOTAL KNEE Right        Social History:    Social History     Social History   • Marital status:      Spouse name: N/A   • Number of children: N/A   • Years of education: N/A     Occupational History   • Not on file.     Social History Main Topics   • Smoking status: Never Smoker   • Smokeless tobacco: Never Used   • Alcohol use No   • Drug use: Defer   • Sexual activity: Defer     Other Topics Concern   • Not on file     Social  History Narrative       Family History:    Family History   Problem Relation Age of Onset   • Hypertension Mother    • Cancer Father      prostate   • Hypertension Father    • Diabetes Sister    • Hypertension Sister    • Hypertension Brother    • Cancer Daughter      breast       Allergies:      Review of patient's allergies indicates no known allergies.    Home Medications:    Prior to Admission Medications     Prescriptions Last Dose Informant Patient Reported? Taking?    atorvastatin (LIPITOR) 10 MG tablet   No Yes    Take 1 tablet by mouth Every Night.    bumetanide (BUMEX) 1 MG tablet   No Yes    Take 1 tablet by mouth 2 (Two) Times a Day.    cyanocobalamin 1000 MCG/ML injection   No Yes    Inject 1 mL into the shoulder, thigh, or buttocks Every 28 (Twenty-Eight) Days.    epoetin parveen (EPOGEN,PROCRIT) 13052 UNIT/ML injection   No Yes    Inject 1 mL under the skin Every 14 (Fourteen) Days. Indications: Chronic Hemolytic Anemia    hydrALAZINE (APRESOLINE) 25 MG tablet  Medication Bottle Yes Yes    Take 12.5 mg by mouth Daily.    HYDROcodone-acetaminophen (NORCO)  MG per tablet   Yes Yes    Take 1 tablet by mouth Every 6 (Six) Hours As Needed for Moderate Pain .    ondansetron (ZOFRAN) 4 MG tablet   Yes Yes    Take 4 mg by mouth Every 8 (Eight) Hours As Needed for Nausea or Vomiting.    pantoprazole (PROTONIX) 40 MG EC tablet   Yes Yes    40 mg Daily.    propranolol (INDERAL) 20 MG tablet  Medication Bottle Yes Yes    40 mg 2 (Two) Times a Day.    spironolactone (ALDACTONE) 100 MG tablet   No Yes    Take 1 tablet by mouth 2 (Two) Times a Day.    warfarin (COUMADIN) 2 MG tablet   No Yes    2 mg PO daily    warfarin (COUMADIN) 2.5 MG tablet  Medication Bottle Yes Yes    1 mg Daily.             ED Medications:    Medications   sodium chloride 0.9 % flush 10 mL (not administered)   sodium chloride 0.9 % flush 10 mL (not administered)   cefTRIAXone (ROCEPHIN) IVPB 1 g/50ml dextrose (premix) (1 g Intravenous New  Bag 10/10/17 1430)   lactulose solution 20 g (20 g Oral Given 10/10/17 1430)       Vital Signs:    Temp:  [97.6 °F (36.4 °C)] 97.6 °F (36.4 °C)  Heart Rate:  [74-89] 89  Resp:  [18] 18  BP: (106-121)/(71-76) 121/75    Last 3 weights    10/10/17  1142   Weight: 248 lb (112 kg)       Body mass index is 38.84 kg/(m^2).    Physical Exam:    General Appearance:  Alert but not oriented to place or time, not in any acute distress.   Head:  Atraumatic and normocephalic, without obvious abnormality.   Eyes:      PERRLA, conjunctivae and sclerae normal, no Icterus. No pallor.   Ears:  Ears appear intact with no abnormalities noted.   Throat: No oral lesions, no thrush, oral mucosa moist.   Neck: Supple, trachea midline, no thyromegaly.         Lungs:   Chest shape is normal. Breath sounds heard bilaterally equally.  No crackles or wheezing. No Pleural rub or bronchial breathing. No accessory muscle use.    Heart:  Regular rate and rhythm. Normal S1 and S2, no murmur, no gallop, no rub.   Abdomen:   Ascites is present. bowel sounds present. Soft, non-tender, non-distended, no guarding, no rebound tenderness. No hepatosplenomegaly.    Extremities: 3+ pitting edema in lower extremity. no cyanosis, no clubbing.   Pulses: Pulses palpable and equal bilaterally.   Skin: No bleeding, bruising or rash.   Neurologic: Alert but not oriented to person or place. Moves upper extremity equally. Diminished strength in lower extremities. No tremors. No facial asymetry. CN 2-12 grossly intact.      Laboratory data:    I have reviewed the labs done in the emergency room.      Results from last 7 days  Lab Units 10/10/17  1148   SODIUM mmol/L 137   POTASSIUM mmol/L 4.4   CHLORIDE mmol/L 102   CO2 mmol/L 25.0*   BUN mg/dL 33*   CREATININE mg/dL 2.30*   CALCIUM mg/dL 8.1*   BILIRUBIN mg/dL 1.8*   ALK PHOS U/L 182*   ALT (SGPT) U/L 28   AST (SGOT) U/L 51*   GLUCOSE mg/dL 121*       Results from last 7 days  Lab Units 10/10/17  1148   WBC 10*3/mm3  3.51*   HEMOGLOBIN g/dL 9.9*   HEMATOCRIT % 31.2*   PLATELETS 10*3/mm3 161       Results from last 7 days  Lab Units 10/10/17  1148   INR  2.01*       Results from last 7 days  Lab Units 10/10/17  1148   TROPONIN I ng/mL 0.051*               Results from last 7 days  Lab Units 10/10/17  1148   LIPASE U/L 113       Results from last 7 days  Lab Units 10/10/17  1224   PH, ARTERIAL pH units 7.507*   PO2 ART mm Hg 75.6   PCO2, ARTERIAL mm Hg 35.0   HCO3 ART mmol/L 27.7       Results from last 7 days  Lab Units 10/10/17  1238   COLOR UA  Yellow   GLUCOSE UA  Negative   KETONES UA  Negative   LEUKOCYTES UA  Moderate (2+)*   PH, URINE  5.5   BILIRUBIN UA  Negative   UROBILINOGEN UA  1.0 E.U./dL     Pain Management Panel     There is no flowsheet data to display.              EKG:          Radiology:    Imaging Results (last 72 hours)     Procedure Component Value Units Date/Time    CT Head Without Contrast [[id number]] Collected:  10/10/17 1325     Updated:  10/10/17 1328    Narrative:       PROCEDURE: CT HEAD WO CONTRAST-     HISTORY: confusion     TECHNIQUE: Axial images were obtained from the skull vertex through the  base without contrast.      COMPARISON: None.     FINDINGS: There is generalized cerebral volume loss. There are patchy  hypodensities in the periventricular white matter consistent with  chronic small vessel ischemic change. There is no CT evidence of acute  hemorrhage. There is no mass, mass effect, or midline shift. There is no  hydrocephalus. Bone windows reveal no osseous abnormalities. The  visualized paranasal sinuses are clear.       Impression:       Findings consistent with chronic small vessel ischemic  change with no acute intracranial abnormality.        This study was performed with techniques to keep radiation doses as low  as reasonably achievable (ALARA). Individualized dose reduction  techniques using automated exposure control or adjustment of mA and/or  kV according to the patient size  were employed.      This report was finalized on 10/10/2017 1:26 PM by Mae Barrios M.D..    XR Chest 2 View [[id number]] Collected:  10/10/17 1326     Updated:  10/10/17 1334    Narrative:       PROCEDURE: XR CHEST 2 VW-        HISTORY: recent pleural effusion     COMPARISON: September 28, 2017.     FINDINGS: The heart is normal in size. The mediastinum is unremarkable.  The lungs are clear. There is no pneumothorax. There are no acute  osseous abnormalities.           Impression:       No acute cardiopulmonary process.           This report was finalized on 10/10/2017 1:31 PM by Mae Barrios M.D..          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 She will continue to receive lactulose for encephalopathy. Rocephin has be initiated to address urinary tract infection. Patients home medications will be started. Her previously prescribed bumetanide and spironolactone were initiated for her edema. Dr. Franco will be consulted for kidney evaluation. Patients coumadin was restarted for anticoagulation. She will also receive hydralazine and propanolol for hypertension. Her labs and mental status will continue to be monitored. Patient will be monitored on telemetry. Patient will be Full Code.   Assessment and Plan have been discussed with Dr. Winchester.     Patient and daughters at bedside were informed of this plan and agreed.         Evon Low  10/10/17  3:07 PM

## 2017-10-11 NOTE — CONSULTS
[facility]      Nephrology Consultation    Referring Provider:   No ref. provider found    Reason for Consultation:  CKD 4 and associated problems         Subjective     Chief complaint   Chief Complaint   Patient presents with   • Altered Mental Status       History of present illness:    Patient is 76 yo female with multiple medical problems as listed below in the past medical history who presented to the ER with confusion that had been worsening for 3-4 days. She was felt to have hepatic encephalopathy with prior h/o cirrhosis and was admitted for further evaluation and management, for full details please see the H+P from Dr. Winchester which was reviewed by me. Patient with a h/o CKD4 was seen with most recent hospitalization 9/2017 and baseline creatinine 2.2-2.5mg/dl. Was noted to have issues with the volume at that time and diuretics were adjusted. Upon admission now she was hypotensive with significant peripheral edema and was consulted for further management of CKD with volume overload and hypotension.  I have reviewed labs/imaging/records from this hospitalization, including ER staff and admitting/attending physicians H/P's and progress notes to establish a comprehensive understanding of this patient's clinical hospital course, as well as to establish plan of care appropriately.   Past Medical History:   Diagnosis Date   • Arthritis    • Cancer     uterin    • Cirrhosis of liver    • Effect, radiation    • GERD (gastroesophageal reflux disease)    • Hernia of abdominal wall     JUST BELOW UMBILICUS   • History of pneumonia    • Hypertension    • NSTEMI (non-ST elevated myocardial infarction) 9/24/2017   • Pulmonary embolism    • Wears dentures     UPPER ONLY       Past Surgical History:   Procedure Laterality Date   • ENDOSCOPY     • GALLBLADDER SURGERY     • HERNIA REPAIR     • HYSTERECTOMY     • REPLACEMENT TOTAL KNEE Right        Family History   Problem Relation Age of Onset   •  Hypertension Mother    • Cancer Father      prostate   • Hypertension Father    • Diabetes Sister    • Hypertension Sister    • Hypertension Brother    • Cancer Daughter      breast          positive h/o ESRD     Social History   Substance Use Topics   • Smoking status: Never Smoker   • Smokeless tobacco: Never Used   • Alcohol use No     Prescriptions Prior to Admission   Medication Sig Dispense Refill Last Dose   • atorvastatin (LIPITOR) 10 MG tablet Take 1 tablet by mouth Every Night. 30 tablet 0 10/9/2017 at Unknown time   • bumetanide (BUMEX) 1 MG tablet Take 1 tablet by mouth 2 (Two) Times a Day. 60 tablet 0 10/10/2017 at Unknown time   • hydrALAZINE (APRESOLINE) 25 MG tablet Take 12.5 mg by mouth Daily.   Unknown at Unknown time   • HYDROcodone-acetaminophen (NORCO)  MG per tablet Take 1 tablet by mouth Every 6 (Six) Hours As Needed for Moderate Pain .   10/9/2017 at Unknown time   • ondansetron (ZOFRAN) 4 MG tablet Take 4 mg by mouth Every 8 (Eight) Hours As Needed for Nausea or Vomiting.   10/9/2017 at Unknown time   • pantoprazole (PROTONIX) 40 MG EC tablet 40 mg Daily.   10/10/2017 at Unknown time   • propranolol (INDERAL) 20 MG tablet 40 mg 2 (Two) Times a Day.   10/10/2017 at Unknown time   • spironolactone (ALDACTONE) 100 MG tablet Take 1 tablet by mouth 2 (Two) Times a Day. 60 tablet 0 10/10/2017 at Unknown time   • warfarin (COUMADIN) 2.5 MG tablet Take 1 mg by mouth Daily.   10/9/2017 at Unknown time   • [START ON 10/24/2017] cyanocobalamin 1000 MCG/ML injection Inject 1 mL into the shoulder, thigh, or buttocks Every 28 (Twenty-Eight) Days. 1 mL 0 Unknown at Unknown time   • epoetin parveen (EPOGEN,PROCRIT) 06634 UNIT/ML injection Inject 1 mL under the skin Every 14 (Fourteen) Days. Indications: Chronic Hemolytic Anemia 1 each 0 Unknown at Unknown time   • warfarin (COUMADIN) 2 MG tablet 2 mg PO daily (Patient not taking: Reported on 10/10/2017) 30 tablet 0 Not Taking at Unknown time  "    Allergies:  Review of patient's allergies indicates no known allergies.    Review of Systems  Constitutional: Negative for fever and chills, no diaphoresis, +ve fatigue and unexpected weight change.   HENT: Negative for congestion and does have hearing loss.   Eyes: Negative for redness and visual disturbance.   Respiratory: negative for shortness of breath. Negative for chest pain . Negative for cough and chest tightness.   Cardiovascular: Negative for chest pain and palpitations.   Gastrointestinal: Negative for abdominal distention, abdominal pain and blood in stool. Denies any constipation or diarrhea.  Endocrine: Negative for cold or heat intolerance.   Genitourinary: Negative for difficulty urinating, dysuria and frequency.   Musculoskeletal: +ve for arthralgias, back pain and denies any myalgias.   Skin: Negative for color change, rash and wound.   Neurological: Negative for syncope, new onset weakness or numbness of any extremities. Denies any headaches.   Hematological: Negative for adenopathy. Does not bruise/bleed easily.   Psychiatric/Behavioral: +ve for confusion. The patient is not nervous/anxious.     Objective     Vital Signs  /67 (BP Location: Right arm, Patient Position: Lying)  Pulse 87  Temp 97.4 °F (36.3 °C) (Oral)   Resp 16  Ht 67\" (170.2 cm)  Wt 248 lb (112 kg)  SpO2 98%  BMI 38.84 kg/m2              Intake/Output Summary (Last 24 hours) at 10/11/17 0811  Last data filed at 10/11/17 0600   Gross per 24 hour   Intake              155 ml   Output              400 ml   Net             -245 ml       Physical Exam:     General Appearance:   Alert, cooperative, in no acute distress.     Head:   Normocephalic, without obvious abnormality, atraumatic.     Eyes:       Normal, conjunctivae and sclerae, no icterus, no pallor, corneas clear, PERRLA        Throat:   Oral mucosa dry      Neck:  No adenopathy, supple, trachea midline, no thyromegaly, no carotid bruit, no JVD      Back:   No " CVA tenderness on Percussion.     Lungs:    Clear to auscultation and fair air movement noted.      Heart:   IRRegular rhythm and normal rate, normal S1 and S2.       Abdomen:   Obese. Normal bowel sounds, no masses, no organomegaly, soft non-tender, non-distended, no guarding, no rebound tenderness        Extremities:  Moves all extremities, 2+ edema, no cyanosis, no redness.     Pulses:  Pulses palpable and equal bilaterally but weak.     Skin:  No bleeding, +ve bruising no rash        Neurologic:  Cranial nerves grossly intact, move all extremities             Results Review:  Lab Results (last 7 days)     Procedure Component Value Units Date/Time    POC Glucose Fingerstick [[id number]]  (Normal) Collected:  10/10/17 1146    Specimen:  Blood Updated:  10/10/17 1151     Glucose 90 mg/dL       Serial Number: [id number]:  [id number]       Protime-INR [[id number]]  (Abnormal) Collected:  10/10/17 1148    Specimen:  Blood Updated:  10/10/17 1208     Protime 22.0 (H) Seconds      INR 2.01 (H)    Lipase [[id number]]  (Normal) Collected:  10/10/17 1148    Specimen:  Blood Updated:  10/10/17 1208     Lipase 113 U/L     Comprehensive Metabolic Panel [[id number]]  (Abnormal) Collected:  10/10/17 1148    Specimen:  Blood Updated:  10/10/17 1215     Glucose 121 (H) mg/dL      BUN 33 (H) mg/dL       Specimen hemolyzed. Results may be affected.        Creatinine 2.30 (H) mg/dL      Sodium 137 mmol/L      Potassium 4.4 mmol/L       Specimen hemolyzed.  Results may be affected.        Chloride 102 mmol/L      CO2 25.0 (L) mmol/L      Calcium 8.1 (L) mg/dL      Total Protein 7.0 g/dL      Albumin 2.60 (L) g/dL      ALT (SGPT) 28 U/L       Specimen hemolyzed.  Results may be affected.        AST (SGOT) 51 (H) U/L       Specimen hemolyzed.  Results may be affected.        Alkaline Phosphatase 182 (H) U/L       Specimen hemolyzed. Results may be affected.        Total Bilirubin 1.8 (H) mg/dL      eGFR Non  Amer 20 (L)  mL/min/1.73      Globulin 4.4 gm/dL      A/G Ratio 0.6 (L) g/dL      BUN/Creatinine Ratio 14.3     Anion Gap 14.4 mmol/L     Narrative:       The MDRD GFR formula is only valid for adults with stable renal function between ages 18 and 70.    CBC Auto Differential [[id number]]  (Abnormal) Collected:  10/10/17 1148    Specimen:  Blood Updated:  10/10/17 1219     WBC 3.51 (L) 10*3/mm3      RBC 3.29 (L) 10*6/mm3      Hemoglobin 9.9 (L) g/dL      Hematocrit 31.2 (L) %      MCV 94.8 fL      MCH 30.1 pg      MCHC 31.7 g/dL      RDW 16.6 (H) %      RDW-SD 57.4 (H) fl      MPV 10.9 fL      Platelets 161 10*3/mm3      Neutrophil % 64.1 %      Lymphocyte % 24.8 %      Monocyte % 8.3 %      Eosinophil % 1.4 %      Basophil % 1.1 %      Immature Grans % 0.3 %      Neutrophils, Absolute 2.25 10*3/mm3      Lymphocytes, Absolute 0.87 10*3/mm3      Monocytes, Absolute 0.29 10*3/mm3      Eosinophils, Absolute 0.05 10*3/mm3      Basophils, Absolute 0.04 10*3/mm3      Immature Grans, Absolute 0.01 10*3/mm3      nRBC 0.0 /100 WBC     CBC & Differential [[id number]] Collected:  10/10/17 1148    Specimen:  Blood Updated:  10/10/17 1219    Narrative:       The following orders were created for panel order CBC & Differential.  Procedure                               Abnormality         Status                     ---------                               -----------         ------                     CBC Auto Differential[[id number]]        Abnormal            Final result                 Please view results for these tests on the individual orders.    Blood Gas, Arterial With Co-Ox [[id number]]  (Abnormal) Collected:  10/10/17 1224    Specimen:  Arterial Blood Updated:  10/10/17 1224     Site Arterial: left brachial     Darrell's Test N/A     pH, Arterial 7.507 (C) pH units      pCO2, Arterial 35.0 mm Hg      pO2, Arterial 75.6 mm Hg      HCO3, Arterial 27.7 mmol/L      Base Excess, Arterial 4.6 mmol/L      O2 Saturation, Arterial 96.4 %       Hemoglobin, Blood Gas 9.4 (L) g/dL      Oxyhemoglobin 94.0 %      Methemoglobin 0.90 %      Carboxyhemoglobin 1.6 %      Modality Room Air     FIO2 21 %     Troponin [[id number]]  (Abnormal) Collected:  10/10/17 1148    Specimen:  Blood Updated:  10/10/17 1240     Troponin I 0.051 (H) ng/mL     Urine Culture - Urine, Urine, Clean Catch [[id number]] Collected:  10/10/17 1238    Specimen:  Urine from Urine, Catheter Updated:  10/10/17 1248    Urinalysis With / Culture If Indicated - Urine, Catheter [[id number]]  (Abnormal) Collected:  10/10/17 1238    Specimen:  Urine from Urine, Catheter Updated:  10/10/17 1251     Color, UA Yellow     Appearance, UA Cloudy (A)     pH, UA 5.5     Specific Gravity, UA 1.010     Glucose, UA Negative     Ketones, UA Negative     Bilirubin, UA Negative     Blood, UA Trace (A)     Protein, UA Negative     Leuk Esterase, UA Moderate (2+) (A)     Nitrite, UA Negative     Urobilinogen, UA 1.0 E.U./dL    Ammonia [[id number]]  (Abnormal) Collected:  10/10/17 1230    Specimen:  Blood Updated:  10/10/17 1253     Ammonia 41 (H) umol/L     Urinalysis, Microscopic Only - Urine, Clean Catch [[id number]]  (Abnormal) Collected:  10/10/17 1238    Specimen:  Urine from Urine, Catheter Updated:  10/10/17 1300     RBC, UA 0-2 (A) /HPF      WBC, UA 31-50 (A) /HPF      Bacteria, UA 1+ (A) /HPF      Squamous Epithelial Cells, UA 7-12 (A) /HPF      Yeast, UA Moderate/2+ Budding Yeast /HPF      Hyaline Casts, UA 0-2 /LPF      Methodology Manual Light Microscopy    Lactic Acid, Plasma [[id number]]  (Abnormal) Collected:  10/10/17 1230    Specimen:  Blood Updated:  10/10/17 1301     Lactate 3.1 (C) mmol/L     [place] Draw [[id number]] Collected:  10/10/17 1148    Specimen:  Blood Updated:  10/10/17 1301    Narrative:       The following orders were created for panel order [place] Draw.  Procedure                               Abnormality         Status                     ---------                                -----------         ------                     Light Blue Top[[id number]]                                   Final result               Lavender Top[[id number]]                                     Final result               Gold Top - SST[[id number]]                                   Final result               Green Top (No Gel)[[id number]]                               Final result                 Please view results for these tests on the individual orders.    Light Blue Top [[id number]] Collected:  10/10/17 1148    Specimen:  Blood Updated:  10/10/17 1301     Extra Tube hold for add-on      Auto resulted       Lavender Top [[id number]] Collected:  10/10/17 1148    Specimen:  Blood Updated:  10/10/17 1301     Extra Tube hold for add-on      Auto resulted       Gold Top - SST [[id number]] Collected:  10/10/17 1148    Specimen:  Blood Updated:  10/10/17 1301     Extra Tube Hold for add-ons.      Auto resulted.       Green Top (No Gel) [[id number]] Collected:  10/10/17 1148    Specimen:  Blood Updated:  10/10/17 1301     Extra Tube Hold for add-ons.      Auto resulted.       Lactic Acid, Plasma [[id number]] Collected:  10/10/17 1230    Specimen:  Blood Updated:  10/10/17 1601     Extra Tube Hold for add-ons.      Auto resulted.       Lactic Acid, Reflex [[id number]]  (Abnormal) Collected:  10/10/17 1623    Specimen:  Blood Updated:  10/10/17 1706     Lactate 3.6 (C) mmol/L     Urinalysis With / Culture If Indicated - Urine, Catheter [[id number]]  (Abnormal) Collected:  10/10/17 2047    Specimen:  Urine from Urine, Catheter Updated:  10/10/17 2107     Color, UA Yellow     Appearance, UA Clear     pH, UA 5.5     Specific Gravity, UA 1.010     Glucose, UA Negative     Ketones, UA Negative     Bilirubin, UA Negative     Blood, UA Trace (A)     Protein, UA Negative     Leuk Esterase, UA Trace (A)     Nitrite, UA Negative     Urobilinogen, UA 0.2 E.U./dL    Urine Culture - Urine, Urine, Clean Catch [[id number]] Collected:  10/10/17  2047    Specimen:  Urine from Urine, Catheter Updated:  10/10/17 2109    Urinalysis, Microscopic Only - Urine, Clean Catch [[id number]]  (Abnormal) Collected:  10/10/17 2047    Specimen:  Urine from Urine, Catheter Updated:  10/10/17 2116     RBC, UA 0-2 (A) /HPF      WBC, UA 3-5 (A) /HPF      Bacteria, UA 1+ (A) /HPF      Squamous Epithelial Cells, UA 3-6 (A) /HPF      Hyaline Casts, UA 3-6 /LPF      Amorphous Crystals, UA Small/1+ /HPF      Methodology Manual Light Microscopy    CBC & Differential [[id number]] Collected:  10/11/17 0631    Specimen:  Blood Updated:  10/11/17 0647    Narrative:       The following orders were created for panel order CBC & Differential.  Procedure                               Abnormality         Status                     ---------                               -----------         ------                     CBC Auto Differential[[id number]]        Abnormal            Final result                 Please view results for these tests on the individual orders.    CBC Auto Differential [[id number]]  (Abnormal) Collected:  10/11/17 0631    Specimen:  Blood Updated:  10/11/17 0647     WBC 2.88 (L) 10*3/mm3      RBC 2.90 (L) 10*6/mm3      Hemoglobin 8.7 (L) g/dL      Hematocrit 27.1 (L) %      MCV 93.4 fL      MCH 30.0 pg      MCHC 32.1 g/dL      RDW 16.5 (H) %      RDW-SD 56.7 (H) fl      MPV 10.7 fL      Platelets 119 (L) 10*3/mm3      Neutrophil % 51.9 %      Lymphocyte % 33.3 %      Monocyte % 11.1 %      Eosinophil % 1.7 %      Basophil % 1.0 %      Immature Grans % 1.0 (H) %      Neutrophils, Absolute 1.49 (L) 10*3/mm3      Lymphocytes, Absolute 0.96 10*3/mm3      Monocytes, Absolute 0.32 10*3/mm3      Eosinophils, Absolute 0.05 10*3/mm3      Basophils, Absolute 0.03 10*3/mm3      Immature Grans, Absolute 0.03 10*3/mm3      nRBC 0.0 /100 WBC     Protime-INR [[id number]]  (Abnormal) Collected:  10/11/17 0631    Specimen:  Blood Updated:  10/11/17 0653     Protime 22.2 (H) Seconds      INR  2.03 (H)    Ammonia [[id number]]  (Normal) Collected:  10/11/17 0631    Specimen:  Blood Updated:  10/11/17 0657     Ammonia 19 umol/L     Comprehensive Metabolic Panel [[id number]]  (Abnormal) Collected:  10/11/17 0631    Specimen:  Blood Updated:  10/11/17 0712     Glucose 82 mg/dL      BUN 33 (H) mg/dL      Creatinine 2.20 (H) mg/dL      Sodium 139 mmol/L      Potassium 3.8 mmol/L      Chloride 103 mmol/L      CO2 26.0 mmol/L      Calcium 8.0 (L) mg/dL      Total Protein 6.0 (L) g/dL      Albumin 2.20 (L) g/dL      ALT (SGPT) 26 U/L      AST (SGOT) 32 U/L      Alkaline Phosphatase 156 (H) U/L      Total Bilirubin 1.2 mg/dL      eGFR Non African Amer 22 (L) mL/min/1.73      Globulin 3.8 gm/dL      A/G Ratio 0.6 (L) g/dL      BUN/Creatinine Ratio 15.0     Anion Gap 13.8 mmol/L     Narrative:       The MDRD GFR formula is only valid for adults with stable renal function between ages 18 and 70.    Magnesium [[id number]]  (Abnormal) Collected:  10/11/17 0631    Specimen:  Blood Updated:  10/11/17 0712     Magnesium 1.5 (L) mg/dL     Phosphorus [[id number]]  (Normal) Collected:  10/11/17 0631    Specimen:  Blood Updated:  10/11/17 0712     Phosphorus 3.8 mg/dL         Imaging Results (last 72 hours)     Procedure Component Value Units Date/Time    CT Head Without Contrast [[id number]] Collected:  10/10/17 1325     Updated:  10/10/17 1328    Narrative:       PROCEDURE: CT HEAD WO CONTRAST-     HISTORY: confusion     TECHNIQUE: Axial images were obtained from the skull vertex through the  base without contrast.      COMPARISON: None.     FINDINGS: There is generalized cerebral volume loss. There are patchy  hypodensities in the periventricular white matter consistent with  chronic small vessel ischemic change. There is no CT evidence of acute  hemorrhage. There is no mass, mass effect, or midline shift. There is no  hydrocephalus. Bone windows reveal no osseous abnormalities. The  visualized paranasal sinuses are clear.        Impression:       Findings consistent with chronic small vessel ischemic  change with no acute intracranial abnormality.        This study was performed with techniques to keep radiation doses as low  as reasonably achievable (ALARA). Individualized dose reduction  techniques using automated exposure control or adjustment of mA and/or  kV according to the patient size were employed.      This report was finalized on 10/10/2017 1:26 PM by Mae Barrios M.D..    XR Chest 2 View [[id number]] Collected:  10/10/17 1326     Updated:  10/10/17 1334    Narrative:       PROCEDURE: XR CHEST 2 VW-        HISTORY: recent pleural effusion     COMPARISON: September 28, 2017.     FINDINGS: The heart is normal in size. The mediastinum is unremarkable.  The lungs are clear. There is no pneumothorax. There are no acute  osseous abnormalities.           Impression:       No acute cardiopulmonary process.           This report was finalized on 10/10/2017 1:31 PM by Mae Barrios M.D..              bumetanide 1 mg Oral BID   ceftriaxone 1 g Intravenous Q24H   [START ON 10/24/2017] cyanocobalamin 1,000 mcg Intramuscular Q28 Days   diltiaZEM 30 mg Oral Q8H   epoetin parveen 20,000 Units Subcutaneous Q14 Days   fluconazole 200 mg Intravenous Q24H   hydrALAZINE 12.5 mg Oral Daily   lactulose 20 g Oral TID   magnesium sulfate 2 g Intravenous Once   pantoprazole 40 mg Oral Q AM   propranolol 40 mg Oral Q12H   rifaximin 550 mg Oral Q12H   spironolactone 100 mg Oral BID   warfarin 1 mg Oral Daily          Assessment/Plan     1.   Chronic kidney disease, stage IV (severe): Her serum creatinine has been stable with current medications.  She still does appear to have fairly significant amount of fluid.  I think she will benefit from low-dose loop diuretics once her b/p is better. R/O hepatorenal syndrome.  2.   Chronic anemia: with prior normal iron stores and likely related to CKD.   If hemoglobin drops close to 7 we'll need blood  transfusion.  She did receive Procrit in the past.  3.   Chronic kidney disease-mineral and bone disorder: Watch for abnormalities.  4.   Coronary artery disease- monitored by Dr. Donis  5.   [place]: Today patient did accept the fact that she has obstructive sleep apnea and cannot use the BiPAP.  Some of the issues may be the complication secondary to primary hypertension from untreated obstructive sleep apnea.   6.   Cirrhosis of liver with ascites: Optimize medications  7.   Permanent atrial fibrillation: Rate controlled and Dr. Donis is been following.  8.   Essential hypertension: optimize medication as needed    Details were discussed with the patient as well as family in the room.  Details were also discussed with the hospitalist service.   Further recommendations will depend on clinical course of the patient during the current hospitalization.    I also discussed the details with the nursing staff.    Rest as ordered.    In closing, I sincerely appreciate opportunity to participate in care of this patient. If I can be of any further assistance with the management of this patient, please don’t hesitate to contact me.    Julio Franco MD  10/11/17  8:11 AM    EMR Dragon/Transcription disclaimer:   Much of this encounter note is an electronic transcription/translation of spoken language to printed text. The electronic translation of spoken language may permit erroneous, or at times, nonsensical words or phrases to be inadvertently transcribed; Although I have reviewed the note for such errors, some may still exist.

## 2017-10-11 NOTE — DISCHARGE INSTR - OTHER ORDERS
If you have any questions about your recovery, please call the [facility] at [phone]. A registered nurse is available 24 hours a day 7 days a week to assist you.

## 2017-10-11 NOTE — PROGRESS NOTES
[facility]    [facility]    Name:  Hodan Carter   Age:  77 y.o.  Sex:  female  :  1940  MRN:  [id number]   Visit Number:  [id number]  Admission Date:  10/10/2017  Date Of Service:  10/11/17  Primary Care Physician:  Juan Espino MD     LOS: 1 day :  Patient Care Team:  Juan Espino MD as PCP - General:    History taken from:     patient family    Chief Complaint:      Encephalopathy  Subjective     Interval History:     Patient seen again today.  Family states she is much better.  She was admitted for hepatic encephalopathy.  She has been given lactulose.  She has responded with several bowel movements.  She is much more awake.  She denies any chest pressure, shortness breath, nausea, vomiting, or pain.  Spoke to Dr. Franco who wanted to hold all of her diuretics at this point.  Patient was noted to have atrial fibrillation with rapid ventricular response this morning, Dr. Donis was consulted.  Patient was given Cardizem which did control her rate, however systolic blood pressure dropped down to 80.  It did come up later in the day.  Patient was asymptomatic with this.  She is also very weak, PT and OT have been consulted.  There is also question whether she had sleep apnea, however she has never been tested for this.  She denies any other problems.    Review of Systems:     All systems were reviewed and negative except for:  Cardiovascular: positive for  palpitations    Objective     Vital Signs:    Temp:  [97.4 °F (36.3 °C)-98.2 °F (36.8 °C)] 98 °F (36.7 °C)  Heart Rate:  [] 75  Resp:  [16-20] 20  BP: ()/(58-79) 106/62    Physical Exam:      General Appearance:    Alert, cooperative, in no acute distress   Head:    Normocephalic, without obvious abnormality, atraumatic   Eyes:            Lids and lashes normal, conjunctivae and sclerae normal, no   icterus, no pallor, corneas clear, PERRLA   Ears:    Ears appear intact with no  abnormalities noted   Throat:   No oral lesions, no thrush, oral mucosa moist   Neck:   No adenopathy, supple, trachea midline, no thyromegaly, no     carotid bruit, no JVD   Back:     No kyphosis present, no scoliosis present, no skin lesions,       erythema or scars, no tenderness to percussion or                   palpation,   range of motion normal   Lungs:     Clear to auscultation,respirations regular, even and                   unlabored    Heart:    Regular rhythm and normal rate, normal S1 and S2, no            murmur, no gallop, no rub, no click   Breast Exam:    Deferred   Abdomen:     Normal bowel sounds, no masses, no organomegaly, soft        non-tender, non-distended, no guarding, no rebound                 tenderness, Ascites noted.     Genitalia:    Deferred   Extremities:   Moves all extremities well, Diffuse edema, no cyanosis, no              redness   Pulses:   Pulses palpable and equal bilaterally   Skin:   No bleeding, bruising or rash   Lymph nodes:   No palpable adenopathy   Neurologic:   Cranial nerves 2 - 12 grossly intact, sensation intact, DTR        present and equal bilaterally          Results Review:      I reviewed the patient's new clinical results.    Labs:  Lab Results (last 24 hours)     Procedure Component Value Units Date/Time    Lactic Acid, Plasma [[id number]] Collected:  10/10/17 1230    Specimen:  Blood Updated:  10/10/17 1601     Extra Tube Hold for add-ons.      Auto resulted.       Lactic Acid, Reflex [[id number]]  (Abnormal) Collected:  10/10/17 1623    Specimen:  Blood Updated:  10/10/17 1706     Lactate 3.6 (C) mmol/L     Urinalysis With / Culture If Indicated - Urine, Catheter [[id number]]  (Abnormal) Collected:  10/10/17 2047    Specimen:  Urine from Urine, Catheter Updated:  10/10/17 2107     Color, UA Yellow     Appearance, UA Clear     pH, UA 5.5     Specific Gravity, UA 1.010     Glucose, UA Negative     Ketones, UA Negative     Bilirubin, UA Negative     Blood,  UA Trace (A)     Protein, UA Negative     Leuk Esterase, UA Trace (A)     Nitrite, UA Negative     Urobilinogen, UA 0.2 E.U./dL    Urine Culture - Urine, Urine, Clean Catch [[id number]] Collected:  10/10/17 2047    Specimen:  Urine from Urine, Catheter Updated:  10/10/17 2109    Urinalysis, Microscopic Only - Urine, Clean Catch [[id number]]  (Abnormal) Collected:  10/10/17 2047    Specimen:  Urine from Urine, Catheter Updated:  10/10/17 2116     RBC, UA 0-2 (A) /HPF      WBC, UA 3-5 (A) /HPF      Bacteria, UA 1+ (A) /HPF      Squamous Epithelial Cells, UA 3-6 (A) /HPF      Hyaline Casts, UA 3-6 /LPF      Amorphous Crystals, UA Small/1+ /HPF      Methodology Manual Light Microscopy    CBC & Differential [[id number]] Collected:  10/11/17 0631    Specimen:  Blood Updated:  10/11/17 0647    Narrative:       The following orders were created for panel order CBC & Differential.  Procedure                               Abnormality         Status                     ---------                               -----------         ------                     CBC Auto Differential[[id number]]        Abnormal            Final result                 Please view results for these tests on the individual orders.    CBC Auto Differential [[id number]]  (Abnormal) Collected:  10/11/17 0631    Specimen:  Blood Updated:  10/11/17 0647     WBC 2.88 (L) 10*3/mm3      RBC 2.90 (L) 10*6/mm3      Hemoglobin 8.7 (L) g/dL      Hematocrit 27.1 (L) %      MCV 93.4 fL      MCH 30.0 pg      MCHC 32.1 g/dL      RDW 16.5 (H) %      RDW-SD 56.7 (H) fl      MPV 10.7 fL      Platelets 119 (L) 10*3/mm3      Neutrophil % 51.9 %      Lymphocyte % 33.3 %      Monocyte % 11.1 %      Eosinophil % 1.7 %      Basophil % 1.0 %      Immature Grans % 1.0 (H) %      Neutrophils, Absolute 1.49 (L) 10*3/mm3      Lymphocytes, Absolute 0.96 10*3/mm3      Monocytes, Absolute 0.32 10*3/mm3      Eosinophils, Absolute 0.05 10*3/mm3      Basophils, Absolute 0.03 10*3/mm3       Immature Grans, Absolute 0.03 10*3/mm3      nRBC 0.0 /100 WBC     Protime-INR [[id number]]  (Abnormal) Collected:  10/11/17 0631    Specimen:  Blood Updated:  10/11/17 0653     Protime 22.2 (H) Seconds      INR 2.03 (H)    Ammonia [[id number]]  (Normal) Collected:  10/11/17 0631    Specimen:  Blood Updated:  10/11/17 0657     Ammonia 19 umol/L     Comprehensive Metabolic Panel [[id number]]  (Abnormal) Collected:  10/11/17 0631    Specimen:  Blood Updated:  10/11/17 0712     Glucose 82 mg/dL      BUN 33 (H) mg/dL      Creatinine 2.20 (H) mg/dL      Sodium 139 mmol/L      Potassium 3.8 mmol/L      Chloride 103 mmol/L      CO2 26.0 mmol/L      Calcium 8.0 (L) mg/dL      Total Protein 6.0 (L) g/dL      Albumin 2.20 (L) g/dL      ALT (SGPT) 26 U/L      AST (SGOT) 32 U/L      Alkaline Phosphatase 156 (H) U/L      Total Bilirubin 1.2 mg/dL      eGFR Non African Amer 22 (L) mL/min/1.73      Globulin 3.8 gm/dL      A/G Ratio 0.6 (L) g/dL      BUN/Creatinine Ratio 15.0     Anion Gap 13.8 mmol/L     Narrative:       The MDRD GFR formula is only valid for adults with stable renal function between ages 18 and 70.    Magnesium [[id number]]  (Abnormal) Collected:  10/11/17 0631    Specimen:  Blood Updated:  10/11/17 0712     Magnesium 1.5 (L) mg/dL     Phosphorus [[id number]]  (Normal) Collected:  10/11/17 0631    Specimen:  Blood Updated:  10/11/17 0712     Phosphorus 3.8 mg/dL     Reticulocytes [[id number]]  (Abnormal) Collected:  10/11/17 0631    Specimen:  Blood Updated:  10/11/17 0940     Reticulocyte % 2.34 (H) %      Reticulocyte Absolute 0.0676 10*6/mm3     Iron Profile [[id number]]  (Abnormal) Collected:  10/11/17 0631    Specimen:  Blood Updated:  10/11/17 0955     Iron 121 mcg/dL       Specimen hemolyzed.  Results may be affected.        TIBC 208 (L) mcg/dL      Iron Saturation 58 (H) %            Radiology:    Imaging Results (last 24 hours)     ** No results found for the last 24 hours. **          Medication Review:        ceftriaxone 1 g Intravenous Q24H   [START ON 10/24/2017] cyanocobalamin 1,000 mcg Intramuscular Q28 Days   diltiaZEM 30 mg Oral Q12H   epoetin parveen 20,000 Units Subcutaneous Q14 Days   fluconazole 200 mg Intravenous Q24H   hydrALAZINE 12.5 mg Oral Daily   lactulose 20 g Oral TID   pantoprazole 40 mg Oral Q AM   propranolol 40 mg Oral Q12H   rifaximin 550 mg Oral Q12H   warfarin 1 mg Oral Daily            Assessment/Plan     Problem List Items Addressed This Visit     Acute hepatic encephalopathy - Primary      Other Visit Diagnoses     Acute UTI              1.  Hepatic encephalopathy  2.  Cirrhosis of unknown etiology   3.  Chronic kidney disease stage IV, worsening  4.  UTI, present on admission, recurrent  5.  Permanent atrial fibrillation on Coumadin, with RVR  6.  History of PE many years ago; Coumadin  7.  History of coronary artery disease  8.  History of uterine cancer status post hysterectomy in the past  9.  Chronic pancytopenia due to #2  10.  Increased lactic acid  11.  Debility  12.  Hematochezia  13.  Hypomagnesemia    Plan:    We'll continue to monitor.  Discussed case with Dr. Franco who wanted to hold all diuretics.  Patient was noted to have heart rate of 140s, added Cardizem 30 mg twice a day.  Monitor blood pressure closely.  Also consulted Dr. Donis from cardiology, who knows this patient.  Check lab work including LFTs and ammonia in the morning.  Consult PT and OT as she is quite debilitated.  Continue with Diflucan and Rocephin for UTI.  Await culture results on this.  We'll replace her magnesium.  Iron studies show chronic disease.  We will check Hemoccult stool.  Further recommendations will depend on the clinical course.  Hopefully she can discharge home within the next 1-2 days.    Frankie Winchester,   10/11/17  3:45 PM

## 2017-10-11 NOTE — CONSULTS
Terry Donis MD  CARDIOLOGY CONSULT    PATIENT: Hodan Carter                                                   DATE: 10/11/17   319/1  : 1940     PRIMARY PHYSICIAN: Dr. Sandoval    CHIEF COMPLAINT: History of atrial fibrillation currently in chronic phase with less than optimally controlled ventricular response    HISTORY OF PRESENT ILLNESS:   Patient is 77 y.o. old  female with relatively low risk profile for atherosclerotic cardiovascular disease compromising of truncal obesity, unknown lipid status and longstanding hypertension with hypertensive heart disease with no known recent scintigraphic or angiographic studies, who has history of anasarca and known atrial fibrillation, was hospitalized on account of altered mental status which is thought to reflect hepatic encephalopathy possibly triggered by GI hemorrhage and was substernally noticed to have rapid ventricular response prompting additional AV blocker therapy..       The patient, who is known to have normal LV function, apparently has not been able to ambulate any significant distances since recent hospitalization for substantial fluid overload which is thought to be multifactorial.  Over the years, patient has noticed rather progressive shortness of air and was found to have evidence of substantial fluid reflecting cor pulmonale, chronic kidney disease and especially rather advanced cirrhosis with portal hypertension for which patient was started on aggressive diuretic regimen during last hospitalization.  Patient has prior history of orthopnea and occasional PND's and unfortunately she has had substantial and progressive dependent edema involving lower extremities with some degree did improve after she was started on diuretic therapy.  Patient, who has history of cirrhosis, also has history of significant ascites.     Patient, who has potential for coronary artery disease, and has had elevated serum troponin on multiple occasions in the  past with no recent history of any significant chest discomfort.  However patient is functionally very limited.   She was again noticed to have elevated cardiac markers as noted earlier.     Patient, who is currently in chronic phase of atrial fibrillation, and was and was noticed to have rapid ventricular response, herself had not noticed any significant  palpitations.  There is no history of syncope or loss of consciousness.      REVIEW OF SYSTEMS:    CONSTITUTIONAL:  Patient believes that she has had weight loss  and there is no history of fever.   HEENT:  No history of nasal discharge/nasal obstruction or sore throat.   CNS:  No history of headache, visual complaints, seizure disorder, or sensory or motor complaints.   BRONCHOPULMONARY:  As described in the history.    GASTROINTESTINAL:  As described in the history.    GENITOURINARY:  No history of nocturia, hematuria, or dysuria.   MUSCULOSKELETAL:  No history of myalgia or arthralgias.   DERMATOLOGIC:  No history of skin rash.   ENDOCRINE:  No history of cold or heat intolerance or thyromegaly.   HEMATOPOIETIC:  No history of bleeding from any site, anemia, or lymphadenopathy.    PSYCHIATRIC:  No history of symptoms suggesting depression or anxiety. Patient does have history of altered mental status.     PAST MEDICAL HISTORY:  1. Atherosclerotic cardiovascular disease:  · History of chest pain syndrome for which she underwent stress echocardiographic study in spring 2011 which reportedly was normal.  However date as not available.  · History of discomfort involving lower extremities with poorly palpable distal vessels.  2. History of longstanding hypertension with hypertensive heart disease.  The patient is not known to have secondary etiologies of hypertension.  She had some cortical atrophy on recent CT scan.  Patient has been on hydralazine therapy as an outpatient.   3. Unknown lipid status.    4. History of fluid overload with suggestion of anasarca with  "generalized fluid overload especially involving lower extremities for which she has been on chronic diuretic therapy.  Patient has prior history of cor pulmonale, underlying cirrhosis, chronic kidney disease but reportedly her LV function was normal based on remote workup.  Patient did undergo repeat echocardiogram during last hospitalization in September 2017.  She was found to have intact LV function.  5. History of severe cor pulmonale based on echocardiogram performed in September 2017.  Patient is known to have pulmonary thromboembolism and she has been on anticoagulant therapy.  Patient is not known to have interstitial lung disease or significant sleep disorder breathing though date as not available.  6. History of gastroesophageal reflux disease for which she has been on both PPI therapy and H2 blocker therapy.    7. History of known cirrhosis with splenomegaly which was thought to reflect congestive splenomegaly.  The exact etiology of cirrhosis is not obvious.  Apparently she has not undergone liver biopsies.  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  8. History of atrial fibrillation currently in chronic phase.  Patient has been on anticoagulant therapy and has numerous underlying substrates.     SURGICAL HISTORY:  1.  History of gallbladder surgery.    2.  History of hysterectomy.    3.  Knee surgery.    4. Ventral hernia repair surgery with poor healing.     SOCIAL HISTORY:  The patient is currently living alone though lately her son has been staying with the patient.  She is unemployed.  She does not smoke.  She denies alcohol use.     PHYSICAL EXAMINATION:  GENERAL: Very pleasant elderly female  /68 (BP Location: Right arm, Patient Position: Lying)  Pulse 78  Temp 97.9 °F (36.6 °C) (Oral)   Resp 17  Ht 67\" (170.2 cm)  Wt 248 lb (112 kg)  SpO2 95%  BMI 38.84 kg/m2 Body mass index is 38.84 kg/(m^2). HEENT:  Head: Atraumatic, normocephalic, No tenderness " over paranasal sinuses, external nares normal. No oral or nasal mucosal lesion. Sclera non-icteric ocular movements are normal with pupils reacting both to light and accommodations. Ocular fundi not seen. NECK: Carotid upstroke is normal, there are no carotid bruits, no JVD, no thyromegaly, no cervical or axillary lymphadenopathy. HEART: [place] beat is not displaced, no thrill is appreciated and both heart sounds are normal.  There is no murmur or rub audible. BRONCHOPULMONARY: Patient has good air entry bilaterally with bronchovesicular sounds. No adventious sounds audible. GI & : Soft, no tenderness elicited and no viscera are palpable. Bowel sounds are positive and there is no renal bruits audible. EXTREMITIES:  There is no radio-femoral delay .  Distal vessels are poorly palpable and there are no femoral bruits audible. Patient does have dependent edema with chronic skin changes. CNS: No gross motor neurological deficit. MUSCULOSKELETAL: No joint swelling notice and patient has normal range of motion in lower extremity.       Intake/Output Summary (Last 24 hours) at 10/11/17 1649  Last data filed at 10/11/17 1300   Gross per 24 hour   Intake              635 ml   Output              400 ml   Net              235 ml     Wt Readings from Last 7 Encounters:   10/10/17 248 lb (112 kg)   09/28/17 248 lb 12.8 oz (113 kg)   09/17/17 224 lb (102 kg)   08/29/17 224 lb (102 kg)   08/01/17 233 lb 11 oz (106 kg)   05/09/17 200 lb (90.7 kg)   04/10/17 224 lb (102 kg)     LABS:     Results from last 7 days  Lab Units 10/11/17  0631 10/10/17  1148   WBC 10*3/mm3 2.88* 3.51*   HEMOGLOBIN g/dL 8.7* 9.9*   HEMATOCRIT % 27.1* 31.2*   PLATELETS 10*3/mm3 119* 161       Results from last 7 days  Lab Units 10/11/17  0631 10/10/17  1148   SODIUM mmol/L 139 137   POTASSIUM mmol/L 3.8 4.4   CHLORIDE mmol/L 103 102   CO2 mmol/L 26.0 25.0*   BUN mg/dL 33* 33*   CREATININE mg/dL 2.20* 2.30*   GLUCOSE mg/dL 82 121*   CALCIUM mg/dL 8.0* 8.1*        Results from last 7 days  Lab Units 10/10/17  1148   TROPONIN I ng/mL 0.051*            Results from last 7 days  Lab Units 10/11/17  0631 10/10/17  1148   INR  2.03* 2.01*     No results found for: HGBA1C        RADIOLOGY: Imaging Results (last 24 hours)     ** No results found for the last 24 hours. **          No Known Allergies    ceftriaxone 1 g Intravenous Q24H   [START ON 10/24/2017] cyanocobalamin 1,000 mcg Intramuscular Q28 Days   diltiaZEM 30 mg Oral Q12H   epoetin parveen 20,000 Units Subcutaneous Q14 Days   fluconazole 200 mg Intravenous Q24H   hydrALAZINE 12.5 mg Oral Daily   lactulose 20 g Oral BID   pantoprazole 40 mg Oral Q AM   propranolol 40 mg Oral Q12H   rifaximin 550 mg Oral Q12H   warfarin 1 mg Oral Daily        ASSESSMENT /PLAN:    2. Patient is 77 y.o.   female with risk profile for atherosclerotic cardiovascular disease as outlined previously, who was hospitalized on account of altered mental status, which was thought to reflect hepatic encephalopathy possibly triggered by volume contraction versus GI hemorrhage and was noticed to have increased ventricular response.  As mentioned previously, patient has multiple substrates for atrial dysrhythmia including significant cor pulmonale and hypertensive heart disease.  Patient is currently in chronic phase with numerous underlying substrates.  Her ventricular response appears to be fairly well-controlled after additional AV blocker therapy.  Given concomitant cirrhosis, patient has been on nonselective beta blocker therapy and is on anticoagulant therapy with potential for catastrophic hemorrhage given rather advanced esophageal varices.  There is certainly potential for accumulation of diltiazem and it may be desirable to restart the patient on up titrated dosages of nonselective beta blocker therapy especially given her portal hypertension.  To enable up titrated dosages of beta blocker therapy, it may be desirable to discontinue  her hydralazine therapy.     3. Patient appears to have rather diffuse atherosclerotic process with prior history of elevated cardiac markers.  Patient also has diffuse repolarization abnormality and certainly has potential for significant underlying coronary artery disease with no recent workup available.  Patient would be considered candidate for beta blocker therapy, antiplatelet therapy depending on platelet count.  Depending on her renal function patient eventually should undergo angiographic studies though during  hospitalization in 2012 patient did not opt for urgent angiographic studies when she was found to have significant elevation of cardiac markers.  Additional etiologies of elevated troponins may include a right ventricular strain and ischemia.  4. Patient has history of significant shortness of air which conceivably reflects fluid overload.   patient unfortunately has rather advanced cor pulmonale with no definitive prior history of chronic deep venous thrombosis  but apparently she does have prior history of pulmonary thromboembolism.  Patient reportedly has previously undergone CT scan with pulmonary embolus protocol but data is not available.  She is currently on anticoagulant therapy with subtherapeutic INR.   she has substantial risk for hemorrhage given significant esophageal varices.  5. Patient has evidence of progressive fluid overload which appears to be multifactorial.  She does have rather advanced and possibly end-stage liver disease with obvious cirrhosis with potential for hypoalbuminemia and portal hypertension.  Her fairly recent echocardiogram demonstrated fairly advanced cor pulmonale which may reflect recurrent pulmonary thromboembolism despite onboard anticoagulant therapy.  She apparently has history of sleep disorder breathing prompting  possible sleep oximetry and she's been on when necessary supplemental O2.  Conceivably her cirrhosis is advanced and she may not have any  reversible hepatocellular function the she does have infiltrative issues such as hemochromatosis etc. then is still hopefully liver function may reverse or definitive therapy.  As for as her azotemia, it may simply reflect poor perfusion though she does have potential for hepatorenal syndrome at this stage.  She conceivably may have long-standing underlying renal parenchymal disease and may benefit from workup for renal disease as well.  Given evidence of hepatic encephalopathy, patient would not be candidate for aggressive diuretic therapy which may lead to volume contraction.  6. Other issues include history of hypertension with hypertensive heart disease.  Patient does appear to have fairly low blood pressure for which patient was previously on hydralazine therapy which should be held today and will up titration of beta blocker therapy.   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  She also appears to have peripheral arterial disease.  Her additional diagnoses include history of degenerative joint disease, gastroesophageal reflux disease.                        In closing, I sincerely appreciate opportunity to participate in care of this patient. If I can be of any further assistance with the management of this patient, please don’t hesitate to contact me.    HERMINIA MENDOZA M.D. [facility]    Please note that portions of this note may have been completed with a voice recognition program. Efforts were made to edit the dictations, but occasionally words are mistranscribed.

## 2017-10-11 NOTE — THERAPY EVALUATION
Acute Care - Occupational Therapy Initial Evaluation   [place]     Patient Name: Hodan Carter  : 1940  MRN: [id number]  Today's Date: 10/11/2017  Onset of Illness/Injury or Date of Surgery Date: 10/10/17  Date of Referral to OT: 10/11/17  Referring Physician: Dr. Winchester    Admit Date: 10/10/2017       ICD-10-CM ICD-9-CM   1. Acute hepatic encephalopathy K72.00 572.2   2. Acute UTI N39.0 599.0   3. Impaired mobility and ADLs Z74.09 799.89     Patient Active Problem List   Diagnosis   • Ascites   • Cirrhosis of liver with ascites   • Chronic kidney disease, stage IV (severe)   • Permanent atrial fibrillation   • Essential hypertension   • Chronic anemia   • Cellulitis and abscess of trunk   • Shortness of breath   • NSTEMI (non-ST elevated myocardial infarction)   • Chronic kidney disease-mineral and bone disorder   • Candida UTI   • Volume overload   • Acute hepatic encephalopathy     Past Medical History:   Diagnosis Date   • Arthritis    • Cancer     uterin    • Cirrhosis of liver    • Effect, radiation    • GERD (gastroesophageal reflux disease)    • Hernia of abdominal wall     JUST BELOW UMBILICUS   • History of pneumonia    • Hypertension    • NSTEMI (non-ST elevated myocardial infarction) 2017   • Pulmonary embolism    • Wears dentures     UPPER ONLY     Past Surgical History:   Procedure Laterality Date   • ENDOSCOPY     • GALLBLADDER SURGERY     • HERNIA REPAIR     • HYSTERECTOMY     • REPLACEMENT TOTAL KNEE Right           OT ASSESSMENT FLOWSHEET (last 72 hours)      OT Evaluation       10/11/17 1630 10/11/17 1444 10/11/17 1438 10/11/17 1437 10/10/17 1500    Rehab Evaluation    Document Type evaluation  -SD        Subjective Information agree to therapy;complains of;weakness   diarrhea  -SD        Patient Effort, Rehab Treatment adequate  -SD        Symptoms Noted During/After Treatment fatigue  -SD        General Information    Patient Profile Review yes  -SD        Onset of  Illness/Injury or Date of Surgery Date 10/10/17  -SD        Referring Physician Dr. Winchester  -SD        General Observations Patient received lying supine in bed with IV intact  -SD        Pertinent History Of Current Problem Encephalopathy, cirrhosis, CKD, UTI, A-fib, PE, CAD  -SD        Precautions/Limitations fall precautions  -SD        Prior Level of Function independent:;all household mobility;ADL's  -SD        Equipment Currently Used at Home walker, rolling;commode;oxygen;cane, straight  -SD oxygen   2L nightly  -LT commode;walker, standard  -LT  walker, standard;wheelchair  -DS    Plans/Goals Discussed With patient;agreed upon  -SD        Risks Reviewed patient:;increased discomfort  -SD        Benefits Reviewed patient:;increase strength;increase balance  -SD        Barriers to Rehab none identified  -SD        Living Environment    Lives With child(tasneem), adult  -SD  child(tasneem), adult  -LT  child(tasneem), adult  -DS    Living Arrangements house  -SD  house  -LT  house  -DS    Home Accessibility bed and bath on same level  -SD  no concerns;bed and bath on same level  -LT  no concerns  -DS    Stair Railings at Home none  -SD  none  -LT  none  -DS    Type of Financial/Environmental Concern none  -SD  none  -LT  none  -DS    Transportation Available family or friend will provide  -SD  family or friend will provide  -LT  none  -DS    Clinical Impression    Date of Referral to OT 10/11/17  -SD        OT Diagnosis ADL decline  -SD        Impairments Found (describe specific impairments) aerobic capacity/endurance;gait, locomotion, and balance  -SD        Criteria for Skilled Therapeutic Interventions Met yes  -SD        Rehab Potential good, to achieve stated therapy goals  -SD        Therapy Frequency 3-5 times/wk  -SD        Anticipated Discharge Disposition home with home health  -SD        Functional Level Prior    Ambulation    1-->assistive equipment  -LT 3-->assistive equipment and person  -DS     Transferring    1-->assistive equipment  -LT 3-->assistive equipment and person  -DS    Toileting    1-->assistive equipment  -LT 3-->assistive equipment and person  -DS    Bathing    2-->assistive person  -LT 3-->assistive equipment and person  -DS    Dressing    2-->assistive person  -LT 3-->assistive equipment and person  -DS    Eating    0-->independent  -LT 2-->assistive person  -DS    Communication    0-->understands/communicates without difficulty  -LT 2-->difficulty understanding (not related to language barrier)  -DS    Swallowing    0-->swallows foods/liquids without difficulty  -LT 0-->swallows foods/liquids without difficulty  -DS    Prior Functional Level Comment     none  -DS    Pain Assessment    Pain Assessment 0-10  -SD        Pain Score 9  -SD        Post Pain Score 9  -SD        Pain Type Acute pain  -SD        Pain Location Leg  -SD        Pain Orientation Right;Left  -SD        Pain Intervention(s) Repositioned  -SD        Response to Interventions Tolerated  -SD        Vision Assessment/Intervention    Visual Impairment WFL  -SD        Cognitive Assessment/Intervention    Current Cognitive/Communication Assessment impaired  -SD        Orientation Status oriented to;person;place  -SD        Follows Commands/Answers Questions able to follow single-step instructions;needs cueing  -SD        Personal Safety decreased awareness, need for assist;decreased awareness, need for safety  -SD        Personal Safety Interventions fall prevention program maintained;gait belt  -SD        ROM (Range of Motion)    General ROM no range of motion deficits identified  -SD        MMT (Manual Muscle Testing)    General MMT Assessment upper extremity strength deficits identified  -SD        General MMT Assessment Detail 3/5  -SD        Bed Mobility, Assessment/Treatment    Bed Mobility, Assistive Device bed rails;head of bed elevated  -SD        Bed Mobility, Roll Left, [place] contact guard assist  -SD         Bed Mobility, Roll Right, [place] contact guard assist  -SD        Bed Mob, Supine to Sit, [place] minimum assist (75% patient effort)  -SD        Bed Mob, Sit to Supine, [place] minimum assist (75% patient effort)  -SD        Bed Mobility, Safety Issues decreased use of arms for pushing/pulling;decreased use of legs for bridging/pushing  -SD        Bed Mobility, Impairments strength decreased;impaired balance  -SD        Transfer Assessment/Treatment    Transfers, Sit-Stand [place] minimum assist (75% patient effort)  -SD        Transfers, Stand-Sit [place] minimum assist (75% patient effort)  -SD        Transfers, Sit-Stand-Sit, Assist Device rolling walker  -SD        Toilet Transfer, [place] minimum assist (75% patient effort)  -SD        Toilet Transfer, Assistive Device rolling walker  -SD        Transfer, Safety Issues balance decreased during turns;sequencing ability decreased;step length decreased;weight-shifting ability decreased  -SD        Transfer, Impairments strength decreased;impaired balance  -SD        Functional Mobility    Functional Mobility- Ind. Level contact guard assist  -SD        Functional Mobility- Device rolling walker  -SD        Functional Mobility-Distance (Feet) 3  -SD        Functional Mobility- Comment onto BSC  -SD        Upper Body Bathing Assessment/Training    UB Bathing Assess/Train, [place] Level contact guard assist  -SD        Lower Body Bathing Assessment/Training    LB Bathing Assess/Train, [place] Level moderate assist (50% patient effort)  -SD        Upper Body Dressing Assessment/Training    UB Dressing Assess/Train, [place] contact guard assist  -SD        Lower Body Dressing Assessment/Training    LB Dressing Assess/Train, [place] moderate assist (50% patient effort)  -SD        Toileting Assessment/Training    Toileting Assess/Train, Indepen Level moderate assist (50% patient effort)  -SD        Grooming  Assessment/Training    Grooming Assess/Train, Indepen Level supervision required  -SD        Positioning and Restraints    Pre-Treatment Position in bed  -SD        Post Treatment Position bsc  -SD        On BS commode call light within reach;encouraged to call for assist;with family/caregiver  -SD          User Key  (r) = Recorded By, (t) = Taken By, (c) = Cosigned By    Initials Name Effective Dates    DS Eva Dong RN 10/26/16 -     LT Genoveva Terrell 10/26/16 -     SD China Logan OT 06/30/17 -            Occupational Therapy Education     Title: PT OT SLP Therapies (Active)     Topic: Occupational Therapy (Active)     Point: ADL training (Done)    Description: Instruct learner(s) on proper safety adaptation and remediation techniques during self care or transfers.   Instruct in proper use of assistive devices.    Learning Progress Summary    Learner Readiness Method Response Comment Documented by Status   Patient Acceptance E VU Benefits of OT and OT POC SD 10/11/17 1719 Done                      User Key     Initials Effective Dates Name Provider Type Discipline    SD 06/30/17 -  China Logan, OT Occupational Therapist OT                  OT Recommendation and Plan  Anticipated Discharge Disposition: home with home health  Therapy Frequency: 3-5 times/wk  Plan of Care Review  Plan Of Care Reviewed With: patient  Progress: no change  Outcome Summary/Follow up Plan: OT eval completed. Patient presents deficits in strength, endurance, balance, mobility, and ADL performance. Patient is expected to benefit from OT services to improve overall functional performance and mobility prior to DC.           OT Goals       10/11/17 1717          Transfer Training 2 OT LTG    Transfer Training 2 OT LTG, Date Established 10/11/17  -SD      Transfer Training 2 OT LTG, Time to Achieve 2 wks  -SD      Transfer Training 2 OT LTG, Activity Type sit to stand/stand to sit;toilet  -SD      Transfer Training 2 OT LTG,  [place] Level contact guard assist  -SD      Transfer Training 2 OT LTG, Assist Device walker, rolling  -SD      Transfer Training 2 OT LTG, Outcome goal ongoing  -SD      Strength OT LTG    Strength Goal OT LTG, Date Established 10/11/17  -SD      Strength Goal OT LTG, Time to Achieve 2 wks  -SD      Strength Goal OT LTG, Measure to Achieve Patient will perform 15 reps UB ther ex using theraband in order to increase strength and endurance.   -SD      Strength Goal OT LTG, Outcome goal ongoing  -SD      Toileting OT LTG    Toileting Goal OT LTG, Date Established 10/11/17  -SD      Toileting Goal OT LTG, Time to Achieve 2 wks  -SD      Toileting Goal OT LTG, [place] Level contact guard assist  -SD      Toileting Goal OT LTG, Outcome goal ongoing  -SD      LB Dressing OT LTG    LB Dressing Goal OT LTG, Date Established 10/11/17  -SD      LB Dressing Goal OT LTG, Time to Achieve 2 wks  -SD      LB Dressing Goal OT LTG, [place] Level contact guard assist  -SD      LB Dressing Goal OT LTG, Outcome goal ongoing  -SD      Functional Mobility OT LTG    Functional Mobility Goal OT LTG, Date Established 10/11/17  -SD      Functional Mobility Goal OT LTG, Time to Achieve 2 wks  -SD      Functional Mobility Goal OT LTG, [place] Level contact guard  -SD      Functional Mobility Goal OT LTG, Assist Device rolling walker  -SD      Functional Mobility Goal OT LTG, Distance to Achieve in hallway  -SD      Functional Mobility Goal OT LTG, Additional Goal 50  -SD      Functional Mobility Goal OT LTG, Outcome goal ongoing  -SD        User Key  (r) = Recorded By, (t) = Taken By, (c) = Cosigned By    Initials Name Provider Type    JOSE Logan, OT Occupational Therapist                Outcome Measures       10/11/17 1630          How much help from another is currently needed...    Putting on and taking off regular lower body clothing? 2  -SD      Bathing (including washing, rinsing, and drying) 2  -SD       Toileting (which includes using toilet bed pan or urinal) 2  -SD      Putting on and taking off regular upper body clothing 3  -SD      Taking care of personal grooming (such as brushing teeth) 4  -SD      Eating meals 4  -SD      Score 17  -SD      Functional Assessment    Outcome Measure Options AM-PAC 6 Clicks Daily Activity (OT)  -SD        User Key  (r) = Recorded By, (t) = Taken By, (c) = Cosigned By    Initials Name Provider Type    SD China Logan OT Occupational Therapist          Time Calculation:   OT Start Time: 1630    Therapy Charges for Today     Code Description Service Date Service Provider Modifiers Qty    [id number]  OT EVAL LOW COMPLEXITY 4 10/11/2017 China Logan OT GO 1               China Logan OT  10/11/2017

## 2017-10-11 NOTE — PLAN OF CARE
Problem: Patient Care Overview (Adult)  Goal: Plan of Care Review  Outcome: Ongoing (interventions implemented as appropriate)    10/11/17 1717   Coping/Psychosocial Response Interventions   Plan Of Care Reviewed With patient   Patient Care Overview   Progress no change   Outcome Evaluation   Outcome Summary/Follow up Plan OT eval completed. Patient presents deficits in strength, endurance, balance, mobility, and ADL performance. Patient is expected to benefit from OT services to improve overall functional performance and mobility prior to DC.          Problem: Inpatient Occupational Therapy  Goal: Transfer Training Goal 2 LTG- OT  Outcome: Ongoing (interventions implemented as appropriate)    10/11/17 1717   Transfer Training 2 OT LTG   Transfer Training 2 OT LTG, Date Established 10/11/17   Transfer Training 2 OT LTG, Time to Achieve 2 wks   Transfer Training 2 OT LTG, Activity Type sit to stand/stand to sit;toilet   Transfer Training 2 OT LTG, [place] Level contact guard assist   Transfer Training 2 OT LTG, Assist Device walker, rolling   Transfer Training 2 OT LTG, Outcome goal ongoing       Goal: Strength Goal LTG- OT  Outcome: Ongoing (interventions implemented as appropriate)    10/11/17 1717   Strength OT LTG   Strength Goal OT LTG, Date Established 10/11/17   Strength Goal OT LTG, Time to Achieve 2 wks   Strength Goal OT LTG, Measure to Achieve Patient will perform 15 reps UB ther ex using theraband in order to increase strength and endurance.    Strength Goal OT LTG, Outcome goal ongoing       Goal: Toileting Goal LTG- OT  Outcome: Ongoing (interventions implemented as appropriate)    10/11/17 1717   Toileting OT LTG   Toileting Goal OT LTG, Date Established 10/11/17   Toileting Goal OT LTG, Time to Achieve 2 wks   Toileting Goal OT LTG, [place] Level contact guard assist   Toileting Goal OT LTG, Outcome goal ongoing       Goal: LB Dressing Goal LTG- OT  Outcome: Ongoing (interventions  implemented as appropriate)    10/11/17 1717   LB Dressing OT LTG   LB Dressing Goal OT LTG, Date Established 10/11/17   LB Dressing Goal OT LTG, Time to Achieve 2 wks   LB Dressing Goal OT LTG, [place] Level contact guard assist   LB Dressing Goal OT LTG, Outcome goal ongoing       Goal: Functional Mobility Goal LTG- OT  Outcome: Ongoing (interventions implemented as appropriate)    10/11/17 1717   Functional Mobility OT LTG   Functional Mobility Goal OT LTG, Date Established 10/11/17   Functional Mobility Goal OT LTG, Time to Achieve 2 wks   Functional Mobility Goal OT LTG, [place] Level contact guard   Functional Mobility Goal OT LTG, Assist Device rolling walker   Functional Mobility Goal OT LTG, Distance to Achieve in hallway   Functional Mobility Goal OT LTG, Additional Goal 50   Functional Mobility Goal OT LTG, Outcome goal ongoing

## 2017-10-11 NOTE — H&P
Patient awake and reporting chronic bilateral leg pain severe pain. She normally takes Lortab 10/325mg #120 per month. With hepatic encephalopathy, will avoid this. Instead, will use oxycodone low dose sparingly. JOSE DANIEL reviewed and printed.  Medication risks and benefits will be discussed.

## 2017-10-11 NOTE — PLAN OF CARE
Problem: Fluid Volume Excess (Adult,Obstetrics,Pediatric)  Goal: Identify Related Risk Factors and Signs and Symptoms  Outcome: Ongoing (interventions implemented as appropriate)  Goal: Stable Weight  Outcome: Ongoing (interventions implemented as appropriate)  Goal: Balanced Intake/Output  Outcome: Ongoing (interventions implemented as appropriate)    Problem: Skin Integrity Impairment, Risk/Actual (Adult)  Goal: Identify Related Risk Factors and Signs and Symptoms  Outcome: Ongoing (interventions implemented as appropriate)  Goal: Skin Integrity/Wound Healing  Outcome: Ongoing (interventions implemented as appropriate)    Problem: Patient Care Overview (Adult)  Goal: Plan of Care Review  Outcome: Ongoing (interventions implemented as appropriate)  Goal: Adult Individualization and Mutuality  Outcome: Ongoing (interventions implemented as appropriate)

## 2017-10-11 NOTE — PROGRESS NOTES
Discharge Planning Assessment  [facility]     Patient Name: Hodan Carter  MRN: [id number]  Today's Date: 10/11/2017    Admit Date: 10/10/2017          Discharge Needs Assessment       10/11/17 1444    Discharge Needs Assessment    Equipment Currently Used at Home oxygen   2L nightly      10/11/17 1438    Living Environment    Lives With child(tasneem), adult    Living Arrangements house    Home Accessibility no concerns;bed and bath on same level    Stair Railings at Home none    Type of Financial/Environmental Concern none    Transportation Available family or friend will provide    Living Environment    Provides Primary Care For no one, unable/limited ability to care for self    Able to Return to Prior Living Arrangements other (see comments)   depends upon clinical course, pt need rehab    Discharge Needs Assessment    Concerns To Be Addressed denies needs/concerns at this time    Readmission Within The Last 30 Days current reason for admission unrelated to previous admission    Anticipated Changes Related to Illness none    Equipment Currently Used at Home commode;walker, standard    Equipment Needed After Discharge none            Discharge Plan       10/11/17 6321    Case Management/Social Work Plan    Plan Home with resumption of HH    Patient/Family In Agreement With Plan yes    Additional Comments Spoke with pt in room regarding DCP; she is alone.  Pt states she lives in a single story house alone.  However, her daughter is with her most all day and her son stays with her at night.  She uses a standad walker for ambulation and has a bedside commode.  Additionally, pt reports she uses oxygen at 2L [place] nightly.  She states it is supplied by [facility].  Per previous admission assessment note, pt has oxygen supplied by [facility].  She has [facility] currently for nursing and PT/OT, and has a care advisor from [facility] that comes to see her.  She reports that [facility] came recently and has evaluated her living environment.   They are going to get her what she needs at home including meals.  She reports that she gets frozen food currently.  Pt states she plans to return home with her HH at discharge.  Address, PCP and phone numbers verified.  CM will contiue to follow and assist with discharge as needed.      Called [facility], spoke with Sonia and Zarina.  They both verified that they are providing nursing and PT services.  They do not supply anything related to her oxygen, except an oxygen saturation check.  They will accept back to their services when pt is dischaged.  They will need a resumption of care order.          Discharge Placement     No information found        Expected Discharge Date and Time     Expected Discharge Date Expected Discharge Time    Oct 16, 2017               Demographic Summary       10/11/17 1434    Referral Information    Admission Type inpatient    Arrived From home or self-care    Referral Source admission list    Reason For Consult discharge planning    Record Reviewed history and physical;medical record    Primary Care Physician Information    Name Jarrett            Functional Status       10/11/17 1437    Functional Status Prior    Ambulation 1-->assistive equipment    Transferring 1-->assistive equipment    Toileting 1-->assistive equipment    Bathing 2-->assistive person    Dressing 2-->assistive person    Eating 0-->independent    Communication 0-->understands/communicates without difficulty    Swallowing 0-->swallows foods/liquids without difficulty    IADL    Medications assistive person    Meal Preparation assistive person    Housekeeping assistive person    Laundry assistive person    Shopping assistive person    Oral Care independent            Psychosocial     None            Abuse/Neglect     None            Legal     None            Substance Abuse     None            Patient Forms     None          Genoveva Terrell

## 2017-10-12 NOTE — PROGRESS NOTES
"Nephrology Progress Note.    LOS: 2 days    Patient Care Team:  Pieter Farias DO as PCP - General (Internal Medicine)    Chief Complaint:    Chief Complaint   Patient presents with   • Altered Mental Status       Subjective     I have reviewed labs/imaging/records from this hospitalization, including ER staff and admitting/attending physicians H/P's and progress notes to establish a comprehensive understanding of this patient's clinical hospital course, as well as to establish plan of care appropriately.     Patient seen and examined this morning.  Events from last night noted.  Patient denies having any fevers chills.  No nausea or vomiting no abdominal pain.  Denies any chest pain shortness of breath cough or sputum production.  There is no significant edema.   Patient also denies having new onset weakness of numbness of either extremity.      Review of Systems:    The pertinent  ROS was done and it is noted above, rest  was negative.    Objective     Vital Signs  /69 (BP Location: Right arm, Patient Position: Lying)  Pulse 77  Temp 97.9 °F (36.6 °C) (Axillary)   Resp 16  Ht 67\" (170.2 cm)  Wt 248 lb (112 kg)  SpO2 95%  BMI 38.84 kg/m2           Intake/Output Summary (Last 24 hours) at 10/12/17 0810  Last data filed at 10/11/17 1700   Gross per 24 hour   Intake              720 ml   Output                0 ml   Net              720 ml       Physical Exam:    General Appearance: alert, oriented x 3, no acute distress,   HEENT: pupils round and reactive to light, oral mucosa dry, extra occular movements intact.  Neck: supple, no JVD, trachea midline  Lungs: Clear to Auscultation, unlabored breathing effort  Heart: RRR, normal S1 and S2, no S3, no rub  Abdomen: soft, non-tender, no palpable bladder, present bowel sounds to auscultation  Extremities: no edema, cyanosis or clubbing.   Neuro: normal speech and mental status, grossly non focal.     Results Review:      Results from last 7 days  Lab " Units 10/12/17  0515 10/11/17  0631 10/10/17  1148   SODIUM mmol/L 140 139 137   POTASSIUM mmol/L 3.6 3.8 4.4   CHLORIDE mmol/L 104 103 102   CO2 mmol/L 27.0 26.0 25.0*   BUN mg/dL 34* 33* 33*   CREATININE mg/dL 2.30* 2.20* 2.30*   CALCIUM mg/dL 7.9* 8.0* 8.1*   BILIRUBIN mg/dL 0.8 1.2 1.8*   ALK PHOS U/L 145* 156* 182*   ALT (SGPT) U/L 29 26 28   AST (SGOT) U/L 30 32 51*   GLUCOSE mg/dL 94 82 121*       Estimated Creatinine Clearance: 25.6 mL/min (by C-G formula based on Cr of 2.3).      Results from last 7 days  Lab Units 10/12/17  0515 10/11/17  0631   MAGNESIUM mg/dL 1.9 1.5*   PHOSPHORUS mg/dL  --  3.8               Results from last 7 days  Lab Units 10/12/17  0515 10/11/17  0631 10/10/17  1148   WBC 10*3/mm3 2.20* 2.88* 3.51*   HEMOGLOBIN g/dL 8.3* 8.7* 9.9*   PLATELETS 10*3/mm3 104* 119* 161         Results from last 7 days  Lab Units 10/12/17  0515 10/11/17  0631 10/10/17  1148   INR  2.31* 2.03* 2.01*         Imaging Results (last 24 hours)     ** No results found for the last 24 hours. **          ceftriaxone 1 g Intravenous Q24H   [START ON 10/24/2017] cyanocobalamin 1,000 mcg Intramuscular Q28 Days   epoetin parveen 20,000 Units Subcutaneous Q14 Days   fluconazole 200 mg Intravenous Q24H   lactulose 20 g Oral BID   magnesium sulfate 2 g Intravenous Once   pantoprazole 40 mg Oral Q AM   potassium chloride 40 mEq Oral Once   propranolol 40 mg Oral Q8H   rifaximin 550 mg Oral Q12H   warfarin 1 mg Oral Daily          Medication Review:   Current Facility-Administered Medications   Medication Dose Route Frequency Provider Last Rate Last Dose   • cefTRIAXone (ROCEPHIN) IVPB 1 g/50ml dextrose (premix)  1 g Intravenous Q24H Frankie Winchester DO   1 g at 10/11/17 1438   • [START ON 10/24/2017] cyanocobalamin injection 1,000 mcg  1,000 mcg Intramuscular Q28 Days Frankie Winchester DO       • epoetin parveen (EPOGEN,PROCRIT) injection 20,000 Units  20,000 Units Subcutaneous Q14 Days Frankie Winchester DO   20,000  Units at 10/11/17 0905   • fluconazole (DIFLUCAN) IVPB 200 mg  200 mg Intravenous Q24H Frankie Winchester DO   200 mg at 10/11/17 1742   • lactulose solution 20 g  20 g Oral BID Frankie Winchester DO   20 g at 10/11/17 1742   • magnesium sulfate 2g/50 mL (PREMIX) infusion  2 g Intravenous Once Bettie Casarez DO       • ondansetron (ZOFRAN) injection 4 mg  4 mg Intravenous Q6H PRN Frankie Winchester DO       • oxyCODONE (ROXICODONE) immediate release tablet 2.5 mg  2.5 mg Oral Q8H PRN Bettie Casarez DO   2.5 mg at 10/11/17 2138   • pantoprazole (PROTONIX) EC tablet 40 mg  40 mg Oral Q AM Frankie Winchester DO   40 mg at 10/12/17 0601   • potassium chloride (MICRO-K) CR capsule 40 mEq  40 mEq Oral Once Bettie Casarez DO       • propranolol (INDERAL) tablet 40 mg  40 mg Oral Q8H Terry Donis MD   40 mg at 10/12/17 0601   • rifaximin (XIFAXAN) tablet 550 mg  550 mg Oral Q12H Frankie Winchester DO   550 mg at 10/11/17 2134   • sodium chloride 0.9 % flush 1-10 mL  1-10 mL Intravenous PRN Frankie Winchester DO       • sodium chloride 0.9 % flush 10 mL  10 mL Intravenous PRN Tigre Velasquez MD       • sodium chloride 0.9 % flush 10 mL  10 mL Intravenous PRN RICH Hyatt       • warfarin (COUMADIN) tablet 1 mg  1 mg Oral Daily Frankie Winchester DO   1 mg at 10/11/17 1742       Assessment/Plan       1.   Chronic kidney disease, stage IV (severe): Her serum creatinine has been stable with current medications.  She still does appear to have fairly significant amount of fluid.  I think she will benefit from low-dose loop diuretics once her b/p is better. R/O hepatorenal syndrome. She will need to f/u with in a week with me in the office.  2.   Chronic anemia: with prior normal iron stores and likely related to CKD.   If hemoglobin drops close to 7 we'll need blood transfusion.  She did receive Procrit in the past, as well as yesterday.  3.   Chronic kidney disease-mineral and bone disorder: Watch  for abnormalities.  4.   Coronary artery disease- monitored by Dr. Donis  5.   [place]: Today patient did accept the fact that she has obstructive sleep apnea and cannot use the BiPAP.  Some of the issues may be the complication secondary to primary hypertension from untreated obstructive sleep apnea.   6.   Cirrhosis of liver with ascites: Optimize medications  7.   Permanent atrial fibrillation: Rate controlled and Dr. Donis is been following.  8.   Essential hypertension: optimize medication as needed      Details were discussed with the patient as well as family in the room.  Further recommendations will depend on clinical course of the patient during the current hospitalization.      I also discussed the details with the nursing staff.    Rest as ordered.    Julio Franco MD  10/12/17  8:10 AM      EMR Dragon/Transcription disclaimer:   Much of this encounter note is an electronic transcription/translation of spoken language to printed text. The electronic translation of spoken language may permit erroneous, or at times, nonsensical words or phrases to be inadvertently transcribed; Although I have reviewed the note for such errors, some may still exist.

## 2017-10-12 NOTE — DISCHARGE PLACEMENT REQUEST
"KATHY Arnold RN  Case Management  Phone:  [phone]; Fax:  [phone]    New order for auto Cpap and clinicals attached.    Jonathan Carter (77 y.o. Female)     Date of Birth Social Security Number Address Home Phone MRN    1940  [address] [address] [phone] [id number]    Sabianist Marital Status          None        Admission Date Admission Type Admitting Provider Attending Provider Department, Room/Bed    10/10/17 Emergency Twin Morales MD Hinsberg, Francis J, DO [facility] MED SURG  3, 319/1    Discharge Date Discharge Disposition Discharge Destination         Home-Health Care [facility] [place]             Attending Provider: Frankie Winchester DO     Allergies:  No Known Allergies    Isolation:  Contact   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)   Code Status:  FULL    Ht:  67\" (170.2 cm)   Wt:  248 lb (112 kg)    Admission Cmt:  None   Principal Problem:  None                Active Insurance as of 10/10/2017     Primary Coverage     Payor Plan Insurance Group Employer/Plan Group    HUMANA MEDICARE REPLACEMENT HUMANA MEDICARE REPL [id number]     Payor Plan Address Payor Plan Phone Number Effective From Effective To    [address] [address] [phone] 2014     [place] [address]       Subscriber Name Subscriber Birth Date Member ID       JONATHAN CARTER 1940 [id number]                 Emergency Contacts      (Rel.) Home Phone Work Phone Mobile Phone    Rita Mead (Daughter) -- -- [phone]          [facility] MED SURG  3  [address] [facility] [address]  Phone:  [phone]  Fax:   Date Ordered: Oct 12, 2017         Patient:  Jonathan Carter MRN:  [id number]   [address] [facility] [address] :  1940  SSN:    Phone: [phone] Sex:  F     Weight: 248 lb (112 kg)         Ht Readings from Last 1 Encounters:   10/10/17 67\" (170.2 cm)         Miscellaneous DME             (Order ID: [id number])  "   Diagnosis:  Shortness of breath (R06.02 [ICD-10-CM] 786.05 [ICD-9-CM])   Quantity:  1     Type of DME: auto cpap  Length of Need (99 Months = Lifetime): 3 Months            Authorizing Provider:Frankie Winchester DO  Authorizing Provider's NPI: [id number]  Order Entered By: Frankie Winchester DO 10/12/2017 12:59 PM     Electronically signed by: Frankie Winchester DO 10/12/2017 12:59 PM                Discharge Summary      Frankie Winchester DO at 10/12/2017  1:01 PM              [facility]   DISCHARGE SUMMARY      Name:  Hodan Carter   Age:  77 y.o.  Sex:  female  :  1940  MRN:  [id number]   Visit Number:  [id number]  Primary Care Physician:  Pieter Farias DO  Date of Discharge:  10/12/2017  Admission Date:  10/10/2017      Discharge Diagnosis:     1.  Hepatic encephalopathy, resolved  2.  Cirrhosis of unknown etiology   3.  Chronic kidney disease stage IV, worsening  4.  UTI, present on admission, recurrent  5.  Permanent atrial fibrillation on Coumadin, with RVR  6.  History of PE many years ago; Coumadin  7.  History of coronary artery disease  8.  History of uterine cancer status post hysterectomy in the past  9.  Chronic pancytopenia due to #2  10.  Increased lactic acid  11.  Debility  12.  Hematochezia,, reported but not seen here  13.  Hypomagnesemia , replaced  Active Hospital Problems (** Indicates Principal Problem)    Diagnosis Date Noted   • Acute hepatic encephalopathy [K72.00] 10/10/2017   • Volume overload [E87.70] 2017   • Chronic kidney disease-mineral and bone disorder [N18.9, E83.9, M89.9] 2017   • Chronic kidney disease, stage IV (severe) [N18.4] 2017   • Permanent atrial fibrillation [I48.2] 2017   • Essential hypertension [I10] 2017   • Chronic anemia [D64.9] 2017   • Cirrhosis of liver with ascites [K74.60] 2017      Resolved Hospital Problems    Diagnosis Date Noted Date Resolved   No resolved problems  to display.         Presenting Problem/History of Present Illness:    Acute hepatic encephalopathy [K72.00]         Hospital Course:    Initial history: Patient is a 77-year-old  female just released from the hospital on 2017 for problems related to her cirrhosis who comes in with confusion since Saturday.  She is able to communicate but has trouble relating doing a good history.  Most of the history is from her daughters.  She has not had a bowel movement for 3 days until she was given lactulose in the emergency room.  Her daughter states she had worsening confusion to the point where it was moderately severe.  Nothing was improving it.  She also has had a 19 pound weight loss since the last hospital stay with adjustment of her diuretics.  She is still very edematous.  She has ascites as well as peripheral edema.  She is very fatigued at the present time.  She's had decreased appetite and has not been taking in much to eat or drink.  She has seen the transplant team at the [facility], but due to her advanced age she is not a candidate for a liver transplant.  She never drank alcohol, and the etiology of her liver failure is unknown.  Specialist have told her that they thought was from taking Tylenol over the course of many years.  However, her sister had  of cirrhosis.  She currently denies any chest pressure, shortness breath, nausea or vomiting.  She denies any pain.  She denies any fevers or chills.  She saw Dr. Franco from nephrology last time she was here, and had a follow-up appointment with him.  Her creatinine is 2.3, it was 1.8 when she left here 12 days ago.  She also had a urinary tract infection at that time for which she was treated with Rocephin and Diflucan.  She again has increased WBCs and yeast in her urine.  However the specimen does have 7-12 squamous epithelial cells and will need repeating.  Her INR today is 2.01, she is on Coumadin for PE as well as permanent  atrial fibrillation.  She also has a history of uterine cancer many many years ago for which had a hysterectomy.  She had a PE many many years ago as well.  She has chronic pancytopenia from her cirrhosis.  However her bilirubin is 1.8 today.  She has been told also she has esophageal varices, although she has never had bleeding varices.  She is on Protonix as well as Inderal.  She has never been told to take lactulose in the past, and has never had hepatic encephalopathy in the past.  When she did have a bowel movement today reportedly there was blood in the stool.  She is also very weak, and having trouble ambulating at the present time.    Patient was placed on lactulose, and had 3-4 bowel movements per day.  Her ammonia came back down to normal over 2 days.  Her mental status improved dramatically.  Dr. Franco head originally held her diuretic, but now will restart Bumex at reduced dose of 0.5 mg twice a day and 10 new Aldactone 100 mg twice a day.   from cardiology also saw the patient, as the patient was in A. fib with RVR at multiple points during the hospitalization.  Initially oral Cardizem was added, and then ultimately her propranolol was increased to every 8 hours.  It was suspected that the patient had sleep apnea at the last visit she was here.  We are trying to arrange an auto CPAP, and follow-up with Dr. Knowles as an outpatient.  Patient otherwise appears to be doing much better.  She denies any chest pressure, shortness breath, nausea, vomiting, or pain.  Initially was thought she had a urinary tract infection, however cultures have been negative.  Rocephin and Diflucan have been discontinued.  She continues on Coumadin, her INR is therapeutic.  She is working with PT and OT, they will come to her home as well.  He is otherwise stable for discharge home, with close follow-up with Dr. Franco and Dr. Knowles as well as her primary care physician .     Procedures Performed:            Consults:     Consults     Date and Time Order Name Status Description    10/11/2017 1609 Inpatient Consult to Nephrology Completed     10/11/2017 1022 Inpatient Consult to Cardiology      10/10/2017 1634 Inpatient Consult to Nephrology Completed     9/25/2017 1217 Inpatient Consult to Pulmonology Completed     9/24/2017 1114 Inpatient Consult to Nephrology Completed           Pertinent Test Results:     Lab Results (all)     Procedure Component Value Units Date/Time    POC Glucose Fingerstick [[id number]]  (Normal) Collected:  10/10/17 1146    Specimen:  Blood Updated:  10/10/17 1151     Glucose 90 mg/dL       Serial Number: [id number]  [id number]       Protime-INR [[id number]]  (Abnormal) Collected:  10/10/17 1148    Specimen:  Blood Updated:  10/10/17 1208     Protime 22.0 (H) Seconds      INR 2.01 (H)    Lipase [[id number]]  (Normal) Collected:  10/10/17 1148    Specimen:  Blood Updated:  10/10/17 1208     Lipase 113 U/L     Comprehensive Metabolic Panel [[id number]]  (Abnormal) Collected:  10/10/17 1148    Specimen:  Blood Updated:  10/10/17 1215     Glucose 121 (H) mg/dL      BUN 33 (H) mg/dL       Specimen hemolyzed. Results may be affected.        Creatinine 2.30 (H) mg/dL      Sodium 137 mmol/L      Potassium 4.4 mmol/L       Specimen hemolyzed.  Results may be affected.        Chloride 102 mmol/L      CO2 25.0 (L) mmol/L      Calcium 8.1 (L) mg/dL      Total Protein 7.0 g/dL      Albumin 2.60 (L) g/dL      ALT (SGPT) 28 U/L       Specimen hemolyzed.  Results may be affected.        AST (SGOT) 51 (H) U/L       Specimen hemolyzed.  Results may be affected.        Alkaline Phosphatase 182 (H) U/L       Specimen hemolyzed. Results may be affected.        Total Bilirubin 1.8 (H) mg/dL      eGFR Non African Amer 20 (L) mL/min/1.73      Globulin 4.4 gm/dL      A/G Ratio 0.6 (L) g/dL      BUN/Creatinine Ratio 14.3     Anion Gap 14.4 mmol/L     Narrative:       The MDRD GFR formula is only valid for adults  with stable renal function between ages 18 and 70.    CBC Auto Differential [[id number]]  (Abnormal) Collected:  10/10/17 1148    Specimen:  Blood Updated:  10/10/17 1219     WBC 3.51 (L) 10*3/mm3      RBC 3.29 (L) 10*6/mm3      Hemoglobin 9.9 (L) g/dL      Hematocrit 31.2 (L) %      MCV 94.8 fL      MCH 30.1 pg      MCHC 31.7 g/dL      RDW 16.6 (H) %      RDW-SD 57.4 (H) fl      MPV 10.9 fL      Platelets 161 10*3/mm3      Neutrophil % 64.1 %      Lymphocyte % 24.8 %      Monocyte % 8.3 %      Eosinophil % 1.4 %      Basophil % 1.1 %      Immature Grans % 0.3 %      Neutrophils, Absolute 2.25 10*3/mm3      Lymphocytes, Absolute 0.87 10*3/mm3      Monocytes, Absolute 0.29 10*3/mm3      Eosinophils, Absolute 0.05 10*3/mm3      Basophils, Absolute 0.04 10*3/mm3      Immature Grans, Absolute 0.01 10*3/mm3      nRBC 0.0 /100 WBC     CBC & Differential [[id number]] Collected:  10/10/17 1148    Specimen:  Blood Updated:  10/10/17 1219    Narrative:       The following orders were created for panel order CBC & Differential.  Procedure                               Abnormality         Status                     ---------                               -----------         ------                     CBC Auto Differential[[id number]]        Abnormal            Final result                 Please view results for these tests on the individual orders.    Blood Gas, Arterial With Co-Ox [[id number]]  (Abnormal) Collected:  10/10/17 1224    Specimen:  Arterial Blood Updated:  10/10/17 1224     Site Arterial: left brachial     Darrell's Test N/A     pH, Arterial 7.507 (C) pH units      pCO2, Arterial 35.0 mm Hg      pO2, Arterial 75.6 mm Hg      HCO3, Arterial 27.7 mmol/L      Base Excess, Arterial 4.6 mmol/L      O2 Saturation, Arterial 96.4 %      Hemoglobin, Blood Gas 9.4 (L) g/dL      Oxyhemoglobin 94.0 %      Methemoglobin 0.90 %      Carboxyhemoglobin 1.6 %      Modality Room Air     FIO2 21 %     Troponin [[id number]]  (Abnormal)  Collected:  10/10/17 1148    Specimen:  Blood Updated:  10/10/17 1240     Troponin I 0.051 (H) ng/mL     Urinalysis With / Culture If Indicated - Urine, Catheter [[id number]]  (Abnormal) Collected:  10/10/17 1238    Specimen:  Urine from Urine, Catheter Updated:  10/10/17 1251     Color, UA Yellow     Appearance, UA Cloudy (A)     pH, UA 5.5     Specific Gravity, UA 1.010     Glucose, UA Negative     Ketones, UA Negative     Bilirubin, UA Negative     Blood, UA Trace (A)     Protein, UA Negative     Leuk Esterase, UA Moderate (2+) (A)     Nitrite, UA Negative     Urobilinogen, UA 1.0 E.U./dL    Ammonia [[id number]]  (Abnormal) Collected:  10/10/17 1230    Specimen:  Blood Updated:  10/10/17 1253     Ammonia 41 (H) umol/L     Urinalysis, Microscopic Only - Urine, Clean Catch [[id number]]  (Abnormal) Collected:  10/10/17 1238    Specimen:  Urine from Urine, Catheter Updated:  10/10/17 1300     RBC, UA 0-2 (A) /HPF      WBC, UA 31-50 (A) /HPF      Bacteria, UA 1+ (A) /HPF      Squamous Epithelial Cells, UA 7-12 (A) /HPF      Yeast, UA Moderate/2+ Budding Yeast /HPF      Hyaline Casts, UA 0-2 /LPF      Methodology Manual Light Microscopy    Lactic Acid, Plasma [[id number]]  (Abnormal) Collected:  10/10/17 1230    Specimen:  Blood Updated:  10/10/17 1301     Lactate 3.1 (C) mmol/L     [place] Draw [[id number]] Collected:  10/10/17 1148    Specimen:  Blood Updated:  10/10/17 1301    Narrative:       The following orders were created for panel order [place] Draw.  Procedure                               Abnormality         Status                     ---------                               -----------         ------                     Light Blue Top[[id number]]                                   Final result               Lavender Top[[id number]]                                     Final result               Gold Top - SST[[id number]]                                   Final result               Green Top (No Gel)[[id number]]                                Final result                 Please view results for these tests on the individual orders.    Light Blue Top [[id number]] Collected:  10/10/17 1148    Specimen:  Blood Updated:  10/10/17 1301     Extra Tube hold for add-on      Auto resulted       Lavender Top [[id number]] Collected:  10/10/17 1148    Specimen:  Blood Updated:  10/10/17 1301     Extra Tube hold for add-on      Auto resulted       Gold Top - SST [[id number]] Collected:  10/10/17 1148    Specimen:  Blood Updated:  10/10/17 1301     Extra Tube Hold for add-ons.      Auto resulted.       Green Top (No Gel) [[id number]] Collected:  10/10/17 1148    Specimen:  Blood Updated:  10/10/17 1301     Extra Tube Hold for add-ons.      Auto resulted.       Lactic Acid, Plasma [[id number]] Collected:  10/10/17 1230    Specimen:  Blood Updated:  10/10/17 1601     Extra Tube Hold for add-ons.      Auto resulted.       Lactic Acid, Reflex [[id number]]  (Abnormal) Collected:  10/10/17 1623    Specimen:  Blood Updated:  10/10/17 1706     Lactate 3.6 (C) mmol/L     Urinalysis With / Culture If Indicated - Urine, Catheter [[id number]]  (Abnormal) Collected:  10/10/17 2047    Specimen:  Urine from Urine, Catheter Updated:  10/10/17 2107     Color, UA Yellow     Appearance, UA Clear     pH, UA 5.5     Specific Gravity, UA 1.010     Glucose, UA Negative     Ketones, UA Negative     Bilirubin, UA Negative     Blood, UA Trace (A)     Protein, UA Negative     Leuk Esterase, UA Trace (A)     Nitrite, UA Negative     Urobilinogen, UA 0.2 E.U./dL    Urinalysis, Microscopic Only - Urine, Clean Catch [[id number]]  (Abnormal) Collected:  10/10/17 2047    Specimen:  Urine from Urine, Catheter Updated:  10/10/17 2116     RBC, UA 0-2 (A) /HPF      WBC, UA 3-5 (A) /HPF      Bacteria, UA 1+ (A) /HPF      Squamous Epithelial Cells, UA 3-6 (A) /HPF      Hyaline Casts, UA 3-6 /LPF      Amorphous Crystals, UA Small/1+ /HPF      Methodology Manual Light Microscopy    CBC &  Differential [[id number]] Collected:  10/11/17 0631    Specimen:  Blood Updated:  10/11/17 0647    Narrative:       The following orders were created for panel order CBC & Differential.  Procedure                               Abnormality         Status                     ---------                               -----------         ------                     CBC Auto Differential[[id number]]        Abnormal            Final result                 Please view results for these tests on the individual orders.    CBC Auto Differential [[id number]]  (Abnormal) Collected:  10/11/17 0631    Specimen:  Blood Updated:  10/11/17 0647     WBC 2.88 (L) 10*3/mm3      RBC 2.90 (L) 10*6/mm3      Hemoglobin 8.7 (L) g/dL      Hematocrit 27.1 (L) %      MCV 93.4 fL      MCH 30.0 pg      MCHC 32.1 g/dL      RDW 16.5 (H) %      RDW-SD 56.7 (H) fl      MPV 10.7 fL      Platelets 119 (L) 10*3/mm3      Neutrophil % 51.9 %      Lymphocyte % 33.3 %      Monocyte % 11.1 %      Eosinophil % 1.7 %      Basophil % 1.0 %      Immature Grans % 1.0 (H) %      Neutrophils, Absolute 1.49 (L) 10*3/mm3      Lymphocytes, Absolute 0.96 10*3/mm3      Monocytes, Absolute 0.32 10*3/mm3      Eosinophils, Absolute 0.05 10*3/mm3      Basophils, Absolute 0.03 10*3/mm3      Immature Grans, Absolute 0.03 10*3/mm3      nRBC 0.0 /100 WBC     Protime-INR [[id number]]  (Abnormal) Collected:  10/11/17 0631    Specimen:  Blood Updated:  10/11/17 0653     Protime 22.2 (H) Seconds      INR 2.03 (H)    Ammonia [[id number]]  (Normal) Collected:  10/11/17 0631    Specimen:  Blood Updated:  10/11/17 0657     Ammonia 19 umol/L     Comprehensive Metabolic Panel [[id number]]  (Abnormal) Collected:  10/11/17 0631    Specimen:  Blood Updated:  10/11/17 0712     Glucose 82 mg/dL      BUN 33 (H) mg/dL      Creatinine 2.20 (H) mg/dL      Sodium 139 mmol/L      Potassium 3.8 mmol/L      Chloride 103 mmol/L      CO2 26.0 mmol/L      Calcium 8.0 (L) mg/dL      Total Protein 6.0 (L) g/dL       Albumin 2.20 (L) g/dL      ALT (SGPT) 26 U/L      AST (SGOT) 32 U/L      Alkaline Phosphatase 156 (H) U/L      Total Bilirubin 1.2 mg/dL      eGFR Non African Amer 22 (L) mL/min/1.73      Globulin 3.8 gm/dL      A/G Ratio 0.6 (L) g/dL      BUN/Creatinine Ratio 15.0     Anion Gap 13.8 mmol/L     Narrative:       The MDRD GFR formula is only valid for adults with stable renal function between ages 18 and 70.    Magnesium [[id number]]  (Abnormal) Collected:  10/11/17 0631    Specimen:  Blood Updated:  10/11/17 0712     Magnesium 1.5 (L) mg/dL     Phosphorus [[id number]]  (Normal) Collected:  10/11/17 0631    Specimen:  Blood Updated:  10/11/17 0712     Phosphorus 3.8 mg/dL     Reticulocytes [[id number]]  (Abnormal) Collected:  10/11/17 0631    Specimen:  Blood Updated:  10/11/17 0940     Reticulocyte % 2.34 (H) %      Reticulocyte Absolute 0.0676 10*6/mm3     Iron Profile [[id number]]  (Abnormal) Collected:  10/11/17 0631    Specimen:  Blood Updated:  10/11/17 0955     Iron 121 mcg/dL       Specimen hemolyzed.  Results may be affected.        TIBC 208 (L) mcg/dL      Iron Saturation 58 (H) %     CBC (No Diff) [[id number]]  (Abnormal) Collected:  10/12/17 0515    Specimen:  Blood Updated:  10/12/17 0538     WBC 2.20 (L) 10*3/mm3      RBC 2.80 (L) 10*6/mm3      Hemoglobin 8.3 (L) g/dL      Hematocrit 26.4 (L) %      MCV 94.3 fL      MCH 29.6 pg      MCHC 31.4 g/dL      RDW 16.4 (H) %      RDW-SD 56.4 (H) fl      MPV 10.8 fL      Platelets 104 (L) 10*3/mm3     Comprehensive Metabolic Panel [[id number]]  (Abnormal) Collected:  10/12/17 0515    Specimen:  Blood Updated:  10/12/17 0540     Glucose 94 mg/dL      BUN 34 (H) mg/dL      Creatinine 2.30 (H) mg/dL      Sodium 140 mmol/L      Potassium 3.6 mmol/L      Chloride 104 mmol/L      CO2 27.0 mmol/L      Calcium 7.9 (L) mg/dL      Total Protein 6.0 (L) g/dL      Albumin 2.10 (L) g/dL      ALT (SGPT) 29 U/L      AST (SGOT) 30 U/L      Alkaline Phosphatase 145 (H) U/L       Total Bilirubin 0.8 mg/dL      eGFR Non African Amer 20 (L) mL/min/1.73      Globulin 3.9 gm/dL      A/G Ratio 0.5 (L) g/dL      BUN/Creatinine Ratio 14.8     Anion Gap 12.6 mmol/L     Narrative:       The MDRD GFR formula is only valid for adults with stable renal function between ages 18 and 70.    Magnesium [[id number]]  (Normal) Collected:  10/12/17 0515    Specimen:  Blood Updated:  10/12/17 0542     Magnesium 1.9 mg/dL     Protime-INR [[id number]]  (Abnormal) Collected:  10/12/17 0515    Specimen:  Blood Updated:  10/12/17 0543     Protime 25.3 (H) Seconds      INR 2.31 (H)    Ammonia [[id number]]  (Abnormal) Collected:  10/12/17 0515    Specimen:  Blood Updated:  10/12/17 0544     Ammonia <9 (L) umol/L     Urine Culture - Urine, Urine, Clean Catch [[id number]]  (Normal) Collected:  10/10/17 2047    Specimen:  Urine from Urine, Catheter Updated:  10/12/17 0631     Urine Culture No growth    Urine Culture - Urine, Urine, Clean Catch [[id number]] Collected:  10/10/17 1238    Specimen:  Urine from Urine, Catheter Updated:  10/12/17 0632     Urine Culture Mixed Anupam Isolated    Narrative:         Specimen contains mixed organisms of questionable pathogenicity which indicates contamination with commensal anupam.  Further identification is unlikely to provide clinically useful information.  Suggest recollection.    SCANNED - LABS [[id number]] Resulted:  10/10/17      Updated:  10/12/17 1002          Imaging Results (all)     Procedure Component Value Units Date/Time    CT Head Without Contrast [[id number]] Collected:  10/10/17 1325     Updated:  10/10/17 1328    Narrative:       PROCEDURE: CT HEAD WO CONTRAST-     HISTORY: confusion     TECHNIQUE: Axial images were obtained from the skull vertex through the  base without contrast.      COMPARISON: None.     FINDINGS: There is generalized cerebral volume loss. There are patchy  hypodensities in the periventricular white matter consistent with  chronic small vessel ischemic  "change. There is no CT evidence of acute  hemorrhage. There is no mass, mass effect, or midline shift. There is no  hydrocephalus. Bone windows reveal no osseous abnormalities. The  visualized paranasal sinuses are clear.       Impression:       Findings consistent with chronic small vessel ischemic  change with no acute intracranial abnormality.        This study was performed with techniques to keep radiation doses as low  as reasonably achievable (ALARA). Individualized dose reduction  techniques using automated exposure control or adjustment of mA and/or  kV according to the patient size were employed.      This report was finalized on 10/10/2017 1:26 PM by Mae Barrios M.D..    XR Chest 2 View [[id number]] Collected:  10/10/17 1326     Updated:  10/10/17 1334    Narrative:       PROCEDURE: XR CHEST 2 VW-        HISTORY: recent pleural effusion     COMPARISON: September 28, 2017.     FINDINGS: The heart is normal in size. The mediastinum is unremarkable.  The lungs are clear. There is no pneumothorax. There are no acute  osseous abnormalities.           Impression:       No acute cardiopulmonary process.           This report was finalized on 10/10/2017 1:31 PM by Mae Barrios M.D..          Condition on Discharge:      Stable    Vital Signs:    /70  Pulse 77  Temp 97.8 °F (36.6 °C) (Oral)   Resp 18  Ht 67\" (170.2 cm)  Wt 248 lb (112 kg)  SpO2 98%  BMI 38.84 kg/m2    Physical Exam:      General Appearance:    Alert, cooperative, in no acute distress   Head:    Normocephalic, without obvious abnormality, atraumatic   Eyes:            Lids and lashes normal, conjunctivae and sclerae normal, no   icterus, no pallor, corneas clear, PERRLA   Ears:    Ears appear intact with no abnormalities noted   Throat:   No oral lesions, no thrush, oral mucosa moist   Neck:   No adenopathy, supple, trachea midline, no thyromegaly, no     carotid bruit, no JVD   Back:     No kyphosis present, no scoliosis " present, no skin lesions,       erythema or scars, no tenderness to percussion or                   palpation,   range of motion normal   Lungs:     Clear to auscultation,respirations regular, even and                   unlabored    Heart:    Regular rhythm and normal rate, normal S1 and S2, no            murmur, no gallop, no rub, no click   Breast Exam:    Deferred   Abdomen:     Normal bowel sounds, no masses, no organomegaly, soft        non-tender, non-distended, no guarding, no rebound                 tenderness   Genitalia:    Deferred   Extremities:   Moves all extremities well, no edema, no cyanosis, no              redness   Pulses:   Pulses palpable and equal bilaterally   Skin:   No bleeding, bruising or rash   Lymph nodes:   No palpable adenopathy   Neurologic:   Cranial nerves 2 - 12 grossly intact, sensation intact, DTR        present and equal bilaterally         Discharge Disposition:  Home-Health Care [facility] [place]    Discharge Medication:     Hodan Carter   Home Medication Instructions AG:[id number]    Printed on:10/12/17 1311   Medication Information                      atorvastatin (LIPITOR) 10 MG tablet  Take 1 tablet by mouth Every Night.             bumetanide (BUMEX) 1 MG tablet  Take 0.5 tablets by mouth 2 (Two) Times a Day.             cyanocobalamin 1000 MCG/ML injection  Inject 1 mL into the shoulder, thigh, or buttocks Every 28 (Twenty-Eight) Days.             epoetin parveen (EPOGEN,PROCRIT) 82194 UNIT/ML injection  Inject 1 mL under the skin Every 14 (Fourteen) Days. Indications: Chronic Hemolytic Anemia             hydrALAZINE (APRESOLINE) 25 MG tablet  Take 12.5 mg by mouth Daily.             HYDROcodone-acetaminophen (NORCO)  MG per tablet  Take 1 tablet by mouth Every 6 (Six) Hours As Needed for Moderate Pain .             lactulose 20 GM/30ML solution solution  Take 30 mL by mouth 2 (Two) Times a Day for 30 days.             ondansetron (ZOFRAN) 4 MG tablet  Take 4 mg by mouth  Every 8 (Eight) Hours As Needed for Nausea or Vomiting.             pantoprazole (PROTONIX) 40 MG EC tablet  40 mg Daily.             propranolol (INDERAL) 40 MG tablet  Take 1 tablet by mouth Every 8 (Eight) Hours.             spironolactone (ALDACTONE) 100 MG tablet  Take 1 tablet by mouth 2 (Two) Times a Day.             warfarin (COUMADIN) 2.5 MG tablet  Take 1 mg by mouth Daily.                 Discharge Diet:      healthy heart/renal diet    Activity at Discharge:      as tolerated    Follow-up Appointments:  Dr. Knowles for sleep apnea evaluation one week  Dr. Franco in one week    Future Appointments  Date Time Provider Department Center   10/16/2017 4:15 PM Pieter Lennon DO [facility] None     Additional Instructions for the Follow-ups that You Need to Schedule     Ambulatory Referral to Home Health    As directed    Face to Face Visit Date:  10/12/2017   Follow-up Provider for Plan of Care?:  I treated the patient in an acute care facility and will not continue treatment after discharge.   Follow-up Provider:  PIETER LENNON   Reason/Clinical Findings:  weakness,debility   Describe mobility limitations that make leaving home difficult:  weakness,debility   Nursing/Therapeutic Services Requested:   Physical Therapy  Skilled Nursing  Occupational Therapy      Skilled nursing orders:   Cardiopulmonary assessments  Neurovascular assessments      PT orders:  Therapeutic exercise   Occupational orders:  Strengthening   Frequency:  1 Week 1       Protime-INR    Oct 16, 2017 (Approximate)    Results to dr lennon   Is the Patient on Coumadin (Warfarin) therapy?:  Yes                 Test Results Pending at Discharge:           Frankie Winchester DO  10/12/17  1:11 PM                 Electronically signed by Frankie Winchester DO at 10/12/2017  1:11 PM

## 2017-10-12 NOTE — PLAN OF CARE
Problem: Patient Care Overview (Adult)  Goal: Plan of Care Review  Outcome: Ongoing (interventions implemented as appropriate)    10/12/17 2074   Coping/Psychosocial Response Interventions   Plan Of Care Reviewed With patient   Patient Care Overview   Progress improving   Outcome Evaluation   Outcome Summary/Follow up Plan Patient has been receiving lactulose twice a day which has brought her ammonia level from 41 to 19. The patient is resting with no complaints and will likely be discharged with home health today depending on her labs.

## 2017-10-12 NOTE — THERAPY EVALUATION
Acute Care - Physical Therapy Initial Evaluation   [facility]     Patient Name: Hodan Carter  : 1940  MRN: [id number]  Today's Date: 10/12/2017   Onset of Illness/Injury or Date of Surgery Date: 10/10/17  Date of Referral to PT: 10/10/17  Referring Physician: Annabella      Admit Date: 10/10/2017     Visit Dx:    ICD-10-CM ICD-9-CM   1. Acute hepatic encephalopathy K72.00 572.2   2. Acute UTI N39.0 599.0   3. Impaired mobility and ADLs Z74.09 799.89   4. Impaired functional mobility, balance, gait, and endurance Z74.09 V49.89     Patient Active Problem List   Diagnosis   • Ascites   • Cirrhosis of liver with ascites   • Chronic kidney disease, stage IV (severe)   • Permanent atrial fibrillation   • Essential hypertension   • Chronic anemia   • Cellulitis and abscess of trunk   • Shortness of breath   • NSTEMI (non-ST elevated myocardial infarction)   • Chronic kidney disease-mineral and bone disorder   • Candida UTI   • Volume overload   • Acute hepatic encephalopathy     Past Medical History:   Diagnosis Date   • Arthritis    • Cancer     uterin    • Cirrhosis of liver    • Effect, radiation    • GERD (gastroesophageal reflux disease)    • Hernia of abdominal wall     JUST BELOW UMBILICUS   • History of pneumonia    • Hypertension    • NSTEMI (non-ST elevated myocardial infarction) 2017   • Pulmonary embolism    • Wears dentures     UPPER ONLY     Past Surgical History:   Procedure Laterality Date   • ENDOSCOPY     • GALLBLADDER SURGERY     • HERNIA REPAIR     • HYSTERECTOMY     • REPLACEMENT TOTAL KNEE Right           PT ASSESSMENT (last 72 hours)      PT Evaluation       10/12/17 0844 10/11/17 1630    Rehab Evaluation    Document Type evaluation  -LM evaluation  -SD    Subjective Information agree to therapy;no complaints  -LM agree to therapy;complains of;weakness   diarrhea  -SD    Patient Effort, Rehab Treatment good  -LM adequate  -SD    Symptoms Noted During/After Treatment  fatigue  -SD     General Information    Patient Profile Review yes  -LM yes  -SD    Onset of Illness/Injury or Date of Surgery Date 10/10/17  -LM 10/10/17  -SD    Referring Physician Annabella  -EARLENE Winchester  -SD    General Observations Pt received sitting on BSC. Agreeable to work with PT.  -LM Patient received lying supine in bed with IV intact  -SD    Pertinent History Of Current Problem Hepatic encephalopathy;cirrhosis,uterine CA,GERD,CAD,HTN,Afib,CKD  -LM Encephalopathy, cirrhosis, CKD, UTI, A-fib, PE, CAD  -SD    Precautions/Limitations fall precautions  -LM fall precautions  -SD    Prior Level of Function independent:;all household mobility  -LM independent:;all household mobility;ADL's  -SD    Equipment Currently Used at Home commode;walker, rolling;oxygen;cane, straight;shower chair;wheelchair  -LM walker, rolling;commode;oxygen;cane, straight  -SD    Plans/Goals Discussed With patient;agreed upon  -LM patient;agreed upon  -SD    Risks Reviewed patient:;LOB;increased discomfort  -LM patient:;increased discomfort  -SD    Benefits Reviewed patient:;improve function;increase independence  -LM patient:;increase strength;increase balance  -SD    Barriers to Rehab none identified  -LM none identified  -SD    Living Environment    Lives With child(tasneem), adult  -LM child(tasneem), adult  -SD    Living Arrangements house  -LM house  -SD    Home Accessibility bed and bath on same level  -LM bed and bath on same level  -SD    Stair Railings at Home  none  -SD    Type of Financial/Environmental Concern  none  -SD    Transportation Available  family or friend will provide  -SD    Clinical Impression    Date of Referral to PT 10/10/17  -LM     PT Diagnosis Generalized weakness  -LM     Patient/Family Goals Statement Return home.  -LM     Criteria for Skilled Therapeutic Interventions Met yes;treatment indicated  -LM     Rehab Potential good, to achieve stated therapy goals  -LM     Pain Assessment    Pain Assessment No/denies pain  -LM  0-10  -SD    Pain Score  9  -SD    Post Pain Score  9  -SD    Pain Type  Acute pain  -SD    Pain Location  Leg  -SD    Pain Orientation  Right;Left  -SD    Pain Intervention(s)  Repositioned  -SD    Response to Interventions  Tolerated  -SD    Vision Assessment/Intervention    Visual Impairment  WFL  -SD    Cognitive Assessment/Intervention    Current Cognitive/Communication Assessment functional  -LM impaired  -SD    Orientation Status oriented x 4  -LM oriented to;person;place  -SD    Follows Commands/Answers Questions 100% of the time;needs cueing  -LM able to follow single-step instructions;needs cueing  -SD    Personal Safety decreased awareness, need for assist;decreased awareness, need for safety  -LM decreased awareness, need for assist;decreased awareness, need for safety  -SD    Personal Safety Interventions fall prevention program maintained;gait belt;nonskid shoes/slippers when out of bed  -LM fall prevention program maintained;gait belt  -SD    ROM (Range of Motion)    General ROM no range of motion deficits identified  -LM no range of motion deficits identified  -SD    MMT (Manual Muscle Testing)    General MMT Assessment upper extremity strength deficits identified;lower extremity strength deficits identified  -LM upper extremity strength deficits identified  -SD    General MMT Assessment Detail Grossly 3+/5.  -LM 3/5  -SD    Bed Mobility, Assessment/Treatment    Bed Mobility, Assistive Device  bed rails;head of bed elevated  -SD    Bed Mobility, Roll Left, [place]  contact guard assist  -SD    Bed Mobility, Roll Right, [place]  contact guard assist  -SD    Bed Mob, Supine to Sit, [place]  minimum assist (75% patient effort)  -SD    Bed Mob, Sit to Supine, [place]  minimum assist (75% patient effort)  -SD    Bed Mobility, Safety Issues  decreased use of arms for pushing/pulling;decreased use of legs for bridging/pushing  -SD    Bed Mobility, Impairments  strength decreased;impaired  balance  -SD    Transfer Assessment/Treatment    Transfers, Sit-Stand [place] contact guard assist  -LM minimum assist (75% patient effort)  -SD    Transfers, Stand-Sit [place] contact guard assist  -LM minimum assist (75% patient effort)  -SD    Transfers, Sit-Stand-Sit, Assist Device rolling walker  -LM rolling walker  -SD    Toilet Transfer, [place] contact guard assist  -LM minimum assist (75% patient effort)  -SD    Toilet Transfer, Assistive Device rolling walker  -LM rolling walker  -SD    Transfer, Safety Issues  balance decreased during turns;sequencing ability decreased;step length decreased;weight-shifting ability decreased  -SD    Transfer, Impairments strength decreased;impaired balance  -LM strength decreased;impaired balance  -SD    Gait Assessment/Treatment    Gait, [place] Level contact guard assist  -LM     Gait, Assistive Device rolling walker  -LM     Gait, Distance (Feet) 144  -LM     Gait, Gait Pattern Analysis swing-to gait  -LM     Gait, Gait Deviations diogo decreased;forward flexed posture;toe-to-floor clearance decreased  -LM     Gait, Safety Issues balance decreased during turns;sequencing ability decreased;step length decreased  -LM     Gait, Impairments strength decreased;impaired balance  -LM     Positioning and Restraints    Pre-Treatment Position bedside commode  -LM in bed  -SD    Post Treatment Position chair  -LM bsc  -SD    In Chair reclined;call light within reach;encouraged to call for assist  -LM     On BS commode  call light within reach;encouraged to call for assist;with family/caregiver  -SD      10/11/17 1444 10/11/17 1438    General Information    Equipment Currently Used at Home oxygen   2L nightly  -LT commode;walker, standard  -LT    Living Environment    Lives With  child(tasneem), adult  -LT    Living Arrangements  house  -LT    Home Accessibility  no concerns;bed and bath on same level  -LT    Stair Railings at Home  none  -LT    Type of  Financial/Environmental Concern  none  -LT    Transportation Available  family or friend will provide  -LT      10/10/17 1500       General Information    Equipment Currently Used at Home walker, standard;wheelchair  -DS     Living Environment    Lives With child(tasneem), adult  -DS     Living Arrangements house  -DS     Home Accessibility no concerns  -DS     Stair Railings at Home none  -DS     Type of Financial/Environmental Concern none  -DS     Transportation Available none  -DS       User Key  (r) = Recorded By, (t) = Taken By, (c) = Cosigned By    Initials Name Provider Type    DS Eva Dong, RN Registered Nurse    EARLENE Khan, PT Physical Therapist    LT Genoveva Terrell     JOSE Logan, OT Occupational Therapist          Physical Therapy Education     Title: PT OT SLP Therapies (Active)     Topic: Physical Therapy (Done)     Point: Mobility training (Done)    Learning Progress Summary    Learner Readiness Method Response Comment Documented by Status   Patient Acceptance E VU Purpose of PT/POC.  Proper use of RW for safe transfers/ambulation.  10/12/17 0933 Done               Point: Home exercise program (Done)    Learning Progress Summary    Learner Readiness Method Response Comment Documented by Status   Patient Acceptance E VU Purpose of PT/POC.  Proper use of RW for safe transfers/ambulation.  10/12/17 0933 Done               Point: Precautions (Done)    Learning Progress Summary    Learner Readiness Method Response Comment Documented by Status   Patient Acceptance E VU Purpose of PT/POC.  Proper use of RW for safe transfers/ambulation.  10/12/17 0933 Done                      User Key     Initials Effective Dates Name Provider Type Discipline     10/26/16 -  Serene Khan, PT Physical Therapist PT                PT Recommendation and Plan  Anticipated Discharge Disposition: home with home health  Planned Therapy Interventions: balance training, bed mobility training,  gait training, home exercise program, patient/family education, strengthening, transfer training  PT Frequency: daily  Plan of Care Review  Plan Of Care Reviewed With: patient  Outcome Summary/Follow up Plan: PT vipin completed.  Pt presents with decreased strength, balance, endurance and independence with mobility.  She is expected to benefit from continued skilled PT intervention to improve her overall functional mobility status prior to D/C.          IP PT Goals       10/12/17 0934          Bed Mobility PT LTG    Bed Mobility PT LTG, Date Established 10/12/17  -LM      Bed Mobility PT LTG, Time to Achieve 2 wks  -LM      Bed Mobility PT LTG, Activity Type supine to sit/sit to supine  -LM      Bed Mobility PT LTG, [place] Level conditional independence  -LM      Bed Mobility PT Goal  LTG, Assist Device bed rails  -LM      Bed Mobility PT LTG, Outcome goal ongoing  -LM      Transfer Training PT LTG    Transfer Training PT LTG, Date Established 10/12/17  -LM      Transfer Training PT LTG, Time to Achieve 2 wks  -LM      Transfer Training PT LTG, Activity Type sit to stand/stand to sit;bed to chair /chair to bed  -LM      Transfer Training PT LTG, [place] Level supervision required  -LM      Transfer Training PT LTG, Assist Device walker, rolling  -LM      Transfer Training PT LTG, Outcome goal ongoing  -LM      Gait Training PT LTG    Gait Training Goal PT LTG, Date Established 10/12/17  -LM      Gait Training Goal PT LTG, Time to Achieve 2 wks  -LM      Gait Training Goal PT LTG, [place] Level contact guard assist  -LM      Gait Training Goal PT LTG, Assist Device walker, rolling  -LM      Gait Training Goal PT LTG, Distance to Achieve 300  -LM      Gait Training Goal PT LTG, Outcome goal ongoing  -LM      Strength Goal PT LTG    Strength Goal PT LTG, Date Established 10/12/17  -LM      Strength Goal PT LTG, Time to Achieve 2 wks  -LM      Strength Goal PT LTG, Measure to Achieve Pt  will perform B LE  ther ex x 15 reps to improve functional strength for mobility.  -LM      Strength Goal PT LTG, Outcome goal ongoing  -LM        User Key  (r) = Recorded By, (t) = Taken By, (c) = Cosigned By    Initials Name Provider Type    EARLENE Khan PT Physical Therapist                Outcome Measures       10/12/17 0844 10/11/17 1630       How much help from another person do you currently need...    Turning from your back to your side while in flat bed without using bedrails? 3  -LM      Moving from lying on back to sitting on the side of a flat bed without bedrails? 3  -LM      Moving to and from a bed to a chair (including a wheelchair)? 3  -LM      Standing up from a chair using your arms (e.g., wheelchair, bedside chair)? 3  -LM      Climbing 3-5 steps with a railing? 2  -LM      To walk in hospital room? 3  -LM      AM-PAC 6 Clicks Score 17  -LM      How much help from another is currently needed...    Putting on and taking off regular lower body clothing?  2  -SD     Bathing (including washing, rinsing, and drying)  2  -SD     Toileting (which includes using toilet bed pan or urinal)  2  -SD     Putting on and taking off regular upper body clothing  3  -SD     Taking care of personal grooming (such as brushing teeth)  4  -SD     Eating meals  4  -SD     Score  17  -SD     Functional Assessment    Outcome Measure Options AM-PAC 6 Clicks Basic Mobility (PT)  -LM AM-PAC 6 Clicks Daily Activity (OT)  -SD       User Key  (r) = Recorded By, (t) = Taken By, (c) = Cosigned By    Initials Name Provider Type    EARLENE Khan PT Physical Therapist    JOSE Logan OT Occupational Therapist           Time Calculation:         PT Charges       10/12/17 0937          Time Calculation    Start Time 0844  -LM      PT Received On 10/12/17  -LM      PT Goal Re-Cert Due Date 10/22/17  -LM        User Key  (r) = Recorded By, (t) = Taken By, (c) = Cosigned By    Initials Name Provider Type    EARLENE Khan PT Physical  Therapist          Therapy Charges for Today     Code Description Service Date Service Provider Modifiers Qty    03600954932 HC PT EVAL MOD COMPLEXITY 4 10/12/2017 Serene Khan, PT GP 1          PT G-Codes  Outcome Measure Options: AM-PAC 6 Clicks Basic Mobility (PT)      Serene Khan, PT  10/12/2017

## 2017-10-12 NOTE — PROGRESS NOTES
Cardiology Progress Note  Terry Donis MD  PATIENT: Hodan Carter : 1940   DATE: 10/12/17   319/1  Reason for the visit: History of atrial fibrillation with rapid ventricular response and history of wide complex tachycardia    Patient Care Team:  Pieter Farias DO as PCP - General (Internal Medicine)    Subjective .  Patient apparently had some wheezing last night which has resolved.  She prefers to sleep with the head elevated but has not had any PND's.  Patient does have substantial dependent edema as outlined earlier.  Patient has not experienced any chest pain.    Objective     Vital Sign Min/Max for last 24 hours  Temp  Min: 97.8 °F (36.6 °C)  Max: 98.7 °F (37.1 °C)   BP  Min: 100/69  Max: 118/60   Pulse  Min: 75  Max: 92   Resp  Min: 16  Max: 20   SpO2  Min: 95 %  Max: 98 %   No Data Recorded   No Data Recorded     Wt Readings from Last 7 Encounters:   10/10/17 248 lb (112 kg)   17 248 lb 12.8 oz (113 kg)   17 224 lb (102 kg)   17 224 lb (102 kg)   17 233 lb 11 oz (106 kg)   17 200 lb (90.7 kg)   04/10/17 224 lb (102 kg)          Physical Exam:    CONSTITUTIONAL: Not in acute distress.    NECK: Carotid upstroke is normal, there are no carotid bruits, no JVD, no thyromegaly, no cervical or axillary lymphadenopathy.     HEART: [place] beat is not displaced, no thrill is appreciated and both heart sounds are normal.  There is no murmur or rub audible.     BRONCHOPULMONARY: Patient has good air entry bilaterally with bronchovesicular sounds. No adventious sounds audible.     GI & : Soft, no tenderness elicited and no viscera are palpable. Bowel sounds are positive and there is no renal bruits audible.       Intake/Output Summary (Last 24 hours) at 10/12/17 0858  Last data filed at 10/11/17 1700   Gross per 24 hour   Intake              720 ml   Output                0 ml   Net              720 ml       Results Review:    LABS:     Results from last 7 days  Lab Units  10/12/17  0515 10/11/17  0631 10/10/17  1148   WBC 10*3/mm3 2.20* 2.88* 3.51*   HEMOGLOBIN g/dL 8.3* 8.7* 9.9*   HEMATOCRIT % 26.4* 27.1* 31.2*   PLATELETS 10*3/mm3 104* 119* 161     I reviewed the patient's new clinical results.    Results from last 7 days  Lab Units 10/12/17  0515 10/11/17  0631 10/10/17  1148   WBC 10*3/mm3 2.20* 2.88* 3.51*   HEMOGLOBIN g/dL 8.3* 8.7* 9.9*   HEMATOCRIT % 26.4* 27.1* 31.2*   PLATELETS 10*3/mm3 104* 119* 161       Results from last 7 days  Lab Units 10/12/17  0515 10/11/17  0631 10/10/17  1148   SODIUM mmol/L 140 139 137   POTASSIUM mmol/L 3.6 3.8 4.4   CHLORIDE mmol/L 104 103 102   CO2 mmol/L 27.0 26.0 25.0*   BUN mg/dL 34* 33* 33*   CREATININE mg/dL 2.30* 2.20* 2.30*   GLUCOSE mg/dL 94 82 121*   CALCIUM mg/dL 7.9* 8.0* 8.1*       Results from last 7 days  Lab Units 10/10/17  1148   TROPONIN I ng/mL 0.051*            Results from last 7 days  Lab Units 10/12/17  0515 10/11/17  0631 10/10/17  1148   INR  2.31* 2.03* 2.01*     No results found for: HGBA1C        Results from last 7 days  Lab Units 10/10/17  1148   LIPASE U/L 113       RADIOLOGY: Imaging Results (last 24 hours)     ** No results found for the last 24 hours. **                 No Known Allergies    [START ON 10/24/2017] cyanocobalamin 1,000 mcg Intramuscular Q28 Days   epoetin parveen 20,000 Units Subcutaneous Q14 Days   lactulose 20 g Oral BID   magnesium sulfate 2 g Intravenous Once   pantoprazole 40 mg Oral Q AM   potassium chloride 40 mEq Oral Once   propranolol 40 mg Oral Q8H   rifaximin 550 mg Oral Q12H   warfarin 1 mg Oral Daily        Active Problems:    Cirrhosis of liver with ascites    Chronic kidney disease, stage IV (severe)    Permanent atrial fibrillation    Essential hypertension    Chronic anemia    Chronic kidney disease-mineral and bone disorder    Volume overload    Acute hepatic encephalopathy    Assessment/Plan     1. Patient, who is currently in chronic phase of atrial fibrillation, has had  excellent control of ventricular response on up titrated dosages of beta blocker therapy.  She did require Cardizem yesterday for optimizing rate control and given potential for recommendation, she was recommended to continue nonselective beta blocker therapy which would be relevant from perspective of portal hypertension as well.  She is anticoagulated presumably on account of both atrial dysrhythmia and possible prior ulnarly thromboembolism.  Patient did have wide complex tachycardia conceivably reflecting ventricular tachycardia for which she has numerous substrates including profound cor pulmonale but at this stage other than continued beta blocker therapy and optimize nocturnal saturation, no further intervention would be relevant.  Patient should be considered candidate for empiric positive pressure breathing to reduce future potential for both bradydysrhythmias and high-grade ventricular tachyarrhythmias and sudden cardiac death.  2. Patient has profound cor pulmonale which may reflect either recurrent pulmonary thromboembolism over the years despite onboard anticoagulant therapy.  She apparently has undergone VQ scan in the past but data is not available.  She would also merit workup for sleep-disordered breathing though additional issues such as interstitial lung disease etc. should be excluded.  Patient has been previously evaluated by pulmonary and sleep services.  She did not have any evidence of intracardiac shunt based on prior echocardiogram.  3. Patient has had intermittent elevation of cardiac markers over the years which conceivably affect cor pulmonale the she does have potential underlying coronary artery disease.  She is already on beta blocker therapy and anticoagulant therapy.  4. Patient appears to have rather advanced cirrhosis with significant portal hypertension and hepatocellular dysfunction with hepatic encephalopathy possibly triggered by volume contraction, sepsis versus GI hemorrhage  with poor short and long-term prognosis.    I discussed the patients findings and my recommendations with the patient in depth.    Terry Donis MD  10/12/17  8:58 AM  Please note that portions of this note may have been completed with a voice recognition program. Efforts were made to edit the dictations, but occasionally words are mistranscribed.

## 2017-10-12 NOTE — PROGRESS NOTES
Case Management Discharge Note         Discharge Placement     Facility/Agency Request Status Selected? Address Phone Number Fax Number    [facility] Accepted     [address] [place] [address] [phone]        Other: Other (Private vehicle)    Discharge Codes: 06  Discharged/transferred to home under care of organized home health service organization in anticipation of skilled care

## 2017-10-12 NOTE — DISCHARGE SUMMARY
[facility]   DISCHARGE SUMMARY      Name:  Hodan Carter   Age:  77 y.o.  Sex:  female  :  1940  MRN:  [id number]   Visit Number:  [id number]  Primary Care Physician:  Pieter Farias DO  Date of Discharge:  10/12/2017  Admission Date:  10/10/2017      Discharge Diagnosis:     1.  Hepatic encephalopathy, resolved  2.  Cirrhosis of unknown etiology   3.  Chronic kidney disease stage IV, worsening  4.  UTI, present on admission, recurrent  5.  Permanent atrial fibrillation on Coumadin, with RVR  6.  History of PE many years ago; Coumadin  7.  History of coronary artery disease  8.  History of uterine cancer status post hysterectomy in the past  9.  Chronic pancytopenia due to #2  10.  Increased lactic acid  11.  Debility  12.  Hematochezia,, reported but not seen here  13.  Hypomagnesemia, replaced  Active Hospital Problems (** Indicates Principal Problem)    Diagnosis Date Noted   • Acute hepatic encephalopathy [K72.00] 10/10/2017   • Volume overload [E87.70] 2017   • Chronic kidney disease-mineral and bone disorder [N18.9, E83.9, M89.9] 2017   • Chronic kidney disease, stage IV (severe) [N18.4] 2017   • Permanent atrial fibrillation [I48.2] 2017   • Essential hypertension [I10] 2017   • Chronic anemia [D64.9] 2017   • Cirrhosis of liver with ascites [K74.60] 2017      Resolved Hospital Problems    Diagnosis Date Noted Date Resolved   No resolved problems to display.         Presenting Problem/History of Present Illness:    Acute hepatic encephalopathy [K72.00]         Hospital Course:    Initial history: Patient is a 77-year-old  female just released from the hospital on 2017 for problems related to her cirrhosis who comes in with confusion since Saturday.  She is able to communicate but has trouble relating doing a good history.  Most of the history is from her daughters.  She has not had a bowel movement for 3 days  until she was given lactulose in the emergency room.  Her daughter states she had worsening confusion to the point where it was moderately severe.  Nothing was improving it.  She also has had a 19 pound weight loss since the last hospital stay with adjustment of her diuretics.  She is still very edematous.  She has ascites as well as peripheral edema.  She is very fatigued at the present time.  She's had decreased appetite and has not been taking in much to eat or drink.  She has seen the transplant team at the [facility], but due to her advanced age she is not a candidate for a liver transplant.  She never drank alcohol, and the etiology of her liver failure is unknown.  Specialist have told her that they thought was from taking Tylenol over the course of many years.  However, her sister had  of cirrhosis.  She currently denies any chest pressure, shortness breath, nausea or vomiting.  She denies any pain.  She denies any fevers or chills.  She saw Dr. Franco from nephrology last time she was here, and had a follow-up appointment with him.  Her creatinine is 2.3, it was 1.8 when she left here 12 days ago.  She also had a urinary tract infection at that time for which she was treated with Rocephin and Diflucan.  She again has increased WBCs and yeast in her urine.  However the specimen does have 7-12 squamous epithelial cells and will need repeating.  Her INR today is 2.01, she is on Coumadin for PE as well as permanent atrial fibrillation.  She also has a history of uterine cancer many many years ago for which had a hysterectomy.  She had a PE many many years ago as well.  She has chronic pancytopenia from her cirrhosis.  However her bilirubin is 1.8 today.  She has been told also she has esophageal varices, although she has never had bleeding varices.  She is on Protonix as well as Inderal.  She has never been told to take lactulose in the past, and has never had hepatic encephalopathy in the past.   When she did have a bowel movement today reportedly there was blood in the stool.  She is also very weak, and having trouble ambulating at the present time.    Patient was placed on lactulose, and had 3-4 bowel movements per day.  Her ammonia came back down to normal over 2 days.  Her mental status improved dramatically.  Dr. Franco head originally held her diuretic, but now will restart Bumex at reduced dose of 0.5 mg twice a day and 10 new Aldactone 100 mg twice a day.   from cardiology also saw the patient, as the patient was in A. fib with RVR at multiple points during the hospitalization.  Initially oral Cardizem was added, and then ultimately her propranolol was increased to every 8 hours.  It was suspected that the patient had sleep apnea at the last visit she was here.  We are trying to arrange an auto CPAP, and follow-up with Dr. Knowles as an outpatient.  Patient otherwise appears to be doing much better.  She denies any chest pressure, shortness breath, nausea, vomiting, or pain.  Initially was thought she had a urinary tract infection, however cultures have been negative.  Rocephin and Diflucan have been discontinued.  She continues on Coumadin, her INR is therapeutic.  She is working with PT and OT, they will come to her home as well.  He is otherwise stable for discharge home, with close follow-up with Dr. Franco and Dr. Knowles as well as her primary care physician .     Procedures Performed:           Consults:     Consults     Date and Time Order Name Status Description    10/11/2017 1609 Inpatient Consult to Nephrology Completed     10/11/2017 1022 Inpatient Consult to Cardiology      10/10/2017 1634 Inpatient Consult to Nephrology Completed     9/25/2017 1217 Inpatient Consult to Pulmonology Completed     9/24/2017 1114 Inpatient Consult to Nephrology Completed           Pertinent Test Results:     Lab Results (all)     Procedure Component Value Units Date/Time    POC Glucose  Fingerstick [[id number]]  (Normal) Collected:  10/10/17 1146    Specimen:  Blood Updated:  10/10/17 1151     Glucose 90 mg/dL       Serial Number: [id number]:  [id number]       Protime-INR [[id number]]  (Abnormal) Collected:  10/10/17 1148    Specimen:  Blood Updated:  10/10/17 1208     Protime 22.0 (H) Seconds      INR 2.01 (H)    Lipase [[id number]]  (Normal) Collected:  10/10/17 1148    Specimen:  Blood Updated:  10/10/17 1208     Lipase 113 U/L     Comprehensive Metabolic Panel [[id number]]  (Abnormal) Collected:  10/10/17 1148    Specimen:  Blood Updated:  10/10/17 1215     Glucose 121 (H) mg/dL      BUN 33 (H) mg/dL       Specimen hemolyzed. Results may be affected.        Creatinine 2.30 (H) mg/dL      Sodium 137 mmol/L      Potassium 4.4 mmol/L       Specimen hemolyzed.  Results may be affected.        Chloride 102 mmol/L      CO2 25.0 (L) mmol/L      Calcium 8.1 (L) mg/dL      Total Protein 7.0 g/dL      Albumin 2.60 (L) g/dL      ALT (SGPT) 28 U/L       Specimen hemolyzed.  Results may be affected.        AST (SGOT) 51 (H) U/L       Specimen hemolyzed.  Results may be affected.        Alkaline Phosphatase 182 (H) U/L       Specimen hemolyzed. Results may be affected.        Total Bilirubin 1.8 (H) mg/dL      eGFR Non African Amer 20 (L) mL/min/1.73      Globulin 4.4 gm/dL      A/G Ratio 0.6 (L) g/dL      BUN/Creatinine Ratio 14.3     Anion Gap 14.4 mmol/L     Narrative:       The MDRD GFR formula is only valid for adults with stable renal function between ages 18 and 70.    CBC Auto Differential [[id number]]  (Abnormal) Collected:  10/10/17 1148    Specimen:  Blood Updated:  10/10/17 1219     WBC 3.51 (L) 10*3/mm3      RBC 3.29 (L) 10*6/mm3      Hemoglobin 9.9 (L) g/dL      Hematocrit 31.2 (L) %      MCV 94.8 fL      MCH 30.1 pg      MCHC 31.7 g/dL      RDW 16.6 (H) %      RDW-SD 57.4 (H) fl      MPV 10.9 fL      Platelets 161 10*3/mm3      Neutrophil % 64.1 %      Lymphocyte % 24.8 %      Monocyte % 8.3 %       Eosinophil % 1.4 %      Basophil % 1.1 %      Immature Grans % 0.3 %      Neutrophils, Absolute 2.25 10*3/mm3      Lymphocytes, Absolute 0.87 10*3/mm3      Monocytes, Absolute 0.29 10*3/mm3      Eosinophils, Absolute 0.05 10*3/mm3      Basophils, Absolute 0.04 10*3/mm3      Immature Grans, Absolute 0.01 10*3/mm3      nRBC 0.0 /100 WBC     CBC & Differential [[id number]] Collected:  10/10/17 1148    Specimen:  Blood Updated:  10/10/17 1219    Narrative:       The following orders were created for panel order CBC & Differential.  Procedure                               Abnormality         Status                     ---------                               -----------         ------                     CBC Auto Differential[[id number]]        Abnormal            Final result                 Please view results for these tests on the individual orders.    Blood Gas, Arterial With Co-Ox [[id number]]  (Abnormal) Collected:  10/10/17 1224    Specimen:  Arterial Blood Updated:  10/10/17 1224     Site Arterial: left brachial     Darrell's Test N/A     pH, Arterial 7.507 (C) pH units      pCO2, Arterial 35.0 mm Hg      pO2, Arterial 75.6 mm Hg      HCO3, Arterial 27.7 mmol/L      Base Excess, Arterial 4.6 mmol/L      O2 Saturation, Arterial 96.4 %      Hemoglobin, Blood Gas 9.4 (L) g/dL      Oxyhemoglobin 94.0 %      Methemoglobin 0.90 %      Carboxyhemoglobin 1.6 %      Modality Room Air     FIO2 21 %     Troponin [[id number]]  (Abnormal) Collected:  10/10/17 1148    Specimen:  Blood Updated:  10/10/17 1240     Troponin I 0.051 (H) ng/mL     Urinalysis With / Culture If Indicated - Urine, Catheter [[id number]]  (Abnormal) Collected:  10/10/17 1238    Specimen:  Urine from Urine, Catheter Updated:  10/10/17 1251     Color, UA Yellow     Appearance, UA Cloudy (A)     pH, UA 5.5     Specific Gravity, UA 1.010     Glucose, UA Negative     Ketones, UA Negative     Bilirubin, UA Negative     Blood, UA Trace (A)     Protein, UA  Negative     Leuk Esterase, UA Moderate (2+) (A)     Nitrite, UA Negative     Urobilinogen, UA 1.0 E.U./dL    Ammonia [[id number]]  (Abnormal) Collected:  10/10/17 1230    Specimen:  Blood Updated:  10/10/17 1253     Ammonia 41 (H) umol/L     Urinalysis, Microscopic Only - Urine, Clean Catch [[id number]]  (Abnormal) Collected:  10/10/17 1238    Specimen:  Urine from Urine, Catheter Updated:  10/10/17 1300     RBC, UA 0-2 (A) /HPF      WBC, UA 31-50 (A) /HPF      Bacteria, UA 1+ (A) /HPF      Squamous Epithelial Cells, UA 7-12 (A) /HPF      Yeast, UA Moderate/2+ Budding Yeast /HPF      Hyaline Casts, UA 0-2 /LPF      Methodology Manual Light Microscopy    Lactic Acid, Plasma [[id number]]  (Abnormal) Collected:  10/10/17 1230    Specimen:  Blood Updated:  10/10/17 1301     Lactate 3.1 (C) mmol/L     [place] Draw [[id number]] Collected:  10/10/17 1148    Specimen:  Blood Updated:  10/10/17 1301    Narrative:       The following orders were created for panel order [place] Draw.  Procedure                               Abnormality         Status                     ---------                               -----------         ------                     Light Blue Top[[id number]]                                   Final result               Lavender Top[[id number]]                                     Final result               Gold Top - SST[[id number]]                                   Final result               Green Top (No Gel)[[id number]]                               Final result                 Please view results for these tests on the individual orders.    Light Blue Top [[id number]] Collected:  10/10/17 1148    Specimen:  Blood Updated:  10/10/17 1301     Extra Tube hold for add-on      Auto resulted       Lavender Top [[id number]] Collected:  10/10/17 1148    Specimen:  Blood Updated:  10/10/17 1301     Extra Tube hold for add-on      Auto resulted       Gold Top - SST [[id number]] Collected:  10/10/17 1148    Specimen:   Blood Updated:  10/10/17 1301     Extra Tube Hold for add-ons.      Auto resulted.       Green Top (No Gel) [[id number]] Collected:  10/10/17 1148    Specimen:  Blood Updated:  10/10/17 1301     Extra Tube Hold for add-ons.      Auto resulted.       Lactic Acid, Plasma [[id number]] Collected:  10/10/17 1230    Specimen:  Blood Updated:  10/10/17 1601     Extra Tube Hold for add-ons.      Auto resulted.       Lactic Acid, Reflex [[id number]]  (Abnormal) Collected:  10/10/17 1623    Specimen:  Blood Updated:  10/10/17 1706     Lactate 3.6 (C) mmol/L     Urinalysis With / Culture If Indicated - Urine, Catheter [[id number]]  (Abnormal) Collected:  10/10/17 2047    Specimen:  Urine from Urine, Catheter Updated:  10/10/17 2107     Color, UA Yellow     Appearance, UA Clear     pH, UA 5.5     Specific Gravity, UA 1.010     Glucose, UA Negative     Ketones, UA Negative     Bilirubin, UA Negative     Blood, UA Trace (A)     Protein, UA Negative     Leuk Esterase, UA Trace (A)     Nitrite, UA Negative     Urobilinogen, UA 0.2 E.U./dL    Urinalysis, Microscopic Only - Urine, Clean Catch [[id number]]  (Abnormal) Collected:  10/10/17 2047    Specimen:  Urine from Urine, Catheter Updated:  10/10/17 2116     RBC, UA 0-2 (A) /HPF      WBC, UA 3-5 (A) /HPF      Bacteria, UA 1+ (A) /HPF      Squamous Epithelial Cells, UA 3-6 (A) /HPF      Hyaline Casts, UA 3-6 /LPF      Amorphous Crystals, UA Small/1+ /HPF      Methodology Manual Light Microscopy    CBC & Differential [[id number]] Collected:  10/11/17 0631    Specimen:  Blood Updated:  10/11/17 0647    Narrative:       The following orders were created for panel order CBC & Differential.  Procedure                               Abnormality         Status                     ---------                               -----------         ------                     CBC Auto Differential[[id number]]        Abnormal            Final result                 Please view results for these tests on the  individual orders.    CBC Auto Differential [[id number]]  (Abnormal) Collected:  10/11/17 0631    Specimen:  Blood Updated:  10/11/17 0647     WBC 2.88 (L) 10*3/mm3      RBC 2.90 (L) 10*6/mm3      Hemoglobin 8.7 (L) g/dL      Hematocrit 27.1 (L) %      MCV 93.4 fL      MCH 30.0 pg      MCHC 32.1 g/dL      RDW 16.5 (H) %      RDW-SD 56.7 (H) fl      MPV 10.7 fL      Platelets 119 (L) 10*3/mm3      Neutrophil % 51.9 %      Lymphocyte % 33.3 %      Monocyte % 11.1 %      Eosinophil % 1.7 %      Basophil % 1.0 %      Immature Grans % 1.0 (H) %      Neutrophils, Absolute 1.49 (L) 10*3/mm3      Lymphocytes, Absolute 0.96 10*3/mm3      Monocytes, Absolute 0.32 10*3/mm3      Eosinophils, Absolute 0.05 10*3/mm3      Basophils, Absolute 0.03 10*3/mm3      Immature Grans, Absolute 0.03 10*3/mm3      nRBC 0.0 /100 WBC     Protime-INR [[id number]]  (Abnormal) Collected:  10/11/17 0631    Specimen:  Blood Updated:  10/11/17 0653     Protime 22.2 (H) Seconds      INR 2.03 (H)    Ammonia [[id number]]  (Normal) Collected:  10/11/17 0631    Specimen:  Blood Updated:  10/11/17 0657     Ammonia 19 umol/L     Comprehensive Metabolic Panel [[id number]]  (Abnormal) Collected:  10/11/17 0631    Specimen:  Blood Updated:  10/11/17 0712     Glucose 82 mg/dL      BUN 33 (H) mg/dL      Creatinine 2.20 (H) mg/dL      Sodium 139 mmol/L      Potassium 3.8 mmol/L      Chloride 103 mmol/L      CO2 26.0 mmol/L      Calcium 8.0 (L) mg/dL      Total Protein 6.0 (L) g/dL      Albumin 2.20 (L) g/dL      ALT (SGPT) 26 U/L      AST (SGOT) 32 U/L      Alkaline Phosphatase 156 (H) U/L      Total Bilirubin 1.2 mg/dL      eGFR Non African Amer 22 (L) mL/min/1.73      Globulin 3.8 gm/dL      A/G Ratio 0.6 (L) g/dL      BUN/Creatinine Ratio 15.0     Anion Gap 13.8 mmol/L     Narrative:       The MDRD GFR formula is only valid for adults with stable renal function between ages 18 and 70.    Magnesium [[id number]]  (Abnormal) Collected:  10/11/17 0631    Specimen:   Blood Updated:  10/11/17 0712     Magnesium 1.5 (L) mg/dL     Phosphorus [[id number]]  (Normal) Collected:  10/11/17 0631    Specimen:  Blood Updated:  10/11/17 0712     Phosphorus 3.8 mg/dL     Reticulocytes [[id number]]  (Abnormal) Collected:  10/11/17 0631    Specimen:  Blood Updated:  10/11/17 0940     Reticulocyte % 2.34 (H) %      Reticulocyte Absolute 0.0676 10*6/mm3     Iron Profile [[id number]]  (Abnormal) Collected:  10/11/17 0631    Specimen:  Blood Updated:  10/11/17 0955     Iron 121 mcg/dL       Specimen hemolyzed.  Results may be affected.        TIBC 208 (L) mcg/dL      Iron Saturation 58 (H) %     CBC (No Diff) [[id number]]  (Abnormal) Collected:  10/12/17 0515    Specimen:  Blood Updated:  10/12/17 0538     WBC 2.20 (L) 10*3/mm3      RBC 2.80 (L) 10*6/mm3      Hemoglobin 8.3 (L) g/dL      Hematocrit 26.4 (L) %      MCV 94.3 fL      MCH 29.6 pg      MCHC 31.4 g/dL      RDW 16.4 (H) %      RDW-SD 56.4 (H) fl      MPV 10.8 fL      Platelets 104 (L) 10*3/mm3     Comprehensive Metabolic Panel [[id number]]  (Abnormal) Collected:  10/12/17 0515    Specimen:  Blood Updated:  10/12/17 0540     Glucose 94 mg/dL      BUN 34 (H) mg/dL      Creatinine 2.30 (H) mg/dL      Sodium 140 mmol/L      Potassium 3.6 mmol/L      Chloride 104 mmol/L      CO2 27.0 mmol/L      Calcium 7.9 (L) mg/dL      Total Protein 6.0 (L) g/dL      Albumin 2.10 (L) g/dL      ALT (SGPT) 29 U/L      AST (SGOT) 30 U/L      Alkaline Phosphatase 145 (H) U/L      Total Bilirubin 0.8 mg/dL      eGFR Non African Amer 20 (L) mL/min/1.73      Globulin 3.9 gm/dL      A/G Ratio 0.5 (L) g/dL      BUN/Creatinine Ratio 14.8     Anion Gap 12.6 mmol/L     Narrative:       The MDRD GFR formula is only valid for adults with stable renal function between ages 18 and 70.    Magnesium [[id number]]  (Normal) Collected:  10/12/17 0515    Specimen:  Blood Updated:  10/12/17 0542     Magnesium 1.9 mg/dL     Protime-INR [[id number]]  (Abnormal) Collected:  10/12/17 0515     Specimen:  Blood Updated:  10/12/17 0543     Protime 25.3 (H) Seconds      INR 2.31 (H)    Ammonia [[id number]]  (Abnormal) Collected:  10/12/17 0515    Specimen:  Blood Updated:  10/12/17 0544     Ammonia <9 (L) umol/L     Urine Culture - Urine, Urine, Clean Catch [[id number]]  (Normal) Collected:  10/10/17 2047    Specimen:  Urine from Urine, Catheter Updated:  10/12/17 0631     Urine Culture No growth    Urine Culture - Urine, Urine, Clean Catch [[id number]] Collected:  10/10/17 1238    Specimen:  Urine from Urine, Catheter Updated:  10/12/17 0632     Urine Culture Mixed Anupam Isolated    Narrative:         Specimen contains mixed organisms of questionable pathogenicity which indicates contamination with commensal anupam.  Further identification is unlikely to provide clinically useful information.  Suggest recollection.    SCANNED - LABS [[id number]] Resulted:  10/10/17      Updated:  10/12/17 1002          Imaging Results (all)     Procedure Component Value Units Date/Time    CT Head Without Contrast [[id number]] Collected:  10/10/17 1325     Updated:  10/10/17 1328    Narrative:       PROCEDURE: CT HEAD WO CONTRAST-     HISTORY: confusion     TECHNIQUE: Axial images were obtained from the skull vertex through the  base without contrast.      COMPARISON: None.     FINDINGS: There is generalized cerebral volume loss. There are patchy  hypodensities in the periventricular white matter consistent with  chronic small vessel ischemic change. There is no CT evidence of acute  hemorrhage. There is no mass, mass effect, or midline shift. There is no  hydrocephalus. Bone windows reveal no osseous abnormalities. The  visualized paranasal sinuses are clear.       Impression:       Findings consistent with chronic small vessel ischemic  change with no acute intracranial abnormality.        This study was performed with techniques to keep radiation doses as low  as reasonably achievable (ALARA). Individualized dose  "reduction  techniques using automated exposure control or adjustment of mA and/or  kV according to the patient size were employed.      This report was finalized on 10/10/2017 1:26 PM by Mae Barrios M.D..    XR Chest 2 View [[id number]] Collected:  10/10/17 1326     Updated:  10/10/17 1334    Narrative:       PROCEDURE: XR CHEST 2 VW-        HISTORY: recent pleural effusion     COMPARISON: September 28, 2017.     FINDINGS: The heart is normal in size. The mediastinum is unremarkable.  The lungs are clear. There is no pneumothorax. There are no acute  osseous abnormalities.           Impression:       No acute cardiopulmonary process.           This report was finalized on 10/10/2017 1:31 PM by Mae Barrios M.D..          Condition on Discharge:      Stable    Vital Signs:    /70  Pulse 77  Temp 97.8 °F (36.6 °C) (Oral)   Resp 18  Ht 67\" (170.2 cm)  Wt 248 lb (112 kg)  SpO2 98%  BMI 38.84 kg/m2    Physical Exam:      General Appearance:    Alert, cooperative, in no acute distress   Head:    Normocephalic, without obvious abnormality, atraumatic   Eyes:            Lids and lashes normal, conjunctivae and sclerae normal, no   icterus, no pallor, corneas clear, PERRLA   Ears:    Ears appear intact with no abnormalities noted   Throat:   No oral lesions, no thrush, oral mucosa moist   Neck:   No adenopathy, supple, trachea midline, no thyromegaly, no     carotid bruit, no JVD   Back:     No kyphosis present, no scoliosis present, no skin lesions,       erythema or scars, no tenderness to percussion or                   palpation,   range of motion normal   Lungs:     Clear to auscultation,respirations regular, even and                   unlabored    Heart:    Regular rhythm and normal rate, normal S1 and S2, no            murmur, no gallop, no rub, no click   Breast Exam:    Deferred   Abdomen:     Normal bowel sounds, no masses, no organomegaly, soft        non-tender, non-distended, no " guarding, no rebound                 tenderness   Genitalia:    Deferred   Extremities:   Moves all extremities well, no edema, no cyanosis, no              redness   Pulses:   Pulses palpable and equal bilaterally   Skin:   No bleeding, bruising or rash   Lymph nodes:   No palpable adenopathy   Neurologic:   Cranial nerves 2 - 12 grossly intact, sensation intact, DTR        present and equal bilaterally         Discharge Disposition:  [facility] [place] [facility]    Discharge Medication:     Hodan Carter   Home Medication Instructions AG:[id number]    Printed on:10/12/17 1311   Medication Information                      atorvastatin (LIPITOR) 10 MG tablet  Take 1 tablet by mouth Every Night.             bumetanide (BUMEX) 1 MG tablet  Take 0.5 tablets by mouth 2 (Two) Times a Day.             cyanocobalamin 1000 MCG/ML injection  Inject 1 mL into the shoulder, thigh, or buttocks Every 28 (Twenty-Eight) Days.             epoetin parveen (EPOGEN,PROCRIT) 52729 UNIT/ML injection  Inject 1 mL under the skin Every 14 (Fourteen) Days. Indications: Chronic Hemolytic Anemia             hydrALAZINE (APRESOLINE) 25 MG tablet  Take 12.5 mg by mouth Daily.             HYDROcodone-acetaminophen (NORCO)  MG per tablet  Take 1 tablet by mouth Every 6 (Six) Hours As Needed for Moderate Pain .             lactulose 20 GM/30ML solution solution  Take 30 mL by mouth 2 (Two) Times a Day for 30 days.             ondansetron (ZOFRAN) 4 MG tablet  Take 4 mg by mouth Every 8 (Eight) Hours As Needed for Nausea or Vomiting.             pantoprazole (PROTONIX) 40 MG EC tablet  40 mg Daily.             propranolol (INDERAL) 40 MG tablet  Take 1 tablet by mouth Every 8 (Eight) Hours.             spironolactone (ALDACTONE) 100 MG tablet  Take 1 tablet by mouth 2 (Two) Times a Day.             warfarin (COUMADIN) 2.5 MG tablet  Take 1 mg by mouth Daily.                 Discharge Diet:      healthy heart/renal diet    Activity at Discharge:       as tolerated    Follow-up Appointments:  Dr. Knowles for sleep apnea evaluation one week  Dr. Franco in one week    Future Appointments  Date Time Provider Department Center   10/16/2017 4:15 PM [facility]     Additional Instructions for the Follow-ups that You Need to Schedule     Ambulatory Referral to Home Health    As directed    Face to Face Visit Date:  10/12/2017   Follow-up Provider for Plan of Care?:  I treated the patient in an acute care facility and will not continue treatment after discharge.   Follow-up Provider:  PIETER LENNON   Reason/Clinical Findings:  weakness,debility   Describe mobility limitations that make leaving home difficult:  weakness,debility   Nursing/Therapeutic Services Requested:   Physical Therapy  Skilled Nursing  Occupational Therapy      Skilled nursing orders:   Cardiopulmonary assessments  Neurovascular assessments      PT orders:  Therapeutic exercise   Occupational orders:  Strengthening   Frequency:  1 Week 1       Protime-INR    Oct 16, 2017 (Approximate)    Results to dr lennon   Is the Patient on Coumadin (Warfarin) therapy?:  Yes                 Test Results Pending at Discharge:           Frankie Winchester DO  10/12/17  1:11 PM

## 2017-10-12 NOTE — MEDICAL STUDENT
[facility]    Name:  Hodan Carter   Age:  77 y.o.  Sex:  female  :  1940  MRN:  [id number]   Visit Number:  [id number]  Admission Date:  10/10/2017  Date Of Service:  10/12/17  Primary Care Physician:  Pieter Farias DO     LOS: 2 days :  Patient Care Team:  Pieter Farias DO as PCP - General (Internal Medicine):    Chief Complaint:      Leg pain and edema    Subjective / Interval History:     Patient is 77 year old female admitted for confusion secondary to acute hepatic encephalopathy. She has cirrhosis of the liver with ascites, CKD stage IV, and atrial fibrillation. She was treated for UTI present on admission with Rocephin which was discontinued due to lack of growth in urine culture. Patient cardizem was stopped. She had a 9 beat episode of VTach last night. Dr. Donis increased her propranolol to every 8 hours. Today she states that she is much better and has few complaints. She states that her legs feel less edematous and that she has more strength. She states that she walked up and down the [place] with PT this morning. She states that she was had no weakness or pain while walking and had no episodes of dyspnea. She states that he diarrhea episodes have slowed to 3 or 4 times a day. Patient admits to occasional cough and wheezing. Patient denies shortness of breath, chest pain, fever or chills.     Review of Systems:     General ROS: Patient denies any fevers, chills, fatigue or loss of consciousness.  Respiratory ROS: Patient admits to cough, wheezing. Denies shortness of breath.  Cardiovascular ROS: Denies chest pain or palpitations. No history of exertional chest pain.   Gastrointestinal ROS: Patient admits to diarrhea. Denies nausea and vomiting. Denies any abdominal pain.   Neurological ROS: Denies any focal weakness. No loss of consciousness. Denies any numbness.  Vascular ROS: Admits to leg edema, ascites. Denies claudication.    Dermatological ROS: Denies any redness or pruritis.    Vital Signs:    Temp:  [97.8 °F (36.6 °C)-98.7 °F (37.1 °C)] 97.8 °F (36.6 °C)  Heart Rate:  [74-92] 74  Resp:  [16-18] 18  BP: (100-115)/(59-74) 108/59    Intake and output:    I/O last 3 completed shifts:  In: 875 [P.O.:720; I.V.:155]  Out: 400 [Urine:400]       Physical Examination:    General Appearance:  Alert, sitting up in chair and cooperative, not in any acute distress. Pleasant affect.    Head:  Atraumatic and normocephalic, without obvious abnormality.   Eyes:          PERRLA, conjunctivae and sclerae normal, no Icterus. No pallor. Extra-occular movements are within normal limits.   Neck: Supple, trachea midline, no thyromegaly.   Lungs:   Chest shape is normal. Breath sounds heard bilaterally equally.  No crackles or wheezing. No Pleural rub or bronchial breathing.   Heart:  Normal S1 and S2, no murmur, no gallop, no rub.   Abdomen:   Ascites present. Normal bowel sounds, no masses, no organomegaly. Soft, non-tender, distended, no guarding, no rebound tenderness.   Extremities: 5/5 strength in UE, 4/5 strength in LE. Edema noted, no cyanosis, no clubbing.   Skin: No bleeding, bruising or rash.   Neurologic: Awake, alert and oriented times 3. Sensation intact.      Laboratory results:      Results from last 7 days  Lab Units 10/12/17  0515 10/11/17  0631 10/10/17  1148   SODIUM mmol/L 140 139 137   POTASSIUM mmol/L 3.6 3.8 4.4   CHLORIDE mmol/L 104 103 102   CO2 mmol/L 27.0 26.0 25.0*   BUN mg/dL 34* 33* 33*   CREATININE mg/dL 2.30* 2.20* 2.30*   CALCIUM mg/dL 7.9* 8.0* 8.1*   BILIRUBIN mg/dL 0.8 1.2 1.8*   ALK PHOS U/L 145* 156* 182*   ALT (SGPT) U/L 29 26 28   AST (SGOT) U/L 30 32 51*   GLUCOSE mg/dL 94 82 121*       Results from last 7 days  Lab Units 10/12/17  0515 10/11/17  0631 10/10/17  1148   WBC 10*3/mm3 2.20* 2.88* 3.51*   HEMOGLOBIN g/dL 8.3* 8.7* 9.9*   HEMATOCRIT % 26.4* 27.1* 31.2*   PLATELETS 10*3/mm3 104* 119* 161       Results from  last 7 days  Lab Units 10/12/17  0515 10/11/17  0631 10/10/17  1148   INR  2.31* 2.03* 2.01*       Results from last 7 days  Lab Units 10/10/17  1148   TROPONIN I ng/mL 0.051*       Results from last 7 days  Lab Units 10/10/17  2047 10/10/17  1238   URINECX  No growth Mixed Dana Isolated       I have reviewed the patient's laboratory results.    Radiology results:    Imaging Results (last 24 hours)     ** No results found for the last 24 hours. **          I have reviewed the patient's radiology reports.    Medication Review:     I have reviewed the patients active and prn medications.     Active Problems:    Cirrhosis of liver with ascites    Chronic kidney disease, stage IV (severe)    Permanent atrial fibrillation    Essential hypertension    Chronic anemia    Chronic kidney disease-mineral and bone disorder    Volume overload    Acute hepatic encephalopathy      Assessment:    1.) Hepatic encephalopathy-resolved   2.) Cirrhosis of liver with ascites  3.) Chronic kidney disease stage IV  4.) Permanent Atrial fibrillation  5.) HTN  6.) CAD  7.) Anemia    Plan:    Patient will continue to receive lactulose and was counseled on how to use it when discharged. Hepatic encephalopathy has completed resolved. Patient was advised to take lactulose and ensure she is having regular bowel movements. Dr. Franco has restarted bumetanide and aldactone in hopes it will relieve her existing edema. Dr. Donis prescribed propranolol and increased the dose to every 8 hours. She is currently on coumadin. Her BP and HR will be continued to be monitored. She is receiving procrit injections for anemia. Rocephin was discontinued due to lack of growth in culture. Patient will be monitored on telemetry.   Assessment and Plan have been discussed with Dr. Winchester.     Evon Low  10/12/17  3:47 PM    Dictated utilizing Dragon dictation.

## 2017-10-12 NOTE — PROGRESS NOTES
Continued Stay Note  JULIO Cortez     Patient Name: Hodan Carter  MRN: [id number]  Today's Date: 10/12/2017    Admit Date: 10/10/2017          Discharge Plan       10/12/17 [id number]    Case Management/Social Work Plan    Plan Home with HH    Patient/Family In Agreement With Plan yes    Additional Comments New orders received for resumption of HH (nursing, PT/OT) and auto cpap.  [place] [facility] spoke [place] Sonia.  Updated given that pt was discharging.  Order and clinicals efaxed to [phone].  Called pt's [employer], spoke with Shae.  Per Shae, pt would need a sleep study to qualify for an auto cpap.  They can initiate testing at home and follow up with PCP.  Dr. Winchester updated.  Order and clinicals efaxed to [phone].   CM will continue to follow and assist with discharge as needed.              Discharge Codes     None        Expected Discharge Date and Time     Expected Discharge Date Expected Discharge Time    Oct 12, 2017             Genoveva Terrell

## 2017-10-12 NOTE — PLAN OF CARE
Problem: Fluid Volume Excess (Adult,Obstetrics,Pediatric)  Goal: Identify Related Risk Factors and Signs and Symptoms  Outcome: Outcome(s) achieved Date Met:  10/12/17  Goal: Stable Weight  Outcome: Outcome(s) achieved Date Met:  10/12/17  Goal: Balanced Intake/Output  Outcome: Outcome(s) achieved Date Met:  10/12/17    Problem: Skin Integrity Impairment, Risk/Actual (Adult)  Goal: Identify Related Risk Factors and Signs and Symptoms  Outcome: Outcome(s) achieved Date Met:  10/12/17  Goal: Skin Integrity/Wound Healing  Outcome: Outcome(s) achieved Date Met:  10/12/17    Problem: Patient Care Overview (Adult)  Goal: Plan of Care Review  Outcome: Outcome(s) achieved Date Met:  10/12/17  Goal: Discharge Needs Assessment  Outcome: Outcome(s) achieved Date Met:  10/12/17

## 2017-10-12 NOTE — PROGRESS NOTES
9 beat VTach run during sleep. Stat Labs pending. Afib rate 60-80s. Patient asymptomatic. Cardizem stopped yesterday during day for concern of accumulation. Betablocker could be considered per Dr Donis but patient has low normal blood pressure with rate 60-80 already. Plan to continue daily monitoring and titration of medication. Replace electrolytes accordingly.

## 2017-10-12 NOTE — DISCHARGE PLACEMENT REQUEST
"KATHY Arnold RN  Case Management  Phone:  [phone]; Fax:  [phone]    Pt discharging home today.  Order and clinical updates attached.    Jonathan Carter (77 y.o. 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)   Code Status:  FULL    Ht:  67\" (170.2 cm)   Wt:  248 lb (112 kg)    Admission Cmt:  None   Principal Problem:  None                Active Insurance as of 10/10/2017     Primary Coverage     Payor Plan Insurance Group Employer/Plan Group    HUMANA MEDICARE REPLACEMENT HUMANA MEDICARE REPL [id number]     Payor Plan Address Payor Plan Phone Number Effective From Effective To    [address] [address] [phone] [address]     [place] [address]       Subscriber Name Subscriber Birth Date Member ID       JONATHAN CARTER 1940 [id number]                 Emergency Contacts      (Rel.) Home Phone Work Phone Mobile Phone    Rita Mead (Daughter) -- -- [phone]          [facility]  3  [address] [facility] [address]  Phone:  [phone]  Fax:   Date: Oct 12, 2017      Ambulatory Referral to Home Health     Patient:  Jonathan Carter MRN:  [id number]   [address] [facility] [address] :  1940  SSN:    Phone: [phone] Sex:  F      INSURANCE PAYOR PLAN GROUP # SUBSCRIBER ID   Primary: HUMANA MEDICARE REPLACEMENT [id number] "      Referring Provider Information:  DEONNA WINCHESTER Phone: [phone] Fax:       Referral Information:   # Visits:  1 Referral Type: Home Health [42]   Urgency:  Routine Referral Reason: Specialty Services Required   Start Date: Oct 12, 2017 End Date:  To be determined by Insurer   Diagnosis: Impaired mobility and ADLs (Z74.09 [ICD-10-CM] 799.89 [ICD-9-CM])  Impaired functional mobility, balance, gait, and endurance (Z74.09 [ICD-10-CM] V49.89 [ICD-9-CM])      Refer to Dept:   Refer to Provider:   Refer to Facility:       Face to Face Visit Date: 10/12/2017  Follow-up Provider for Plan of Care? I treated the patient in an acute care facility and will not continue treatment after discharge.  Follow-up Provider: JASMINE LENNON [[id number]]  Reason/Clinical Findings: weakness,debility  Describe mobility limitations that make leaving home difficult: weakness,debility  Nursing/Therapeutic Services Requested: Physical Therapy  Nursing/Therapeutic Services Requested: Skilled Nursing  Nursing/Therapeutic Services Requested: Occupational Therapy  Skilled nursing orders: Cardiopulmonary assessments  Skilled nursing orders: Neurovascular assessments  PT orders: Therapeutic exercise  Occupational orders: Strengthening  Frequency: 1 Week 1     This document serves as a request of services and does not constitute Insurance authorization or approval of services.  To determine eligibility, please contact the members Insurance carrier to verify and review coverage.     If you have medical questions regarding this request for services. Please contact [facility]  3 at [phone] between the hours of 8:00am - 5:00pm (Mon-Fri).             Authorizing Provider:Deonna Winchester DO  Authorizing Provider's NPI: [id number]  Order Entered By: Deonna Winchester DO 10/12/2017 12:59 PM     Electronically signed by: Deonna Winchester DO 10/12/2017 12:59 PM                Discharge Summary      Deonna TUBBS  DO Annabella at 10/12/2017  1:01 PM              [facility]   DISCHARGE SUMMARY      Name:  Hodan Carter   Age:  77 y.o.  Sex:  female  :  1940  MRN:  [id number]   Visit Number:  [id number]  Primary Care Physician:  Pieter Farias DO  Date of Discharge:  10/12/2017  Admission Date:  10/10/2017      Discharge Diagnosis:     1.  Hepatic encephalopathy, resolved  2.  Cirrhosis of unknown etiology   3.  Chronic kidney disease stage IV, worsening  4.  UTI, present on admission, recurrent  5.  Permanent atrial fibrillation on Coumadin, with RVR  6.  History of PE many years ago; Coumadin  7.  History of coronary artery disease  8.  History of uterine cancer status post hysterectomy in the past  9.  Chronic pancytopenia due to #2  10.  Increased lactic acid  11.  Debility  12.  Hematochezia,, reported but not seen here  13.  Hypomagnesemia , replaced  Active Hospital Problems (** Indicates Principal Problem)    Diagnosis Date Noted   • Acute hepatic encephalopathy [K72.00] 10/10/2017   • Volume overload [E87.70] 2017   • Chronic kidney disease-mineral and bone disorder [N18.9, E83.9, M89.9] 2017   • Chronic kidney disease, stage IV (severe) [N18.4] 2017   • Permanent atrial fibrillation [I48.2] 2017   • Essential hypertension [I10] 2017   • Chronic anemia [D64.9] 2017   • Cirrhosis of liver with ascites [K74.60] 2017      Resolved Hospital Problems    Diagnosis Date Noted Date Resolved   No resolved problems to display.         Presenting Problem/History of Present Illness:    Acute hepatic encephalopathy [K72.00]         Hospital Course:    Initial history: Patient is a 77-year-old  female just released from the hospital on 2017 for problems related to her cirrhosis who comes in with confusion since Saturday.  She is able to communicate but has trouble relating doing a good history.  Most of the history is from her daughters.   She has not had a bowel movement for 3 days until she was given lactulose in the emergency room.  Her daughter states she had worsening confusion to the point where it was moderately severe.  Nothing was improving it.  She also has had a 19 pound weight loss since the last hospital stay with adjustment of her diuretics.  She is still very edematous.  She has ascites as well as peripheral edema.  She is very fatigued at the present time.  She's had decreased appetite and has not been taking in much to eat or drink.  She has seen the transplant team at the [facility], but due to her advanced age she is not a candidate for a liver transplant.  She never drank alcohol, and the etiology of her liver failure is unknown.  Specialist have told her that they thought was from taking Tylenol over the course of many years.  However, her sister had  of cirrhosis.  She currently denies any chest pressure, shortness breath, nausea or vomiting.  She denies any pain.  She denies any fevers or chills.  She saw Dr. Franco from nephrology last time she was here, and had a follow-up appointment with him.  Her creatinine is 2.3, it was 1.8 when she left here 12 days ago.  She also had a urinary tract infection at that time for which she was treated with Rocephin and Diflucan.  She again has increased WBCs and yeast in her urine.  However the specimen does have 7-12 squamous epithelial cells and will need repeating.  Her INR today is 2.01, she is on Coumadin for PE as well as permanent atrial fibrillation.  She also has a history of uterine cancer many many years ago for which had a hysterectomy.  She had a PE many many years ago as well.  She has chronic pancytopenia from her cirrhosis.  However her bilirubin is 1.8 today.  She has been told also she has esophageal varices, although she has never had bleeding varices.  She is on Protonix as well as Inderal.  She has never been told to take lactulose in the past, and has  never had hepatic encephalopathy in the past.  When she did have a bowel movement today reportedly there was blood in the stool.  She is also very weak, and having trouble ambulating at the present time.    Patient was placed on lactulose, and had 3-4 bowel movements per day.  Her ammonia came back down to normal over 2 days.  Her mental status improved dramatically.  Dr. Franco head originally held her diuretic, but now will restart Bumex at reduced dose of 0.5 mg twice a day and 10 new Aldactone 100 mg twice a day.   from cardiology also saw the patient, as the patient was in A. fib with RVR at multiple points during the hospitalization.  Initially oral Cardizem was added, and then ultimately her propranolol was increased to every 8 hours.  It was suspected that the patient had sleep apnea at the last visit she was here.  We are trying to arrange an auto CPAP, and follow-up with Dr. Knowles as an outpatient.  Patient otherwise appears to be doing much better.  She denies any chest pressure, shortness breath, nausea, vomiting, or pain.  Initially was thought she had a urinary tract infection, however cultures have been negative.  Rocephin and Diflucan have been discontinued.  She continues on Coumadin, her INR is therapeutic.  She is working with PT and OT, they will come to her home as well.  He is otherwise stable for discharge home, with close follow-up with Dr. Franco and Dr. Knowles as well as her primary care physician .     Procedures Performed:           Consults:     Consults     Date and Time Order Name Status Description    10/11/2017 1609 Inpatient Consult to Nephrology Completed     10/11/2017 1022 Inpatient Consult to Cardiology      10/10/2017 1634 Inpatient Consult to Nephrology Completed     9/25/2017 1217 Inpatient Consult to Pulmonology Completed     9/24/2017 1114 Inpatient Consult to Nephrology Completed           Pertinent Test Results:     Lab Results (all)     Procedure  Component Value Units Date/Time    POC Glucose Fingerstick [[id number]]  (Normal) Collected:  10/10/17 1146    Specimen:  Blood Updated:  10/10/17 1151     Glucose 90 mg/dL       Serial Number: [id number]:  [id number]       Protime-INR [[id number]]  (Abnormal) Collected:  10/10/17 1148    Specimen:  Blood Updated:  10/10/17 1208     Protime 22.0 (H) Seconds      INR 2.01 (H)    Lipase [[id number]]  (Normal) Collected:  10/10/17 1148    Specimen:  Blood Updated:  10/10/17 1208     Lipase 113 U/L     Comprehensive Metabolic Panel [[id number]]  (Abnormal) Collected:  10/10/17 1148    Specimen:  Blood Updated:  10/10/17 1215     Glucose 121 (H) mg/dL      BUN 33 (H) mg/dL       Specimen hemolyzed. Results may be affected.        Creatinine 2.30 (H) mg/dL      Sodium 137 mmol/L      Potassium 4.4 mmol/L       Specimen hemolyzed.  Results may be affected.        Chloride 102 mmol/L      CO2 25.0 (L) mmol/L      Calcium 8.1 (L) mg/dL      Total Protein 7.0 g/dL      Albumin 2.60 (L) g/dL      ALT (SGPT) 28 U/L       Specimen hemolyzed.  Results may be affected.        AST (SGOT) 51 (H) U/L       Specimen hemolyzed.  Results may be affected.        Alkaline Phosphatase 182 (H) U/L       Specimen hemolyzed. Results may be affected.        Total Bilirubin 1.8 (H) mg/dL      eGFR Non African Amer 20 (L) mL/min/1.73      Globulin 4.4 gm/dL      A/G Ratio 0.6 (L) g/dL      BUN/Creatinine Ratio 14.3     Anion Gap 14.4 mmol/L     Narrative:       The MDRD GFR formula is only valid for adults with stable renal function between ages 18 and 70.    CBC Auto Differential [[id number]]  (Abnormal) Collected:  10/10/17 1148    Specimen:  Blood Updated:  10/10/17 1219     WBC 3.51 (L) 10*3/mm3      RBC 3.29 (L) 10*6/mm3      Hemoglobin 9.9 (L) g/dL      Hematocrit 31.2 (L) %      MCV 94.8 fL      MCH 30.1 pg      MCHC 31.7 g/dL      RDW 16.6 (H) %      RDW-SD 57.4 (H) fl      MPV 10.9 fL      Platelets 161 10*3/mm3      Neutrophil % 64.1 %       Lymphocyte % 24.8 %      Monocyte % 8.3 %      Eosinophil % 1.4 %      Basophil % 1.1 %      Immature Grans % 0.3 %      Neutrophils, Absolute 2.25 10*3/mm3      Lymphocytes, Absolute 0.87 10*3/mm3      Monocytes, Absolute 0.29 10*3/mm3      Eosinophils, Absolute 0.05 10*3/mm3      Basophils, Absolute 0.04 10*3/mm3      Immature Grans, Absolute 0.01 10*3/mm3      nRBC 0.0 /100 WBC     CBC & Differential [[id number]] Collected:  10/10/17 1148    Specimen:  Blood Updated:  10/10/17 1219    Narrative:       The following orders were created for panel order CBC & Differential.  Procedure                               Abnormality         Status                     ---------                               -----------         ------                     CBC Auto Differential[[id number]]        Abnormal            Final result                 Please view results for these tests on the individual orders.    Blood Gas, Arterial With Co-Ox [[id number]]  (Abnormal) Collected:  10/10/17 1224    Specimen:  Arterial Blood Updated:  10/10/17 1224     Site Arterial: left brachial     Darrell's Test N/A     pH, Arterial 7.507 (C) pH units      pCO2, Arterial 35.0 mm Hg      pO2, Arterial 75.6 mm Hg      HCO3, Arterial 27.7 mmol/L      Base Excess, Arterial 4.6 mmol/L      O2 Saturation, Arterial 96.4 %      Hemoglobin, Blood Gas 9.4 (L) g/dL      Oxyhemoglobin 94.0 %      Methemoglobin 0.90 %      Carboxyhemoglobin 1.6 %      Modality Room Air     FIO2 21 %     Troponin [[id number]]  (Abnormal) Collected:  10/10/17 1148    Specimen:  Blood Updated:  10/10/17 1240     Troponin I 0.051 (H) ng/mL     Urinalysis With / Culture If Indicated - Urine, Catheter [[id number]]  (Abnormal) Collected:  10/10/17 1238    Specimen:  Urine from Urine, Catheter Updated:  10/10/17 1251     Color, UA Yellow     Appearance, UA Cloudy (A)     pH, UA 5.5     Specific Gravity, UA 1.010     Glucose, UA Negative     Ketones, UA Negative     Bilirubin, UA Negative      Blood, UA Trace (A)     Protein, UA Negative     Leuk Esterase, UA Moderate (2+) (A)     Nitrite, UA Negative     Urobilinogen, UA 1.0 E.U./dL    Ammonia [[id number]]  (Abnormal) Collected:  10/10/17 1230    Specimen:  Blood Updated:  10/10/17 1253     Ammonia 41 (H) umol/L     Urinalysis, Microscopic Only - Urine, Clean Catch [[id number]]  (Abnormal) Collected:  10/10/17 1238    Specimen:  Urine from Urine, Catheter Updated:  10/10/17 1300     RBC, UA 0-2 (A) /HPF      WBC, UA 31-50 (A) /HPF      Bacteria, UA 1+ (A) /HPF      Squamous Epithelial Cells, UA 7-12 (A) /HPF      Yeast, UA Moderate/2+ Budding Yeast /HPF      Hyaline Casts, UA 0-2 /LPF      Methodology Manual Light Microscopy    Lactic Acid, Plasma [[id number]]  (Abnormal) Collected:  10/10/17 1230    Specimen:  Blood Updated:  10/10/17 1301     Lactate 3.1 (C) mmol/L     Godfrey Draw [[id number]] Collected:  10/10/17 1148    Specimen:  Blood Updated:  10/10/17 1301    Narrative:       The following orders were created for panel order Godfrey Draw.  Procedure                               Abnormality         Status                     ---------                               -----------         ------                     Light Blue Top[[id number]]                                   Final result               Lavender Top[[id number]]                                     Final result               Gold Top - SST[[id number]]                                   Final result               Green Top (No Gel)[[id number]]                               Final result                 Please view results for these tests on the individual orders.    Light Blue Top [[id number]] Collected:  10/10/17 1148    Specimen:  Blood Updated:  10/10/17 1301     Extra Tube hold for add-on      Auto resulted       Lavender Top [[id number]] Collected:  10/10/17 1148    Specimen:  Blood Updated:  10/10/17 1301     Extra Tube hold for add-on      Auto resulted       Gold Top - SST [[id number]]  Collected:  10/10/17 1148    Specimen:  Blood Updated:  10/10/17 1301     Extra Tube Hold for add-ons.      Auto resulted.       Green Top (No Gel) [[id number]] Collected:  10/10/17 1148    Specimen:  Blood Updated:  10/10/17 1301     Extra Tube Hold for add-ons.      Auto resulted.       Lactic Acid, Plasma [[id number]] Collected:  10/10/17 1230    Specimen:  Blood Updated:  10/10/17 1601     Extra Tube Hold for add-ons.      Auto resulted.       Lactic Acid, Reflex [[id number]]  (Abnormal) Collected:  10/10/17 1623    Specimen:  Blood Updated:  10/10/17 1706     Lactate 3.6 (C) mmol/L     Urinalysis With / Culture If Indicated - Urine, Catheter [[id number]]  (Abnormal) Collected:  10/10/17 2047    Specimen:  Urine from Urine, Catheter Updated:  10/10/17 2107     Color, UA Yellow     Appearance, UA Clear     pH, UA 5.5     Specific Gravity, UA 1.010     Glucose, UA Negative     Ketones, UA Negative     Bilirubin, UA Negative     Blood, UA Trace (A)     Protein, UA Negative     Leuk Esterase, UA Trace (A)     Nitrite, UA Negative     Urobilinogen, UA 0.2 E.U./dL    Urinalysis, Microscopic Only - Urine, Clean Catch [[id number]]  (Abnormal) Collected:  10/10/17 2047    Specimen:  Urine from Urine, Catheter Updated:  10/10/17 2116     RBC, UA 0-2 (A) /HPF      WBC, UA 3-5 (A) /HPF      Bacteria, UA 1+ (A) /HPF      Squamous Epithelial Cells, UA 3-6 (A) /HPF      Hyaline Casts, UA 3-6 /LPF      Amorphous Crystals, UA Small/1+ /HPF      Methodology Manual Light Microscopy    CBC & Differential [[id number]] Collected:  10/11/17 0631    Specimen:  Blood Updated:  10/11/17 0647    Narrative:       The following orders were created for panel order CBC & Differential.  Procedure                               Abnormality         Status                     ---------                               -----------         ------                     CBC Auto Differential[[id number]]        Abnormal            Final result                  Please view results for these tests on the individual orders.    CBC Auto Differential [[id number]]  (Abnormal) Collected:  10/11/17 0631    Specimen:  Blood Updated:  10/11/17 0647     WBC 2.88 (L) 10*3/mm3      RBC 2.90 (L) 10*6/mm3      Hemoglobin 8.7 (L) g/dL      Hematocrit 27.1 (L) %      MCV 93.4 fL      MCH 30.0 pg      MCHC 32.1 g/dL      RDW 16.5 (H) %      RDW-SD 56.7 (H) fl      MPV 10.7 fL      Platelets 119 (L) 10*3/mm3      Neutrophil % 51.9 %      Lymphocyte % 33.3 %      Monocyte % 11.1 %      Eosinophil % 1.7 %      Basophil % 1.0 %      Immature Grans % 1.0 (H) %      Neutrophils, Absolute 1.49 (L) 10*3/mm3      Lymphocytes, Absolute 0.96 10*3/mm3      Monocytes, Absolute 0.32 10*3/mm3      Eosinophils, Absolute 0.05 10*3/mm3      Basophils, Absolute 0.03 10*3/mm3      Immature Grans, Absolute 0.03 10*3/mm3      nRBC 0.0 /100 WBC     Protime-INR [[id number]]  (Abnormal) Collected:  10/11/17 0631    Specimen:  Blood Updated:  10/11/17 0653     Protime 22.2 (H) Seconds      INR 2.03 (H)    Ammonia [[id number]]  (Normal) Collected:  10/11/17 0631    Specimen:  Blood Updated:  10/11/17 0657     Ammonia 19 umol/L     Comprehensive Metabolic Panel [[id number]]  (Abnormal) Collected:  10/11/17 0631    Specimen:  Blood Updated:  10/11/17 0712     Glucose 82 mg/dL      BUN 33 (H) mg/dL      Creatinine 2.20 (H) mg/dL      Sodium 139 mmol/L      Potassium 3.8 mmol/L      Chloride 103 mmol/L      CO2 26.0 mmol/L      Calcium 8.0 (L) mg/dL      Total Protein 6.0 (L) g/dL      Albumin 2.20 (L) g/dL      ALT (SGPT) 26 U/L      AST (SGOT) 32 U/L      Alkaline Phosphatase 156 (H) U/L      Total Bilirubin 1.2 mg/dL      eGFR Non African Amer 22 (L) mL/min/1.73      Globulin 3.8 gm/dL      A/G Ratio 0.6 (L) g/dL      BUN/Creatinine Ratio 15.0     Anion Gap 13.8 mmol/L     Narrative:       The MDRD GFR formula is only valid for adults with stable renal function between ages 18 and 70.    Magnesium [[id number]]   (Abnormal) Collected:  10/11/17 0631    Specimen:  Blood Updated:  10/11/17 0712     Magnesium 1.5 (L) mg/dL     Phosphorus [[id number]]  (Normal) Collected:  10/11/17 0631    Specimen:  Blood Updated:  10/11/17 0712     Phosphorus 3.8 mg/dL     Reticulocytes [[id number]]  (Abnormal) Collected:  10/11/17 0631    Specimen:  Blood Updated:  10/11/17 0940     Reticulocyte % 2.34 (H) %      Reticulocyte Absolute 0.0676 10*6/mm3     Iron Profile [[id number]]  (Abnormal) Collected:  10/11/17 0631    Specimen:  Blood Updated:  10/11/17 0955     Iron 121 mcg/dL       Specimen hemolyzed.  Results may be affected.        TIBC 208 (L) mcg/dL      Iron Saturation 58 (H) %     CBC (No Diff) [[id number]]  (Abnormal) Collected:  10/12/17 0515    Specimen:  Blood Updated:  10/12/17 0538     WBC 2.20 (L) 10*3/mm3      RBC 2.80 (L) 10*6/mm3      Hemoglobin 8.3 (L) g/dL      Hematocrit 26.4 (L) %      MCV 94.3 fL      MCH 29.6 pg      MCHC 31.4 g/dL      RDW 16.4 (H) %      RDW-SD 56.4 (H) fl      MPV 10.8 fL      Platelets 104 (L) 10*3/mm3     Comprehensive Metabolic Panel [[id number]]  (Abnormal) Collected:  10/12/17 0515    Specimen:  Blood Updated:  10/12/17 0540     Glucose 94 mg/dL      BUN 34 (H) mg/dL      Creatinine 2.30 (H) mg/dL      Sodium 140 mmol/L      Potassium 3.6 mmol/L      Chloride 104 mmol/L      CO2 27.0 mmol/L      Calcium 7.9 (L) mg/dL      Total Protein 6.0 (L) g/dL      Albumin 2.10 (L) g/dL      ALT (SGPT) 29 U/L      AST (SGOT) 30 U/L      Alkaline Phosphatase 145 (H) U/L      Total Bilirubin 0.8 mg/dL      eGFR Non African Amer 20 (L) mL/min/1.73      Globulin 3.9 gm/dL      A/G Ratio 0.5 (L) g/dL      BUN/Creatinine Ratio 14.8     Anion Gap 12.6 mmol/L     Narrative:       The MDRD GFR formula is only valid for adults with stable renal function between ages 18 and 70.    Magnesium [[id number]]  (Normal) Collected:  10/12/17 0515    Specimen:  Blood Updated:  10/12/17 0542     Magnesium 1.9 mg/dL     Protime-INR  [[id number]]  (Abnormal) Collected:  10/12/17 0515    Specimen:  Blood Updated:  10/12/17 0543     Protime 25.3 (H) Seconds      INR 2.31 (H)    Ammonia [[id number]]  (Abnormal) Collected:  10/12/17 0515    Specimen:  Blood Updated:  10/12/17 0544     Ammonia <9 (L) umol/L     Urine Culture - Urine, Urine, Clean Catch [[id number]]  (Normal) Collected:  10/10/17 2047    Specimen:  Urine from Urine, Catheter Updated:  10/12/17 0631     Urine Culture No growth    Urine Culture - Urine, Urine, Clean Catch [[id number]] Collected:  10/10/17 1238    Specimen:  Urine from Urine, Catheter Updated:  10/12/17 0632     Urine Culture Mixed Anupam Isolated    Narrative:         Specimen contains mixed organisms of questionable pathogenicity which indicates contamination with commensal anupam.  Further identification is unlikely to provide clinically useful information.  Suggest recollection.    SCANNED - LABS [[id number]] Resulted:  10/10/17      Updated:  10/12/17 1002          Imaging Results (all)     Procedure Component Value Units Date/Time    CT Head Without Contrast [[id number]] Collected:  10/10/17 1325     Updated:  10/10/17 1328    Narrative:       PROCEDURE: CT HEAD WO CONTRAST-     HISTORY: confusion     TECHNIQUE: Axial images were obtained from the skull vertex through the  base without contrast.      COMPARISON: None.     FINDINGS: There is generalized cerebral volume loss. There are patchy  hypodensities in the periventricular white matter consistent with  chronic small vessel ischemic change. There is no CT evidence of acute  hemorrhage. There is no mass, mass effect, or midline shift. There is no  hydrocephalus. Bone windows reveal no osseous abnormalities. The  visualized paranasal sinuses are clear.       Impression:       Findings consistent with chronic small vessel ischemic  change with no acute intracranial abnormality.        This study was performed with techniques to keep radiation doses as low  as reasonably  "achievable (ALARA). Individualized dose reduction  techniques using automated exposure control or adjustment of mA and/or  kV according to the patient size were employed.      This report was finalized on 10/10/2017 1:26 PM by Mae Barrios M.D..    XR Chest 2 View [[id number]] Collected:  10/10/17 1326     Updated:  10/10/17 1334    Narrative:       PROCEDURE: XR CHEST 2 VW-        HISTORY: recent pleural effusion     COMPARISON: September 28, 2017.     FINDINGS: The heart is normal in size. The mediastinum is unremarkable.  The lungs are clear. There is no pneumothorax. There are no acute  osseous abnormalities.           Impression:       No acute cardiopulmonary process.           This report was finalized on 10/10/2017 1:31 PM by Mae Barrios M.D..          Condition on Discharge:      Stable    Vital Signs:    /70  Pulse 77  Temp 97.8 °F (36.6 °C) (Oral)   Resp 18  Ht 67\" (170.2 cm)  Wt 248 lb (112 kg)  SpO2 98%  BMI 38.84 kg/m2    Physical Exam:      General Appearance:    Alert, cooperative, in no acute distress   Head:    Normocephalic, without obvious abnormality, atraumatic   Eyes:            Lids and lashes normal, conjunctivae and sclerae normal, no   icterus, no pallor, corneas clear, PERRLA   Ears:    Ears appear intact with no abnormalities noted   Throat:   No oral lesions, no thrush, oral mucosa moist   Neck:   No adenopathy, supple, trachea midline, no thyromegaly, no     carotid bruit, no JVD   Back:     No kyphosis present, no scoliosis present, no skin lesions,       erythema or scars, no tenderness to percussion or                   palpation,   range of motion normal   Lungs:     Clear to auscultation,respirations regular, even and                   unlabored    Heart:    Regular rhythm and normal rate, normal S1 and S2, no            murmur, no gallop, no rub, no click   Breast Exam:    Deferred   Abdomen:     Normal bowel sounds, no masses, no organomegaly, soft     "    non-tender, non-distended, no guarding, no rebound                 tenderness   Genitalia:    Deferred   Extremities:   Moves all extremities well, no edema, no cyanosis, no              redness   Pulses:   Pulses palpable and equal bilaterally   Skin:   No bleeding, bruising or rash   Lymph nodes:   No palpable adenopathy   Neurologic:   Cranial nerves 2 - 12 grossly intact, sensation intact, DTR        present and equal bilaterally         Discharge Disposition:  Home-Health Care [facility]    Discharge Medication:     Hodan Carter   Home Medication Instructions AG:[id number]    Printed on:10/12/17 1315   Medication Information                      atorvastatin (LIPITOR) 10 MG tablet  Take 1 tablet by mouth Every Night.             bumetanide (BUMEX) 1 MG tablet  Take 0.5 tablets by mouth 2 (Two) Times a Day.             cyanocobalamin 1000 MCG/ML injection  Inject 1 mL into the shoulder, thigh, or buttocks Every 28 (Twenty-Eight) Days.             epoetin parveen (EPOGEN,PROCRIT) 35862 UNIT/ML injection  Inject 1 mL under the skin Every 14 (Fourteen) Days. Indications: Chronic Hemolytic Anemia             hydrALAZINE (APRESOLINE) 25 MG tablet  Take 12.5 mg by mouth Daily.             HYDROcodone-acetaminophen (NORCO)  MG per tablet  Take 1 tablet by mouth Every 6 (Six) Hours As Needed for Moderate Pain .             lactulose 20 GM/30ML solution solution  Take 30 mL by mouth 2 (Two) Times a Day for 30 days.             ondansetron (ZOFRAN) 4 MG tablet  Take 4 mg by mouth Every 8 (Eight) Hours As Needed for Nausea or Vomiting.             pantoprazole (PROTONIX) 40 MG EC tablet  40 mg Daily.             propranolol (INDERAL) 40 MG tablet  Take 1 tablet by mouth Every 8 (Eight) Hours.             spironolactone (ALDACTONE) 100 MG tablet  Take 1 tablet by mouth 2 (Two) Times a Day.             warfarin (COUMADIN) 2.5 MG tablet  Take 1 mg by mouth Daily.                 Discharge Diet:      healthy heart/renal  diet    Activity at Discharge:      as tolerated    Follow-up Appointments:  Dr. Knowles for sleep apnea evaluation one week  Dr. Franco in one week    Future Appointments  Date Time Provider Department Center   10/16/2017 4:15 PM [facility]     Additional Instructions for the Follow-ups that You Need to Schedule     Ambulatory Referral to Home Health    As directed    Face to Face Visit Date:  10/12/2017   Follow-up Provider for Plan of Care?:  I treated the patient in an acute care facility and will not continue treatment after discharge.   Follow-up Provider:  PIETER LENNON   Reason/Clinical Findings:  weakness,debility   Describe mobility limitations that make leaving home difficult:  weakness,debility   Nursing/Therapeutic Services Requested:   Physical Therapy  Skilled Nursing  Occupational Therapy      Skilled nursing orders:   Cardiopulmonary assessments  Neurovascular assessments      PT orders:  Therapeutic exercise   Occupational orders:  Strengthening   Frequency:  1 Week 1       Protime-INR    Oct 16, 2017 (Approximate)    Results to dr lennon   Is the Patient on Coumadin (Warfarin) therapy?:  Yes                 Test Results Pending at Discharge:           Frankie Winchester DO  10/12/17  1:11 PM                 Electronically signed by Frankie Winchester DO at 10/12/2017  1:11 PM

## 2017-10-13 NOTE — THERAPY DISCHARGE NOTE
Acute Care - Occupational Therapy Discharge Summary   Diego     Patient Name: Hodan Carter  : 1940  MRN: [id number]    Today's Date: 10/13/2017  Onset of Illness/Injury or Date of Surgery Date: 10/10/17    Date of Referral to OT: 10/11/17  Referring Physician: Annabella      Admit Date: 10/10/2017        OT Recommendation and Plan    Visit Dx:    ICD-10-CM ICD-9-CM   1. Acute hepatic encephalopathy K72.00 572.2   2. Acute UTI N39.0 599.0   3. Impaired mobility and ADLs Z74.09 799.89   4. Impaired functional mobility, balance, gait, and endurance Z74.09 V49.89   5. Shortness of breath R06.02 786.05   6. Permanent atrial fibrillation I48.2 427.31                     OT Goals       10/11/17 1717          Transfer Training 2 OT LTG    Transfer Training 2 OT LTG, Date Established 10/11/17  -SD      Transfer Training 2 OT LTG, Time to Achieve 2 wks  -SD      Transfer Training 2 OT LTG, Activity Type sit to stand/stand to sit;toilet  -SD      Transfer Training 2 OT LTG, [place] contact guard assist  -SD      Transfer Training 2 OT LTG, Assist Device walker, rolling  -SD      Transfer Training 2 OT LTG, Outcome goal ongoing  -SD      Strength OT LTG    Strength Goal OT LTG, Date Established 10/11/17  -SD      Strength Goal OT LTG, Time to Achieve 2 wks  -SD      Strength Goal OT LTG, Measure to Achieve Patient will perform 15 reps UB ther ex using theraband in order to increase strength and endurance.   -SD      Strength Goal OT LTG, Outcome goal ongoing  -SD      Toileting OT LTG    Toileting Goal OT LTG, Date Established 10/11/17  -SD      Toileting Goal OT LTG, Time to Achieve 2 wks  -SD      Toileting Goal OT LTG, [place] contact guard assist  -SD      Toileting Goal OT LTG, Outcome goal ongoing  -SD      LB Dressing OT LTG    LB Dressing Goal OT LTG, Date Established 10/11/17  -SD      LB Dressing Goal OT LTG, Time to Achieve 2 wks  -SD      LB Dressing Goal OT LTG, [place]  contact guard assist  -SD      LB Dressing Goal OT LTG, Outcome goal ongoing  -SD      Functional Mobility OT LTG    Functional Mobility Goal OT LTG, Date Established 10/11/17  -SD      Functional Mobility Goal OT LTG, Time to Achieve 2 wks  -SD      Functional Mobility Goal OT LTG, [place] Level contact guard  -SD      Functional Mobility Goal OT LTG, Assist Device rolling walker  -SD      Functional Mobility Goal OT LTG, Distance to Achieve in hallway  -SD      Functional Mobility Goal OT LTG, Additional Goal 50  -SD      Functional Mobility Goal OT LTG, Outcome goal ongoing  -SD        User Key  (r) = Recorded By, (t) = Taken By, (c) = Cosigned By    Initials Name Provider Type    SD China Logan, OT Occupational Therapist                Outcome Measures       10/12/17 0844 10/11/17 1630       How much help from another person do you currently need...    Turning from your back to your side while in flat bed without using bedrails? 3  -LM      Moving from lying on back to sitting on the side of a flat bed without bedrails? 3  -LM      Moving to and from a bed to a chair (including a wheelchair)? 3  -LM      Standing up from a chair using your arms (e.g., wheelchair, bedside chair)? 3  -LM      Climbing 3-5 steps with a railing? 2  -LM      To walk in hospital room? 3  -LM      AM-PAC 6 Clicks Score 17  -LM      How much help from another is currently needed...    Putting on and taking off regular lower body clothing?  2  -SD     Bathing (including washing, rinsing, and drying)  2  -SD     Toileting (which includes using toilet bed pan or urinal)  2  -SD     Putting on and taking off regular upper body clothing  3  -SD     Taking care of personal grooming (such as brushing teeth)  4  -SD     Eating meals  4  -SD     Score  17  -SD     Functional Assessment    Outcome Measure Options AM-PAC 6 Clicks Basic Mobility (PT)  -LM AM-PAC 6 Clicks Daily Activity (OT)  -SD       User Key  (r) = Recorded By, (t) =  Taken By, (c) = Cosigned By    Initials Name Provider Type    LM Serene Khan, PT Physical Therapist    JOSE Logan, OT Occupational Therapist              OT Discharge Summary  Anticipated Discharge Disposition: home with home health  Reason for Discharge: Discharge from facility  Outcomes Achieved: Unable to make functional progress toward goals at this time (Pt seen for OT evaluation only.)  Discharge Destination: Home with home health      Rosalia Sepulveda  10/13/2017

## 2017-10-18 NOTE — TELEPHONE ENCOUNTER
Patient had a blisters on her right lower leg; Bettie dressed the wound with an Abd pad.  Patient accidentally scratched herself and saturated the abd pad with blood.  She is on Coumadin.  Home health nurse stated that she has had both clear drainage and bleeding.  She wants to know what you would like to do.

## 2017-10-19 NOTE — PROGRESS NOTES
Chief Complaint   Patient presents with   • Establish Care     Former patient of Dr. Schilling; no complains       Subjective     History of Present Illness   Hodan Carter is a 77 y.o. female who presents to [facility] following hospitalization for hepatic encephalopathy.  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  Patient was evaluated by  liver team and was not deemed to be a transplant candidate due to age.  Patient denies significant drinking history or exposure to hepatitis.  Patient was recently admitted to [facility] for hepatic encephalopathy which improved with lactulose treatments.  She is currently doing well with lactulose except she feels she has too many bowel movements per day ( 5-6).  Patient also has associated bilateral lower extremity edema which is a chronic issue. She has never had a large volume paracentesis.     The following portions of the patient's history were reviewed and updated as appropriate: allergies, current medications, past family history, past medical history, past social history, past surgical history and problem list.    Review of Systems   Constitutional: Negative for chills, fatigue and fever.   HENT: Negative for congestion, ear pain, rhinorrhea, sinus pressure and sore throat.    Eyes: Negative for visual disturbance.   Respiratory: Negative for cough, chest tightness, shortness of breath and wheezing.    Cardiovascular: Positive for leg swelling. Negative for chest pain and palpitations.   Gastrointestinal: Negative for abdominal pain, blood in stool, constipation, diarrhea, nausea and vomiting.   Endocrine: Negative for polydipsia and polyuria.   Genitourinary: Negative for dysuria and hematuria.   Musculoskeletal: Negative for back pain.   Skin: Negative for rash.   Neurological: Negative for dizziness, light-headedness, numbness and headaches.   Psychiatric/Behavioral: Negative for dysphoric mood and sleep disturbance. The patient is not  nervous/anxious.        No Known Allergies    Past Medical History:   Diagnosis Date   • Arthritis    • Cancer     uterin    • Cirrhosis of liver    • Effect, radiation    • GERD (gastroesophageal reflux disease)    • Hernia of abdominal wall     JUST BELOW UMBILICUS   • History of pneumonia    • Hypertension    • NSTEMI (non-ST elevated myocardial infarction) 9/24/2017   • Pulmonary embolism    • Wears dentures     UPPER ONLY       Social History     Social History   • Marital status:      Spouse name: N/A   • Number of children: N/A   • Years of education: N/A     Occupational History   • Not on file.     Social History Main Topics   • Smoking status: Never Smoker   • Smokeless tobacco: Never Used   • Alcohol use No   • Drug use: Defer   • Sexual activity: Defer     Other Topics Concern   • Not on file     Social History Narrative        Past Surgical History:   Procedure Laterality Date   • ENDOSCOPY     • GALLBLADDER SURGERY     • HERNIA REPAIR     • HYSTERECTOMY     • REPLACEMENT TOTAL KNEE Right        Family History   Problem Relation Age of Onset   • Hypertension Mother    • Cancer Father      prostate   • Hypertension Father    • Diabetes Sister    • Hypertension Sister    • Hypertension Brother    • Cancer Daughter      breast         Current Outpatient Prescriptions:   •  atorvastatin (LIPITOR) 10 MG tablet, Take 1 tablet by mouth Every Night., Disp: 30 tablet, Rfl: 0  •  bumetanide (BUMEX) 0.5 MG tablet, Take 1 tablet by mouth 2 (Two) Times a Day., Disp: 60 tablet, Rfl: 0  •  [START ON 10/24/2017] cyanocobalamin 1000 MCG/ML injection, Inject 1 mL into the shoulder, thigh, or buttocks Every 28 (Twenty-Eight) Days., Disp: 1 mL, Rfl: 0  •  epoetin parveen (EPOGEN,PROCRIT) 55320 UNIT/ML injection, Inject 1 mL under the skin Every 14 (Fourteen) Days. Indications: Chronic Hemolytic Anemia, Disp: 1 each, Rfl: 0  •  hydrALAZINE (APRESOLINE) 25 MG tablet, Take 12.5 mg by mouth Daily., Disp: , Rfl:   •   "HYDROcodone-acetaminophen (NORCO)  MG per tablet, Take 1 tablet by mouth Every 6 (Six) Hours As Needed for Moderate Pain ., Disp: , Rfl:   •  lactulose (CHRONULAC) 10 GM/15ML solution, Take 30 mL (2 Tablespoonfuls) by mouth 2 (Two) Times a Day for 30 days., Disp: 1892 mL, Rfl: 0  •  ondansetron (ZOFRAN) 4 MG tablet, Take 4 mg by mouth Every 8 (Eight) Hours As Needed for Nausea or Vomiting., Disp: , Rfl:   •  pantoprazole (PROTONIX) 40 MG EC tablet, 40 mg Daily., Disp: , Rfl:   •  propranolol (INDERAL) 40 MG tablet, Take 1 tablet by mouth Every 8 (Eight) Hours., Disp: 90 tablet, Rfl: 0  •  spironolactone (ALDACTONE) 100 MG tablet, Take 1 tablet by mouth 2 (Two) Times a Day., Disp: 60 tablet, Rfl: 0  •  warfarin (COUMADIN) 2.5 MG tablet, Take 1 mg by mouth Daily., Disp: , Rfl:     Objective   /67 (BP Location: Left arm, Patient Position: Sitting)  Pulse 65  Temp 98 °F (36.7 °C)  Ht 67\" (170.2 cm)  Wt 243 lb (110 kg)  SpO2 98%  BMI 38.06 kg/m2    Physical Exam   Constitutional: She is oriented to person, place, and time. She appears well-developed and well-nourished.   HENT:   Head: Normocephalic and atraumatic.   Eyes: Conjunctivae are normal.   Pulmonary/Chest: Effort normal.   Musculoskeletal: Normal range of motion.   Neurological: She is alert and oriented to person, place, and time.   Psychiatric: She has a normal mood and affect. Her behavior is normal.   Nursing note and vitals reviewed.      Assessment/Plan   Hodan was seen today for [facility].    Diagnoses and all orders for this visit:    Other cirrhosis of liver  -     CBC & Differential  -     Comprehensive Metabolic Panel  -     Ambulatory Referral to Gastroenterology  -     CBC Auto Differential    Chronic kidney disease-mineral and bone disorder    Other hypervolemia    Essential hypertension    Permanent atrial fibrillation          Discussion Summary:    76 yo W F  Presenting to [facility]    1. Idiopathic Liver Cirrhosis " with associated Hepatic Encephalopathy and ascites  - cont lactulose however dose can be reduced for a goal of 3-4 BM per day, (at this point, 1 dose of 30ml per day)  - not liver transplant candidate, as a result, pt requests to follow local GI rather than at .  Referral placed.   2. HTN - stable    3. Chronic Lower extremity edema / Hepato-renal syndrome  - careful diuresis as tolerated and when appropriate.  For now cont current doses of medications.     4. A fib - on warfarin, INR at goal     Follow up:  No Follow-up on file.     Patient Instructions:  Patient instructions were provided.

## 2017-10-20 NOTE — TELEPHONE ENCOUNTER
Bettie with [facility] called for verbal orders for wound care, patient has blisters on her legs and has jammed her fingernails in her legs, has serous drainage, will cover with ABD pads, changing twice daily, cleaning with normal saline.   [phone]    Approved verbal orders.

## 2017-10-25 PROBLEM — E53.8 VITAMIN B12 DEFICIENCY: Status: ACTIVE | Noted: 2017-01-01

## 2017-10-25 PROBLEM — I89.0 LYMPHEDEMA OF BOTH LOWER EXTREMITIES: Status: ACTIVE | Noted: 2017-01-01

## 2017-11-01 NOTE — PROGRESS NOTES
Chief Complaint   Patient presents with   • Pain     back pain and left knee pain s/p fall last night       Subjective     History of Present Illness   Hodan Carter is a 77 y.o. female presenting for follow-up on medical problems.  Patient was advised to come to the clinic by home health given concerns of her lower extremity edema with wounds as well as a recent fall.  Patient apparently was found in the bathroom and likely missed the toilet or slipped off the toilet.  Patient cannot recall the details of the events however she was found by family members and has some mild left shoulder pain, and left knee pain.  Patient states that she feels well otherwise.  Home health also needed further advice regarding the extent of her lower extremity edema her edema has actually been improving and patient has been losing weight.    The following portions of the patient's history were reviewed and updated as appropriate: allergies, current medications, past family history, past medical history, past social history, past surgical history and problem list.    Review of Systems   Constitutional: Negative for chills, fatigue and fever.   HENT: Negative for congestion, ear pain, rhinorrhea, sinus pressure and sore throat.    Eyes: Negative for visual disturbance.   Respiratory: Negative for cough, chest tightness, shortness of breath and wheezing.    Cardiovascular: Positive for leg swelling. Negative for chest pain and palpitations.   Gastrointestinal: Negative for abdominal pain, blood in stool, constipation, diarrhea, nausea and vomiting.   Endocrine: Negative for polydipsia and polyuria.   Genitourinary: Negative for dysuria and hematuria.   Musculoskeletal: Negative for back pain.   Skin: Negative for rash.   Neurological: Negative for dizziness, light-headedness, numbness and headaches.   Psychiatric/Behavioral: Negative for dysphoric mood and sleep disturbance. The patient is not nervous/anxious.        No Known  Allergies    Past Medical History:   Diagnosis Date   • Arthritis    • Cancer     uterin    • Cirrhosis of liver    • Effect, radiation    • GERD (gastroesophageal reflux disease)    • Hernia of abdominal wall     JUST BELOW UMBILICUS   • History of pneumonia    • Hypertension    • NSTEMI (non-ST elevated myocardial infarction) 9/24/2017   • Pulmonary embolism    • Wears dentures     UPPER ONLY       Social History     Social History   • Marital status:      Spouse name: N/A   • Number of children: N/A   • Years of education: N/A     Occupational History   • Not on file.     Social History Main Topics   • Smoking status: Never Smoker   • Smokeless tobacco: Never Used   • Alcohol use No   • Drug use: Defer   • Sexual activity: Defer     Other Topics Concern   • Not on file     Social History Narrative        Past Surgical History:   Procedure Laterality Date   • ENDOSCOPY     • GALLBLADDER SURGERY     • HERNIA REPAIR     • HYSTERECTOMY     • REPLACEMENT TOTAL KNEE Right        Family History   Problem Relation Age of Onset   • Hypertension Mother    • Cancer Father      prostate   • Hypertension Father    • Diabetes Sister    • Hypertension Sister    • Hypertension Brother    • Cancer Daughter      breast         Current Outpatient Prescriptions:   •  atorvastatin (LIPITOR) 10 MG tablet, Take 1 tablet by mouth Every Night., Disp: 30 tablet, Rfl: 0  •  bumetanide (BUMEX) 0.5 MG tablet, Take 1 tablet by mouth 2 (Two) Times a Day., Disp: 60 tablet, Rfl: 0  •  cyanocobalamin 1000 MCG/ML injection, Inject 1 mL into the shoulder, thigh, or buttocks Every 28 (Twenty-Eight) Days., Disp: 1 mL, Rfl: 0  •  epoetin parveen (EPOGEN,PROCRIT) 06625 UNIT/ML injection, Inject 1 mL under the skin Every 14 (Fourteen) Days. Indications: Chronic Hemolytic Anemia, Disp: 1 each, Rfl: 0  •  hydrALAZINE (APRESOLINE) 25 MG tablet, Take 12.5 mg by mouth Daily., Disp: , Rfl:   •  HYDROcodone-acetaminophen (NORCO)  MG per tablet, Take  "1 tablet by mouth Every 6 (Six) Hours As Needed for Moderate Pain ., Disp: , Rfl:   •  lactulose (CHRONULAC) 10 GM/15ML solution, Take 30 mL (2 Tablespoonfuls) by mouth 2 (Two) Times a Day for 30 days., Disp: 1892 mL, Rfl: 0  •  ondansetron (ZOFRAN) 4 MG tablet, Take 4 mg by mouth Every 8 (Eight) Hours As Needed for Nausea or Vomiting., Disp: , Rfl:   •  pantoprazole (PROTONIX) 40 MG EC tablet, 40 mg Daily., Disp: , Rfl:   •  spironolactone (ALDACTONE) 100 MG tablet, Take 1 tablet by mouth 2 (Two) Times a Day., Disp: 60 tablet, Rfl: 0  •  warfarin (COUMADIN) 2.5 MG tablet, Take 1 mg by mouth Daily., Disp: , Rfl:   •  propranolol (INDERAL) 40 MG tablet, Take 1 tablet by mouth Every 8 (Eight) Hours., Disp: 90 tablet, Rfl: 0    Current Facility-Administered Medications:   •  cyanocobalamin injection 1,000 mcg, 1,000 mcg, Intramuscular, Q28 Days, Pieter Farias DO, 1,000 mcg at 10/24/17 1002    Objective   /51  Pulse 64  Temp 97.9 °F (36.6 °C)  Ht 67\" (170.2 cm)  SpO2 100%    Physical Exam   Constitutional: She is oriented to person, place, and time. She appears well-developed and well-nourished.   HENT:   Head: Normocephalic and atraumatic.   Eyes: Conjunctivae are normal.   Pulmonary/Chest: Effort normal.   Musculoskeletal: Normal range of motion. She exhibits edema.   Neurological: She is alert and oriented to person, place, and time.   Skin:   RLE with skin tear and redness across anterior leg.  LLE with 3cm round bloody blister, now flattened with white base.  Significant weeping in both legs. 3+ pitting edema.    Psychiatric: She has a normal mood and affect. Her behavior is normal.   Nursing note and vitals reviewed.      Assessment/Plan   Hodan was seen today for pain.    Diagnoses and all orders for this visit:    Injury of left knee, initial encounter  -     Cancel: XR Knee 1 or 2 View Left; Future    Fall, initial encounter    Lymphedema          Discussion Summary:    77-year-old white female " presenting for follow-up on medical problems.    1.  Bilateral lower extremity edema/lymphedema  -Advised on compression wraps with Ace bandages.  This may be continued with home health.  Order was called in.    2.  Bilateral lower extremity wounds-stable healing.  No signs of infection.    3.  Left knee injury due to fall  -X-rays were checked and only showed evidence of osteoarthritis.  No acute fracture or injury.    Follow up:  No Follow-up on file.     Patient Instructions:  Patient instructions were provided.

## 2017-11-01 NOTE — TELEPHONE ENCOUNTER
Dalila with [facility] called to request additional visits for Ms Carter for gait training.  Verbal Orders given.

## 2017-11-03 PROBLEM — K76.82 HEPATIC ENCEPHALOPATHY (HCC): Status: ACTIVE | Noted: 2017-01-01

## 2017-11-03 NOTE — TELEPHONE ENCOUNTER
Jana, nurse in hospital called and asked for correct dosing on Procrit and b12 injections. Informed per Dr. Farias that Procrit dose should come from Dr. Franco, and b12 is every 4 weeks.

## 2017-11-04 PROBLEM — D63.8 ANEMIA OF CHRONIC DISEASE: Chronic | Status: ACTIVE | Noted: 2017-01-01

## 2017-11-04 PROBLEM — L03.116 CELLULITIS OF LEFT LOWER EXTREMITY: Status: ACTIVE | Noted: 2017-01-01

## 2017-11-04 PROBLEM — K72.90 DECOMPENSATED HEPATIC CIRRHOSIS (HCC): Status: ACTIVE | Noted: 2017-01-01

## 2017-11-04 PROBLEM — K74.60 DECOMPENSATED HEPATIC CIRRHOSIS (HCC): Status: ACTIVE | Noted: 2017-01-01

## 2017-11-08 NOTE — PROGRESS NOTES
Date of First Steps ACP interview: 2017  Location of interview: Patient's room  First Steps ACP Facilitator: Ciro Hewitt MD  Referral Source: Dr. Twin Mroales  Present for facilitation: Patient, daughter    SUMMARY OF ADVANCE CARE PLANNING DISCUSSION:  Hodan presents for First Steps facilitation. We reviewed purpose and goals for advance care planning.    I reviewed with Hodan qualities to consider when choosing a healthcare agent. Hodan is considering her daughter as her healthcare agent. I encouraged Hodan to discuss her preferences for future care with healthcare agents and others close to her.    Hodan describes past experiences dealing with friends or family who have been seriously ill: Mother and father  suddenly; daughter received hospice services during her progressive decline secondary to end stage liver cirrhosis. From these experiences she is unsure regarding goals of care at present.     Hodan identified the following as most important to living well: Enjoying her family    Hodan describes cultural, Temple, spiritual or personal practices/beliefs that are important to her or might help her choose the care wanted, or not wanted: Her missael is strong, guides her in her decisions.    Goals of medical care for severe, permanent brain injury were explored: No     Education materials were provided on: Advance Care Planning    Each section of the advance care/living will document was reviewed and discussed.    Advance care/living will document finished during this facilitation? no    Time spent on preparation, facilitation and documentation was 61-90 minutes.    RECOMMENDATIONS/FOLLOW-UP:  Patient stated that she wishes to discuss her wishes with her family.  At present wishes to maintain FULL CODE status.  She was encouraged to proceed with living will process; wishes to assign her daughter as health care surrogate.  She is agreeable with outpatient palliative care services.  She expressed  understanding and agreement with the same.     CONSULT/NOTE ROUTED  To primary care physician.     Ciro Hewitt MD

## 2017-11-08 NOTE — OUTREACH NOTE
I spoke directly to the patient to obtain the information for the IMER discharge call note.The patient was admitted to [facility] on 11/2/17. Dx of hepatic encephalopathy. Dicharges to home. The patient was advised to notify PCP and seel UTC or ER eval if sx's reoccur, worsen, or condition changes unexpectedly. The patient verbalized understanding.

## 2017-11-09 NOTE — TELEPHONE ENCOUNTER
Serene with home health called to let you know that Ms Tirado BP is running low.  At her visit it was 90/60 sitting and dropped to 70/40 upon standing.  Patient had no symptoms.  She has had some medication changes upon discharge.(cellulitis) Her coumadin dose was changed and Serene needs to know when she needs to recheck it.  Please advise.  Patient does have a RTC appointment 11/16/2017.

## 2017-11-10 NOTE — TELEPHONE ENCOUNTER
Serene with [facility] will contact the patient and notify her to hold for the weekend and recheck Monday.

## 2017-11-16 NOTE — PROGRESS NOTES
Chief Complaint   Patient presents with   • Follow-up     hospital follow up; patient was dc'd on 11/07/17; med refills   • Constipation       Subjective     History of Present Illness   Hodan Carter is a 77 y.o. female presenting for follow up after hospitalization.  Hospital records were reviewed and discussed with the patient.    Hospitalization Summary:  Patient was admitted on 11/2/17 and discharged on 11/7/17 at [facility] for hepatic encephalopathy 2/2 lower extremity cellulitis.  Patient had become acutely confused and unaware of time/place.  She cannot recall her admission.  She did have elevated ammonia levels and this improved with lactulose though her hospitalization.  She was also treated with antibiotics which she has now completed. She is feeling well at this time.  She was approached by palliative and has been unsure about whether to have their services.     She is complaining of back pain and has been on narcotic medications for pain control for several years by her prior PCP, she wishes to continue this.       The following portions of the patient's history were reviewed and updated as appropriate: allergies, current medications, past family history, past medical history, past social history, past surgical history and problem list.    Review of Systems   Constitutional: Positive for fatigue. Negative for chills and fever.   HENT: Negative for congestion, ear pain, rhinorrhea, sinus pressure and sore throat.    Eyes: Negative for visual disturbance.   Respiratory: Negative for cough, chest tightness, shortness of breath and wheezing.    Cardiovascular: Positive for leg swelling. Negative for chest pain and palpitations.   Gastrointestinal: Negative for abdominal pain, blood in stool, constipation, diarrhea, nausea and vomiting.   Endocrine: Negative for polydipsia and polyuria.   Genitourinary: Negative for dysuria and hematuria.   Musculoskeletal: Positive for back pain.   Skin:  Negative for rash.   Neurological: Negative for dizziness, light-headedness, numbness and headaches.   Psychiatric/Behavioral: Negative for dysphoric mood and sleep disturbance. The patient is not nervous/anxious.        No Known Allergies    Past Medical History:   Diagnosis Date   • Arthritis    • Cancer     uterin    • Cirrhosis of liver    • Effect, radiation    • GERD (gastroesophageal reflux disease)    • Hernia of abdominal wall     JUST BELOW UMBILICUS   • History of pneumonia    • Hypertension    • NSTEMI (non-ST elevated myocardial infarction) 9/24/2017   • Pulmonary embolism    • Wears dentures     UPPER ONLY       Social History     Social History   • Marital status:      Spouse name: N/A   • Number of children: N/A   • Years of education: N/A     Occupational History   • Not on file.     Social History Main Topics   • Smoking status: Never Smoker   • Smokeless tobacco: Never Used   • Alcohol use No   • Drug use: Defer   • Sexual activity: Defer     Other Topics Concern   • Not on file     Social History Narrative       Past Surgical History:   Procedure Laterality Date   • ENDOSCOPY     • GALLBLADDER SURGERY     • HERNIA REPAIR     • HYSTERECTOMY     • REPLACEMENT TOTAL KNEE Right        Family History   Problem Relation Age of Onset   • Hypertension Mother    • Cancer Father      prostate   • Hypertension Father    • Diabetes Sister    • Hypertension Sister    • Hypertension Brother    • Cancer Daughter      breast         Current Outpatient Prescriptions:   •  atorvastatin (LIPITOR) 10 MG tablet, Take 1 tablet by mouth Every Night., Disp: 30 tablet, Rfl: 0  •  bumetanide (BUMEX) 1 MG tablet, Take 1 tablet by mouth 2 (Two) Times a Day., Disp: 60 tablet, Rfl: 0  •  cyanocobalamin 1000 MCG/ML injection, Inject 1 mL into the shoulder, thigh, or buttocks Every 28 (Twenty-Eight) Days. (Patient taking differently: Inject 1,000 mcg into the shoulder, thigh, or buttocks Every 14 (Fourteen) Days.), Disp:  "1 mL, Rfl: 0  •  digoxin (LANOXIN) 125 MCG tablet, Take 1 tablet by mouth Every Other Day., Disp: 30 tablet, Rfl: 0  •  epoetin parveen (EPOGEN,PROCRIT) 33029 UNIT/ML injection, Inject 1 mL under the skin Every 14 (Fourteen) Days. Indications: Chronic Hemolytic Anemia, Disp: 1 each, Rfl: 0  •  HYDROcodone-acetaminophen (NORCO)  MG per tablet, Take 1 tablet by mouth Every 6 (Six) Hours As Needed for Moderate Pain ., Disp: 90 tablet, Rfl: 0  •  ondansetron (ZOFRAN) 4 MG tablet, Take 4 mg by mouth Every 8 (Eight) Hours As Needed for Nausea or Vomiting., Disp: , Rfl:   •  pantoprazole (PROTONIX) 40 MG EC tablet, Take 1 tablet by mouth Daily., Disp: 30 tablet, Rfl: 3  •  propranolol (INDERAL) 40 MG tablet, Take 1 tablet by mouth Every 8 (Eight) Hours., Disp: 90 tablet, Rfl: 0  •  spironolactone (ALDACTONE) 100 MG tablet, Take 1 tablet by mouth 2 (Two) Times a Day., Disp: 60 tablet, Rfl: 3  •  warfarin (COUMADIN) 3 MG tablet, Take 1 tablet by mouth once daily, Disp: 30 tablet, Rfl: 0    Objective   BP 94/54  Pulse 61  Temp 98.1 °F (36.7 °C)  Ht 67\" (170.2 cm)  SpO2 98%    Physical Exam   Constitutional: She is oriented to person, place, and time. She appears well-developed and well-nourished.   HENT:   Head: Normocephalic and atraumatic.   Eyes: Conjunctivae are normal.   Pulmonary/Chest: Effort normal.   Musculoskeletal: Normal range of motion. She exhibits edema.   Neurological: She is alert and oriented to person, place, and time.   Skin:   Legs wrapped in ACE bandages b/l    Psychiatric: She has a normal mood and affect. Her behavior is normal.   Nursing note and vitals reviewed.      Assessment/Plan   Hodan was seen today for follow-up and constipation.    Diagnoses and all orders for this visit:    Chronic pain syndrome  -     HYDROcodone-acetaminophen (NORCO)  MG per tablet; Take 1 tablet by mouth Every 6 (Six) Hours As Needed for Moderate Pain .    Hepatic encephalopathy    Cirrhosis of liver with " ascites, unspecified hepatic cirrhosis type    Permanent atrial fibrillation    Essential hypertension          Discussion Summary:  76 yo W F presenting for follow up after hospitalization for hepatic encephalopathy 2/2 lower extremity cellulitis.  Symptoms have improved and she has returned to baseline; however her overall prognosis is poor and palliative services have been offered.  I suggested that this is a wise choice as the services will make home life more convenient.  Given her chronic pain issues, I am ok with starting her on Norco for pain control, Palliative care may want to continue this in the long run.     The patient has read and signed the [facility] Controlled Substance Contract.  I will continue to see patient for regular follow up appointments.  They are well controlled on their medication.  JOSE DANIEL has been reviewed by me and is updated every 3 months. The patient is aware of the potential for addiction and dependence.      INR at home noted at 2.7 - normal range.  Cont 3mg warfarin.     Transitional Care Management Certification  I certify that the following are true:  1. Communication was made within 2 business days of discharge.  2. Complexity of Medical Decision Making is high.  3. Face to face visit occurred within 7 days.    *Note: 40521 is for high complexity patients with a face to face visit within 7 days of discharge.  99901 is for high complexity patients with a face to face on days 8-14 post discharge or medium complexity with face to face visit within 14 days post discharge.    Follow up:  No Follow-up on file.     Patient Instructions:  Patient instructions were provided.

## 2017-11-20 NOTE — TELEPHONE ENCOUNTER
Medication was sent to pharmacy with the assistance of Dr. Whyte.  Home health nurse notified and she will let family know.

## 2017-11-20 NOTE — TELEPHONE ENCOUNTER
PATIENT DAUGHTER CALLED AGAIN ASKING ABOUT REFILL, WAS INFORMED AWAITING APPROVAL SINCE PROVIDER HAD NEVER FILLED PRESCRIPTION, DAUGHTER BECAME UPSET STATING THAT IT WAS RIDICULOUS AND SHE HAD TO HAVE HER MEDS FOR BOWEL MOVEMENT, STATED IF THIS CONTINUED SHE WOULD HAVE TO FIND ANOTHER DOCTOR. DAUGHTER THEN HUNG UP PHONE BEFORE ANYTHING MORE COULD BE SAID.

## 2017-11-21 NOTE — PATIENT INSTRUCTIONS
Post Procrit Injection:      Common side effects can include rash, soreness of mouth, nausea, vomiting, headache, joint pain, muscle pain, bone pain, redness at the injection site.    You will need to call your doctor before your next injection.    Severe reactions:  Call 911 or come to the Emergency Room.    Severe reactions can include:   1.  Shortness of breath   2.  Wheezing   3.  Swelling of face or hands   4.  Rash all over body   5.  Changes in blood pressure resulting in dizziness or fainting

## 2017-11-24 NOTE — PLAN OF CARE
Problem: Patient Care Overview (Adult)  Goal: Plan of Care Review  Outcome: Ongoing (interventions implemented as appropriate)    10/12/17 0934   Coping/Psychosocial Response Interventions   Plan Of Care Reviewed With patient   Outcome Evaluation   Outcome Summary/Follow up Plan PT vipin completed. Pt presents with decreased strength, balance, endurance and independence with mobility. She is expected to benefit from continued skilled PT intervention to improve her overall functional mobility status prior to D/C.         Problem: Inpatient Physical Therapy  Goal: Bed Mobility Goal LTG- PT  Outcome: Ongoing (interventions implemented as appropriate)    10/12/17 0934   Bed Mobility PT LTG   Bed Mobility PT LTG, Date Established 10/12/17   Bed Mobility PT LTG, Time to Achieve 2 wks   Bed Mobility PT LTG, Activity Type supine to sit/sit to supine   Bed Mobility PT LTG, [place] Level conditional independence   Bed Mobility PT Goal LTG, Assist Device bed rails   Bed Mobility PT LTG, Outcome goal ongoing       Goal: Transfer Training Goal 1 LTG- PT  Outcome: Ongoing (interventions implemented as appropriate)    10/12/17 0934   Transfer Training PT LTG   Transfer Training PT LTG, Date Established 10/12/17   Transfer Training PT LTG, Time to Achieve 2 wks   Transfer Training PT LTG, Activity Type sit to stand/stand to sit;bed to chair /chair to bed   Transfer Training PT LTG, [place] Level supervision required   Transfer Training PT LTG, Assist Device walker, rolling   Transfer Training PT LTG, Outcome goal ongoing       Goal: Gait Training Goal LTG- PT  Outcome: Ongoing (interventions implemented as appropriate)    10/12/17 0934   Gait Training PT LTG   Gait Training Goal PT LTG, Date Established 10/12/17   Gait Training Goal PT LTG, Time to Achieve 2 wks   Gait Training Goal PT LTG, [place] Level contact guard assist   Gait Training Goal PT LTG, Assist Device walker, rolling   Gait Training Goal PT LTG,  Distance to Achieve 300   Gait Training Goal PT LTG, Outcome goal ongoing       Goal: Strength Goal LTG- PT  Outcome: Ongoing (interventions implemented as appropriate)    10/12/17 0976   Strength Goal PT LTG   Strength Goal PT LTG, Date Established 10/12/17   Strength Goal PT LTG, Time to Achieve 2 wks   Strength Goal PT LTG, Measure to Achieve Pt will perform B LE ther ex x 15 reps to improve functional strength for mobility.   Strength Goal PT LTG, Outcome goal ongoing            No

## 2017-11-30 NOTE — TELEPHONE ENCOUNTER
Serene with [facility] [phone] called and said that Ms Carter needs a hospital bed due to severe leg swelling.  She also has 2 stage 2 pressure ulcers on her bottom and they would like a pressure release mattress as well.      Patient has not had BM x 3 days;  Currently taking Lactulose.  Any new orders?

## 2017-12-01 NOTE — OUTREACH NOTE
IMER call completed.  Please refer to TCM call flowsheet for call documentation.  Arianna, patient's daughter, would like to know if any more information has been received about hospital bed order.  Patients legs are starting to swell and they would like to have a bed for her to decrease the swelling.  Arianna is concerned that mother's lactulose has been increased and she has only had 1 small bm today.  Please advise Arianna.  Thank you.

## 2017-12-04 NOTE — TELEPHONE ENCOUNTER
Serene with [facility] [place] stating that she went to see patient, she said that sitting her blood pressure is 88/54, heartrate 76 and irregular which is not unusual. Medication - propranolol 40mg TID, Spironolactone 100mg BID, Bumetanide 1mg BID. standing she cannot hear a blood pressure at all. She was in hospital last week for Pneumonia, coughing but clear.   Chronic edema, 4+, draining clear fluid constantly. Not a new problem, but not improving.   Losing fluid with legs that are constantly draining.   Patient says that she is drinking plenty of fluids.   Patient has not eaten much today.      Informed Serene per Dr. Whyte to have patient drink plenty of fluids, eat something since she has not eaten today, monitor vitals, and have patient keep appointment with Dr. Farias tomorrow.

## 2017-12-04 NOTE — TELEPHONE ENCOUNTER
I just spoke with patient Hodan Carter and her daughter for follow up call. They report that the patient was sent home with nebulizer medication but no machine.  They were under the impression that the neb machine would be coming from [facility].  When they contacted [facility], they were told that there were no orders for this patient.  I have contacted MSW/CM to assist with this matter.

## 2017-12-05 NOTE — PROGRESS NOTES
Chief Complaint   Patient presents with   • Hypotension     BP 94/46 in office today; patient has been vomiting since this AM       Subjective     History of Present Illness   Hodan Carter is a 77 y.o. female presenting for follow up after hospitalization.  Hospital records were reviewed and discussed with the patient.    Hospitalization Summary:  Patient was admitted on 11/25/17  and discharged on 11/29/17 at [facility] for AMS from hepatic encephalopathy and pneumonia.  Patient was admitted and treated for hepatic encephalopathy with rifaximin in addition to her lactulose.  She was also on levofloxacin for pneumonia coverage.  Her encephalopathy was severe enough that she could not swallow and medications needed to be given by NG tube until confusion improved. She was discharged with rifaximin.     Since discharge, patient has had difficulty with recurrence of lower extremity edema.  Partially due to the lack of a hospital bed to assist with keeping her legs up.  They have tried propping legs up with pillows.  She continues to take her diuretics as directed.      She has been having dizziness since yesterday after she ate a bad fruit cup.  She vomited a few times yesterday.  Symptoms have improved today but she feels weak and tired. BP was low yesterday, worse with standing.      The following portions of the patient's history were reviewed and updated as appropriate: allergies, current medications, past family history, past medical history, past social history, past surgical history and problem list.    Review of Systems   Constitutional: Positive for fatigue. Negative for chills and fever.   HENT: Negative for congestion, ear pain, rhinorrhea, sinus pressure and sore throat.    Eyes: Negative for visual disturbance.   Respiratory: Negative for cough, chest tightness, shortness of breath and wheezing.    Cardiovascular: Positive for leg swelling. Negative for chest pain and palpitations.    Gastrointestinal: Negative for abdominal pain, blood in stool, constipation, diarrhea, nausea and vomiting.   Endocrine: Negative for polydipsia and polyuria.   Genitourinary: Negative for dysuria and hematuria.   Musculoskeletal: Negative for back pain.   Skin: Negative for rash.   Neurological: Positive for dizziness. Negative for light-headedness, numbness and headaches.   Psychiatric/Behavioral: Negative for dysphoric mood and sleep disturbance. The patient is not nervous/anxious.        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     Social History Main Topics   • Smoking status: Never Smoker   • Smokeless tobacco: Never Used   • Alcohol use No   • Drug use: Defer   • Sexual activity: Defer     Other Topics Concern   • Not on file     Social History Narrative       Past Surgical History:   Procedure Laterality Date   • ENDOSCOPY     • GALLBLADDER SURGERY     • HERNIA REPAIR     • HYSTERECTOMY     • REPLACEMENT TOTAL KNEE Right        Family History   Problem Relation Age of Onset   • Hypertension Mother    • Cancer Father      prostate   • Hypertension Father    • Diabetes Sister    • Hypertension Sister    • Hypertension Brother    • Cancer Daughter      breast         Current Outpatient Prescriptions:   •  atorvastatin (LIPITOR) 10 MG  tablet, Take 1 tablet by mouth Every Night., Disp: 30 tablet, Rfl: 0  •  bumetanide (BUMEX) 1 MG tablet, Take 1 tablet by mouth 2 (Two) Times a Day., Disp: 60 tablet, Rfl: 0  •  cyanocobalamin 1000 MCG/ML injection, Inject 1 mL into the shoulder, thigh, or buttocks Every 28 (Twenty-Eight) Days. (Patient taking differently: Inject 1,000 mcg into the shoulder, thigh, or buttocks Every 14 (Fourteen) Days.), Disp: 1 mL, Rfl: 0  •  digoxin (LANOXIN) 125 MCG tablet, Take 1 tablet by mouth Every Other Day., Disp: 30 tablet, Rfl: 0  •  epoetin parveen (EPOGEN,PROCRIT) 10922 UNIT/ML injection, Inject 1 mL under the skin Every 14 (Fourteen) Days. Indications: Chronic Hemolytic Anemia, Disp: 1 each, Rfl: 0  •  HYDROcodone-acetaminophen (NORCO)  MG per tablet, Take 1 tablet by mouth Every 6 (Six) Hours As Needed for Moderate Pain ., Disp: 90 tablet, Rfl: 0  •  ipratropium-albuterol (DUO-NEB) 0.5-2.5 mg/mL nebulizer, Take 3 mL by nebulization Every 6 (Six) Hours., Disp: 180 mL, Rfl: 0  •  lactulose (CHRONULAC) 10 GM/15ML solution, Take 15 mL by mouth 3 (Three) Times a Day., Disp: 1892 mL, Rfl: 6  •  levoFLOXacin (LEVAQUIN) 750 MG tablet, Take 1 tablet by mouth Daily., Disp: 4 tablet, Rfl: 0  •  Nebulizers (COMPRESSOR/NEBULIZER) misc, For use with nebulizer solution, Q6H prn soa, Disp: 1 each, Rfl: 0  •  ondansetron (ZOFRAN) 4 MG tablet, Take 4 mg by mouth Every 8 (Eight) Hours As Needed for Nausea or Vomiting., Disp: , Rfl:   •  propranolol (INDERAL) 40 MG tablet, Take 1 tablet by mouth Every 8 (Eight) Hours., Disp: 90 tablet, Rfl: 0  •  rifaximin (XIFAXAN) 550 MG tablet, Take 1 tablet by mouth Every 12 (Twelve) Hours., Disp: 60 tablet, Rfl: 5  •  spironolactone (ALDACTONE) 100 MG tablet, Take 1 tablet by mouth 2 (Two) Times a Day., Disp: 60 tablet, Rfl: 3  •  warfarin (COUMADIN) 3 MG tablet, Take 1 tablet by mouth once daily, Disp: 30 tablet, Rfl: 0  No current facility-administered medications for this visit.  "    Facility-Administered Medications Ordered in Other Visits:   •  epoetin parveen (EPOGEN,PROCRIT) injection 20,000 Units, 20,000 Units, Subcutaneous, Once, Julio Franco MD    Objective   BP 94/46 (BP Location: Left arm, Patient Position: Sitting)  Pulse 69  Temp 98 °F (36.7 °C)  Ht 167.6 cm (66\")  SpO2 100%    Physical Exam   Constitutional: She is oriented to person, place, and time. She appears well-developed and well-nourished.   HENT:   Head: Normocephalic and atraumatic.   Eyes: Conjunctivae are normal.   Cardiovascular: Normal rate, regular rhythm and normal heart sounds.    Pulmonary/Chest: Effort normal.   Musculoskeletal: Normal range of motion. She exhibits edema.   Neurological: She is alert and oriented to person, place, and time.   Skin:   Legs wrapped in ACE bandages b/l soaked. Left shin with 2x 1cm open wound, clean.   Psychiatric: She has a normal mood and affect. Her behavior is normal.   Nursing note and vitals reviewed.      Assessment/Plan   Hodan was seen today for hypotension.    Diagnoses and all orders for this visit:    Hepatic encephalopathy    Cirrhosis of liver with ascites, unspecified hepatic cirrhosis type    Lymphedema of both lower extremities    Open wound of left lower extremity, subsequent encounter          Discussion Summary:    77-year-old white female presenting for follow-up on hepatic encephalopathy and pneumonia after recent hospitalization.  Hospital records were reviewed and discussed.  Since she was discharged, she has needed a hospital bed, cushion for her wheelchair, and a nebulizer machine for home use.  These items have been ordered but ago.  We will ensure patient obtains her needed supplies for her Debility.    Patient has lower blood pressure likely associated with gastroenteritis and I suspect that her symptoms should improve.  If they do not improve, we may consider Midodrine if appropriate.  For now we will hold on propranolol for the next 2 " weeks.    Lymph edema is worse today, she should ocntinue taking bumex and elevate the feet.  Towels and ace wraps were recommended.    Rifaximin was approved by insurance, pt may continue taking BID.       Transitional Care Management Certification  I certify that the following are true:  1. Communication was made within 2 business days of discharge.  2. Complexity of Medical Decision Making is high.  3. Face to face visit occurred within 6 days.    *Note: 14048 is for high complexity patients with a face to face visit within 7 days of discharge.  18834 is for high complexity patients with a face to face on days 8-14 post discharge or medium complexity with face to face visit within 14 days post discharge.    Follow up:  No Follow-up on file.     Patient Instructions:  Patient instructions were provided.

## 2017-12-08 PROBLEM — N17.9 ACUTE RENAL FAILURE (HCC): Status: ACTIVE | Noted: 2017-01-01

## 2017-12-08 PROBLEM — E87.5 HYPERKALEMIA: Status: ACTIVE | Noted: 2017-01-01

## 2017-12-08 PROBLEM — E86.0 DEHYDRATION: Status: ACTIVE | Noted: 2017-01-01

## 2017-12-08 NOTE — CONSULTS
Date of [facility] ACP interview: 12/8/2017  Location of interview: Patient's room  [facility] ACP Facilitator: Ciro Hewitt MD  Referral Source: Dr. Frankie Winchester  Present for facilitation: Patient, daughter, palliative care RN and myself    SUMMARY OF ADVANCE CARE PLANNING DISCUSSION:  We met with Mrs. Carter to discuss advance care planning.    I reviewed qualities to consider when choosing a healthcare agent.  She was initially undecided regarding her choice of the same.  Subsequently, she agreed to meet with our  and completed a Living Will.     Hodan identified the following as most important to living well: Spending time with her family.    Goals of medical care for severe, permanent brain injury were explored: Yes     Education materials were provided on: Advance Care Planning    Each section of the advance care/living will document was reviewed and discussed.    MOST form was completed while daughter was at the bedside    Time spent on preparation, facilitation and documentation was 61-90 minutes.    RECOMMENDATIONS/FOLLOW-UP:    Further discussions regarding goals of care are recommended in the setting of advanced liver failure; agreeable with out patient palliative care follow up    CONSULT/NOTE ROUTED    Dr. Frankie Hewitt MD

## 2017-12-08 NOTE — PROGRESS NOTES
Discharge Planning Assessment  [facility]     Patient Name: Hodan Carter  MRN: [id number]  Today's Date: 12/8/2017    Admit Date: 12/7/2017          Discharge Needs Assessment       12/08/17 1617    Living Environment    Lives With child(tasneem), adult    Living Arrangements house    Home Accessibility bed and bath on same level    Stair Railings at Home none    Type of Financial/Environmental Concern none    Transportation Available family or friend will provide    Living Environment    Provides Primary Care For no one, unable/limited ability to care for self    Primary Care Provided By child(tasneem) (specify)   Adult children    Quality Of Family Relationships supportive    Able to Return to Prior Living Arrangements yes    Discharge Needs Assessment    Concerns To Be Addressed denies needs/concerns at this time    Readmission Within The Last 30 Days previous discharge plan unsuccessful    Outpatient/Agency/Support Group Needs homecare agency (specify level of care)    Community Agency Name(S) [facility]    Equipment Currently Used at Home walker, standard;commode;wheelchair    Equipment Needed After Discharge none            Discharge Plan       12/08/17 1619    Case Management/Social Work Plan    Plan Home with resumption of HH    Patient/Family In Agreement With Plan yes    Additional Comments Spoke with pt in room regarding DCP; her two daughters are at bedside.  Pt states her adult son lives with her and two of her daughters live on either side of her.  Family states that on a good day, pt can eat, dress, and do her own oral care.  Family assists in all other activities.  Pt has [facility] currently following her for skilled nursing, PT/OT.  Pt ambulates with a standard walker and has a WC, cane, and bedside.  Pt has an oxygen concentrator (supplied by [facility]), but has not used oxygen in the past two months.  Prior to that, she used her oxygen nightly as needed.  Pt has a nebulizer set up by her PCP and has a hospital  bed ordered.  Pt recently filled out a Living Will naming her daughters as her HC surrogate.  Family provides transportation.  Address, phone and PCP verified as correct on chart.  CM will continue to follow and assist with discharge as needed.        Discharge Placement     No information found        Expected Discharge Date and Time     Expected Discharge Date Expected Discharge Time    Dec 10, 2017               Demographic Summary       12/08/17 1616    Referral Information    Admission Type inpatient    Arrived From home or self-care    Referral Source admission list    Reason For Consult discharge planning    Record Reviewed history and physical;medical record    Primary Care Physician Information    Name Pieter FariasDO            Functional Status       12/08/17 1616    Functional Status Prior    Ambulation 1-->assistive equipment    Transferring 1-->assistive equipment    Toileting 1-->assistive equipment    Bathing 2-->assistive person    Dressing 2-->assistive person    Eating 0-->independent    Communication 0-->understands/communicates without difficulty    Swallowing 0-->swallows foods/liquids without difficulty    IADL    Medications assistive person    Meal Preparation assistive person    Housekeeping assistive person    Laundry assistive person    Shopping assistive person    Oral Care independent            Psychosocial     None            Abuse/Neglect     None            Legal     None            Substance Abuse     None            Patient Forms     None          Genoveva Terrell

## 2017-12-08 NOTE — PLAN OF CARE
Problem: Patient Care Overview (Adult)  Goal: Plan of Care Review    12/08/17 0500   Outcome Evaluation   Outcome Summary/Follow up Plan Patient new admission to the floor. Pictures taken of wounds. MD notified of patient's low Bp.   Coping/Psychosocial Response Interventions   Plan Of Care Reviewed With patient   Patient Care Overview   Progress no change       Goal: Adult Individualization and Mutuality  Outcome: Ongoing (interventions implemented as appropriate)  Goal: Discharge Needs Assessment  Outcome: Ongoing (interventions implemented as appropriate)    Problem: Pain, Acute (Adult)  Goal: Identify Related Risk Factors and Signs and Symptoms  Outcome: Ongoing (interventions implemented as appropriate)  Goal: Acceptable Pain Control/Comfort Level  Outcome: Ongoing (interventions implemented as appropriate)    Problem: Skin Integrity Impairment, Risk/Actual (Adult)  Goal: Identify Related Risk Factors and Signs and Symptoms  Outcome: Ongoing (interventions implemented as appropriate)  Goal: Skin Integrity/Wound Healing  Outcome: Ongoing (interventions implemented as appropriate)

## 2017-12-08 NOTE — PLAN OF CARE
Problem: Patient Care Overview (Adult)  Goal: Plan of Care Review  Outcome: Ongoing (interventions implemented as appropriate)    12/08/17 9490   Coping/Psychosocial Response Interventions   Plan Of Care Reviewed With patient   Patient Care Overview   Progress improving         Problem: Pain, Acute (Adult)  Goal: Acceptable Pain Control/Comfort Level  Outcome: Ongoing (interventions implemented as appropriate)    Problem: Skin Integrity Impairment, Risk/Actual (Adult)  Goal: Skin Integrity/Wound Healing  Outcome: Ongoing (interventions implemented as appropriate)

## 2017-12-08 NOTE — PROGRESS NOTES
SW met with pt and daughter and assisted with completing living will booklet. Informed [place] supervisor that they are waiting on a notary.    GUILLERMO Deluca, JUDIW  12/08/17  3:03 PM

## 2017-12-08 NOTE — THERAPY EVALUATION
Acute Care - Occupational Therapy Initial Evaluation   [place]     Patient Name: Hodan Carter  : 1940  MRN: [id number]  Today's Date: 2017  Onset of Illness/Injury or Date of Surgery Date: 17  Date of Referral to OT: 17  Referring Physician: Dr. Morales    Admit Date: 2017       ICD-10-CM ICD-9-CM   1. Dehydration E86.0 276.51   2. MODESTA (acute kidney injury) N17.9 584.9   3. Weakness generalized R53.1 780.79   4. Hyponatremia E87.1 276.1   5. Impaired mobility and ADLs Z74.09 799.89   6. Impaired functional mobility, balance, gait, and endurance Z74.09 V49.89     Patient Active Problem List   Diagnosis   • Ascites   • Cirrhosis of liver with ascites   • Chronic kidney disease, stage IV (severe)   • Permanent atrial fibrillation   • Essential hypertension   • Chronic anemia   • Cellulitis and abscess of trunk   • Shortness of breath   • NSTEMI (non-ST elevated myocardial infarction)   • Chronic kidney disease-mineral and bone disorder   • Candida UTI   • Volume overload   • Acute hepatic encephalopathy   • Lymphedema of both lower extremities   • Vitamin B12 deficiency   • Hepatic encephalopathy   • Anemia of chronic disease   • Cellulitis of left lower extremity   • Decompensated hepatic cirrhosis   • Dehydration   • Acute renal failure   • Hyperkalemia     Past Medical History:   Diagnosis Date   • Anemia    • Arthritis    • Cancer     uterin    • Cirrhosis of liver    • Effect, radiation    • GERD (gastroesophageal reflux disease)    • Hernia of abdominal wall     JUST BELOW UMBILICUS   • History of pneumonia    • Hypertension    • NSTEMI (non-ST elevated myocardial infarction) 2017   • Pulmonary embolism    • 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   • Wears dentures     UPPER ONLY     Past Surgical History:   Procedure Laterality Date   • ENDOSCOPY     • GALLBLADDER SURGERY     • HERNIA REPAIR     • HYSTERECTOMY     • REPLACEMENT TOTAL KNEE Right           OT ASSESSMENT  FLOWSHEET (last 72 hours)      OT Evaluation       12/08/17 1038 12/08/17 1025 12/08/17 0800          Rehab Evaluation    Document Type evaluation  - (P)  evaluation  -LM       Subjective Information agree to therapy;complains of;pain  - (P)  agree to therapy;complains of;pain  -LM       Patient Effort, Rehab Treatment adequate  - (P)  adequate  -LM       Symptoms Noted During/After Treatment fatigue  - (P)  fatigue  -LM       General Information    Patient Profile Review yes  - (P)  yes  -LM       Onset of Illness/Injury or Date of Surgery Date 12/07/17  - (P)  12/07/17  -LM       Referring Physician Dr. Morales  - (P)  Carmen  -       General Observations Pt received supine in bed.  - (P)  Pt received supine in bed.  -LM       Pertinent History Of Current Problem Pt with acute renal failure, hyperkalemia, hepatorenal syndrome, cirrhosis, afib, CKD IV, chronic venous insuffiency  - (P)  Acute Renal Failure;dehydration;cirrhosis,HTN,CKD,anemia,hyperkalemia  -LM       Precautions/Limitations fall precautions  - (P)  fall precautions  -LM       Prior Level of Function min assist:;bathing;dressing;independent:;all household mobility  - (P)  all household mobility;independent:   with SW  -LM       Equipment Currently Used at Home walker, standard;wheelchair;commode;cane, straight  - (P)  walker, standard;wheelchair;commode;cane, straight  -LM cane, straight;walker, standard;wheelchair;commode;wound care supplies;shower chair  -EB      Plans/Goals Discussed With patient and family;agreed upon  - (P)  patient and family;agreed upon  -LM       Risks Reviewed patient and family:;increased discomfort  - (P)  patient and family:;increased discomfort  -LM       Benefits Reviewed patient and family:;improve function;increase independence;increase strength  - (P)  patient and family:;improve function;increase independence  -LM       Barriers to Rehab previous functional deficit;medically complex  -  (P)  medically complex;previous functional deficit  -LM       Living Environment    Lives With child(tasneem), adult  - (P)  child(tasneem), adult  -LM child(tasneem), adult  -EB      Living Arrangements house  - (P)  house  - house  -EB      Home Accessibility bed and bath on same level  - (P)  bed and bath on same level  - no concerns  -      Stair Railings at Home   none  -      Type of Financial/Environmental Concern   none  -      Transportation Available   family or friend will provide  -      Clinical Impression    Date of Referral to OT 12/08/17  -        OT Diagnosis generalized weakness and decline in ADL independence  -        Patient/Family Goals Statement Pt wants to d/c home   -        Criteria for Skilled Therapeutic Interventions Met yes;treatment indicated  -        Rehab Potential good, to achieve stated therapy goals  -        Therapy Frequency 3-5 times/wk  -        Anticipated Discharge Disposition home with home health  -        Functional Level Prior    Ambulation 1-->assistive equipment  -  3-->assistive equipment and person  -EB      Transferring 1-->assistive equipment  -  3-->assistive equipment and person  -EB      Toileting 3-->assistive equipment and person  -AH  3-->assistive equipment and person  -EB      Bathing 2-->assistive person  -AH  3-->assistive equipment and person  -EB      Dressing 2-->assistive person  -AH  3-->assistive equipment and person  -EB      Eating 0-->independent  -  0-->independent  -EB      Communication 0-->understands/communicates without difficulty  -  0-->understands/communicates without difficulty  -      Swallowing 0-->swallows foods/liquids without difficulty  -  0-->swallows foods/liquids without difficulty  -      Pain Assessment    Pain Assessment 0-10  - (P)  0-10  -       Pain Score 8  -        Post Pain Score 8  -        Pain Type Chronic pain  -        Pain Location Leg  -        Pain Orientation  Right;Left  -        Pain Intervention(s) Repositioned;Ambulation/increased activity  -        Response to Interventions tolerated  -        Vision Assessment/Intervention    Visual Impairment WFL with corrective lenses  -        Cognitive Assessment/Intervention    Current Cognitive/Communication Assessment functional  -        Orientation Status oriented x 4  -        Follows Commands/Answers Questions able to follow multi-step instructions  -        Personal Safety WNL/WFL  -        Personal Safety Interventions fall prevention program maintained;gait belt;nonskid shoes/slippers when out of bed  -        ROM (Range of Motion)    General ROM no range of motion deficits identified  -        MMT (Manual Muscle Testing)    General MMT Assessment upper extremity strength deficits identified   BUE strength is grossly 4-/5  -        Bed Mobility, Assessment/Treatment    Bed Mob, Supine to Sit, [place] minimum assist (75% patient effort)  -        Bed Mob, Sit to Supine, [place] minimum assist (75% patient effort)  -        Transfer Assessment/Treatment    Transfers, Sit-Stand [place] minimum assist (75% patient effort)  -        Transfers, Stand-Sit [place] minimum assist (75% patient effort)  -        Transfers, Sit-Stand-Sit, Assist Device rolling walker  -        Toilet Transfer, [place] minimum assist (75% patient effort)  -        Toilet Transfer, Assistive Device bedside commode without drop arms;rolling walker  -        Upper Body Bathing Assessment/Training    UB Bathing Assess/Train, [place] Level minimum assist (75% patient effort)  -        Lower Body Bathing Assessment/Training    LB Bathing Assess/Train, [place] Level moderate assist (50% patient effort)  -        Upper Body Dressing Assessment/Training    UB Dressing Assess/Train, [place] minimum assist (75% patient effort)  -        Lower Body Dressing Assessment/Training     LB Dressing Assess/Train, [place] moderate assist (50% patient effort)  -        Toileting Assessment/Training    Toileting Assess/Train, [place] Level moderate assist (50% patient effort)  -        Grooming Assessment/Training    Grooming Assess/Train, [place] Level minimum assist (75% patient effort)  -        General Therapy Interventions    Planned Therapy Interventions adaptive equipment training;ADL retraining;bed mobility training;strengthening;transfer training  -        Positioning and Restraints    Pre-Treatment Position in bed  -        Post Treatment Position bed  -        In Bed supine;with nsg;with family/caregiver  -          User Key  (r) = Recorded By, (t) = Taken By, (c) = Cosigned By    Initials Name Effective Dates     Rosalia Sepulveda 10/26/16 -     EPI Lundy RN 10/26/16 -     EARLENE Khan, PT 10/26/16 -            Occupational Therapy Education     Title: PT OT SLP Therapies (Active)     Topic: Occupational Therapy (Active)     Point: ADL training (Done)    Description: Instruct learner(s) on proper safety adaptation and remediation techniques during self care or transfers.   Instruct in proper use of assistive devices.    Learning Progress Summary    Learner Readiness Method Response Comment Documented by Status   Patient Acceptance E VU Role of OT/POC  12/08/17 1315 Done                      User Key     Initials Effective Dates Name Provider Type Discipline     10/26/16 -  Rosalia Sepulveda Occupational Therapist OT                  OT Recommendation and Plan  Anticipated Discharge Disposition: home with home health  Planned Therapy Interventions: adaptive equipment training, ADL retraining, bed mobility training, strengthening, transfer training  Therapy Frequency: 3-5 times/wk  Plan of Care Review  Plan Of Care Reviewed With: patient  Progress: no change  Outcome Summary/Follow up Plan: Pt seen for OT evaluation today.  Pt presents with decreased strength and  ADL independence.  Pt is expected to benefit from skilled OT to improve her strength and independence with self care and functional mobility tasks.          OT Goals       12/08/17 1315          Bed Mobility OT LTG    Bed Mobility OT LTG, Date Established 12/08/17  -      Bed Mobility OT LTG, Activity Type supine to sit/sit to supine  -      Bed Mobility OT LTG, [place] Level contact guard assist  -      Bed Mobility OT LTG, Assist Device bed rails  -      Bed Mobility OT LTG, Outcome goal ongoing  -      Transfer Training OT LTG    Transfer Training OT LTG, Date Established 12/08/17  -      Transfer Training OT LTG, Time to Achieve by discharge  -      Transfer Training OT LTG, Activity Type sit to stand/stand to sit  -      Transfer Training OT LTG, [place] Level contact guard assist  -      Transfer Training OT LTG, Assist Device walker, rolling  -      Transfer Training OT LTG, Outcome goal ongoing  -      Strength OT LTG    Strength Goal OT LTG, Date Established 12/08/17  -      Strength Goal OT LTG, Time to Achieve by discharge  -      Strength Goal OT LTG, Functional Goal Pt will perform 15 reps BUE strengthening ex using theraband for resistance  -      Strength Goal OT LTG, Outcome goal ongoing  -      LB Dressing OT LTG    LB Dressing Goal OT LTG, Date Established 12/08/17  -      LB Dressing Goal OT LTG, Time to Achieve by discharge  -      LB Dressing Goal OT LTG, [place] Level minimum assist (75% patient effort)  -      LB Dressing Goal OT LTG, Adaptive Equipment reacher;sock-aid  -      LB Dressing Goal OT LTG, Outcome goal ongoing  -      Functional Mobility OT LTG    Functional Mobility Goal OT LTG, Date Established 12/08/17  -      Functional Mobility Goal OT LTG, Time to Achieve by discharge  -      Functional Mobility Goal OT LTG, [place] Level contact guard  -      Functional Mobility Goal OT LTG, Assist Device rolling walker  -       Functional Mobility Goal OT LTG, Distance to Achieve to the bathroom  -      Functional Mobility Goal OT LTG, Outcome goal ongoing  -        User Key  (r) = Recorded By, (t) = Taken By, (c) = Cosigned By    Initials Name Provider Type     Rosalia Sepulveda Occupational Therapist                Outcome Measures       12/08/17 1038          How much help from another is currently needed...    Putting on and taking off regular lower body clothing? 2  -AH      Bathing (including washing, rinsing, and drying) 2  -AH      Toileting (which includes using toilet bed pan or urinal) 2  -AH      Putting on and taking off regular upper body clothing 3  -AH      Taking care of personal grooming (such as brushing teeth) 3  -AH      Eating meals 4  -      Score 16  -      Functional Assessment    Outcome Measure Options AM-PAC 6 Clicks Daily Activity (OT)  -        User Key  (r) = Recorded By, (t) = Taken By, (c) = Cosigned By    Initials Name Provider Type     Rosalia Sepulveda Occupational Therapist          Time Calculation:   OT Start Time: 1038    Therapy Charges for Today     Code Description Service Date Service Provider Modifiers Qty    [id number]  OT EVAL LOW COMPLEXITY 4 12/8/2017 Rosalia Sepulveda GO 1               Rosalia Sepulveda  12/8/2017

## 2017-12-08 NOTE — PAYOR COMM NOTE
"TO:HUMANA   FROM:LETA GHOTRA, RN PHONE [phone]  INPT CLINICALS    Jonathan Carter (77 y.o. Female)     Date of Birth Social Security Number Address Home Phone MRN    1940  [id number] [phone] [id number]    [employer] Marital Status          None        Admission Date Admission Type Admitting Provider Attending Provider Department, Room/Bed    12/7/17 Emergency Twin Morales MD Hinsberg, Francis J, DO [facility] MED SURG  4, 410/1    Discharge Date Discharge Disposition Discharge Destination                      Attending Provider: Frankie Winchester DO     Allergies:  Cefepime    Isolation:  Contact   Infection:  MRSA (08/03/17)   Code Status:  Conditional    Ht:  170.2 cm (67\")   Wt:  94.8 kg (209 lb)    Admission Cmt:  None   Principal Problem:  Acute renal failure [N17.9]                 Active Insurance as of 12/7/2017     Primary Coverage     Payor Plan Insurance Group Employer/Plan Group    HUMANA MEDICARE REPLACEMENT HUMANA MEDICARE REPL [id number]     Payor Plan Address Payor Plan Phone Number Effective From Effective To    [address] [address] [phone] 1/1/2014     [place] [address]       Subscriber Name Subscriber Birth Date Member ID       JONATHAN CARTER 1940 [id number]           Secondary Coverage     Payor Plan Insurance Group Employer/Plan Group    [place]      Payor Plan Address Payor Plan Phone Number Effective From Effective To    [address] [phone] 1/11/2017     [id number]       Subscriber Name Subscriber Birth Date Member ID       JONATHAN CARTER 1940 [id number]                 Emergency Contacts      (Rel.) Home Phone Work Phone Mobile Phone    Rita Mead (Daughter) -- -- [phone]               History & Physical      Twin Morales MD at 12/8/2017  4:12 AM              [facility]   HISTORY AND " PHYSICAL      Name:  Hodan Carter   Age:  77 y.o.  Sex:  female  :  1940  MRN:  [id number]   Visit Number:  [id number]  Admission Date:  2017  Date Of Service:  17  Primary Care Physician:  Pieter Farias DO    Chief Complaint:     Generalized weakness since one week.    History Of Presenting Illness:      This is a 77-year-old unfortunate female with history of cirrhosis of uncertain etiology with portal hypertension and ascites, chronic kidney disease was brought to the emergency room by EMS with the above complaints.  The history is obtained from the patient as well as the daughters were at the bedside.    According to the daughters, patient has been progressively getting weak in the last one week.  She was also noted to have low blood pressures by home health nurses.  She was discharged from this facility about 2 weeks ago after being treated for pneumonia and hepatic encephalopathy.  Patient was seen by palliative care at that time but had refused home palliative care.      According to the daughter, patient has been progressively getting weak in the last several days to the point that she could not even get up from her sitting position.  Patient recently was seen by her primary care physician and one of her pressure medications were discontinued.  The daughter had to finally called ambulance to get her to the emergency room.  There is no history of any fever or loss of consciousness.  No history of any abdominal pain or distention.  She does have chronic bilateral leg swelling and has had weeping from the legs.  She has been taking her medications including diuretics as prescribed.  She is also on Coumadin for her atrial fibrillation.  No history of hematemesis or melena.  She does give history of nausea.    Patient was evaluated in the emergency room.  She was hemodynamically stable except for occasional blood pressure drop into the 90s.  She did not have any tachycardia.  She was  saturating at 100% on room air.  Blood work revealed worsening renal failure with a creatinine of 4.6. INR was 5.4 and a lactic acid was 3.8.  CT of the abdomen done in the emergency room did not show any acute findings.  She was given 1 L of normal saline fluid bolus in the emergency room and is currently being admitted to the medical floor.  She is currently lying down on the bed and is upper tablet rest except for generalized weakness.    Review Of Systems:     General ROS: Patient denies any fevers, chills or loss of consciousness. Complains of generalized weakness.  Psychological ROS: No history of any hallucinations and delusions.  Ophthalmic ROS: No history of any diplopia or transient loss of vision.  ENT ROS: No history of sore throat, nasal congestion or ear pain.   Allergy and Immunology ROS: No history of rash or itching.  Hematological and Lymphatic ROS: No history of neck swelling or easy bleeding.  Endocrine ROS: No history of any recent unintentional weight gain or loss.  Respiratory ROS: No history of cough or shortness of breath.  Cardiovascular ROS: No history of chest pain or palpitations.  Gastrointestinal ROS: As per history of present illness.  Genito-Urinary ROS: No history of dysuria or hematuria.  Musculoskeletal ROS: No muscle pain. No calf pain.  Complains of chronic leg swelling.  Neurological ROS: No history of any focal weakness. No loss of consciousness. Denies any numbness.  Dermatological ROS: No history of any redness or pruritis.     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     Social History Main Topics   • Smoking status: Never Smoker   • Smokeless tobacco: Never Used   • Alcohol use No   • Drug use: Defer   • Sexual activity: Defer     Other Topics Concern   • Not on file     Social History Narrative       Family History:    Family History   Problem Relation Age of Onset   • Hypertension Mother    • Cancer Father      prostate   • Hypertension Father    • Diabetes Sister    • Hypertension Sister    • Hypertension Brother    • Cancer Daughter      breast       Allergies:      Cefepime    Home Medications:    Prior to Admission Medications     Prescriptions Last Dose Informant Patient Reported? Taking?    atorvastatin (LIPITOR) 10 MG tablet 12/6/2017  No No    Take 1 tablet by mouth Every Night.    bumetanide (BUMEX) 1 MG tablet 12/7/2017  No No    Take 1 tablet by mouth 2 (Two) Times a Day.    cyanocobalamin 1000 MCG/ML injection 12/7/2017  No No    Inject 1 mL into the shoulder, thigh, or buttocks Every 28 (Twenty-Eight) Days.    Patient taking differently:  Inject 1,000 mcg into the shoulder, thigh, or buttocks Every 14 (Fourteen) Days.    digoxin (LANOXIN) 125 MCG tablet 12/7/2017  No No    Take 1 tablet by mouth Every Other Day.    epoetin parveen (EPOGEN,PROCRIT) 53262 UNIT/ML injection 12/7/2017  No No    Inject 1 mL under the skin Every 14 (Fourteen) Days. Indications: Chronic Hemolytic Anemia    HYDROcodone-acetaminophen (NORCO)  MG per tablet 12/7/2017  No No    Take 1 tablet by mouth Every 6 (Six) Hours As Needed for Moderate Pain .     ipratropium-albuterol (DUO-NEB) 0.5-2.5 mg/mL nebulizer 12/7/2017  No No    Take 3 mL by nebulization Every 6 (Six) Hours.    lactulose (CHRONULAC) 10 GM/15ML solution 12/7/2017  No No    Take 15 mL by mouth 3 (Three) Times a Day.    levoFLOXacin (LEVAQUIN) 750 MG tablet   No No    Take 1 tablet by mouth Daily.    Nebulizers (COMPRESSOR/NEBULIZER) misc 12/7/2017  No No    For use with nebulizer solution, Q6H prn soa    ondansetron (ZOFRAN) 4 MG tablet 12/7/2017  Yes No    Take 4 mg by mouth Every 8 (Eight) Hours As Needed for Nausea or Vomiting.    pantoprazole (PROTONIX) 40 MG EC tablet 12/7/2017  Yes Yes    Take 40 mg by mouth Daily.    propranolol (INDERAL) 40 MG tablet   No No    Take 1 tablet by mouth Every 8 (Eight) Hours.    rifaximin (XIFAXAN) 550 MG tablet 12/7/2017  No No    Take 1 tablet by mouth Every 12 (Twelve) Hours.    spironolactone (ALDACTONE) 100 MG tablet 12/7/2017  No No    Take 1 tablet by mouth 2 (Two) Times a Day.    warfarin (COUMADIN) 3 MG tablet 12/7/2017  No No    Take 1 tablet by mouth once daily             ED Medications:    Medications   sodium chloride 0.9 % flush 10 mL (not administered)   sodium chloride 0.9 % infusion (not administered)   sodium chloride 0.9 % bolus 1,000 mL (0 mL Intravenous Stopped 12/8/17 0316)       Vital Signs:    Temp:  [97.2 °F (36.2 °C)-97.6 °F (36.4 °C)] 97.6 °F (36.4 °C)  Heart Rate:  [60-69] 60  Resp:  [18-20] 18  BP: ()/(37-63) 97/47    Last 3 weights    12/07/17  2807   Weight: 109 kg (240 lb)       Body mass index is 37.59 kg/(m^2).    Physical Exam:    General Appearance:  Alert and cooperative, not in any acute distress.   Head:  Atraumatic and normocephalic, without obvious abnormality.   Eyes:          PERRLA, conjunctivae and sclerae normal, no Icterus. No pallor. Extra-occular movements are within normal limits.   Ears:  Ears appear intact with no abnormalities noted.   Throat: No oral lesions, no thrush, oral mucosa moist.   Neck:  Supple, trachea midline, no thyromegaly, no carotid bruit.   Back:   No kyphoscoliosis present. No tenderness to palpation,   range of motion normal.   Lungs:   Chest shape is normal. Breath sounds heard bilaterally equally.  No crackles or wheezing. No Pleural rub or bronchial breathing.   Heart:  Normal S1 and S2, no murmur, no gallop, no rub. No JVD.   Abdomen:   Normal bowel sounds, no masses, no organomegaly. Soft, non-tender, obese, no guarding, no rebound tenderness.  Large right upper quadrant surgical scar noted.  Scar due to secondary healing noted in the midabdomen.     Extremities: Moves all extremities well, no cyanosis, no clubbing.  She has 2+ pitting edema bilateral lower extremities up to the knee joints.  She has a surgical scar on the right knee.  Also noted on the left lower leg anteriorly due to fluid-filled bulla.   Skin: No bleeding, bruising or rash.   Neurologic: Alert and oriented x 3. Moves all four limbs equally. No tremors. No facial asymetry.  No asterixis.     Laboratory data:    I have reviewed the labs done in the emergency room.      Results from last 7 days  Lab Units 12/08/17  0002   SODIUM mmol/L 125*   POTASSIUM mmol/L 5.5*   CHLORIDE mmol/L 98   CO2 mmol/L 13.0*   BUN mg/dL 78*   CREATININE mg/dL 4.60*   CALCIUM mg/dL 9.2   BILIRUBIN mg/dL 0.8   ALK PHOS U/L 217*   ALT (SGPT) U/L 40   AST (SGOT) U/L 43   GLUCOSE mg/dL 102*       Results from last 7 days  Lab Units 12/08/17  0006 12/05/17  1415   WBC 10*3/mm3 7.88 7.88   HEMOGLOBIN g/dL 11.1* 10.0*   HEMATOCRIT % 33.8* 29.9*   PLATELETS 10*3/mm3 141 158       Results from last 7 days  Lab Units 12/08/17  0106   INR  5.45*       Results from last 7 days  Lab Units 12/08/17  0002   TROPONIN I ng/mL 0.047*                       Results from last 7 days  Lab Units 12/08/17  0049   COLOR UA  Yellow   GLUCOSE UA  Negative   KETONES UA  Negative   LEUKOCYTES UA  Trace*   PH, URINE  <=5.0   BILIRUBIN UA  Negative   UROBILINOGEN UA  0.2  E.U./dL     EKG:      EKG done in the emergency room was reviewed by me.  It shows sinus rhythm at 67 bpm.  Normal axis.  First-degree AV block was noted.  Q waves noted in inferior leads as well as anterior chest leads.  Poor R-wave progression noted.  Diffuse low voltage QRS complexes noted with nonspecific ST-T changes.    Radiology:    Imaging Results (last 72 hours)     Procedure Component Value Units Date/Time    XR Chest 1 View [[id number]] Updated:  12/08/17 0019    CT Abdomen Pelvis Without Contrast [[id number]] Updated:  12/08/17 0150        Portable chest x-ray done in the emergency room was reviewed by me.  No opacities noted in the lung fields.  No cardiomegaly.  Gas shadow noted under the right diaphragm possibly due to colonic gas.    CT of the abdomen and pelvis without contrast done in the emergency room, preliminary report is as follows:    FINDINGS:  There is a large ventral hernia which now contains a segment of nondistended transverse colon without apparent  obstruction.  Small liver with nodular surface and is suggestive of cirrhosis. Couple scattered hypodense liver lesions are not  well characterized without IV contrast.  Large esophageal varices are again noted.  The kidneys are atrophic.  No acute hepatobiliary, pancreatic, splenic    Active Problems:    Dehydration    Assessment:    1.  Acute renal failure possibly secondary to diuretics and dehydration.  2.  Acute hyperkalemia secondary to #1.  3.  Suspected hepatorenal syndrome.  4.  Supratherapeutic INR.  5.  Cirrhosis with portal hypertension and esophageal varices.  6.  Permanent atrial fibrillation.  7.  Chronic kidney disease stage IV.  8.  Chronic venous insufficiency of the lower extremities.    Plan:    Patient is currently being admitted to the medical floor.  She has received 1 L of IV fluids in the emergency room.  I will hold off on further IV fluids at this time until seen by nephrology services.  We will hold her diuretic  therapy for now.  We will consult Dr. Franco for further recommendations.  We will also hold her Coumadin and continue to monitor her INR.    Patient has been progressively getting worse with regards to her liver as well as renal failure.  Unfortunately, her prognosis seems to be extremely poor with significant risk for multiorgan failure as well as catastrophe GI bleeding from the varices.  I discussed the advanced directives with the patient as well as the daughters and they want her to be DNR.  I will again consult palliative care services for discussions regarding goals of care and family support.  Further recommendations depend upon her clinical course.    Twin Morales MD  12/08/17  4:12 AM    Dictated utilizing Dragon dictation.     Electronically signed by Twin Morales MD at 12/8/2017  5:19 AM           Emergency Department Notes      Justin Ordonez MD at 12/7/2017 11:12 PM          Subjective   History of Present Illness  TRIAGE CHIEF COMPLAINT:   Chief Complaint   Patient presents with   • Fatigue         HPI: Hodan Carter   is a 77 y.o. female   who presents to the emergency department complaining of Weakness.  Poor historian.  Patient with history of end-stage cirrhosis, CKD, hypertension, A. fib, PE, anemia.  Patient describes recent hospitalization a few weeks ago for pneumonia.  Subsequent to returning home she has been struggling with nausea and intermittent vomiting.  She feels weak, possibly dehydrated.  Review of systems noteworthy for cough sometimes productive of clear sputum.  Denies fever, chest pain, shortness of breath, palpitations, near-syncope, abdominal pain, dysuria, hematochezia, melena, sore throat, rash.           Review of Systems   All other systems reviewed and are negative.      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   • Wears dentures     UPPER ONLY       Allergies   Allergen Reactions   • Cefepime Other (See Comments)     Redness at IV site       Past Surgical History:   Procedure Laterality Date   • ENDOSCOPY     • GALLBLADDER SURGERY     • HERNIA REPAIR     • HYSTERECTOMY     • REPLACEMENT TOTAL KNEE Right        Family History   Problem Relation Age of Onset   • Hypertension Mother    • Cancer Father      prostate   • Hypertension Father    • Diabetes Sister    • Hypertension Sister    • Hypertension Brother    • Cancer Daughter      breast       Social History     Social History   • Marital status:      Spouse name: N/A   • Number of children: N/A   • Years of education: N/A     Social History Main Topics   • Smoking status: Never Smoker   • Smokeless tobacco: Never Used   • Alcohol use No   • Drug use: Defer   • Sexual activity: Defer     Other Topics Concern   • None     Social History Narrative           Objective   Physical Exam    CONSTITUTIONAL: Awake, alert, appears elderly, frail, chronically ill but non-toxic   HENT: Atraumatic, normocephalic, oral mucosa pink and moist, airway patent. Nares patent without drainage. External ears normal.   EYES: Conjunctiva clear, EOMI, PERRL   NECK: Trachea midline, non-tender, supple   CARDIOVASCULAR: Normal heart rate, Normal rhythm, No murmurs, rubs, gallops   PULMONARY/CHEST: Clear to auscultation, no rhonchi, wheezes, or rales. Symmetrical breath sounds. Non-tender.   ABDOMINAL: Non-distended, soft, non-tender - no rebound or guarding. BS normal.   NEUROLOGIC: Non-focal, moving all four extremities, no gross sensory or motor deficits.   EXTREMITIES: No clubbing, cyanosis. 4+ pitting edema.  Feet are cold but pink.  3 second capillary refill.  SKIN:   Dry, No erythema, No rash     XR Chest 1 View    (Results Pending)   CT Abdomen Pelvis Without Contrast    (Results Pending)     CXR: There appears to be some opacification in the left mid lung fields however overall seems improved compared to prior.  Patient did have recent pneumonia.  No new infiltrates observed.      EKG:  NSR, rate 67, no acute ST changes        Procedures        ED Course  ED Course          ED COURSE / MEDICAL DECISION MAKING:   Nursing notes reviewed.    Patient with acute kidney injury likely due to dehydration secondary to nausea vomiting and poor PO intake.  Also found to have mild hyponatremia and supratherapeutic INR.  She feels generally weak and fatigued.  Admitted to the hospitalist service.    DECISION TO DISCHARGE/ADMIT: see ED care timeline       Electronically signed by: Justin Rivas MD, 12/8/2017 3:06 AM              [facility]    Final diagnoses:   Dehydration   MODESTA (acute kidney injury)   Weakness generalized   Hyponatremia            Justin Rivas MD  12/08/17 0307       Electronically signed by Justin Rivas MD at 12/8/2017  3:07 AM      Capri Bo at 12/8/2017 12:00 AM          LAB STATED THAT THEY WERE UNABLE TO GET ENOUGH BLOOD FOR THE PT INR @ THIS TIME. CALLED AND TOLD DR. RIVAS @ THIS TIME. HE STATES THAT WE DON'T NEED IT @ THIS TIME.      Capri Bo  12/08/17 0002       Electronically signed by Capri Bo at 12/8/2017 12:02 AM      Capri Bo at 12/8/2017  2:53 AM          DR. BERG WAS CALLED FOR DR. RIVAS WITH ANSWER.      Capri Bo  12/08/17 0254       Electronically signed by Capri Bo at 12/8/2017  2:54 AM        Vital Signs (last 24 hours)       12/07 0700  -  12/08 0659 12/08 0700  -  12/08 1031   Most Recent    Temp (°F) 97.1 -  97.6      97.7     97.7 (36.5)    Heart Rate 60 -  69      59     59    Resp 18 -  20      20     20    BP (!)92/37 -  117/63      (!)89/40     (!) 89/40  Comment: nurse notified    SpO2  "(%) (!)89 -  100      100     100          Hospital Medications (active)       Dose Frequency Start End    acetaminophen (TYLENOL) tablet 650 mg 650 mg Every 4 Hours PRN 12/8/2017     Sig - Route: Take 2 tablets by mouth Every 4 (Four) Hours As Needed for Mild Pain . - Oral    digoxin (LANOXIN) tablet 125 mcg 125 mcg Every Other Day 12/8/2017     Sig - Route: Take 1 tablet by mouth Every Other Day. - Oral    HYDROcodone-acetaminophen (NORCO)  MG per tablet 1 tablet 1 tablet Every 6 Hours PRN 12/8/2017     Sig - Route: Take 1 tablet by mouth Every 6 (Six) Hours As Needed for Moderate Pain . - Oral    ipratropium-albuterol (DUO-NEB) nebulizer solution 3 mL 3 mL Every 4 Hours PRN 12/8/2017     Sig - Route: Take 3 mL by nebulization Every 4 (Four) Hours As Needed for Shortness of Air. - Nebulization    lactulose (CHRONULAC) 10 GM/15ML solution 10 g 10 g 3 Times Daily 12/8/2017     Sig - Route: Take 15 mL by mouth 3 (Three) Times a Day. - Oral    ondansetron (ZOFRAN) injection 4 mg 4 mg Every 6 Hours PRN 12/8/2017     Sig - Route: Infuse 2 mL into a venous catheter Every 6 (Six) Hours As Needed for Nausea or Vomiting. - Intravenous    Linked Group 1:  \"Or\" Linked Group Details        ondansetron (ZOFRAN) tablet 4 mg 4 mg Every 6 Hours PRN 12/8/2017     Sig - Route: Take 1 tablet by mouth Every 6 (Six) Hours As Needed for Nausea or Vomiting. - Oral    Linked Group 1:  \"Or\" Linked Group Details        ondansetron ODT (ZOFRAN-ODT) disintegrating tablet 4 mg 4 mg Every 6 Hours PRN 12/8/2017     Sig - Route: Take 1 tablet by mouth Every 6 (Six) Hours As Needed for Nausea or Vomiting. - Oral    Linked Group 1:  \"Or\" Linked Group Details        pantoprazole (PROTONIX) EC tablet 40 mg 40 mg Daily 12/8/2017     Sig - Route: Take 1 tablet by mouth Daily. - Oral    rifaximin (XIFAXAN) tablet 550 mg 550 mg Every 12 Hours Scheduled 12/8/2017     Sig - Route: Take 1 tablet by mouth Every 12 (Twelve) Hours. - Oral    sodium " chloride 0.9 % bolus 1,000 mL 1,000 mL Once 12/8/2017 12/8/2017    Sig - Route: Infuse 1,000 mL into a venous catheter 1 (One) Time. - Intravenous    sodium chloride 0.9 % flush 1-10 mL 1-10 mL As Needed 12/8/2017     Sig - Route: Infuse 1-10 mL into a venous catheter As Needed for Line Care. - Intravenous    sodium chloride 0.9 % flush 10 mL (Discontinued) 10 mL As Needed 12/7/2017 12/8/2017    Sig - Route: Infuse 10 mL into a venous catheter As Needed for Line Care. - Intravenous    Cosign for Ordering: Accepted by Justin Ordonez MD on 12/8/2017  3:07 AM    sodium chloride 0.9 % infusion (Discontinued) 125 mL/hr Continuous 12/8/2017 12/8/2017    Sig - Route: Infuse 125 mL/hr into a venous catheter Continuous. - Intravenous            Lab Results (last 24 hours)     Procedure Component Value Units Date/Time    Blood Culture With GABRIEL - Blood, [[id number]] Collected:  12/08/17 0002    Specimen:  Blood from Arm, Left Updated:  12/08/17 0007    CBC & Differential [[id number]] Collected:  12/08/17 0006    Specimen:  Blood Updated:  12/08/17 0017    Narrative:       The following orders were created for panel order CBC & Differential.  Procedure                               Abnormality         Status                     ---------                               -----------         ------                     CBC Auto Differential[[id number]]        Abnormal            Final result                 Please view results for these tests on the individual orders.    CBC Auto Differential [[id number]]  (Abnormal) Collected:  12/08/17 0006    Specimen:  Blood Updated:  12/08/17 0017     WBC 7.88 10*3/mm3      RBC 3.72 (L) 10*6/mm3      Hemoglobin 11.1 (L) g/dL      Hematocrit 33.8 (L) %      MCV 90.9 fL      MCH 29.8 pg      MCHC 32.8 g/dL      RDW 17.6 (H) %      RDW-SD 57.4 (H) fl      MPV 9.7 fL      Platelets 141 10*3/mm3      Neutrophil % 71.9 %      Lymphocyte % 15.9 %      Monocyte % 11.0 %      Eosinophil % 0.3 %       Basophil % 0.3 %      Immature Grans % 0.6 %      Neutrophils, Absolute 5.67 10*3/mm3      Lymphocytes, Absolute 1.25 10*3/mm3      Monocytes, Absolute 0.87 10*3/mm3      Eosinophils, Absolute 0.02 10*3/mm3      Basophils, Absolute 0.02 10*3/mm3      Immature Grans, Absolute 0.05 10*3/mm3      nRBC 0.0 /100 WBC     Ammonia [[id number]]  (Abnormal) Collected:  12/08/17 0002    Specimen:  Blood Updated:  12/08/17 0034     Ammonia <9 (L) umol/L     Digoxin Level [[id number]]  (Normal) Collected:  12/08/17 0002    Specimen:  Blood Updated:  12/08/17 0034     Digoxin 1.30 ng/mL     Troponin [[id number]]  (Abnormal) Collected:  12/08/17 0002    Specimen:  Blood Updated:  12/08/17 0038     Troponin I 0.047 (H) ng/mL     Narrative:       Normal Patient Upper Reference Limit (URL) (99th Percentile)=0.03 ng/mL   Non-AMI Illness Reference Limit=0.03-0.11 ng/mL   AMI Confirmation=0.12 ng/mL and above    Lactic Acid, Plasma [[id number]]  (Abnormal) Collected:  12/08/17 0002    Specimen:  Blood Updated:  12/08/17 0042     Lactate 3.8 (C) mmol/L     Magnesium [[id number]]  (Abnormal) Collected:  12/08/17 0002    Specimen:  Blood Updated:  12/08/17 0057     Magnesium 2.4 (H) mg/dL     Comprehensive Metabolic Panel [[id number]]  (Abnormal) Collected:  12/08/17 0002    Specimen:  Blood Updated:  12/08/17 0058     Glucose 102 (H) mg/dL      BUN 78 (C) mg/dL      Creatinine 4.60 (H) mg/dL      Sodium 125 (C) mmol/L      Potassium 5.5 (H) mmol/L      Chloride 98 mmol/L      CO2 13.0 (L) mmol/L      Calcium 9.2 mg/dL      Total Protein 6.9 g/dL      Albumin 2.60 (L) g/dL      ALT (SGPT) 40 U/L      AST (SGOT) 43 U/L      Alkaline Phosphatase 217 (H) U/L      Total Bilirubin 0.8 mg/dL      eGFR Non African Amer 9 (L) mL/min/1.73      Globulin 4.3 gm/dL      A/G Ratio 0.6 (L) g/dL      BUN/Creatinine Ratio 17.0     Anion Gap 19.5 mmol/L     Narrative:       The MDRD GFR formula is only valid for adults with stable renal function between ages 18  and 70.    Lavender Top [[id number]] Collected:  12/08/17 0006    Specimen:  Blood Updated:  12/08/17 0116     Extra Tube hold for add-on      Auto resulted       Gold Top - SST [[id number]] Collected:  12/08/17 0002    Specimen:  Blood Updated:  12/08/17 0116     Extra Tube Hold for add-ons.      Auto resulted.       Urinalysis With / Culture If Indicated - Urine, Clean Catch [[id number]]  (Abnormal) Collected:  12/08/17 0049    Specimen:  Urine from Urine, Clean Catch Updated:  12/08/17 0116     Color, UA Yellow     Appearance, UA Cloudy (A)     pH, UA <=5.0     Specific Gravity, UA 1.015     Glucose, UA Negative     Ketones, UA Negative     Bilirubin, UA Negative     Blood, UA Negative     Protein, UA Negative     Leuk Esterase, UA Trace (A)     Nitrite, UA Negative     Urobilinogen, UA 0.2 E.U./dL    Urine Culture - Urine, Urine, Clean Catch [[id number]] Collected:  12/08/17 0049    Specimen:  Urine from Urine, Clean Catch Updated:  12/08/17 0123    Urinalysis, Microscopic Only - Urine, Clean Catch [[id number]] Collected:  12/08/17 0049    Specimen:  Urine from Urine, Clean Catch Updated:  12/08/17 0124     RBC, UA None Seen /HPF      WBC, UA None Seen /HPF      Bacteria, UA None Seen /HPF      Squamous Epithelial Cells, UA 0-2 /HPF      Yeast, UA Large/3+ Budding Yeast /HPF      Hyaline Casts, UA None Seen /LPF      Methodology Manual Light Microscopy    Protime-INR [[id number]]  (Abnormal) Collected:  12/08/17 0106    Specimen:  Blood Updated:  12/08/17 0154     Protime 63.1 (H) Seconds      INR 5.45 (C)    POC Glucose Fingerstick [[id number]]  (Normal) Collected:  12/08/17 0207    Specimen:  Blood Updated:  12/08/17 0215     Glucose 87 mg/dL       Serial Number: [id number]:  [id number]       [place] [facility] [[id number]] Collected:  12/08/17 0002    Specimen:  Blood Updated:  12/08/17 0216    Narrative:       The following orders were created for panel order [place] Draw.  Procedure                                Abnormality         Status                     ---------                               -----------         ------                     Light Blue Top[[id number]]                                   Final result               Lavender Top[[id number]]                                     Final result               Gold Top - SST[[id number]]                                   Final result               Green Top (No Gel)[[id number]]                               Final result                 Please view results for these tests on the individual orders.    Light Blue Top [[id number]] Collected:  12/08/17 0106    Specimen:  Blood Updated:  12/08/17 0216     Extra Tube hold for add-on      Auto resulted       Green Top (No Gel) [[id number]] Collected:  12/08/17 0106    Specimen:  Blood Updated:  12/08/17 0216     Extra Tube Hold for add-ons.      Auto resulted.       Lactic Acid, Reflex Timer [[id number]] Collected:  12/08/17 0002    Specimen:  Blood Updated:  12/08/17 0346     Extra Tube Hold for add-ons.      Auto resulted.       CBC & Differential [[id number]] Collected:  12/08/17 0530    Specimen:  Blood Updated:  12/08/17 0611    Narrative:       The following orders were created for panel order CBC & Differential.  Procedure                               Abnormality         Status                     ---------                               -----------         ------                     CBC Auto Differential[[id number]]        Abnormal            Final result                 Please view results for these tests on the individual orders.    CBC Auto Differential [[id number]]  (Abnormal) Collected:  12/08/17 0530    Specimen:  Blood Updated:  12/08/17 0611     WBC 6.86 10*3/mm3      RBC 3.21 (L) 10*6/mm3      Hemoglobin 9.7 (L) g/dL      Hematocrit 29.0 (L) %      MCV 90.3 fL      MCH 30.2 pg      MCHC 33.4 g/dL      RDW 17.5 (H) %      RDW-SD 56.9 (H) fl      MPV 9.5 fL      Platelets 128 (L) 10*3/mm3      Neutrophil % 72.4 %       Lymphocyte % 16.3 %      Monocyte % 9.9 %      Eosinophil % 0.3 %      Basophil % 0.4 %      Immature Grans % 0.7 (H) %      Neutrophils, Absolute 4.96 10*3/mm3      Lymphocytes, Absolute 1.12 10*3/mm3      Monocytes, Absolute 0.68 10*3/mm3      Eosinophils, Absolute 0.02 10*3/mm3      Basophils, Absolute 0.03 10*3/mm3      Immature Grans, Absolute 0.05 10*3/mm3      nRBC 0.0 /100 WBC     Acinetobacter Screen - Swab, Axilla, Right [[id number]] Collected:  12/08/17 0416    Specimen:  Swab from Axilla, Right Updated:  12/08/17 0616    VRE Culture - Swab, Per Rectum [[id number]] Collected:  12/08/17 0417    Specimen:  Swab from Per Rectum Updated:  12/08/17 0616    MRSA Screen Culture - Swab, Nares [[id number]] Collected:  12/08/17 0417    Specimen:  Swab from Nares Updated:  12/08/17 0616    Wound Culture - Wound, Leg, Left [[id number]] Collected:  12/08/17 0418    Specimen:  Wound from Leg, Left Updated:  12/08/17 0616    Lactic Acid, Reflex [[id number]]  (Abnormal) Collected:  12/08/17 0542    Specimen:  Blood Updated:  12/08/17 0626     Lactate 2.8 (C) mmol/L     Protime-INR [[id number]]  (Abnormal) Collected:  12/08/17 0530    Specimen:  Blood Updated:  12/08/17 0627     Protime 61.5 (H) Seconds      INR 5.39 (C)    Renal Function Panel [[id number]]  (Abnormal) Collected:  12/08/17 0530    Specimen:  Blood Updated:  12/08/17 0645     Glucose 81 mg/dL      BUN 74 (H) mg/dL      Creatinine 4.40 (H) mg/dL      Sodium 125 (C) mmol/L      Potassium 5.0 mmol/L      Chloride 101 mmol/L      CO2 13.0 (L) mmol/L      Calcium 8.3 (L) mg/dL      Albumin 2.20 (L) g/dL      Phosphorus 6.8 (C) mg/dL      Anion Gap 16.0 mmol/L      BUN/Creatinine Ratio 16.8     eGFR Non African Amer 10 (L) mL/min/1.73     Narrative:       The MDRD GFR formula is only valid for adults with stable renal function between ages 18 and 70.        Imaging Results (last 24 hours)     Procedure Component Value Units Date/Time    CT Abdomen Pelvis Without  Contrast [[id number]] Collected:  12/08/17 0718     Updated:  12/08/17 0728    Narrative:       PROCEDURE: CT ABDOMEN PELVIS WO CONTRAST-     HISTORY: N/V, MODESTA, weakness     COMPARISON: None .     PROCEDURE: Axial images were obtained from the lung bases through the  pubic symphysis without intravenous contrast.    .      FINDINGS:      ABDOMEN: Lack of intravenous contrast limits evaluation of the solid  organs, the mediastinum, and the vasculature. Mild atelectasis in the  bibasilar regions.The limited noncontrast images of the liver  demonstrate a nodular border suggestive of cirrhosis. There are  subcentimeter nodular areas that are not well assessed without contrast.  Esophageal and splenic varices are noted hiatal hernia is noted.  Additionally there is a ventral hernia containing nondistended loops of  transverse colon. The gallbladder is absent . The spleen is normal. 1.7  cm right adrenal adenoma.  The pancreas is atrophic. Nonobstructing  stone present within the right collecting system. There is no  hydronephrosis. The aorta is normal in caliber. There is no significant  free fluid or adenopathy.  No abnormally dilated loops of bowel are  appreciated. Postoperative changes are seen to small bowel. There is  retained stool throughout the colon.  Nonspecific body wall edema is  noted.     PELVIS: The appendix is not identified. The urinary bladder is  unremarkable. There is no significant fluid or adenopathy. There are  degenerative changes of the spine.           Impression:       1. No evidence of an obstructive uropathy.  2. Significant interval improvement of ascites and pleural effusions  compared to prior.   3. Ventral wall defect containing nondistended loop of transverse colon.  4. Cirrhotic appearance to the liver with subcentimeter nodular  densities, dedicated follow-up imaging of the liver is recommended..  5. 1.7 cm right adrenal adenoma.                   .          This study was performed  with techniques to keep radiation doses as low  as reasonably achievable (ALARA). Individualized dose reduction  techniques using automated exposure control or adjustment of mA and/or  kV according to the patient size were employed.      This report was finalized on 12/8/2017 7:26 AM by Shabbir Sher DO.    XR Chest 1 View [[id number]] Collected:  12/08/17 0726     Updated:  12/08/17 0730    Narrative:       PROCEDURE: XR CHEST 1 VW-     HISTORY: Weak/Dizzy/AMS triage protocol     COMPARISON: November 27, 2017.     FINDINGS: Electrodes overlie the chest. The heart is normal in size. The  mediastinum is unremarkable. There are chronic interstitial changes.  There is no pneumothorax.  There are no acute osseous abnormalities.           Impression:       No acute cardiopulmonary process.     Continued followup is recommended.     This report was finalized on 12/8/2017 7:28 AM by Shabbir Sher DO.        Physician Progress Notes (last 24 hours) (Notes from 12/7/2017 10:31 AM through 12/8/2017 10:31 AM)     No notes of this type exist for this encounter.        Consult Notes (last 24 hours) (Notes from 12/7/2017 10:31 AM through 12/8/2017 10:31 AM)     No notes of this type exist for this encounter.

## 2017-12-08 NOTE — DISCHARGE INSTR - OTHER ORDERS
If you have any questions about your recovery, please call the [facility] Nurse Call Center at [phone]. A registered nurse is available 24 hours a day 7 days a week to assist you.

## 2017-12-08 NOTE — THERAPY EVALUATION
Acute Care - Physical Therapy Initial Evaluation   [place]     Patient Name: Hodan Carter  : 1940  MRN: [id number]  Today's Date: 2017   Onset of Illness/Injury or Date of Surgery Date: 17  Date of Referral to PT: 17  Referring Physician: Dr. Morales      Admit Date: 2017     Visit Dx:    ICD-10-CM ICD-9-CM   1. Dehydration E86.0 276.51   2. MODESTA (acute kidney injury) N17.9 584.9   3. Weakness generalized R53.1 780.79   4. Hyponatremia E87.1 276.1   5. Impaired mobility and ADLs Z74.09 799.89   6. Impaired functional mobility, balance, gait, and endurance Z74.09 V49.89     Patient Active Problem List   Diagnosis   • Ascites   • Cirrhosis of liver with ascites   • Chronic kidney disease, stage IV (severe)   • Permanent atrial fibrillation   • Essential hypertension   • Chronic anemia   • Cellulitis and abscess of trunk   • Shortness of breath   • NSTEMI (non-ST elevated myocardial infarction)   • Chronic kidney disease-mineral and bone disorder   • Candida UTI   • Volume overload   • Acute hepatic encephalopathy   • Lymphedema of both lower extremities   • Vitamin B12 deficiency   • Hepatic encephalopathy   • Anemia of chronic disease   • Cellulitis of left lower extremity   • Decompensated hepatic cirrhosis   • Dehydration   • Acute renal failure   • Hyperkalemia     Past Medical History:   Diagnosis Date   • Anemia    • Arthritis    • Cancer     uterin    • Cirrhosis of liver    • Effect, radiation    • GERD (gastroesophageal reflux disease)    • Hernia of abdominal wall     JUST BELOW UMBILICUS   • History of pneumonia    • Hypertension    • NSTEMI (non-ST elevated myocardial infarction) 2017   • Pulmonary embolism    • 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   • Wears dentures     UPPER ONLY     Past Surgical History:   Procedure Laterality Date   • ENDOSCOPY     • GALLBLADDER SURGERY     • HERNIA REPAIR     • HYSTERECTOMY     • REPLACEMENT TOTAL KNEE Right           PT  ASSESSMENT (last 72 hours)      PT Evaluation       12/08/17 1038 12/08/17 1025    Rehab Evaluation    Document Type evaluation  - evaluation  -    Subjective Information agree to therapy;complains of;pain  - agree to therapy;complains of;pain  -LM    Patient Effort, Rehab Treatment adequate  - adequate  -    Symptoms Noted During/After Treatment fatigue  - fatigue  -    General Information    Patient Profile Review yes  - yes  -LM    Onset of Illness/Injury or Date of Surgery Date 12/07/17  - 12/07/17  -    Referring Physician Dr. Morales  - [place]  -    General Observations Pt received supine in bed.  - Pt received supine in bed.  -LM    Pertinent History Of Current Problem Pt with acute renal failure, hyperkalemia, hepatorenal syndrome, cirrhosis, afib, CKD IV, chronic venous insuffiency  - Acute Renal Failure;dehydration;cirrhosis,HTN,CKD,anemia,hyperkalemia  -LM    Precautions/Limitations fall precautions  - fall precautions  -    Prior Level of Function min assist:;bathing;dressing;independent:;all household mobility  - all household mobility;independent:   with SW  -    Equipment Currently Used at Home walker, standard;wheelchair;commode;cane, straight  - walker, standard;wheelchair;commode;cane, straight  -    Plans/Goals Discussed With patient and family;agreed upon  - patient and family;agreed upon  -    Risks Reviewed patient and family:;increased discomfort  - patient and family:;increased discomfort  -    Benefits Reviewed patient and family:;improve function;increase independence;increase strength  - patient and family:;improve function;increase independence  -    Barriers to Rehab previous functional deficit;medically complex  - medically complex;previous functional deficit  -LM    Living Environment    Lives With child(tasneem), adult  - child(tasneem), adult  -    Living Arrangements house  - house  -    Home Accessibility bed and bath on same level   - bed and bath on same level  -    Clinical Impression    Date of Referral to PT  12/08/17  -    PT Diagnosis  Generalized weakness  -    Patient/Family Goals Statement  Return home.  -    Criteria for Skilled Therapeutic Interventions Met  yes;treatment indicated  -    Rehab Potential  good, to achieve stated therapy goals  -    Pain Assessment    Pain Assessment 0-10  - 0-10  -LM    Pain Score 8  -AH 8  -LM    Post Pain Score 8  -AH 8  -LM    Pain Type Chronic pain  - Chronic pain  -    Pain Location Leg  - Leg  -LM    Pain Orientation Right;Left  - Right;Left  -LM    Pain Intervention(s) Repositioned;Ambulation/increased activity  - Repositioned;Ambulation/increased activity  -    Response to Interventions tolerated  - Tolerated.  -    Vision Assessment/Intervention    Visual Impairment WFL with corrective lenses  -     Cognitive Assessment/Intervention    Current Cognitive/Communication Assessment functional  - functional  -    Orientation Status oriented x 4  - oriented x 4  -    Follows Commands/Answers Questions able to follow multi-step instructions  - able to follow multi-step instructions  -    Personal Safety WNL/WFL  - WNL/WFL  -    Personal Safety Interventions fall prevention program maintained;gait belt;nonskid shoes/slippers when out of bed  - fall prevention program maintained;gait belt;nonskid shoes/slippers when out of bed  -    ROM (Range of Motion)    General ROM no range of motion deficits identified  - no range of motion deficits identified  -    MMT (Manual Muscle Testing)    General MMT Assessment upper extremity strength deficits identified   BUE strength is grossly 4-/5  - upper extremity strength deficits identified;lower extremity strength deficits identified  -    General MMT Assessment Detail  Grossly 4-/5 throughout.  -    Bed Mobility, Assessment/Treatment    Bed Mob, Supine to Sit, [place] minimum assist (75%  patient effort)  - minimum assist (75% patient effort)  -LM    Bed Mob, Sit to Supine, [place] minimum assist (75% patient effort)  - minimum assist (75% patient effort)  -LM    Transfer Assessment/Treatment    Transfers, Sit-Stand [place] minimum assist (75% patient effort)  - minimum assist (75% patient effort)  -LM    Transfers, Stand-Sit [place] minimum assist (75% patient effort)  - minimum assist (75% patient effort)  -LM    Transfers, Sit-Stand-Sit, Assist Device rolling walker  - rolling walker  -LM    Toilet Transfer, [place] minimum assist (75% patient effort)  - minimum assist (75% patient effort)  -LM    Toilet Transfer, Assistive Device bedside commode without drop arms;rolling walker  - bedside commode without drop arms  -LM    Transfer, Safety Issues  balance decreased during turns;weight-shifting ability decreased  -LM    Transfer, Impairments  strength decreased;impaired balance;pain  -LM    Gait Assessment/Treatment    Gait, [place] Level  minimum assist (75% patient effort)  -LM    Gait, Assistive Device  rolling walker  -LM    Gait, Distance (Feet)  4  -LM    Gait, Safety Issues  balance decreased during turns;step length decreased;weight-shifting ability decreased  -LM    Gait, Impairments  strength decreased;impaired balance;pain  -LM    Positioning and Restraints    Pre-Treatment Position in bed  - in bed  -LM    Post Treatment Position bed  - bed  -    In Bed supine;with nsg;with family/caregiver  - supine;with family/caregiver;with nsg  -      12/08/17 0800       General Information    Equipment Currently Used at Home cane, straight;walker, standard;wheelchair;commode;wound care supplies;shower chair  -EB     Living Environment    Lives With child(tasneem), adult  -EB     Living Arrangements house  -EB     Home Accessibility no concerns  -EB     Stair Railings at Home none  -EB     Type of Financial/Environmental Concern none  -EB      Transportation Available family or friend will provide  -EB       User Key  (r) = Recorded By, (t) = Taken By, (c) = Cosigned By    Initials Name Provider Type     Rosalia Sepulveda Occupational Therapist     Lauren Lundy, RN Registered Nurse     Serene Khan, PT Physical Therapist          Physical Therapy Education     Title: PT OT SLP Therapies (Active)     Topic: Physical Therapy (Done)     Point: Mobility training (Done)    Learning Progress Summary    Learner Readiness Method Response Comment Documented by Status   Patient Acceptance E VU Purpose of PT/POC.  12/08/17 1331 Done               Point: Home exercise program (Done)    Learning Progress Summary    Learner Readiness Method Response Comment Documented by Status   Patient Acceptance E VU Purpose of PT/POC.  12/08/17 1331 Done               Point: Precautions (Done)    Learning Progress Summary    Learner Readiness Method Response Comment Documented by Status   Patient Acceptance E VU Purpose of PT/POC.  12/08/17 1331 Done                      User Key     Initials Effective Dates Name Provider Type [facility] 10/26/16 -  Serene Khan, PT Physical Therapist PT                PT Recommendation and Plan  Planned Therapy Interventions: balance training, bed mobility training, gait training, home exercise program, patient/family education, strengthening, transfer training  PT Frequency: daily  Plan of Care Review  Plan Of Care Reviewed With: patient  Outcome Summary/Follow up Plan: PT eval completed.Patient presents with decreased strength, balance, endurance and independence with mobility.  She is expected to benefit from continued skilled PT intervention to improve her overall functional mobility status prior to D/C.          IP PT Goals       12/08/17 1331          Bed Mobility PT LTG    Bed Mobility PT LTG, Date Established 12/08/17  -LM      Bed Mobility PT LTG, Time to Achieve 2 wks  -LM      Bed Mobility PT LTG, Activity Type supine to  sit/sit to supine  -LM      Bed Mobility PT LTG, [place] Level contact guard assist  -LM      Bed Mobility PT Goal  LTG, Assist Device bed rails  -LM      Bed Mobility PT LTG, Outcome goal ongoing  -LM      Transfer Training PT LTG    Transfer Training PT LTG, Date Established 12/08/17  -LM      Transfer Training PT LTG, Time to Achieve 2 wks  -LM      Transfer Training PT LTG, Activity Type sit to stand/stand to sit;bed to chair /chair to bed  -LM      Transfer Training PT LTG, [place] Level contact guard assist  -LM      Transfer Training PT LTG, Assist Device walker, rolling  -LM      Transfer Training PT LTG, Outcome goal ongoing  -LM      Gait Training PT LTG    Gait Training Goal PT LTG, Date Established 12/08/17  -LM      Gait Training Goal PT LTG, Time to Achieve 2 wks  -LM      Gait Training Goal PT LTG, [place] Level contact guard assist  -LM      Gait Training Goal PT LTG, Assist Device walker, rolling  -LM      Gait Training Goal PT LTG, Distance to Achieve 50  -LM      Gait Training Goal PT LTG, Outcome goal ongoing  -LM      Strength Goal PT LTG    Strength Goal PT LTG, Date Established 12/08/17  -LM      Strength Goal PT LTG, Time to Achieve 2 wks  -LM      Strength Goal PT LTG, Measure to Achieve Patient will perform B LE ther ex x 15 reps to improve functional strength for mobility.  -LM      Strength Goal PT LTG, Outcome goal ongoing  -LM        User Key  (r) = Recorded By, (t) = Taken By, (c) = Cosigned By    Initials Name Provider Type    LM Serene Khan, PT Physical Therapist                Outcome Measures       12/08/17 1038 12/08/17 1025       How much help from another person do you currently need...    Turning from your back to your side while in flat bed without using bedrails?  3  -LM     Moving from lying on back to sitting on the side of a flat bed without bedrails?  3  -LM     Moving to and from a bed to a chair (including a wheelchair)?  3  -LM     Standing up from a  chair using your arms (e.g., wheelchair, bedside chair)?  3  -LM     Climbing 3-5 steps with a railing?  2  -LM     To walk in hospital room?  2  -LM     AM-PAC 6 Clicks Score  16  -LM     How much help from another is currently needed...    Putting on and taking off regular lower body clothing? 2  -AH      Bathing (including washing, rinsing, and drying) 2  -AH      Toileting (which includes using toilet bed pan or urinal) 2  -AH      Putting on and taking off regular upper body clothing 3  -AH      Taking care of personal grooming (such as brushing teeth) 3  -AH      Eating meals 4  -AH      Score 16  -AH      Functional Assessment    Outcome Measure Options AM-PAC 6 Clicks Daily Activity (OT)  -AH AM-PAC 6 Clicks Basic Mobility (PT)  -       User Key  (r) = Recorded By, (t) = Taken By, (c) = Cosigned By    Initials Name Provider Type     Rosalia Sepulevda Occupational Therapist     Serene Khan, MICK Physical Therapist           Time Calculation:         PT Charges       12/08/17 1336          Time Calculation    Start Time 1025  -LM      PT Received On 12/08/17  -LM      PT Goal Re-Cert Due Date 12/18/17  -        User Key  (r) = Recorded By, (t) = Taken By, (c) = Cosigned By    Initials Name Provider Type     Serene Khan PT Physical Therapist          Therapy Charges for Today     Code Description Service Date Service Provider Modifiers Qty    [id number]  PT EVAL MOD COMPLEXITY 4 12/8/2017 Serene Khan, PT GP 1          PT G-Codes  Outcome Measure Options: AM-PAC 6 Clicks Daily Activity (OT)      Serene Khan PT  12/8/2017

## 2017-12-08 NOTE — PLAN OF CARE
Problem: Patient Care Overview (Adult)  Goal: Plan of Care Review  Outcome: Ongoing (interventions implemented as appropriate)    12/08/17 1315   Outcome Evaluation   Outcome Summary/Follow up Plan Pt seen for OT evaluation today. Pt presents with decreased strength and ADL independence. Pt is expected to benefit from skilled OT to improve her strength and independence with self care and functional mobility tasks.   Coping/Psychosocial Response Interventions   Plan Of Care Reviewed With patient   Patient Care Overview   Progress no change         Problem: Inpatient Occupational Therapy  Goal: Bed Mobility Goal LTG- OT  Outcome: Ongoing (interventions implemented as appropriate)    12/08/17 1315   Bed Mobility OT LTG   Bed Mobility OT LTG, Date Established 12/08/17   Bed Mobility OT LTG, Activity Type supine to sit/sit to supine   Bed Mobility OT LTG, [place] Level contact guard assist   Bed Mobility OT LTG, Assist Device bed rails   Bed Mobility OT LTG, Outcome goal ongoing       Goal: Transfer Training Goal 1 LTG- OT  Outcome: Ongoing (interventions implemented as appropriate)    12/08/17 1315   Transfer Training OT LTG   Transfer Training OT LTG, Date Established 12/08/17   Transfer Training OT LTG, Time to Achieve by discharge   Transfer Training OT LTG, Activity Type sit to stand/stand to sit   Transfer Training OT LTG, [place] Level contact guard assist   Transfer Training OT LTG, Assist Device walker, rolling   Transfer Training OT LTG, Outcome goal ongoing       Goal: Strength Goal LTG- OT  Outcome: Ongoing (interventions implemented as appropriate)    12/08/17 1315   Strength OT LTG   Strength Goal OT LTG, Date Established 12/08/17   Strength Goal OT LTG, Time to Achieve by discharge   Strength Goal OT LTG, Functional Goal Pt will perform 15 reps BUE strengthening ex using theraband for resistance   Strength Goal OT LTG, Outcome goal ongoing       Goal: LB Dressing Goal LTG- OT  Outcome: Ongoing  (interventions implemented as appropriate)    12/08/17 1315   LB Dressing OT LTG   LB Dressing Goal OT LTG, Date Established 12/08/17   LB Dressing Goal OT LTG, Time to Achieve by discharge   LB Dressing Goal OT LTG, [place] Level minimum assist (75% patient effort)   LB Dressing Goal OT LTG, Adaptive Equipment reacher;sock-aid   LB Dressing Goal OT LTG, Outcome goal ongoing       Goal: Functional Mobility Goal LTG- OT  Outcome: Ongoing (interventions implemented as appropriate)    12/08/17 1315   Functional Mobility OT LTG   Functional Mobility Goal OT LTG, Date Established 12/08/17   Functional Mobility Goal OT LTG, Time to Achieve by discharge   Functional Mobility Goal OT LTG, [place] Level contact guard   Functional Mobility Goal OT LTG, Assist Device rolling walker   Functional Mobility Goal OT LTG, Distance to Achieve to the bathroom   Functional Mobility Goal OT LTG, Outcome goal ongoing

## 2017-12-08 NOTE — PLAN OF CARE
Problem: Patient Care Overview (Adult)  Goal: Adult Individualization and Mutuality  Outcome: Ongoing (interventions implemented as appropriate)  Pt know to me from previous admissions.  Admitted with weakness, dehydration & hyponatremia.  Agreed to PC referrals last 2 admissions but would not allow visit.  Stated has HH and [employer] nurse visiting & thought it would be too much.  Dr Hewitt explained PC would be focusing on medications and symptoms.  Pt stated she would allow PC to visit.  HCP notified. Clinicals faxed.  HCP reported for Serene Long KORI that she and pt had agreement they would call if they wanted PC to visit.  Provided pt & family contact name and number to call.    Dr Hewitt met with pt and daughter.  Confirmed pt wanting DNR.  MOST form explained and signed per pt and Dr Hewitt.  Copy of form STAT faxed to HIM.  Original in place sleeve given to pt.  Living Will was also reviewed and discussed.  Pt agreeable to completing Living Will while in hospital.  April SW notified.  Assisted pt in completion.  Notorized per House Supervisor, Chelo Martin RN, copied and sent to be scanned into medical record.   Informed pt PC will continue to follow while in hospital.

## 2017-12-08 NOTE — ED NOTES
LAB STATED THAT THEY WERE UNABLE TO GET ENOUGH BLOOD FOR THE PT INR @ THIS TIME. CALLED AND TOLD DR. RIVAS @ THIS TIME. HE STATES THAT WE DON'T NEED IT @ THIS TIME.      Capri Bo  12/08/17 0002

## 2017-12-08 NOTE — PLAN OF CARE
Problem: Renal Failure/Kidney Injury, Acute (Adult)  Goal: Signs and Symptoms of Listed Potential Problems Will be Absent or Manageable (Renal Failure/Kidney Injury, Acute)  Outcome: Ongoing (interventions implemented as appropriate)    12/08/17 1846   Renal Failure/Kidney Injury, Acute   Problems Assessed (Acute Renal Failure/Kidney Injury) all   Problems Present (Acute Renal Failure/Kidney Injury) fluid imbalance

## 2017-12-08 NOTE — ED PROVIDER NOTES
Subjective   History of Present Illness  TRIAGE CHIEF COMPLAINT:   Chief Complaint   Patient presents with   • Fatigue         HPI: Hodan Carter   is a 77 y.o. female   who presents to the emergency department complaining of Weakness.  Poor historian.  Patient with history of end-stage cirrhosis, CKD, hypertension, A. fib, PE, anemia.  Patient describes recent hospitalization a few weeks ago for pneumonia.  Subsequent to returning home she has been struggling with nausea and intermittent vomiting.  She feels weak, possibly dehydrated.  Review of systems noteworthy for cough sometimes productive of clear sputum.  Denies fever, chest pain, shortness of breath, palpitations, near-syncope, abdominal pain, dysuria, hematochezia, melena, sore throat, rash.           Review of Systems   All other systems reviewed and are negative.      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   • Wears dentures     UPPER ONLY       Allergies   Allergen Reactions   • Cefepime Other (See Comments)     Redness at IV site       Past Surgical History:   Procedure Laterality Date   • ENDOSCOPY     • GALLBLADDER SURGERY     • HERNIA REPAIR     • HYSTERECTOMY     • REPLACEMENT TOTAL KNEE Right        Family History   Problem Relation Age of Onset   • Hypertension Mother    • Cancer Father      prostate   • Hypertension Father    • Diabetes Sister    • Hypertension Sister    • Hypertension Brother    • Cancer Daughter      breast       Social History     Social History   • Marital status:      Spouse name: N/A   • Number of children: N/A   • Years of education: N/A      Social History Main Topics   • Smoking status: Never Smoker   • Smokeless tobacco: Never Used   • Alcohol use No   • Drug use: Defer   • Sexual activity: Defer     Other Topics Concern   • None     Social History Narrative           Objective   Physical Exam    CONSTITUTIONAL: Awake, alert, appears elderly, frail, chronically ill but non-toxic   HENT: Atraumatic, normocephalic, oral mucosa pink and moist, airway patent. Nares patent without drainage. External ears normal.   EYES: Conjunctiva clear, EOMI, PERRL   NECK: Trachea midline, non-tender, supple   CARDIOVASCULAR: Normal heart rate, Normal rhythm, No murmurs, rubs, gallops   PULMONARY/CHEST: Clear to auscultation, no rhonchi, wheezes, or rales. Symmetrical breath sounds. Non-tender.   ABDOMINAL: Non-distended, soft, non-tender - no rebound or guarding. BS normal.   NEUROLOGIC: Non-focal, moving all four extremities, no gross sensory or motor deficits.   EXTREMITIES: No clubbing, cyanosis. 4+ pitting edema.  Feet are cold but pink.  3 second capillary refill.  SKIN:  Dry, No erythema, No rash     XR Chest 1 View    (Results Pending)   CT Abdomen Pelvis Without Contrast    (Results Pending)     CXR: There appears to be some opacification in the left mid lung fields however overall seems improved compared to prior.  Patient did have recent pneumonia.  No new infiltrates observed.      EKG:  NSR, rate 67, no acute ST changes        Procedures         ED Course  ED Course          ED COURSE / MEDICAL DECISION MAKING:   Nursing notes reviewed.    Patient with acute kidney injury likely due to dehydration secondary to nausea vomiting and poor PO intake.  Also found to have mild hyponatremia and supratherapeutic INR.  She feels generally weak and fatigued.  Admitted to the hospitalist service.    DECISION TO DISCHARGE/ADMIT: see ED care timeline       Electronically signed by: Justin Ordonez MD, 12/8/2017 3:06 AM              [facility]    Final diagnoses:    Dehydration   MODESTA (acute kidney injury)   Weakness generalized   Hyponatremia            Justin Ordonez MD  12/08/17 5024

## 2017-12-08 NOTE — CONSULTS
[facility]      Nephrology Consultation    Referring Provider:   No ref. provider found    Reason for Consultation:  Acute Kidney Injury/CKD    Subjective     Chief complaint   Chief Complaint   Patient presents with   • Fatigue       History of present illness:    Patient is 76 yo female with multiple medical problems as listed below in the past medical history who is well known to me from previous hospital admission presented to the ER via ambulance for lethargy and weakness.  She was found to be in acute on chronic renal failure and was admitted for further medical management. For full details on admission please see the H+P from Dr. Morales which was reviewed by me. Patient with a h/o CKD4 with a baseline creatinine 2.2-2.7mg/dl.  Upon presentation it was 4.6mg/dl and was consulted for further evaluation and management. She has missed all outpatient follow up appointments.   I have reviewed labs/imaging/records from this hospitalization, including ER staff and admitting/attending physicians H/P's and progress notes to establish a comprehensive understanding of this patient's clinical hospital course, as well as to establish plan of care appropriately.   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   • Wears dentures     UPPER ONLY       Past Surgical History:   Procedure Laterality Date   • ENDOSCOPY     • GALLBLADDER SURGERY     • HERNIA REPAIR     • HYSTERECTOMY     • REPLACEMENT TOTAL KNEE Right        Family History   Problem Relation Age of Onset   • Hypertension Mother    • Cancer Father      prostate   • Hypertension Father     • Diabetes Sister    • Hypertension Sister    • Hypertension Brother    • Cancer Daughter      breast          negative h/o ESRD     Social History   Substance Use Topics   • Smoking status: Never Smoker   • Smokeless tobacco: Never Used   • Alcohol use No     Prescriptions Prior to Admission   Medication Sig Dispense Refill Last Dose   • pantoprazole (PROTONIX) 40 MG EC tablet Take 40 mg by mouth Daily.   12/7/2017   • atorvastatin (LIPITOR) 10 MG tablet Take 1 tablet by mouth Every Night. 30 tablet 0 12/6/2017   • bumetanide (BUMEX) 1 MG tablet Take 1 tablet by mouth 2 (Two) Times a Day. 60 tablet 0 12/7/2017   • cyanocobalamin 1000 MCG/ML injection Inject 1 mL into the shoulder, thigh, or buttocks Every 28 (Twenty-Eight) Days. (Patient taking differently: Inject 1,000 mcg into the shoulder, thigh, or buttocks Every 14 (Fourteen) Days.) 1 mL 0 12/7/2017   • digoxin (LANOXIN) 125 MCG tablet Take 1 tablet by mouth Every Other Day. 30 tablet 0 12/7/2017   • epoetin parveen (EPOGEN,PROCRIT) 59892 UNIT/ML injection Inject 1 mL under the skin Every 14 (Fourteen) Days. Indications: Chronic Hemolytic Anemia 1 each 0 12/7/2017   • HYDROcodone-acetaminophen (NORCO)  MG per tablet Take 1 tablet by mouth Every 6 (Six) Hours As Needed for Moderate Pain . 90 tablet 0 12/7/2017   • ipratropium-albuterol (DUO-NEB) 0.5-2.5 mg/mL nebulizer Take 3 mL by nebulization Every 6 (Six) Hours. 180 mL 0 12/7/2017   • lactulose (CHRONULAC) 10 GM/15ML solution Take 15 mL by mouth 3 (Three) Times a Day. 1892 mL 6 12/7/2017   • levoFLOXacin (LEVAQUIN) 750 MG tablet Take 1 tablet by mouth Daily. 4 tablet 0 Taking   • Nebulizers (COMPRESSOR/NEBULIZER) misc For use with nebulizer solution, Q6H prn soa 1 each 0 12/7/2017   • ondansetron (ZOFRAN) 4 MG tablet Take 4 mg by mouth Every 8 (Eight) Hours As Needed for Nausea or Vomiting.   12/7/2017   • propranolol (INDERAL) 40 MG tablet Take 1 tablet by mouth Every 8 (Eight) Hours. 90 tablet 0  "Taking   • rifaximin (XIFAXAN) 550 MG tablet Take 1 tablet by mouth Every 12 (Twelve) Hours. 60 tablet 5 12/7/2017   • spironolactone (ALDACTONE) 100 MG tablet Take 1 tablet by mouth 2 (Two) Times a Day. 60 tablet 3 12/7/2017   • warfarin (COUMADIN) 3 MG tablet Take 1 tablet by mouth once daily 30 tablet 0 12/7/2017     Allergies:  Cefepime    Review of Systems  Constitutional: Negative for fever and chills, no diaphoresis, Positive for fatigue and unexpected weight change.   HENT: Negative for congestion and hearing loss.   Eyes: Negative for redness and visual disturbance.   Respiratory: negative for shortness of breath. Negative for chest pain . Negative for cough and chest tightness.   Cardiovascular: Negative for chest pain and palpitations.   Gastrointestinal: Negative for abdominal distention, abdominal pain and blood in stool. Denies any constipation or diarrhea.  Endocrine: Negative for cold or heat intolerance.   Genitourinary: Negative for difficulty urinating, dysuria and frequency.   Musculoskeletal: Positive for arthralgias, back pain and an eyes any myalgias.   Skin: Negative for color change, rash and wound.   Neurological: Negative for syncope, new onset weakness or numbness of any extremities. Denies any headaches.   Hematological: Negative for adenopathy. Does not bruise/bleed easily.   Psychiatric/Behavioral: Negative for confusion. The patient is not nervous/anxious.     Objective     Vital Signs  BP (!) 89/40 (BP Location: Left arm, Patient Position: Lying) Comment: nurse notified  Pulse 59  Temp 97.7 °F (36.5 °C) (Oral)   Resp 20  Ht 170.2 cm (67\")  Wt 94.8 kg (209 lb)  SpO2 100%  BMI 32.73 kg/m2              Intake/Output Summary (Last 24 hours) at 12/08/17 0876  Last data filed at 12/08/17 0316   Gross per 24 hour   Intake             1000 ml   Output                0 ml   Net             1000 ml       Physical Exam:     General Appearance:   Alert, cooperative, in no acute " distress.     Head:   Normocephalic, without obvious abnormality, atraumatic.     Eyes:      Normal, conjunctivae and sclerae, no icterus, no pallor, corneas clear, PERRLA        Throat:   Oral mucosa dry      Neck:  No adenopathy, supple, trachea midline, no thyromegaly, no carotid bruit, no JVD      Back:   No CVA tenderness on Percussion.     Lungs:    Clear to auscultation and fair air movement noted.      Heart:   Regular rhythm and normal rate, normal S1 and S2.       Abdomen:   Obese. Normal bowel sounds, no masses, no organomegaly, soft non-tender, non-distended, no guarding, no rebound tenderness        Extremities:  Moves all extremities, 2-3+ edema, no cyanosis, no redness.     Pulses:  Pulses palpable and equal bilaterally but weak.     Skin:  No bleeding, bruising or rash        Neurologic:  Cranial nerves grossly intact, move all extremities                Lab Results (last 7 days)     Procedure Component Value Units Date/Time    Blood Culture With GABRIEL - Blood, [[id number]] Collected:  12/08/17 0002    Specimen:  Blood from Arm, Left Updated:  12/08/17 0007    CBC & Differential [[id number]] Collected:  12/08/17 0006    Specimen:  Blood Updated:  12/08/17 0017    Narrative:       The following orders were created for panel order CBC & Differential.  Procedure                               Abnormality         Status                     ---------                               -----------         ------                     CBC Auto Differential[[id number]]        Abnormal            Final result                 Please view results for these tests on the individual orders.    CBC Auto Differential [[id number]]  (Abnormal) Collected:  12/08/17 0006    Specimen:  Blood Updated:  12/08/17 0017     WBC 7.88 10*3/mm3      RBC 3.72 (L) 10*6/mm3      Hemoglobin 11.1 (L) g/dL      Hematocrit 33.8 (L) %      MCV 90.9 fL      MCH 29.8 pg      MCHC 32.8 g/dL      RDW 17.6 (H) %      RDW-SD 57.4 (H) fl      MPV 9.7 fL       Platelets 141 10*3/mm3      Neutrophil % 71.9 %      Lymphocyte % 15.9 %      Monocyte % 11.0 %      Eosinophil % 0.3 %      Basophil % 0.3 %      Immature Grans % 0.6 %      Neutrophils, Absolute 5.67 10*3/mm3      Lymphocytes, Absolute 1.25 10*3/mm3      Monocytes, Absolute 0.87 10*3/mm3      Eosinophils, Absolute 0.02 10*3/mm3      Basophils, Absolute 0.02 10*3/mm3      Immature Grans, Absolute 0.05 10*3/mm3      nRBC 0.0 /100 WBC     Ammonia [[id number]]  (Abnormal) Collected:  12/08/17 0002    Specimen:  Blood Updated:  12/08/17 0034     Ammonia <9 (L) umol/L     Digoxin Level [[id number]]  (Normal) Collected:  12/08/17 0002    Specimen:  Blood Updated:  12/08/17 0034     Digoxin 1.30 ng/mL     Troponin [[id number]]  (Abnormal) Collected:  12/08/17 0002    Specimen:  Blood Updated:  12/08/17 0038     Troponin I 0.047 (H) ng/mL     Narrative:       Normal Patient Upper Reference Limit (URL) (99th Percentile)=0.03 ng/mL   Non-AMI Illness Reference Limit=0.03-0.11 ng/mL   AMI Confirmation=0.12 ng/mL and above    Lactic Acid, Plasma [[id number]]  (Abnormal) Collected:  12/08/17 0002    Specimen:  Blood Updated:  12/08/17 0042     Lactate 3.8 (C) mmol/L     Magnesium [[id number]]  (Abnormal) Collected:  12/08/17 0002    Specimen:  Blood Updated:  12/08/17 0057     Magnesium 2.4 (H) mg/dL     Comprehensive Metabolic Panel [[id number]]  (Abnormal) Collected:  12/08/17 0002    Specimen:  Blood Updated:  12/08/17 0058     Glucose 102 (H) mg/dL      BUN 78 (C) mg/dL      Creatinine 4.60 (H) mg/dL      Sodium 125 (C) mmol/L      Potassium 5.5 (H) mmol/L      Chloride 98 mmol/L      CO2 13.0 (L) mmol/L      Calcium 9.2 mg/dL      Total Protein 6.9 g/dL      Albumin 2.60 (L) g/dL      ALT (SGPT) 40 U/L      AST (SGOT) 43 U/L      Alkaline Phosphatase 217 (H) U/L      Total Bilirubin 0.8 mg/dL      eGFR Non African Amer 9 (L) mL/min/1.73      Globulin 4.3 gm/dL      A/G Ratio 0.6 (L) g/dL      BUN/Creatinine Ratio 17.0     Anion  Gap 19.5 mmol/L     Narrative:       The MDRD GFR formula is only valid for adults with stable renal function between ages 18 and 70.    Lavender Top [[id number]] Collected:  12/08/17 0006    Specimen:  Blood Updated:  12/08/17 0116     Extra Tube hold for add-on      Auto resulted       Gold Top - SST [[id number]] Collected:  12/08/17 0002    Specimen:  Blood Updated:  12/08/17 0116     Extra Tube Hold for add-ons.      Auto resulted.       Urinalysis With / Culture If Indicated - Urine, Clean Catch [[id number]]  (Abnormal) Collected:  12/08/17 0049    Specimen:  Urine from Urine, Clean Catch Updated:  12/08/17 0116     Color, UA Yellow     Appearance, UA Cloudy (A)     pH, UA <=5.0     Specific Gravity, UA 1.015     Glucose, UA Negative     Ketones, UA Negative     Bilirubin, UA Negative     Blood, UA Negative     Protein, UA Negative     Leuk Esterase, UA Trace (A)     Nitrite, UA Negative     Urobilinogen, UA 0.2 E.U./dL    Urine Culture - Urine, Urine, Clean Catch [[id number]] Collected:  12/08/17 0049    Specimen:  Urine from Urine, Clean Catch Updated:  12/08/17 0123    Urinalysis, Microscopic Only - Urine, Clean Catch [[id number]] Collected:  12/08/17 0049    Specimen:  Urine from Urine, Clean Catch Updated:  12/08/17 0124     RBC, UA None Seen /HPF      WBC, UA None Seen /HPF      Bacteria, UA None Seen /HPF      Squamous Epithelial Cells, UA 0-2 /HPF      Yeast, UA Large/3+ Budding Yeast /HPF      Hyaline Casts, UA None Seen /LPF      Methodology Manual Light Microscopy    Protime-INR [[id number]]  (Abnormal) Collected:  12/08/17 0106    Specimen:  Blood Updated:  12/08/17 0154     Protime 63.1 (H) Seconds      INR 5.45 (C)    POC Glucose Fingerstick [[id number]]  (Normal) Collected:  12/08/17 0207    Specimen:  Blood Updated:  12/08/17 0215     Glucose 87 mg/dL       Serial Number: [id number]:  [id number]       [place] Draw [[id number]] Collected:  12/08/17 0002    Specimen:  Blood Updated:  12/08/17 0216     Narrative:       The following orders were created for panel order [place] Draw.  Procedure                               Abnormality         Status                     ---------                               -----------         ------                     Light Blue Top[[id number]]                                   Final result               Lavender Top[[id number]]                                     Final result               Gold Top - SST[[id number]]                                   Final result               Green Top (No Gel)[[id number]]                               Final result                 Please view results for these tests on the individual orders.    Light Blue Top [[id number]] Collected:  12/08/17 0106    Specimen:  Blood Updated:  12/08/17 0216     Extra Tube hold for add-on      Auto resulted       Green Top (No Gel) [[id number]] Collected:  12/08/17 0106    Specimen:  Blood Updated:  12/08/17 0216     Extra Tube Hold for add-ons.      Auto resulted.       Lactic Acid, Reflex Timer [[id number]] Collected:  12/08/17 0002    Specimen:  Blood Updated:  12/08/17 0346     Extra Tube Hold for add-ons.      Auto resulted.       CBC & Differential [[id number]] Collected:  12/08/17 0530    Specimen:  Blood Updated:  12/08/17 0611    Narrative:       The following orders were created for panel order CBC & Differential.  Procedure                               Abnormality         Status                     ---------                               -----------         ------                     CBC Auto Differential[[id number]]        Abnormal            Final result                 Please view results for these tests on the individual orders.    CBC Auto Differential [[id number]]  (Abnormal) Collected:  12/08/17 0530    Specimen:  Blood Updated:  12/08/17 0611     WBC 6.86 10*3/mm3      RBC 3.21 (L) 10*6/mm3      Hemoglobin 9.7 (L) g/dL      Hematocrit 29.0 (L) %      MCV 90.3 fL      MCH 30.2 pg      MCHC 33.4 g/dL       RDW 17.5 (H) %      RDW-SD 56.9 (H) fl      MPV 9.5 fL      Platelets 128 (L) 10*3/mm3      Neutrophil % 72.4 %      Lymphocyte % 16.3 %      Monocyte % 9.9 %      Eosinophil % 0.3 %      Basophil % 0.4 %      Immature Grans % 0.7 (H) %      Neutrophils, Absolute 4.96 10*3/mm3      Lymphocytes, Absolute 1.12 10*3/mm3      Monocytes, Absolute 0.68 10*3/mm3      Eosinophils, Absolute 0.02 10*3/mm3      Basophils, Absolute 0.03 10*3/mm3      Immature Grans, Absolute 0.05 10*3/mm3      nRBC 0.0 /100 WBC     Acinetobacter Screen - Swab, Axilla, Right [[id number]] Collected:  12/08/17 0416    Specimen:  Swab from Axilla, Right Updated:  12/08/17 0616    VRE Culture - Swab, Per Rectum [[id number]] Collected:  12/08/17 0417    Specimen:  Swab from Per Rectum Updated:  12/08/17 0616    MRSA Screen Culture - Swab, Nares [[id number]] Collected:  12/08/17 0417    Specimen:  Swab from Nares Updated:  12/08/17 0616    Wound Culture - Wound, Leg, Left [[id number]] Collected:  12/08/17 0418    Specimen:  Wound from Leg, Left Updated:  12/08/17 0616    Lactic Acid, Reflex [[id number]]  (Abnormal) Collected:  12/08/17 0542    Specimen:  Blood Updated:  12/08/17 0626     Lactate 2.8 (C) mmol/L     Protime-INR [[id number]]  (Abnormal) Collected:  12/08/17 0530    Specimen:  Blood Updated:  12/08/17 0627     Protime 61.5 (H) Seconds      INR 5.39 (C)    Renal Function Panel [[id number]]  (Abnormal) Collected:  12/08/17 0530    Specimen:  Blood Updated:  12/08/17 0645     Glucose 81 mg/dL      BUN 74 (H) mg/dL      Creatinine 4.40 (H) mg/dL      Sodium 125 (C) mmol/L      Potassium 5.0 mmol/L      Chloride 101 mmol/L      CO2 13.0 (L) mmol/L      Calcium 8.3 (L) mg/dL      Albumin 2.20 (L) g/dL      Phosphorus 6.8 (C) mg/dL      Anion Gap 16.0 mmol/L      BUN/Creatinine Ratio 16.8     eGFR Non African Amer 10 (L) mL/min/1.73     Narrative:       The MDRD GFR formula is only valid for adults with stable renal function between ages 18 and 70.         Imaging Results (last 72 hours)     Procedure Component Value Units Date/Time    CT Abdomen Pelvis Without Contrast [[id number]] Collected:  12/08/17 0718     Updated:  12/08/17 0728    Narrative:       PROCEDURE: CT ABDOMEN PELVIS WO CONTRAST-     HISTORY: N/V, MODESTA, weakness     COMPARISON: None .     PROCEDURE: Axial images were obtained from the lung bases through the  pubic symphysis without intravenous contrast.    .      FINDINGS:      ABDOMEN: Lack of intravenous contrast limits evaluation of the solid  organs, the mediastinum, and the vasculature. Mild atelectasis in the  bibasilar regions.The limited noncontrast images of the liver  demonstrate a nodular border suggestive of cirrhosis. There are  subcentimeter nodular areas that are not well assessed without contrast.  Esophageal and splenic varices are noted hiatal hernia is noted.  Additionally there is a ventral hernia containing nondistended loops of  transverse colon. The gallbladder is absent . The spleen is normal. 1.7  cm right adrenal adenoma.  The pancreas is atrophic. Nonobstructing  stone present within the right collecting system. There is no  hydronephrosis. The aorta is normal in caliber. There is no significant  free fluid or adenopathy.  No abnormally dilated loops of bowel are  appreciated. Postoperative changes are seen to small bowel. There is  retained stool throughout the colon.  Nonspecific body wall edema is  noted.     PELVIS: The appendix is not identified. The urinary bladder is  unremarkable. There is no significant fluid or adenopathy. There are  degenerative changes of the spine.           Impression:       1. No evidence of an obstructive uropathy.  2. Significant interval improvement of ascites and pleural effusions  compared to prior.   3. Ventral wall defect containing nondistended loop of transverse colon.  4. Cirrhotic appearance to the liver with subcentimeter nodular  densities, dedicated follow-up imaging of the  liver is recommended..  5. 1.7 cm right adrenal adenoma.                   .          This study was performed with techniques to keep radiation doses as low  as reasonably achievable (ALARA). Individualized dose reduction  techniques using automated exposure control or adjustment of mA and/or  kV according to the patient size were employed.      This report was finalized on 12/8/2017 7:26 AM by Shabbir Sher DO.    XR Chest 1 View [[id number]] Collected:  12/08/17 0726     Updated:  12/08/17 0730    Narrative:       PROCEDURE: XR CHEST 1 VW-     HISTORY: Weak/Dizzy/AMS triage protocol     COMPARISON: November 27, 2017.     FINDINGS: Electrodes overlie the chest. The heart is normal in size. The  mediastinum is unremarkable. There are chronic interstitial changes.  There is no pneumothorax.  There are no acute osseous abnormalities.           Impression:       No acute cardiopulmonary process.     Continued followup is recommended.     This report was finalized on 12/8/2017 7:28 AM by Shabbir Sher DO.              digoxin 125 mcg Oral Every Other Day   lactulose 10 g Oral TID   pantoprazole 40 mg Oral Daily   rifaximin 550 mg Oral Q12H          Assessment/Plan         1.   Acute renal failure  2.   Chronic kidney disease, stage IV (severe)  3.   Cirrhosis of liver with ascites and esophageal varices  4.   Permanent atrial fibrillation  5.   Lymphedema of both lower extremities  6.   Dehydration  7.   Hyperkalemia  8.   Supratherapeutic INR  9.   Anemia of chronic kidney disease requiring erythropoietin therapy.    Plan    · Acute worsening all 4 chronic kidney disease likely combination of diuretics and other hemodynamic abnormalities including cirrhosis of the liver volume issues and atrial fibrillation.  I will go ahead and give her some fluid in the form of boluses.  I'll give 500 bolus today and reassess her again in the morning and also follow urine sodiums.  · Her INR was elevated, coumadin is on hold. I think  her worsening creatinine is from hemodynamic changes and will need to optimize her volume status slowly.    · In the presence of complicated cirrhosis she is at high risk of hepatorenal syndrome also and will need to be watched very closely. Further workup is as ordered.  · Details were discussed with the patient as well as family in the room. I have tried to explain to the daughter about her clinical condition and worsening of renal function, it does appear that the she may end up needing dialysis in near future if there is no significant improvement in her renal function.  I recommended that she discuss with the family as well as the patient to just in case if she ever needed to start dialysis that is something that she'll be interested in or she can discuss the issues with the palliative care that has been consulted and may end up benefiting from hospice.   · Details were also discussed with the hospitalist service.   · Further recommendations will depend on clinical course of the patient during the current hospitalization.    · I also discussed the details with the nursing staff.    Rest as ordered.    In closing, I sincerely appreciate opportunity to participate in care of this patient. If I can be of any further assistance with the management of this patient, please don’t hesitate to contact me.    Julio Franco MD  12/08/17  8:54 AM    EMR Dragon/Transcription disclaimer:   Much of this encounter note is an electronic transcription/translation of spoken language to printed text. The electronic translation of spoken language may permit erroneous, or at times, nonsensical words or phrases to be inadvertently transcribed; Although I have reviewed the note for such errors, some may still exist.

## 2017-12-08 NOTE — PLAN OF CARE
Problem: Patient Care Overview (Adult)  Goal: Plan of Care Review  Outcome: Ongoing (interventions implemented as appropriate)    12/08/17 1331   Outcome Evaluation   Outcome Summary/Follow up Plan PT vipin completed.Patient presents with decreased strength, balance, endurance and independence with mobility. She is expected to benefit from continued skilled PT intervention to improve her overall functional mobility status prior to D/C.   Coping/Psychosocial Response Interventions   Plan Of Care Reviewed With patient         Problem: Inpatient Physical Therapy  Goal: Bed Mobility Goal LTG- PT  Outcome: Ongoing (interventions implemented as appropriate)    12/08/17 1331   Bed Mobility PT LTG   Bed Mobility PT LTG, Date Established 12/08/17   Bed Mobility PT LTG, Time to Achieve 2 wks   Bed Mobility PT LTG, Activity Type supine to sit/sit to supine   Bed Mobility PT LTG, [place] Level contact guard assist   Bed Mobility PT Goal LTG, Assist Device bed rails   Bed Mobility PT LTG, Outcome goal ongoing       Goal: Transfer Training Goal 1 LTG- PT  Outcome: Ongoing (interventions implemented as appropriate)    12/08/17 1331   Transfer Training PT LTG   Transfer Training PT LTG, Date Established 12/08/17   Transfer Training PT LTG, Time to Achieve 2 wks   Transfer Training PT LTG, Activity Type sit to stand/stand to sit;bed to chair /chair to bed   Transfer Training PT LTG, [place] Level contact guard assist   Transfer Training PT LTG, Assist Device walker, rolling   Transfer Training PT LTG, Outcome goal ongoing       Goal: Gait Training Goal LTG- PT  Outcome: Ongoing (interventions implemented as appropriate)    12/08/17 1331   Gait Training PT LTG   Gait Training Goal PT LTG, Date Established 12/08/17   Gait Training Goal PT LTG, Time to Achieve 2 wks   Gait Training Goal PT LTG, [place] Level contact guard assist   Gait Training Goal PT LTG, Assist Device walker, rolling   Gait Training Goal PT LTG,  Distance to Achieve 50   Gait Training Goal PT LTG, Outcome goal ongoing       Goal: Strength Goal LTG- PT  Outcome: Ongoing (interventions implemented as appropriate)    12/08/17 1331   Strength Goal PT LTG   Strength Goal PT LTG, Date Established 12/08/17   Strength Goal PT LTG, Time to Achieve 2 wks   Strength Goal PT LTG, Measure to Achieve Patient will perform B LE ther ex x 15 reps to improve functional strength for mobility.   Strength Goal PT LTG, Outcome goal ongoing

## 2017-12-08 NOTE — H&P
[facility]   HISTORY AND PHYSICAL      Name:  Hodan Carter   Age:  77 y.o.  Sex:  female  :  1940  MRN:  [id number]   Visit Number:  [id number]  Admission Date:  2017  Date Of Service:  17  Primary Care Physician:  Pieter Farias DO    Chief Complaint:     Generalized weakness since one week.    History Of Presenting Illness:      This is a 77-year-old unfortunate female with history of cirrhosis of uncertain etiology with portal hypertension and ascites, chronic kidney disease was brought to the emergency room by EMS with the above complaints.  The history is obtained from the patient as well as the daughters were at the bedside.    According to the daughters, patient has been progressively getting weak in the last one week.  She was also noted to have low blood pressures by home health nurses.  She was discharged from this facility about 2 weeks ago after being treated for pneumonia and hepatic encephalopathy.  Patient was seen by palliative care at that time but had refused home palliative care.      According to the daughter, patient has been progressively getting weak in the last several days to the point that she could not even get up from her sitting position.  Patient recently was seen by her primary care physician and one of her pressure medications were discontinued.  The daughter had to finally called ambulance to get her to the emergency room.  There is no history of any fever or loss of consciousness.  No history of any abdominal pain or distention.  She does have chronic bilateral leg swelling and has had weeping from the legs.  She has been taking her medications including diuretics as prescribed.  She is also on Coumadin for her atrial fibrillation.  No history of hematemesis or melena.  She does give history of nausea.    Patient was evaluated in the emergency room.  She was hemodynamically stable except for occasional blood pressure drop into  the 90s.  She did not have any tachycardia.  She was saturating at 100% on room air.  Blood work revealed worsening renal failure with a creatinine of 4.6. INR was 5.4 and a lactic acid was 3.8.  CT of the abdomen done in the emergency room did not show any acute findings.  She was given 1 L of normal saline fluid bolus in the emergency room and is currently being admitted to the medical floor.  She is currently lying down on the bed and is upper tablet rest except for generalized weakness.    Review Of Systems:     General ROS: Patient denies any fevers, chills or loss of consciousness. Complains of generalized weakness.  Psychological ROS: No history of any hallucinations and delusions.  Ophthalmic ROS: No history of any diplopia or transient loss of vision.  ENT ROS: No history of sore throat, nasal congestion or ear pain.   Allergy and Immunology ROS: No history of rash or itching.  Hematological and Lymphatic ROS: No history of neck swelling or easy bleeding.  Endocrine ROS: No history of any recent unintentional weight gain or loss.  Respiratory ROS: No history of cough or shortness of breath.  Cardiovascular ROS: No history of chest pain or palpitations.  Gastrointestinal ROS: As per history of present illness.  Genito-Urinary ROS: No history of dysuria or hematuria.  Musculoskeletal ROS: No muscle pain. No calf pain.  Complains of chronic leg swelling.  Neurological ROS: No history of any focal weakness. No loss of consciousness. Denies any numbness.  Dermatological ROS: No history of any redness or pruritis.     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     Social History Main Topics   • Smoking status: Never Smoker   • Smokeless tobacco: Never Used   • Alcohol use No   • Drug use: Defer   • Sexual activity: Defer     Other Topics Concern   • Not on file     Social History Narrative       Family History:    Family History   Problem Relation Age of Onset   • Hypertension Mother    • Cancer Father      prostate   • Hypertension Father    • Diabetes Sister    • Hypertension Sister    • Hypertension Brother    • Cancer Daughter      breast       Allergies:      Cefepime    Home Medications:    Prior to Admission Medications     Prescriptions Last Dose Informant Patient Reported? Taking?    atorvastatin (LIPITOR) 10 MG tablet 12/6/2017  No No    Take 1 tablet by mouth Every Night.    bumetanide (BUMEX) 1 MG tablet 12/7/2017  No No    Take 1 tablet by mouth 2 (Two) Times a Day.    cyanocobalamin 1000 MCG/ML injection 12/7/2017  No No    Inject 1 mL into the shoulder, thigh, or buttocks Every 28 (Twenty-Eight) Days.    Patient taking differently:  Inject 1,000 mcg into the shoulder, thigh, or buttocks Every 14 (Fourteen) Days.    digoxin (LANOXIN) 125 MCG tablet 12/7/2017  No No    Take 1 tablet by mouth Every Other Day.    epoetin parveen (EPOGEN,PROCRIT) 23387 UNIT/ML injection 12/7/2017  No No    Inject 1 mL under the skin Every 14 (Fourteen) Days. Indications: Chronic Hemolytic Anemia    HYDROcodone-acetaminophen (NORCO)  MG per tablet 12/7/2017  No No    Take 1 tablet by mouth Every 6 (Six) Hours  As Needed for Moderate Pain .    ipratropium-albuterol (DUO-NEB) 0.5-2.5 mg/mL nebulizer 12/7/2017  No No    Take 3 mL by nebulization Every 6 (Six) Hours.    lactulose (CHRONULAC) 10 GM/15ML solution 12/7/2017  No No    Take 15 mL by mouth 3 (Three) Times a Day.    levoFLOXacin (LEVAQUIN) 750 MG tablet   No No    Take 1 tablet by mouth Daily.    Nebulizers (COMPRESSOR/NEBULIZER) misc 12/7/2017  No No    For use with nebulizer solution, Q6H prn soa    ondansetron (ZOFRAN) 4 MG tablet 12/7/2017  Yes No    Take 4 mg by mouth Every 8 (Eight) Hours As Needed for Nausea or Vomiting.    pantoprazole (PROTONIX) 40 MG EC tablet 12/7/2017  Yes Yes    Take 40 mg by mouth Daily.    propranolol (INDERAL) 40 MG tablet   No No    Take 1 tablet by mouth Every 8 (Eight) Hours.    rifaximin (XIFAXAN) 550 MG tablet 12/7/2017  No No    Take 1 tablet by mouth Every 12 (Twelve) Hours.    spironolactone (ALDACTONE) 100 MG tablet 12/7/2017  No No    Take 1 tablet by mouth 2 (Two) Times a Day.    warfarin (COUMADIN) 3 MG tablet 12/7/2017  No No    Take 1 tablet by mouth once daily             ED Medications:    Medications   sodium chloride 0.9 % flush 10 mL (not administered)   sodium chloride 0.9 % infusion (not administered)   sodium chloride 0.9 % bolus 1,000 mL (0 mL Intravenous Stopped 12/8/17 0316)       Vital Signs:    Temp:  [97.2 °F (36.2 °C)-97.6 °F (36.4 °C)] 97.6 °F (36.4 °C)  Heart Rate:  [60-69] 60  Resp:  [18-20] 18  BP: ()/(37-63) 97/47    Last 3 weights    12/07/17 2247   Weight: 109 kg (240 lb)       Body mass index is 37.59 kg/(m^2).    Physical Exam:    General Appearance:  Alert and cooperative, not in any acute distress.   Head:  Atraumatic and normocephalic, without obvious abnormality.   Eyes:          PERRLA, conjunctivae and sclerae normal, no Icterus. No pallor. Extra-occular movements are within normal limits.   Ears:  Ears appear intact with no abnormalities noted.   Throat: No oral lesions, no thrush,  oral mucosa moist.   Neck: Supple, trachea midline, no thyromegaly, no carotid bruit.   Back:   No kyphoscoliosis present. No tenderness to palpation,   range of motion normal.   Lungs:   Chest shape is normal. Breath sounds heard bilaterally equally.  No crackles or wheezing. No Pleural rub or bronchial breathing.   Heart:  Normal S1 and S2, no murmur, no gallop, no rub. No JVD.   Abdomen:   Normal bowel sounds, no masses, no organomegaly. Soft, non-tender, obese, no guarding, no rebound tenderness.  Large right upper quadrant surgical scar noted.  Scar due to secondary healing noted in the midabdomen.     Extremities: Moves all extremities well, no cyanosis, no clubbing.  She has 2+ pitting edema bilateral lower extremities up to the knee joints.  She has a surgical scar on the right knee.  Also noted on the left lower leg anteriorly due to fluid-filled bulla.   Skin: No bleeding, bruising or rash.   Neurologic: Alert and oriented x 3. Moves all four limbs equally. No tremors. No facial asymetry.  No asterixis.     Laboratory data:    I have reviewed the labs done in the emergency room.      Results from last 7 days  Lab Units 12/08/17  0002   SODIUM mmol/L 125*   POTASSIUM mmol/L 5.5*   CHLORIDE mmol/L 98   CO2 mmol/L 13.0*   BUN mg/dL 78*   CREATININE mg/dL 4.60*   CALCIUM mg/dL 9.2   BILIRUBIN mg/dL 0.8   ALK PHOS U/L 217*   ALT (SGPT) U/L 40   AST (SGOT) U/L 43   GLUCOSE mg/dL 102*       Results from last 7 days  Lab Units 12/08/17  0006 12/05/17  1415   WBC 10*3/mm3 7.88 7.88   HEMOGLOBIN g/dL 11.1* 10.0*   HEMATOCRIT % 33.8* 29.9*   PLATELETS 10*3/mm3 141 158       Results from last 7 days  Lab Units 12/08/17  0106   INR  5.45*       Results from last 7 days  Lab Units 12/08/17  0002   TROPONIN I ng/mL 0.047*                       Results from last 7 days  Lab Units 12/08/17  0049   COLOR UA  Yellow   GLUCOSE UA  Negative   KETONES UA  Negative   LEUKOCYTES UA  Trace*   PH, URINE  <=5.0   BILIRUBIN UA   Negative   UROBILINOGEN UA  0.2 E.U./dL     EKG:      EKG done in the emergency room was reviewed by me.  It shows sinus rhythm at 67 bpm.  Normal axis.  First-degree AV block was noted.  Q waves noted in inferior leads as well as anterior chest leads.  Poor R-wave progression noted.  Diffuse low voltage QRS complexes noted with nonspecific ST-T changes.    Radiology:    Imaging Results (last 72 hours)     Procedure Component Value Units Date/Time    XR Chest 1 View [[id number]] Updated:  12/08/17 0019    CT Abdomen Pelvis Without Contrast [[id number]] Updated:  12/08/17 0150        Portable chest x-ray done in the emergency room was reviewed by me.  No opacities noted in the lung fields.  No cardiomegaly.  Gas shadow noted under the right diaphragm possibly due to colonic gas.    CT of the abdomen and pelvis without contrast done in the emergency room, preliminary report is as follows:    FINDINGS:  There is a large ventral hernia which now contains a segment of nondistended transverse colon without apparent  obstruction.  Small liver with nodular surface and is suggestive of cirrhosis. Couple scattered hypodense liver lesions are not  well characterized without IV contrast.  Large esophageal varices are again noted.  The kidneys are atrophic.  No acute hepatobiliary, pancreatic, splenic    Active Problems:    Dehydration    Assessment:    1.  Acute renal failure possibly secondary to diuretics and dehydration.  2.  Acute hyperkalemia secondary to #1.  3.  Suspected hepatorenal syndrome.  4.  Supratherapeutic INR.  5.  Cirrhosis with portal hypertension and esophageal varices.  6.  Permanent atrial fibrillation.  7.  Chronic kidney disease stage IV.  8.  Chronic venous insufficiency of the lower extremities.    Plan:    Patient is currently being admitted to the medical floor.  She has received 1 L of IV fluids in the emergency room.  I will hold off on further IV fluids at this time until seen by nephrology  services.  We will hold her diuretic therapy for now.  We will consult Dr. Franco for further recommendations.  We will also hold her Coumadin and continue to monitor her INR.    Patient has been progressively getting worse with regards to her liver as well as renal failure.  Unfortunately, her prognosis seems to be extremely poor with significant risk for multiorgan failure as well as catastrophe GI bleeding from the varices.  I discussed the advanced directives with the patient as well as the daughters and they want her to be DNR.  I will again consult palliative care services for discussions regarding goals of care and family support.  Further recommendations depend upon her clinical course.    Twin Morales MD  12/08/17  4:12 AM    Dictated utilizing Dragon dictation.

## 2017-12-08 NOTE — PROGRESS NOTES
[facility]    [facility]ESS NOTE    Name:  Hodan Carter   Age:  77 y.o.  Sex:  female  :  1940  MRN:  [id number]   Visit Number:  [id number]  Admission Date:  2017  Date Of Service:  17  Primary Care Physician:  Pieter Farias DO     LOS: 0 days :  Patient Care Team:  Pieter Farias DO as PCP - General (Internal Medicine):    History taken from:     patient    Chief Complaint:      Weakness    Subjective     Interval History:     Patient seen today.  Patient had just been released from this hospital 2 weeks ago for hepatic encephalopathy as well as pneumonia.  She was seen by palliative care, but refused home palliative care.  Patient apparently has gotten weaker in the last few days unable to hold her head up when she is sitting.  She has chronic bilateral leg swelling and weeping from the legs.  She had no confusion this time though.  She was taken to the emergency room, she was noted creatinine of 4.6, and INR 5.4 as well as a lactic acid of 3.8.  CT of the abdomen done in the emergency room showed no acute findings.  Patient was given 1 L bolus in the emergency room does feel better.  Lasix and Aldactone are on hold at the present time.  Dr. Franco his seen the patient is giving her IV fluids, and has offered the family dialysis, they're thinking of it.  Patient does feel better, she denies any chest pressure, shortness breath, nausea, vomiting, or pain at present.    Review of Systems:     All systems were reviewed and negative except for:  Cardiovascular: positive for  lower extremity edema    Objective     Vital Signs:    Temp:  [97.1 °F (36.2 °C)-98.2 °F (36.8 °C)] 98.2 °F (36.8 °C)  Heart Rate:  [59-82] 82  Resp:  [18-20] 20  BP: ()/(37-63) 96/52    Physical Exam:      General Appearance:    Alert, cooperative, in no acute distress   Head:    Normocephalic, without obvious abnormality, atraumatic   Eyes:            Lids and lashes normal,  conjunctivae and sclerae normal, no   icterus, no pallor, corneas clear, PERRLA   Ears:    Ears appear intact with no abnormalities noted   Throat:   No oral lesions, no thrush, oral mucosa moist   Neck:   No adenopathy, supple, trachea midline, no thyromegaly, no     carotid bruit, no JVD   Back:     No kyphosis present, no scoliosis present, no skin lesions,       erythema or scars, no tenderness to percussion or                   palpation,   range of motion normal   Lungs:     Clear to auscultation,respirations regular, even and                   unlabored    Heart:    Regular rhythm and normal rate, normal S1 and S2, no            murmur, no gallop, no rub, no click   Breast Exam:    Deferred   Abdomen:     Normal bowel sounds, no masses, no organomegaly, soft        non-tender, non-distended, no guarding, no rebound                 tenderness   Genitalia:    Deferred   Extremities:   4/5 strength in upper/lower extremities bilaterally., Bilateral weeping edema.     Pulses:   Pulses palpable and equal bilaterally   Skin:   No bleeding, bruising or rash   Lymph nodes:   No palpable adenopathy   Neurologic:   Cranial nerves 2 - 12 grossly intact, sensation intact, DTR        present and equal bilaterally          Results Review:      I reviewed the patient's new clinical results.    Labs:  Lab Results (last 24 hours)     Procedure Component Value Units Date/Time    CBC & Differential [[id number]] Collected:  12/08/17 0006    Specimen:  Blood Updated:  12/08/17 0017    Narrative:       The following orders were created for panel order CBC & Differential.  Procedure                               Abnormality         Status                     ---------                               -----------         ------                     CBC Auto Differential[[id number]]        Abnormal            Final result                 Please view results for these tests on the individual orders.    CBC Auto Differential [[id number]]   (Abnormal) Collected:  12/08/17 0006    Specimen:  Blood Updated:  12/08/17 0017     WBC 7.88 10*3/mm3      RBC 3.72 (L) 10*6/mm3      Hemoglobin 11.1 (L) g/dL      Hematocrit 33.8 (L) %      MCV 90.9 fL      MCH 29.8 pg      MCHC 32.8 g/dL      RDW 17.6 (H) %      RDW-SD 57.4 (H) fl      MPV 9.7 fL      Platelets 141 10*3/mm3      Neutrophil % 71.9 %      Lymphocyte % 15.9 %      Monocyte % 11.0 %      Eosinophil % 0.3 %      Basophil % 0.3 %      Immature Grans % 0.6 %      Neutrophils, Absolute 5.67 10*3/mm3      Lymphocytes, Absolute 1.25 10*3/mm3      Monocytes, Absolute 0.87 10*3/mm3      Eosinophils, Absolute 0.02 10*3/mm3      Basophils, Absolute 0.02 10*3/mm3      Immature Grans, Absolute 0.05 10*3/mm3      nRBC 0.0 /100 WBC     Ammonia [[id number]]  (Abnormal) Collected:  12/08/17 0002    Specimen:  Blood Updated:  12/08/17 0034     Ammonia <9 (L) umol/L     Digoxin Level [[id number]]  (Normal) Collected:  12/08/17 0002    Specimen:  Blood Updated:  12/08/17 0034     Digoxin 1.30 ng/mL     Troponin [[id number]]  (Abnormal) Collected:  12/08/17 0002    Specimen:  Blood Updated:  12/08/17 0038     Troponin I 0.047 (H) ng/mL     Narrative:       Normal Patient Upper Reference Limit (URL) (99th Percentile)=0.03 ng/mL   Non-AMI Illness Reference Limit=0.03-0.11 ng/mL   AMI Confirmation=0.12 ng/mL and above    Lactic Acid, Plasma [[id number]]  (Abnormal) Collected:  12/08/17 0002    Specimen:  Blood Updated:  12/08/17 0042     Lactate 3.8 (C) mmol/L     Magnesium [[id number]]  (Abnormal) Collected:  12/08/17 0002    Specimen:  Blood Updated:  12/08/17 0057     Magnesium 2.4 (H) mg/dL     Comprehensive Metabolic Panel [[id number]]  (Abnormal) Collected:  12/08/17 0002    Specimen:  Blood Updated:  12/08/17 0058     Glucose 102 (H) mg/dL      BUN 78 (C) mg/dL      Creatinine 4.60 (H) mg/dL      Sodium 125 (C) mmol/L      Potassium 5.5 (H) mmol/L      Chloride 98 mmol/L      CO2 13.0 (L) mmol/L      Calcium 9.2 mg/dL       Total Protein 6.9 g/dL      Albumin 2.60 (L) g/dL      ALT (SGPT) 40 U/L      AST (SGOT) 43 U/L      Alkaline Phosphatase 217 (H) U/L      Total Bilirubin 0.8 mg/dL      eGFR Non African Amer 9 (L) mL/min/1.73      Globulin 4.3 gm/dL      A/G Ratio 0.6 (L) g/dL      BUN/Creatinine Ratio 17.0     Anion Gap 19.5 mmol/L     Narrative:       The MDRD GFR formula is only valid for adults with stable renal function between ages 18 and 70.    Lavender Top [[id number]] Collected:  12/08/17 0006    Specimen:  Blood Updated:  12/08/17 0116     Extra Tube hold for add-on      Auto resulted       Gold Top - SST [[id number]] Collected:  12/08/17 0002    Specimen:  Blood Updated:  12/08/17 0116     Extra Tube Hold for add-ons.      Auto resulted.       Urinalysis With / Culture If Indicated - Urine, Clean Catch [[id number]]  (Abnormal) Collected:  12/08/17 0049    Specimen:  Urine from Urine, Clean Catch Updated:  12/08/17 0116     Color, UA Yellow     Appearance, UA Cloudy (A)     pH, UA <=5.0     Specific Gravity, UA 1.015     Glucose, UA Negative     Ketones, UA Negative     Bilirubin, UA Negative     Blood, UA Negative     Protein, UA Negative     Leuk Esterase, UA Trace (A)     Nitrite, UA Negative     Urobilinogen, UA 0.2 E.U./dL    Urine Culture - Urine, Urine, Clean Catch [[id number]] Collected:  12/08/17 0049    Specimen:  Urine from Urine, Clean Catch Updated:  12/08/17 0123    Urinalysis, Microscopic Only - Urine, Clean Catch [[id number]] Collected:  12/08/17 0049    Specimen:  Urine from Urine, Clean Catch Updated:  12/08/17 0124     RBC, UA None Seen /HPF      WBC, UA None Seen /HPF      Bacteria, UA None Seen /HPF      Squamous Epithelial Cells, UA 0-2 /HPF      Yeast, UA Large/3+ Budding Yeast /HPF      Hyaline Casts, UA None Seen /LPF      Methodology Manual Light Microscopy    Protime-INR [[id number]]  (Abnormal) Collected:  12/08/17 0106    Specimen:  Blood Updated:  12/08/17 0154     Protime 63.1 (H) Seconds       INR 5.45 (C)    POC Glucose Fingerstick [[id number]]  (Normal) Collected:  12/08/17 0207    Specimen:  Blood Updated:  12/08/17 0215     Glucose 87 mg/dL       Serial Number: [id number]:  [id number]       [place] Draw [[id number]] Collected:  12/08/17 0002    Specimen:  Blood Updated:  12/08/17 0216    Narrative:       The following orders were created for panel order [place] Draw.  Procedure                               Abnormality         Status                     ---------                               -----------         ------                     Light Blue Top[[id number]]                                   Final result               Lavender Top[[id number]]                                     Final result               Gold Top - SST[[id number]]                                   Final result               Green Top (No Gel)[[id number]]                               Final result                 Please view results for these tests on the individual orders.    Light Blue Top [[id number]] Collected:  12/08/17 0106    Specimen:  Blood Updated:  12/08/17 0216     Extra Tube hold for add-on      Auto resulted       Green Top (No Gel) [[id number]] Collected:  12/08/17 0106    Specimen:  Blood Updated:  12/08/17 0216     Extra Tube Hold for add-ons.      Auto resulted.       Lactic Acid, Reflex Timer [[id number]] Collected:  12/08/17 0002    Specimen:  Blood Updated:  12/08/17 0346     Extra Tube Hold for add-ons.      Auto resulted.       CBC & Differential [[id number]] Collected:  12/08/17 0530    Specimen:  Blood Updated:  12/08/17 0611    Narrative:       The following orders were created for panel order CBC & Differential.  Procedure                               Abnormality         Status                     ---------                               -----------         ------                     CBC Auto Differential[[id number]]        Abnormal            Final result                 Please view results for these tests  on the individual orders.    CBC Auto Differential [[id number]]  (Abnormal) Collected:  12/08/17 0530    Specimen:  Blood Updated:  12/08/17 0611     WBC 6.86 10*3/mm3      RBC 3.21 (L) 10*6/mm3      Hemoglobin 9.7 (L) g/dL      Hematocrit 29.0 (L) %      MCV 90.3 fL      MCH 30.2 pg      MCHC 33.4 g/dL      RDW 17.5 (H) %      RDW-SD 56.9 (H) fl      MPV 9.5 fL      Platelets 128 (L) 10*3/mm3      Neutrophil % 72.4 %      Lymphocyte % 16.3 %      Monocyte % 9.9 %      Eosinophil % 0.3 %      Basophil % 0.4 %      Immature Grans % 0.7 (H) %      Neutrophils, Absolute 4.96 10*3/mm3      Lymphocytes, Absolute 1.12 10*3/mm3      Monocytes, Absolute 0.68 10*3/mm3      Eosinophils, Absolute 0.02 10*3/mm3      Basophils, Absolute 0.03 10*3/mm3      Immature Grans, Absolute 0.05 10*3/mm3      nRBC 0.0 /100 WBC     Acinetobacter Screen - Swab, Axilla, Right [[id number]] Collected:  12/08/17 0416    Specimen:  Swab from Axilla, Right Updated:  12/08/17 0616    VRE Culture - Swab, Per Rectum [[id number]] Collected:  12/08/17 0417    Specimen:  Swab from Per Rectum Updated:  12/08/17 0616    MRSA Screen Culture - Swab, Nares [[id number]] Collected:  12/08/17 0417    Specimen:  Swab from Nares Updated:  12/08/17 0616    Wound Culture - Wound, Leg, Left [[id number]] Collected:  12/08/17 0418    Specimen:  Wound from Leg, Left Updated:  12/08/17 0616    Lactic Acid, Reflex [[id number]]  (Abnormal) Collected:  12/08/17 0542    Specimen:  Blood Updated:  12/08/17 0626     Lactate 2.8 (C) mmol/L     Protime-INR [[id number]]  (Abnormal) Collected:  12/08/17 0530    Specimen:  Blood Updated:  12/08/17 0627     Protime 61.5 (H) Seconds      INR 5.39 (C)    Renal Function Panel [[id number]]  (Abnormal) Collected:  12/08/17 0530    Specimen:  Blood Updated:  12/08/17 0645     Glucose 81 mg/dL      BUN 74 (H) mg/dL      Creatinine 4.40 (H) mg/dL      Sodium 125 (C) mmol/L      Potassium 5.0 mmol/L      Chloride 101 mmol/L      CO2 13.0 (L) mmol/L       Calcium 8.3 (L) mg/dL      Albumin 2.20 (L) g/dL      Phosphorus 6.8 (C) mg/dL      Anion Gap 16.0 mmol/L      BUN/Creatinine Ratio 16.8     eGFR Non African Amer 10 (L) mL/min/1.73     Narrative:       The MDRD GFR formula is only valid for adults with stable renal function between ages 18 and 70.    Blood Culture With GABRIEL - Blood, [[id number]]  (Normal) Collected:  12/08/17 0002    Specimen:  Blood from Arm, Left Updated:  12/08/17 1216     Blood Culture No growth at less than 24 hours    Basic Metabolic Panel [[id number]]  (Abnormal) Collected:  12/08/17 1208    Specimen:  Blood Updated:  12/08/17 1234     Glucose 98 mg/dL      BUN 75 (H) mg/dL      Creatinine 4.50 (H) mg/dL      Sodium 124 (C) mmol/L      Potassium 5.1 mmol/L      Chloride 101 mmol/L      CO2 13.0 (L) mmol/L      Calcium 8.4 mg/dL      eGFR Non African Amer 9 (L) mL/min/1.73      BUN/Creatinine Ratio 16.7     Anion Gap 15.1 mmol/L     Narrative:       The MDRD GFR formula is only valid for adults with stable renal function between ages 18 and 70.           Radiology:    Imaging Results (last 24 hours)     Procedure Component Value Units Date/Time    CT Abdomen Pelvis Without Contrast [[id number]] Collected:  12/08/17 0718     Updated:  12/08/17 0728    Narrative:       PROCEDURE: CT ABDOMEN PELVIS WO CONTRAST-     HISTORY: N/V, MODESTA, weakness     COMPARISON: None .     PROCEDURE: Axial images were obtained from the lung bases through the  pubic symphysis without intravenous contrast.    .      FINDINGS:      ABDOMEN: Lack of intravenous contrast limits evaluation of the solid  organs, the mediastinum, and the vasculature. Mild atelectasis in the  bibasilar regions.The limited noncontrast images of the liver  demonstrate a nodular border suggestive of cirrhosis. There are  subcentimeter nodular areas that are not well assessed without contrast.  Esophageal and splenic varices are noted hiatal hernia is noted.  Additionally there is a ventral  hernia containing nondistended loops of  transverse colon. The gallbladder is absent . The spleen is normal. 1.7  cm right adrenal adenoma.  The pancreas is atrophic. Nonobstructing  stone present within the right collecting system. There is no  hydronephrosis. The aorta is normal in caliber. There is no significant  free fluid or adenopathy.  No abnormally dilated loops of bowel are  appreciated. Postoperative changes are seen to small bowel. There is  retained stool throughout the colon.  Nonspecific body wall edema is  noted.     PELVIS: The appendix is not identified. The urinary bladder is  unremarkable. There is no significant fluid or adenopathy. There are  degenerative changes of the spine.           Impression:       1. No evidence of an obstructive uropathy.  2. Significant interval improvement of ascites and pleural effusions  compared to prior.   3. Ventral wall defect containing nondistended loop of transverse colon.  4. Cirrhotic appearance to the liver with subcentimeter nodular  densities, dedicated follow-up imaging of the liver is recommended..  5. 1.7 cm right adrenal adenoma.                   .          This study was performed with techniques to keep radiation doses as low  as reasonably achievable (ALARA). Individualized dose reduction  techniques using automated exposure control or adjustment of mA and/or  kV according to the patient size were employed.      This report was finalized on 12/8/2017 7:26 AM by Shabbir Sher DO.    XR Chest 1 View [[id number]] Collected:  12/08/17 0726     Updated:  12/08/17 0730    Narrative:       PROCEDURE: XR CHEST 1 VW-     HISTORY: Weak/Dizzy/[facility] triage protocol     COMPARISON: November 27, 2017.     FINDINGS: Electrodes overlie the chest. The heart is normal in size. The  mediastinum is unremarkable. There are chronic interstitial changes.  There is no pneumothorax.  There are no acute osseous abnormalities.           Impression:       No acute  cardiopulmonary process.     Continued followup is recommended.     This report was finalized on 12/8/2017 7:28 AM by Shabbir Sher DO.          Medication Review:       digoxin 125 mcg Oral Every Other Day   lactulose 10 g Oral TID   pantoprazole 40 mg Oral Daily   rifaximin 550 mg Oral Q12H   sodium chloride 500 mL Intravenous Once            Assessment/Plan     Problem List Items Addressed This Visit     Dehydration - Primary      Other Visit Diagnoses     MODESTA (acute kidney injury)        Weakness generalized        Hyponatremia              1.  Acute renal failure possibly secondary to diuretics and dehydration.  2.  Acute hyperkalemia secondary to #1. Improved.  3.  Suspected hepatorenal syndrome.  4.  Supratherapeutic INR.  No bleeding noted.  5.  Cirrhosis with portal hypertension and esophageal varices.  6.  Permanent atrial fibrillation.  7.  Chronic kidney disease stage IV.  8.  Chronic venous insufficiency of the lower extremities.    Plan:    Dr. Franco seen the patient.  She is receiving gentle hydration.  Coumadin is on hold for elevated INR.  Patient apparently may need dialysis.  Palliative care is following as well.  We'll check lab work in the a.m. PT and OT have been consulted.  Further recommendations will depend on clinical course.    Frankie Winchester DO  12/08/17  12:59 PM

## 2017-12-09 NOTE — PLAN OF CARE
Problem: Patient Care Overview (Adult)  Goal: Plan of Care Review  Outcome: Ongoing (interventions implemented as appropriate)    12/09/17 0030   Outcome Evaluation   Outcome Summary/Follow up Plan PT ALERT, ORIENTED, CON'T TO HAVE SEVERE EDEMA OF KARRI LOWER LEGS, VSS, DENIES PAIN AT CURRENT TIME.    Coping/Psychosocial Response Interventions   Plan Of Care Reviewed With patient   Patient Care Overview   Progress progress toward functional goals as expected         Problem: Pain, Acute (Adult)  Goal: Identify Related Risk Factors and Signs and Symptoms  Outcome: Outcome(s) achieved Date Met:  12/09/17  Goal: Acceptable Pain Control/Comfort Level  Outcome: Ongoing (interventions implemented as appropriate)    Problem: Skin Integrity Impairment, Risk/Actual (Adult)  Goal: Identify Related Risk Factors and Signs and Symptoms  Outcome: Outcome(s) achieved Date Met:  12/09/17  Goal: Skin Integrity/Wound Healing  Outcome: Ongoing (interventions implemented as appropriate)    Problem: Renal Failure/Kidney Injury, Acute (Adult)  Goal: Signs and Symptoms of Listed Potential Problems Will be Absent or Manageable (Renal Failure/Kidney Injury, Acute)  Outcome: Ongoing (interventions implemented as appropriate)

## 2017-12-09 NOTE — NURSING NOTE
Patient's blood pressure 90/40 called to Dr. Helm clarified dose of 160mg of lasix. Instructed to give full dose of 160 mg of lasix with current bp 90/40. Will continue to monitor patient.

## 2017-12-09 NOTE — PLAN OF CARE
Problem: Renal Failure/Kidney Injury, Acute (Adult)  Goal: Signs and Symptoms of Listed Potential Problems Will be Absent or Manageable (Renal Failure/Kidney Injury, Acute)  Outcome: Ongoing (interventions implemented as appropriate)    12/09/17 0956   Renal Failure/Kidney Injury, Acute   Problems Assessed (Acute Renal Failure/Kidney Injury) all   Problems Present (Acute Renal Failure/Kidney Injury) fluid imbalance

## 2017-12-09 NOTE — PLAN OF CARE
Problem: Patient Care Overview (Adult)  Goal: Plan of Care Review  Outcome: Ongoing (interventions implemented as appropriate)    12/09/17 0956   Coping/Psychosocial Response Interventions   Plan Of Care Reviewed With patient   Patient Care Overview   Progress improving         Problem: Pain, Acute (Adult)  Goal: Acceptable Pain Control/Comfort Level  Outcome: Ongoing (interventions implemented as appropriate)    Problem: Skin Integrity Impairment, Risk/Actual (Adult)  Goal: Skin Integrity/Wound Healing  Outcome: Ongoing (interventions implemented as appropriate)

## 2017-12-09 NOTE — PROGRESS NOTES
"   LOS: 1 day    Patient Care Team:  Pieter Farias DO as PCP - General (Internal Medicine)    Chief Complaint:    Chief Complaint   Patient presents with   • Fatigue       Subjective follow-up acute kidney injury on chronic kidney disease    Interval History:   The patient stated that she is feeling better, denies any chest pain or shortness of air, no nausea or vomiting, no diarrhea, no abdominal pain, she had significant lower extremity edema with ulceration on both the lower extremities with weeping of the lower extremities because of the severe edema.  The patient was evaluated with palliative care but she is not the interested in that right now she wants full treatment.  Except when I mentioned to her about dialysis she said no for now.      Review of Systems:   As noted above    Objective     Vital Signs  Temp:  [97.5 °F (36.4 °C)-98 °F (36.7 °C)] 97.6 °F (36.4 °C)  Heart Rate:  [60-75] 68  Resp:  [16-20] 18  BP: ()/(38-59) 91/40    Flowsheet Rows         First Filed Value    Admission Height  170.2 cm (67\") Documented at 12/07/2017 2247    Admission Weight  109 kg (240 lb) Documented at 12/07/2017 2247          I/O this shift:  In: 120 [P.O.:120]  Out: -   I/O last 3 completed shifts:  In: 1740 [P.O.:240; I.V.:500; IV Piggyback:1000]  Out: 230 [Urine:230]    Intake/Output Summary (Last 24 hours) at 12/09/17 1438  Last data filed at 12/09/17 0900   Gross per 24 hour   Intake              740 ml   Output                0 ml   Net              740 ml       Physical Exam:  General Appearance: alert, oriented x 3, no acute distress, appears to be chronically ill   Skin: warm and dry, chronic skin changes of both lower extremities  HEENT: Nonicteric sclerae, oral mucosa normal,   Neck: supple, no JVD, trachea midline  Lungs: CTA, unlabored breathing effort  Heart: Irregularly irregular, no rub  Abdomen: soft, non-tender, distended abdomen suggestive of ascites, present bowel sounds to auscultation  : " Unable to assess for the palpable bladder  Extremities: 4+ lower extremity edema with ulcers on both shins with weeping area, no cyanosis or clubbing  Neuro: normal speech and mental status      Results Review:      Results from last 7 days  Lab Units 12/09/17  0639 12/08/17  1208 12/08/17  0530 12/08/17  0002   SODIUM mmol/L 126* 124* 125* 125*   POTASSIUM mmol/L 5.2* 5.1 5.0 5.5*   CHLORIDE mmol/L 102 101 101 98   CO2 mmol/L 12.0* 13.0* 13.0* 13.0*   BUN mg/dL 79* 75* 74* 78*   CREATININE mg/dL 4.40* 4.50* 4.40* 4.60*   CALCIUM mg/dL 8.3* 8.4 8.3* 9.2   BILIRUBIN mg/dL 0.6  --   --  0.8   ALK PHOS U/L 173*  --   --  217*   ALT (SGPT) U/L 33  --   --  40   AST (SGOT) U/L 34  --   --  43   GLUCOSE mg/dL 87 98 81 102*       Estimated Creatinine Clearance: 12.3 mL/min (by C-G formula based on Cr of 4.4).      Results from last 7 days  Lab Units 12/09/17  0639 12/08/17  0530 12/08/17  0002   MAGNESIUM mg/dL 2.4*  --  2.4*   PHOSPHORUS mg/dL  --  6.8*  --                Results from last 7 days  Lab Units 12/09/17  0639 12/08/17  0530 12/08/17  0006 12/05/17  1415   WBC 10*3/mm3 7.30 6.86 7.88 7.88   HEMOGLOBIN g/dL 9.2* 9.7* 11.1* 10.0*   PLATELETS 10*3/mm3 126* 128* 141 158         Results from last 7 days  Lab Units 12/09/17  1015 12/08/17  0530 12/08/17  0106   INR  5.20* 5.39* 5.45*         Imaging Results (last 24 hours)     ** No results found for the last 24 hours. **          digoxin 125 mcg Oral Every Other Day   lactulose 10 g Oral TID   pantoprazole 40 mg Oral Daily   rifaximin 550 mg Oral Q12H   sodium chloride 500 mL Intravenous Once          Medication Review:   Current Facility-Administered Medications   Medication Dose Route Frequency Provider Last Rate Last Dose   • acetaminophen (TYLENOL) tablet 650 mg  650 mg Oral Q4H PRN Twin Morales MD       • digoxin (LANOXIN) tablet 125 mcg  125 mcg Oral Every Other Day Twin Morales MD       • HYDROcodone-acetaminophen (NORCO)  MG per tablet 1 tablet  1  tablet Oral Q6H PRN Twin Morales MD   1 tablet at 12/08/17 0559   • ipratropium-albuterol (DUO-NEB) nebulizer solution 3 mL  3 mL Nebulization Q4H PRN Twin Morales MD       • lactulose (CHRONULAC) 10 GM/15ML solution 10 g  10 g Oral TID Twin Morales MD   10 g at 12/09/17 0912   • ondansetron (ZOFRAN) tablet 4 mg  4 mg Oral Q6H PRN Twin Morales MD        Or   • ondansetron ODT (ZOFRAN-ODT) disintegrating tablet 4 mg  4 mg Oral Q6H PRN Twin Morales MD        Or   • ondansetron (ZOFRAN) injection 4 mg  4 mg Intravenous Q6H PRN Twin Morales MD   4 mg at 12/08/17 2140   • pantoprazole (PROTONIX) EC tablet 40 mg  40 mg Oral Daily Twin Morales MD   40 mg at 12/09/17 0912   • rifaximin (XIFAXAN) tablet 550 mg  550 mg Oral Q12H Twin Morales MD   550 mg at 12/09/17 0912   • sodium chloride 0.9 % bolus 500 mL  500 mL Intravenous Once Frankie Winchester,  mL/hr at 12/09/17 1429 500 mL at 12/09/17 1429   • sodium chloride 0.9 % flush 1-10 mL  1-10 mL Intravenous PRN Twin Morales MD           Assessment/Plan      1.   Acute renal failure, multifactorial associated with the hypotension and  could be related to hepatorenal syndrome  2.   Chronic kidney disease, stage IV (severe)  3.   Cirrhosis of liver with ascites and esophageal varices  4.   Chronic atrial fibrillation  5.   Fluid excess and significant edema associated with her liver disease  6.   Hyperkalemia, potassium today is 5.2  7.   Supratherapeutic INR, INR today is 5.2  8.   Anemia of chronic kidney disease requiring erythropoietin therapy, the hemoglobin today is 9.2        Plan:  1.  We will try to diurese today with IV furosemide  2.  Surveillance labs  3.  Prognosis is very poor, no indication for dialysis at this time.        Quincy Helm MD  12/09/17  2:38 PM

## 2017-12-09 NOTE — PROGRESS NOTES
[facility]    Name:  Hodan Carter   Age:  77 y.o.  Sex:  female  :  1940  MRN:  [id number]   Visit Number:  [id number]  Admission Date:  2017  Date Of Service:  17  Primary Care Physician:  Pieter Farias DO     LOS: 1 day :  Patient Care Team:  Pieter Farias DO as PCP - General (Internal Medicine):    History taken from:     patient    Chief Complaint:      Weakness    Subjective     Interval History:     Patient seen today.  Patient had just been released from this hospital 2 weeks ago for hepatic encephalopathy as well as pneumonia.  She was seen by palliative care, but refused home palliative care.  She has now accepted palliative care.  Patient apparently has gotten weaker in the last few days unable to hold her head up when she is sitting.  She has chronic bilateral leg swelling , but this is improved while here.  She had no confusion this time though.  She was taken to the emergency room, she was noted creatinine of 4.6, and INR 5.4 as well as a lactic acid of 3.8.  CT of the abdomen done in the emergency room showed no acute findings.  Patient was given 1 L bolus in the emergency room does feel better.  Lasix and Aldactone are on hold at the present time.  Her creatinine continues to be elevated, as is her INR.   Patient does feel better, she denies any chest pressure, shortness breath, nausea, vomiting, or pain at present.  Patient and family are inquiring on when she will be able to go home.  She is high risk to return given her condition.    Review of Systems:     All systems were reviewed and negative except for:  Cardiovascular: positive for  lower extremity edema    Objective     Vital Signs:    Temp:  [97.5 °F (36.4 °C)-98 °F (36.7 °C)] 97.6 °F (36.4 °C)  Heart Rate:  [60-75] 68  Resp:  [16-20] 18  BP: ()/(38-59) 91/40    Physical Exam:      General Appearance:    Alert, cooperative, in no acute distress   Head:     Normocephalic, without obvious abnormality, atraumatic   Eyes:            Lids and lashes normal, conjunctivae and sclerae normal, no   icterus, no pallor, corneas clear, PERRLA   Ears:    Ears appear intact with no abnormalities noted   Throat:   No oral lesions, no thrush, oral mucosa moist   Neck:   No adenopathy, supple, trachea midline, no thyromegaly, no     carotid bruit, no JVD   Back:     No kyphosis present, no scoliosis present, no skin lesions,       erythema or scars, no tenderness to percussion or                   palpation,   range of motion normal   Lungs:     Clear to auscultation,respirations regular, even and                   unlabored    Heart:    Regular rhythm and normal rate, normal S1 and S2, no            murmur, no gallop, no rub, no click   Breast Exam:    Deferred   Abdomen:     Normal bowel sounds, no masses, no organomegaly, soft        non-tender, non-distended, no guarding, no rebound                 tenderness   Genitalia:    Deferred   Extremities:   4/5 strength in upper/lower extremities bilaterally., Bilateral weeping edema.     Pulses:   Pulses palpable and equal bilaterally   Skin:   No bleeding, bruising or rash   Lymph nodes:   No palpable adenopathy   Neurologic:   Cranial nerves 2 - 12 grossly intact, sensation intact, DTR        present and equal bilaterally          Results Review:      I reviewed the patient's new clinical results.    Labs:  Lab Results (last 24 hours)     Procedure Component Value Units Date/Time    Sodium, Urine, Random - Urine, Clean Catch [[id number]]  (Abnormal) Collected:  12/08/17 1319    Specimen:  Urine from Urine, Catheter Updated:  12/08/17 1409     Sodium, Urine 28 (L) mmol/L     Blood Culture With GABRIEL - Blood, [[id number]]  (Normal) Collected:  12/08/17 0002    Specimen:  Blood from Arm, Left Updated:  12/09/17 0016     Blood Culture No growth at 24 hours    CBC (No Diff) [[id number]]  (Abnormal) Collected:  12/09/17 0639    Specimen:  Blood  Updated:  12/09/17 0704     WBC 7.30 10*3/mm3      RBC 3.03 (L) 10*6/mm3      Hemoglobin 9.2 (L) g/dL      Hematocrit 27.3 (L) %      MCV 90.1 fL      MCH 30.4 pg      MCHC 33.7 g/dL      RDW 18.0 (H) %      RDW-SD 57.5 (H) fl      MPV 9.6 fL      Platelets 126 (L) 10*3/mm3     Magnesium [[id number]]  (Abnormal) Collected:  12/09/17 0639    Specimen:  Blood Updated:  12/09/17 0722     Magnesium 2.4 (H) mg/dL     Comprehensive Metabolic Panel [[id number]]  (Abnormal) Collected:  12/09/17 0639    Specimen:  Blood Updated:  12/09/17 0734     Glucose 87 mg/dL      BUN 79 (C) mg/dL      Creatinine 4.40 (H) mg/dL      Sodium 126 (C) mmol/L      Potassium 5.2 (H) mmol/L      Chloride 102 mmol/L      CO2 12.0 (L) mmol/L      Calcium 8.3 (L) mg/dL      Total Protein 5.8 (L) g/dL      Albumin 2.00 (L) g/dL      ALT (SGPT) 33 U/L      AST (SGOT) 34 U/L      Alkaline Phosphatase 173 (H) U/L      Total Bilirubin 0.6 mg/dL      eGFR Non African Amer 10 (L) mL/min/1.73      Globulin 3.8 gm/dL      A/G Ratio 0.5 (L) g/dL      BUN/Creatinine Ratio 18.0     Anion Gap 17.2 mmol/L     Narrative:       The MDRD GFR formula is only valid for adults with stable renal function between ages 18 and 70.    Protime-INR [[id number]]  (Abnormal) Collected:  12/09/17 1015    Specimen:  Blood Updated:  12/09/17 1109     Protime 59.3 (H) Seconds      INR 5.20 (C)    Sodium, Urine, Random - Urine, Clean Catch [[id number]] Collected:  12/09/17 1335    Specimen:  Urine from Urine, Clean Catch Updated:  12/09/17 1343           Radiology:    Imaging Results (last 24 hours)     ** No results found for the last 24 hours. **          Medication Review:       digoxin 125 mcg Oral Every Other Day   lactulose 10 g Oral TID   pantoprazole 40 mg Oral Daily   rifaximin 550 mg Oral Q12H            Assessment/Plan     Problem List Items Addressed This Visit     Dehydration - Primary      Other Visit Diagnoses     MODESTA (acute kidney injury)        Weakness generalized         Hyponatremia              1.  Acute renal failure possibly secondary to diuretics and dehydration.  2.  Acute hyperkalemia secondary to #1. Improved.  3.  Suspected hepatorenal syndrome.  4.  Supratherapeutic INR.  No bleeding noted.  5.  Cirrhosis with portal hypertension and esophageal varices.  6.  Permanent atrial fibrillation.  7.  Chronic kidney disease stage IV.  8.  Chronic venous insufficiency of the lower extremities.    Plan:    Nephrology is following.  She is receiving gentle hydration as needed.  We'll give her another 500 cc bolus over 4 hours as her blood pressure is low.  Coumadin is on hold for elevated INR.  Patient apparently may need dialysis though not this time .  Palliative care is following as well.  We'll check lab work in the a.m. PT and OT have been consulted.  Further recommendations will depend on clinical course.  Hopefully patient can go in the next 1-2 days.    Frankie Winchester,   12/09/17  2:04 PM

## 2017-12-10 NOTE — PLAN OF CARE
Problem: Patient Care Overview (Adult)  Goal: Plan of Care Review  Outcome: Ongoing (interventions implemented as appropriate)    12/09/17 1653   Outcome Evaluation   Outcome Summary/Follow up Plan Pt reported tired today however agreed to perform ex . Con't with PT POC   Coping/Psychosocial Response Interventions   Plan Of Care Reviewed With patient   Patient Care Overview   Progress improving         Problem: Inpatient Physical Therapy  Goal: Bed Mobility Goal LTG- PT  Outcome: Ongoing (interventions implemented as appropriate)    12/08/17 1331 12/09/17 1653   Bed Mobility PT LTG   Bed Mobility PT LTG, Date Established 12/08/17 --    Bed Mobility PT LTG, Time to Achieve 2 wks --    Bed Mobility PT LTG, Activity Type supine to sit/sit to supine --    Bed Mobility PT LTG, [place] Level contact guard assist --    Bed Mobility PT Goal LTG, Assist Device bed rails --    Bed Mobility PT LTG, Outcome --  goal ongoing       Goal: Transfer Training Goal 1 LTG- PT  Outcome: Ongoing (interventions implemented as appropriate)    12/08/17 1331 12/09/17 1653   Transfer Training PT LTG   Transfer Training PT LTG, Date Established 12/08/17 --    Transfer Training PT LTG, Time to Achieve 2 wks --    Transfer Training PT LTG, Activity Type sit to stand/stand to sit;bed to chair /chair to bed --    Transfer Training PT LTG, [place] Level contact guard assist --    Transfer Training PT LTG, Assist Device walker, rolling --    Transfer Training PT LTG, Outcome --  goal ongoing       Goal: Gait Training Goal LTG- PT  Outcome: Ongoing (interventions implemented as appropriate)    12/08/17 1331 12/09/17 1653   Gait Training PT LTG   Gait Training Goal PT LTG, Date Established 12/08/17 --    Gait Training Goal PT LTG, Time to Achieve 2 wks --    Gait Training Goal PT LTG, [place] Level contact guard assist --    Gait Training Goal PT LTG, Assist Device walker, rolling --    Gait Training Goal PT LTG, Distance to Achieve 50  --    Gait Training Goal PT LTG, Outcome --  goal ongoing       Goal: Strength Goal LTG- PT  Outcome: Ongoing (interventions implemented as appropriate)    12/08/17 1331 12/09/17 1653   Strength Goal PT LTG   Strength Goal PT LTG, Date Established 12/08/17 --    Strength Goal PT LTG, Time to Achieve 2 wks --    Strength Goal PT LTG, Measure to Achieve Patient will perform B LE ther ex x 15 reps to improve functional strength for mobility. --    Strength Goal PT LTG, Outcome --  goal ongoing

## 2017-12-10 NOTE — PROGRESS NOTES
"   LOS: 2 days    Patient Care Team:  Pieter Farias DO as PCP - General (Internal Medicine)    Chief Complaint:    Chief Complaint   Patient presents with   • Fatigue       Subjective follow-up acute kidney injury on chronic kidney disease    Interval History:   The patient stated that she is feeling better, denies any chest pain or shortness of air, no nausea or vomiting, no diarrhea, no abdominal pain, she had significant lower extremity edema with ulceration on both the lower extremities with weeping of the lower extremities because of the severe edema.  The patient received the furosemide 160 mg IV ×3 doses.  Patient's nurse called me multiple times yesterday concerned about that dose, I explained to her that this is not very large dose given the level of serum creatinine being about 4.4 with a GFR being very low this is very appropriate dose for this patient for this level of kidney function      Review of Systems:   As noted above    Objective     Vital Signs  Temp:  [97.6 °F (36.4 °C)-98.1 °F (36.7 °C)] 97.6 °F (36.4 °C)  Heart Rate:  [64-78] 76  Resp:  [16-20] 18  BP: ()/(40-54) 101/54    Flowsheet Rows         First Filed Value    Admission Height  170.2 cm (67\") Documented at 12/07/2017 2247    Admission Weight  109 kg (240 lb) Documented at 12/07/2017 2247          I/O this shift:  In: -   Out: 500 [Urine:500]  I/O last 3 completed shifts:  In: 980 [P.O.:480; I.V.:500]  Out: 800 [Urine:800]    Intake/Output Summary (Last 24 hours) at 12/10/17 1654  Last data filed at 12/10/17 0900   Gross per 24 hour   Intake              500 ml   Output              800 ml   Net             -300 ml       Physical Exam:  General Appearance: alert, oriented x 3, no acute distress, appears to be chronically ill   Skin: warm and dry, chronic skin changes of both lower extremities  HEENT: Nonicteric sclerae, oral mucosa normal,   Neck: supple, no JVD, trachea midline  Lungs: CTA, unlabored breathing effort  Heart: " Irregularly irregular, no rub  Abdomen: soft, non-tender, distended abdomen suggestive of ascites, present bowel sounds to auscultation  : Unable to assess for the palpable bladder  Extremities: 4+ lower extremity edema with ulcers on both shins with weeping area, no cyanosis or clubbing  Neuro: normal speech and mental status      Results Review:      Results from last 7 days  Lab Units 12/10/17  0610 12/09/17  0639 12/08/17  1208  12/08/17  0002   SODIUM mmol/L 127* 126* 124*  < > 125*   POTASSIUM mmol/L 5.1 5.2* 5.1  < > 5.5*   CHLORIDE mmol/L 103 102 101  < > 98   CO2 mmol/L 15.0* 12.0* 13.0*  < > 13.0*   BUN mg/dL 82* 79* 75*  < > 78*   CREATININE mg/dL 4.40* 4.40* 4.50*  < > 4.60*   CALCIUM mg/dL 8.4 8.3* 8.4  < > 9.2   BILIRUBIN mg/dL 0.7 0.6  --   --  0.8   ALK PHOS U/L 177* 173*  --   --  217*   ALT (SGPT) U/L 27 33  --   --  40   AST (SGOT) U/L 32 34  --   --  43   GLUCOSE mg/dL 81 87 98  < > 102*   < > = values in this interval not displayed.    Estimated Creatinine Clearance: 12.4 mL/min (by C-G formula based on Cr of 4.4).      Results from last 7 days  Lab Units 12/10/17  0610 12/09/17  0639 12/08/17  0530 12/08/17  0002   MAGNESIUM mg/dL 2.5* 2.4*  --  2.4*   PHOSPHORUS mg/dL 6.0*  --  6.8*  --          Results from last 7 days  Lab Units 12/10/17  0610   URIC ACID mg/dL 10.4*         Results from last 7 days  Lab Units 12/10/17  0610 12/09/17  0639 12/08/17  0530 12/08/17  0006 12/05/17  1415   WBC 10*3/mm3 5.79 7.30 6.86 7.88 7.88   HEMOGLOBIN g/dL 8.8* 9.2* 9.7* 11.1* 10.0*   PLATELETS 10*3/mm3 110* 126* 128* 141 158         Results from last 7 days  Lab Units 12/10/17  0610 12/09/17  1015 12/08/17  0530 12/08/17  0106   INR  3.96* 5.20* 5.39* 5.45*         Imaging Results (last 24 hours)     ** No results found for the last 24 hours. **          allopurinol 100 mg Oral BID   digoxin 125 mcg Oral Every Other Day   fluconazole 100 mg Oral Q24H   lactulose 10 g Oral TID   pantoprazole 40 mg Oral  Daily   rifaximin 550 mg Oral Q12H          Medication Review:   Current Facility-Administered Medications   Medication Dose Route Frequency Provider Last Rate Last Dose   • acetaminophen (TYLENOL) tablet 650 mg  650 mg Oral Q4H PRN Twin Morales MD   650 mg at 12/10/17 0856   • allopurinol (ZYLOPRIM) tablet 100 mg  100 mg Oral BID Frankie Winchester DO       • digoxin (LANOXIN) tablet 125 mcg  125 mcg Oral Every Other Day Twin Morales MD   125 mcg at 12/10/17 0857   • fluconazole (DIFLUCAN) tablet 100 mg  100 mg Oral Q24H Frankie Winchester DO   100 mg at 12/10/17 1621   • HYDROcodone-acetaminophen (NORCO)  MG per tablet 1 tablet  1 tablet Oral Q6H PRN Twin Morales MD   1 tablet at 12/10/17 1134   • ipratropium-albuterol (DUO-NEB) nebulizer solution 3 mL  3 mL Nebulization Q4H PRN Twin Morales MD       • lactulose (CHRONULAC) 10 GM/15ML solution 10 g  10 g Oral TID Twin Morales MD   10 g at 12/10/17 1621   • ondansetron (ZOFRAN) tablet 4 mg  4 mg Oral Q6H PRN Twin Morales MD        Or   • ondansetron ODT (ZOFRAN-ODT) disintegrating tablet 4 mg  4 mg Oral Q6H PRN Twin Morales MD        Or   • ondansetron (ZOFRAN) injection 4 mg  4 mg Intravenous Q6H PRN Twin Morales MD   4 mg at 12/08/17 2140   • pantoprazole (PROTONIX) EC tablet 40 mg  40 mg Oral Daily Twin Morales MD   40 mg at 12/10/17 0857   • rifaximin (XIFAXAN) tablet 550 mg  550 mg Oral Q12H Twin Morales MD   550 mg at 12/10/17 0857   • sodium chloride 0.9 % flush 1-10 mL  1-10 mL Intravenous PRN Twin Morales MD   10 mL at 12/09/17 1639       Assessment/Plan      1.   Acute renal failure, multifactorial associated with the hypotension and  could be related to hepatorenal syndrome, renal function is stable creatinine is 4.4 today.  2.   Chronic kidney disease, stage IV (severe)  3.   Cirrhosis of liver with ascites and esophageal varices  4.   Chronic atrial fibrillation  5.   Fluid excess and significant edema associated with her liver disease  6.    Hyperkalemia, potassium today is 5.1  7.   Supratherapeutic INR, INR has improved stent 3.92.  8.   Anemia of chronic kidney disease requiring erythropoietin therapy, the hemoglobin today is 8.8  9. Hyponatremia, sodium is 127.  She is with liver disease  10. 4 RTA  11. Thrombocytopenia associated with liver disease, platelet today is 110,000        Plan:  1.  Will diurese again with IV furosemide and will add sodium bicarbonate to treat the RTA  2.  Surveillance labs  3.  Prognosis is very poor, no indication for dialysis at this time.        Quincy Helm MD  12/10/17  4:54 PM

## 2017-12-10 NOTE — THERAPY TREATMENT NOTE
Acute Care - Physical Therapy Treatment Note  [facility]     Patient Name: Hodan Carter  : 1940  MRN: [id number]  Today's Date: 12/10/2017  Onset of Illness/Injury or Date of Surgery Date: 17  Date of Referral to PT: 17  Referring Physician: Dr. Morales    Admit Date: 2017    Visit Dx:    ICD-10-CM ICD-9-CM   1. Dehydration E86.0 276.51   2. MODESTA (acute kidney injury) N17.9 584.9   3. Weakness generalized R53.1 780.79   4. Hyponatremia E87.1 276.1   5. Impaired mobility and ADLs Z74.09 799.89   6. Impaired functional mobility, balance, gait, and endurance Z74.09 V49.89     Patient Active Problem List   Diagnosis   • Ascites   • Cirrhosis of liver with ascites   • Chronic kidney disease, stage IV (severe)   • Permanent atrial fibrillation   • Essential hypertension   • Chronic anemia   • Cellulitis and abscess of trunk   • Shortness of breath   • NSTEMI (non-ST elevated myocardial infarction)   • Chronic kidney disease-mineral and bone disorder   • Candida UTI   • Volume overload   • Acute hepatic encephalopathy   • Lymphedema of both lower extremities   • Vitamin B12 deficiency   • Hepatic encephalopathy   • Anemia of chronic disease   • Cellulitis of left lower extremity   • Decompensated hepatic cirrhosis   • Dehydration   • Acute renal failure   • Hyperkalemia               Adult Rehabilitation Note       12/10/17 1352 17       Rehab Assessment/Intervention    Discipline physical therapy assistant  -CC physical therapy assistant  -CC     Document Type therapy note (daily note)  -CC therapy note (daily note)  -CC     Subjective Information agree to therapy;complains of;weakness;pain  -CC agree to therapy;complains of;pain  -CC     Patient Effort, Rehab Treatment good  -CC adequate  -CC     Symptoms Noted During/After Treatment fatigue  -CC fatigue  -CC     Precautions/Limitations fall precautions  -CC fall precautions  -CC     Recorded by [CC] Jamaica Farr PTA [CC] Jamaica CUEVAS  ALBINA Farr     Pain Assessment    Pain Assessment 0-10  -CC 0-10  -CC     Pain Score 6  -CC 7  -CC     Post Pain Score 6  -CC 7  -CC     Pain Type Chronic pain  -CC Chronic pain  -CC     Pain Location Leg  -CC Leg  -CC     Pain Orientation Left;Right  -CC Left;Right  -CC     Pain Intervention(s) Repositioned;Ambulation/increased activity  -CC Repositioned  -CC     Response to Interventions    tolerated   -CC tolerated  -CC     Recorded by [CC] Jamaica Farr PTA [CC] Jamaica Farr PTA     Bed Mobility, Assessment/Treatment    Bed Mobility, Assistive Device bed rails  -CC      Bed Mobility, Roll Left, [place] contact guard assist;verbal cues required  -CC      Bed Mobility, Roll Right, [place] contact guard assist;verbal cues required  -CC      Bed Mob, Supine to Sit, [place] contact guard assist;verbal cues required  -CC      Bed Mobility, Safety Issues decreased use of arms for pushing/pulling;decreased use of legs for bridging/pushing  -CC      Bed Mobility, Impairments strength decreased;impaired balance;pain  -CC      Recorded by [CC] [place] Farr PTA      Transfer Assessment/Treatment    Transfers, Bed-Chair [place] contact guard assist;verbal cues required  -CC      Transfers, Bed-Chair-Bed, Assist Device rolling walker  -CC      Transfers, Sit-Stand [place] contact guard assist;verbal cues required  -CC      Transfers, Stand-Sit [place] contact guard assist;stand by assist;verbal cues required  -CC      Transfers, Sit-Stand-Sit, Assist Device rolling walker  -CC      Transfer, Safety Issues balance decreased during turns;weight-shifting ability decreased  -CC      Transfer, Impairments strength decreased;impaired balance;pain  -CC      Recorded by [CC] [place] Farr PTA      Gait Assessment/Treatment    Gait, [place] Level minimum assist (75% patient effort);contact guard assist;verbal cues required  -CC      Gait, Assistive Device rolling walker  -CC       Gait, Distance (Feet) 5  -CC      Gait, Safety Issues balance decreased during turns;step length decreased;weight-shifting ability decreased  -CC      Gait, Impairments strength decreased;impaired balance;pain  -CC      Recorded by [CC] [place] Farr PTA      Therapy Exercises    Bilateral Lower Extremities AROM:;15 reps;supine;ankle pumps/circles;glut sets;heel slides;hip abduction/adduction;quad sets  -CC AROM:;10 reps;supine;ankle pumps/circles;glut sets;heel slides;hip abduction/adduction;quad sets;SAQ;SLR  -CC     Recorded by [CC] Jamaica Farr PTA [CC] Jamaica Farr PTA     Positioning and Restraints    Pre-Treatment Position in bed  -CC in bed  -CC     Post Treatment Position chair  -CC bed  -CC     In Bed  supine;call light within reach;encouraged to call for assist;with family/caregiver  -CC     In Chair reclined;call light within reach;encouraged to call for assist;with family/caregiver  -CC      Recorded by [CC] Jamaica Farr PTA [CC] Jamaica Farr PTA       User Key  (r) = Recorded By, (t) = Taken By, (c) = Cosigned By    Initials Name Effective Dates    CC Jamaica Farr PTA 10/26/16 -                 IP PT Goals       12/10/17 1625 12/09/17 1653 12/08/17 1331    Bed Mobility PT LTG    Bed Mobility PT LTG, Date Established   12/08/17  -LM    Bed Mobility PT LTG, Time to Achieve   2 wks  -LM    Bed Mobility PT LTG, Activity Type   supine to sit/sit to supine  -LM    Bed Mobility PT LTG, [place] Level   contact guard assist  -LM    Bed Mobility PT Goal  LTG, Assist Device   bed rails  -LM    Bed Mobility PT LTG, Outcome (P)  goal partially met  -CC goal ongoing  -CC goal ongoing  -LM    Transfer Training PT LTG    Transfer Training PT LTG, Date Established   12/08/17  -LM    Transfer Training PT LTG, Time to Achieve   2 wks  -LM    Transfer Training PT LTG, Activity Type   sit to stand/stand to sit;bed to chair /chair to bed  -LM    Transfer Training PT LTG, [place] Level    contact guard assist  -LM    Transfer Training PT LTG, Assist Device   walker, rolling  -LM    Transfer Training PT LTG, Outcome (P)  goal partially met  -CC goal ongoing  -CC goal ongoing  -LM    Gait Training PT LTG    Gait Training Goal PT LTG, Date Established   12/08/17  -LM    Gait Training Goal PT LTG, Time to Achieve   2 wks  -LM    Gait Training Goal PT LTG, [place] Level   contact guard assist  -LM    Gait Training Goal PT LTG, Assist Device   walker, rolling  -LM    Gait Training Goal PT LTG, Distance to Achieve   50  -LM    Gait Training Goal PT LTG, Outcome (P)  goal ongoing  -CC goal ongoing  -CC goal ongoing  -LM    Strength Goal PT LTG    Strength Goal PT LTG, Date Established   12/08/17  -LM    Strength Goal PT LTG, Time to Achieve   2 wks  -LM    Strength Goal PT LTG, Measure to Achieve   Patient will perform B LE ther ex x 15 reps to improve functional strength for mobility.  -LM    Strength Goal PT LTG, Outcome (P)  goal partially met  -CC goal ongoing  -CC goal ongoing  -LM      User Key  (r) = Recorded By, (t) = Taken By, (c) = Cosigned By    Initials Name Provider Type    LM Serene Khan, PT Physical Therapist    CC [place] Farr, PTA Physical Therapy Assistant          Physical Therapy Education     Title: PT OT SLP Therapies (Active)     Topic: Physical Therapy (Done)     Point: Mobility training (Done)    Learning Progress Summary    Learner Readiness Method Response Comment Documented by Status   Patient Acceptance E VU,NR Increase mobility daily CC 12/10/17 1624 Done    Acceptance E VU Purpose of PT/POC.  12/08/17 1331 Done               Point: Home exercise program (Done)    Learning Progress Summary    Learner Readiness Method Response Comment Documented by Status   Patient Acceptance E NR,VU Perform ex daily CC 12/10/17 1501 Done    Acceptance E VU Purpose of PT/POC.  12/08/17 1331 Done               Point: Precautions (Done)    Learning Progress Summary    Learner  Readiness Method Response Comment Documented by Status   Patient Acceptance E VU Purpose of PT/POC.  12/08/17 1331 Done                      User Key     Initials Effective Dates Name Provider Type Discipline     10/26/16 -  Serene Khan, PT Physical Therapist PT     10/26/16 -  Jamaica Farr, PTA Physical Therapy Assistant PT                    PT Recommendation and Plan  Planned Therapy Interventions: balance training, bed mobility training, gait training, home exercise program, patient/family education, strengthening, transfer training  PT Frequency: daily  Plan of Care Review  Plan Of Care Reviewed With: (P) patient  Progress: (P) progress toward functional goals as expected  Outcome Summary/Follow up Plan: (P) Pt agreeable to getting OOB and sitting in chair and participated with B LE ex  prior to getting OOB. Con't with PT POC          Outcome Measures       12/10/17 1352 12/09/17 1936 12/08/17 1038    How much help from another person do you currently need...    Turning from your back to your side while in flat bed without using bedrails? 3  -CC 3  -CC     Moving from lying on back to sitting on the side of a flat bed without bedrails? 3  -CC 3  -CC     Moving to and from a bed to a chair (including a wheelchair)? 3  -CC 3  -CC     Standing up from a chair using your arms (e.g., wheelchair, bedside chair)? 3  -CC 3  -CC     Climbing 3-5 steps with a railing? 2  -CC 2  -CC     To walk in hospital room? 3  -CC 2  -CC     AM-PAC 6 Clicks Score 17  -CC 16  -CC     How much help from another is currently needed...    Putting on and taking off regular lower body clothing?   2  -AH    Bathing (including washing, rinsing, and drying)   2  -AH    Toileting (which includes using toilet bed pan or urinal)   2  -AH    Putting on and taking off regular upper body clothing   3  -AH    Taking care of personal grooming (such as brushing teeth)   3  -AH    Eating meals   4  -AH    Score   16  -AH    Functional  Assessment    Outcome Measure Options AM-[facility] 6 Clicks Basic Mobility (PT)  - AM-[facility] 6 Clicks Basic Mobility (PT)  -[facility] 6 Clicks Daily Activity (OT)  -      12/08/17 1025          How much help from another person do you currently need...    Turning from your back to your side while in flat bed without using bedrails? 3  -LM      Moving from lying on back to sitting on the side of a flat bed without bedrails? 3  -LM      Moving to and from a bed to a chair (including a wheelchair)? 3  -LM      Standing up from a chair using your arms (e.g., wheelchair, bedside chair)? 3  -LM      Climbing 3-5 steps with a railing? 2  -LM      To walk in hospital room? 2  -LM      AM-PAC 6 Clicks Score 16  -LM      Functional Assessment    Outcome Measure Options AM-[facility] 6 Clicks Basic Mobility (PT)  -        User Key  (r) = Recorded By, (t) = Taken By, (c) = Cosigned By    Initials Name Provider Type     Rosalia Sepulveda Occupational Therapist    LM Serene Khan, PT Physical Therapist    CC [place] Farr PTA Physical Therapy Assistant           Time Calculation:         PT Charges       12/10/17 1352          Time Calculation    Start Time 1352  -CC      PT Received On 12/10/17  -      PT Goal Re-Cert Due Date 12/18/17  -      Time Calculation- PT    Total Timed Code Minutes- PT 38 minute(s)  -        User Key  (r) = Recorded By, (t) = Taken By, (c) = Cosigned By    Initials Name Provider Type     Jamaica Farr PTA Physical Therapy Assistant          Therapy Charges for Today     Code Description Service Date Service Provider Modifiers Qty    77573737051 HC PT THER PROC EA 15 MIN 12/10/2017 Jamaica Farr PTA GP 1    99004205666 HC GAIT TRAINING EA 15 MIN 12/10/2017 Jamaica Farr PTA GP 1    51483119362 HC PT THER PROC EA 15 MIN 12/10/2017 Jamaica Farr PTA GP 1    26364636185 HC PT THERAPEUTIC ACT EA 15 MIN 12/10/2017 Jamaica Farr PTA GP 1          PT G-Codes  Outcome Measure Options: AM-PAC  6 Clicks Basic Mobility (PT)    Jamaica Farr, PTA  12/10/2017

## 2017-12-10 NOTE — THERAPY TREATMENT NOTE
Acute Care - Physical Therapy Treatment Note  [facility]     Patient Name: Hodan Carter  : 1940  MRN: [id number]  Today's Date: 12/10/2017  Onset of Illness/Injury or Date of Surgery Date: 17  Date of Referral to PT: 17  Referring Physician: Dr. Morales    Admit Date: 2017    Visit Dx:    ICD-10-CM ICD-9-CM   1. Dehydration E86.0 276.51   2. MODESTA (acute kidney injury) N17.9 584.9   3. Weakness generalized R53.1 780.79   4. Hyponatremia E87.1 276.1   5. Impaired mobility and ADLs Z74.09 799.89   6. Impaired functional mobility, balance, gait, and endurance Z74.09 V49.89     Patient Active Problem List   Diagnosis   • Ascites   • Cirrhosis of liver with ascites   • Chronic kidney disease, stage IV (severe)   • Permanent atrial fibrillation   • Essential hypertension   • Chronic anemia   • Cellulitis and abscess of trunk   • Shortness of breath   • NSTEMI (non-ST elevated myocardial infarction)   • Chronic kidney disease-mineral and bone disorder   • Candida UTI   • Volume overload   • Acute hepatic encephalopathy   • Lymphedema of both lower extremities   • Vitamin B12 deficiency   • Hepatic encephalopathy   • Anemia of chronic disease   • Cellulitis of left lower extremity   • Decompensated hepatic cirrhosis   • Dehydration   • Acute renal failure   • Hyperkalemia               Adult Rehabilitation Note       17          Rehab Assessment/Intervention    Discipline physical therapy assistant  -CC      Document Type therapy note (daily note)  -CC      Subjective Information agree to therapy;complains of;pain  -CC      Patient Effort, Rehab Treatment adequate  -CC      Symptoms Noted During/After Treatment fatigue  -CC      Precautions/Limitations fall precautions  -CC      Recorded by [CC] [place] Farr PTA      Pain Assessment    Pain Assessment 0-10  -CC      Pain Score 7  -CC      Post Pain Score 7  -CC      Pain Type Chronic pain  -CC      Pain Location Leg  -CC      Pain  Orientation Left;Right  -CC      Pain Intervention(s) Repositioned  -CC      Response to Interventions tolerated  -CC      Recorded by [CC] [place] Farr PTA      Therapy Exercises    Bilateral Lower Extremities AROM:;10 reps;supine;ankle pumps/circles;glut sets;heel slides;hip abduction/adduction;quad sets;SAQ;SLR  -CC      Recorded by [CC] [place] Farr PTA      Positioning and Restraints    Pre-Treatment Position in bed  -CC      Post Treatment Position bed  -CC      In Bed supine;call light within reach;encouraged to call for assist;with family/caregiver  -CC      Recorded by [CC] [place] Farr PTA        User Key  (r) = Recorded By, (t) = Taken By, (c) = Cosigned By    Initials Name Effective Dates    CC Jamaica Farr PTA 10/26/16 -                 IP PT Goals       12/09/17 1653 12/08/17 1331       Bed Mobility PT LTG    Bed Mobility PT LTG, Date Established  12/08/17  -LM     Bed Mobility PT LTG, Time to Achieve  2 wks  -LM     Bed Mobility PT LTG, Activity Type  supine to sit/sit to supine  -LM     Bed Mobility PT LTG, [place] Level  contact guard assist  -LM     Bed Mobility PT Goal  LTG, Assist Device  bed rails  -LM     Bed Mobility PT LTG, Outcome goal ongoing  - goal ongoing  -LM     Transfer Training PT LTG    Transfer Training PT LTG, Date Established  12/08/17  -LM     Transfer Training PT LTG, Time to Achieve  2 wks  -LM     Transfer Training PT LTG, Activity Type  sit to stand/stand to sit;bed to chair /chair to bed  -LM     Transfer Training PT LTG, [place] Level  contact guard assist  -LM     Transfer Training PT LTG, Assist Device  walker, rolling  -LM     Transfer Training PT LTG, Outcome goal ongoing  -CC goal ongoing  -LM     Gait Training PT LTG    Gait Training Goal PT LTG, Date Established  12/08/17  -LM     Gait Training Goal PT LTG, Time to Achieve  2 wks  -LM     Gait Training Goal PT LTG, [place] Level  contact guard assist  -LM     Gait Training Goal  PT LTG, Assist Device  walker, rolling  -LM     Gait Training Goal PT LTG, Distance to Achieve  50  -LM     Gait Training Goal PT LTG, Outcome goal ongoing  -CC goal ongoing  -LM     Strength Goal PT LTG    Strength Goal PT LTG, Date Established  12/08/17  -LM     Strength Goal PT LTG, Time to Achieve  2 wks  -LM     Strength Goal PT LTG, Measure to Achieve  Patient will perform B LE ther ex x 15 reps to improve functional strength for mobility.  -LM     Strength Goal PT LTG, Outcome goal ongoing  -CC goal ongoing  -LM       User Key  (r) = Recorded By, (t) = Taken By, (c) = Cosigned By    Initials Name Provider Type    LM Serene Khan, PT Physical Therapist    CC [place] Farr, PTA Physical Therapy Assistant          Physical Therapy Education     Title: PT OT SLP Therapies (Active)     Topic: Physical Therapy (Done)     Point: Mobility training (Done)    Learning Progress Summary    Learner Readiness Method Response Comment Documented by Status   Patient Acceptance E VU Purpose of PT/POC.  12/08/17 1331 Done               Point: Home exercise program (Done)    Learning Progress Summary    Learner Readiness Method Response Comment Documented by Status   Patient Acceptance E NR,VU Perform ex daily  12/10/17 1501 Done    Acceptance E VU Purpose of PT/POC.  12/08/17 1331 Done               Point: Precautions (Done)    Learning Progress Summary    Learner Readiness Method Response Comment Documented by Status   Patient Acceptance E VU Purpose of PT/POC.  12/08/17 1331 Done                      User Key     Initials Effective Dates Name Provider Type Discipline     10/26/16 -  Serene Khan, PT Physical Therapist PT     10/26/16 -  Jamaica Farr PTA Physical Therapy Assistant PT                    PT Recommendation and Plan  Planned Therapy Interventions: balance training, bed mobility training, gait training, home exercise program, patient/family education, strengthening, transfer training  PT  Frequency: daily  Plan of Care Review  Plan Of Care Reviewed With: patient  Progress: improving  Outcome Summary/Follow up Plan: Pt reported tired today however agreed to perform ex . Con't with PT POC          Outcome Measures       12/09/17 1936 12/08/17 1038 12/08/17 1025    How much help from another person do you currently need...    Turning from your back to your side while in flat bed without using bedrails? 3  -CC  3  -LM    Moving from lying on back to sitting on the side of a flat bed without bedrails? 3  -CC  3  -LM    Moving to and from a bed to a chair (including a wheelchair)? 3  -CC  3  -LM    Standing up from a chair using your arms (e.g., wheelchair, bedside chair)? 3  -CC  3  -LM    Climbing 3-5 steps with a railing? 2  -CC  2  -LM    To walk in hospital room? 2  -CC  2  -LM    AM-PAC 6 Clicks Score 16  -CC  16  -LM    How much help from another is currently needed...    Putting on and taking off regular lower body clothing?  2  -AH     Bathing (including washing, rinsing, and drying)  2  -AH     Toileting (which includes using toilet bed pan or urinal)  2  -AH     Putting on and taking off regular upper body clothing  3  -AH     Taking care of personal grooming (such as brushing teeth)  3  -AH     Eating meals  4  -AH     Score  16  -     Functional Assessment    Outcome Measure Options AM-PAC 6 Clicks Basic Mobility (PT)  -CC AM-PAC 6 Clicks Daily Activity (OT)  - AM-PAC 6 Clicks Basic Mobility (PT)  -LM      User Key  (r) = Recorded By, (t) = Taken By, (c) = Cosigned By    Initials Name Provider Type     Rosalia Sepulveda Occupational Therapist    LM Serene Khan, PT Physical Therapist    CC [place] Farr PTA Physical Therapy Assistant           Time Calculation:       Therapy Charges for Today     Code Description Service Date Service Provider Modifiers Qty    78955281059 HC PT THER PROC EA 15 MIN 12/10/2017 [place] Farr PTA GP 1          PT G-Codes  Outcome Measure Options: AM-PAC 6  Clicks Basic Mobility (PT)    Jamaica Farr, PTA  12/10/2017

## 2017-12-10 NOTE — PROGRESS NOTES
[facility]    Name:  Hodan Carter   Age:  77 y.o.  Sex:  female  :  1940  MRN:  [id number]   Visit Number:  [id number]  Admission Date:  2017  Date Of Service:  12/10/17  Primary Care Physician:  Pieter Farias DO     LOS: 2 days :  Patient Care Team:  Pieter Farias DO as PCP - General (Internal Medicine):    History taken from:     patient    Chief Complaint:      Weakness    Subjective     Interval History:     Patient seen today.  Patient had just been released from this hospital 2 weeks ago for hepatic encephalopathy as well as pneumonia.  She is being followed by palliative care as she has poor prognosis.  Patient apparently has gotten weaker in the last few days unable to hold her head up when she is sitting.  She has chronic bilateral leg swelling , and is complaining of pain in her left lower extremity at this point.   Upon admission she was noted to have creatinine of 4.6, and INR 5.4 as well as a lactic acid of 3.8.  CT of the abdomen done in the emergency room showed no acute findings.  Patient was given 1 L bolus in the emergency room does feel better.  Lasix is being aggressively given by Dr. Helm.  Her creatinine continues to be elevated, as is her INR.   Patient  denies any chest pressure, shortness breath, nausea, vomiting, or pain at present.      Review of Systems:     All systems were reviewed and negative except for:  Cardiovascular: positive for  lower extremity edema    Objective     Vital Signs:    Temp:  [97.6 °F (36.4 °C)-98.1 °F (36.7 °C)] 97.6 °F (36.4 °C)  Heart Rate:  [64-78] 76  Resp:  [16-20] 18  BP: ()/(40-54) 101/54    Physical Exam:      General Appearance:    Alert, cooperative, in no acute distress   Head:    Normocephalic, without obvious abnormality, atraumatic   Eyes:            Lids and lashes normal, conjunctivae and sclerae normal, no   icterus, no pallor, corneas clear, PERRLA   Ears:    Ears  appear intact with no abnormalities noted   Throat:   No oral lesions, no thrush, oral mucosa moist   Neck:   No adenopathy, supple, trachea midline, no thyromegaly, no     carotid bruit, no JVD   Back:     No kyphosis present, no scoliosis present, no skin lesions,       erythema or scars, no tenderness to percussion or                   palpation,   range of motion normal   Lungs:     Clear to auscultation,respirations regular, even and                   unlabored    Heart:    Regular rhythm and normal rate, normal S1 and S2, no            murmur, no gallop, no rub, no click   Breast Exam:    Deferred   Abdomen:     Normal bowel sounds, no masses, no organomegaly, soft        non-tender, non-distended, no guarding, no rebound                 tenderness   Genitalia:    Deferred   Extremities:   4/5 strength in upper/lower extremities bilaterally., Bilateral weeping edema.     Pulses:   Pulses palpable and equal bilaterally   Skin:   No bleeding, bruising or rash   Lymph nodes:   No palpable adenopathy   Neurologic:   Cranial nerves 2 - 12 grossly intact, sensation intact, DTR        present and equal bilaterally          Results Review:      I reviewed the patient's new clinical results.    Labs:  Lab Results (last 24 hours)     Procedure Component Value Units Date/Time    Sodium, Urine, Random - Urine, Clean Catch [[id number]]  (Abnormal) Collected:  12/09/17 1335    Specimen:  Urine from Urine, Clean Catch Updated:  12/09/17 1418     Sodium, Urine 19 (L) mmol/L     Blood Culture With GABRIEL - Blood, [[id number]]  (Normal) Collected:  12/08/17 0002    Specimen:  Blood from Arm, Left Updated:  12/10/17 0016     Blood Culture No growth at 2 days    CBC & Differential [[id number]] Collected:  12/10/17 0610    Specimen:  Blood Updated:  12/10/17 0649    Narrative:       The following orders were created for panel order CBC & Differential.  Procedure                               Abnormality         Status                      ---------                               -----------         ------                     CBC Auto Differential[[id number]]        Abnormal            Final result                 Please view results for these tests on the individual orders.    CBC Auto Differential [[id number]]  (Abnormal) Collected:  12/10/17 0610    Specimen:  Blood Updated:  12/10/17 0649     WBC 5.79 10*3/mm3      RBC 2.86 (L) 10*6/mm3      Hemoglobin 8.8 (L) g/dL      Hematocrit 25.9 (L) %      MCV 90.6 fL      MCH 30.8 pg      MCHC 34.0 g/dL      RDW 17.9 (H) %      RDW-SD 57.2 (H) fl      MPV 9.6 fL      Platelets 110 (L) 10*3/mm3      Neutrophil % 65.7 %      Lymphocyte % 21.1 %      Monocyte % 11.2 %      Eosinophil % 1.2 %      Basophil % 0.5 %      Immature Grans % 0.3 %      Neutrophils, Absolute 3.80 10*3/mm3      Lymphocytes, Absolute 1.22 10*3/mm3      Monocytes, Absolute 0.65 10*3/mm3      Eosinophils, Absolute 0.07 10*3/mm3      Basophils, Absolute 0.03 10*3/mm3      Immature Grans, Absolute 0.02 10*3/mm3      nRBC 0.0 /100 WBC     Magnesium [[id number]]  (Abnormal) Collected:  12/10/17 0610    Specimen:  Blood Updated:  12/10/17 0700     Magnesium 2.5 (H) mg/dL     Phosphorus [[id number]]  (Abnormal) Collected:  12/10/17 0610    Specimen:  Blood Updated:  12/10/17 0700     Phosphorus 6.0 (H) mg/dL     Uric Acid [[id number]]  (Abnormal) Collected:  12/10/17 0610    Specimen:  Blood Updated:  12/10/17 0700     Uric Acid 10.4 (H) mg/dL     Comprehensive Metabolic Panel [[id number]]  (Abnormal) Collected:  12/10/17 0610    Specimen:  Blood Updated:  12/10/17 0708     Glucose 81 mg/dL      BUN 82 (C) mg/dL      Creatinine 4.40 (H) mg/dL      Sodium 127 (C) mmol/L      Potassium 5.1 mmol/L      Chloride 103 mmol/L      CO2 15.0 (L) mmol/L      Calcium 8.4 mg/dL      Total Protein 5.4 (L) g/dL      Albumin 1.90 (L) g/dL      ALT (SGPT) 27 U/L      AST (SGOT) 32 U/L      Alkaline Phosphatase 177 (H) U/L      Total Bilirubin 0.7 mg/dL      eGFR  Non  Amer 10 (L) mL/min/1.73      Globulin 3.5 gm/dL      A/G Ratio 0.5 (L) g/dL      BUN/Creatinine Ratio 18.6     Anion Gap 14.1 mmol/L     Narrative:       The MDRD GFR formula is only valid for adults with stable renal function between ages 18 and 70.    VRE Culture - Swab, Per Rectum [[id number]]  (Normal) Collected:  12/08/17 0417    Specimen:  Swab from Per Rectum Updated:  12/10/17 0719     VRE SCREEN CX No Vancomycin Resistant Enterococcus Isolated    Wound Culture - Wound, Leg, Left [[id number]]  (Abnormal) Collected:  12/08/17 0418    Specimen:  Wound from Leg, Left Updated:  12/10/17 0723     Wound Culture --      Light growth (2+) Yeast isolated (A)      Id. To follow.       Protime-INR [[id number]]  (Abnormal) Collected:  12/10/17 0610    Specimen:  Blood Updated:  12/10/17 0724     Protime 45.1 (H) Seconds      INR 3.96 (H)    Urine Culture - Urine, Urine, Clean Catch [[id number]]  (Abnormal) Collected:  12/08/17 0049    Specimen:  Urine from Urine, Clean Catch Updated:  12/10/17 0725     Urine Culture --      >100,000 CFU/mL Yeast, Not Candida albicans (A)           Radiology:    Imaging Results (last 24 hours)     ** No results found for the last 24 hours. **          Medication Review:       allopurinol 100 mg Oral BID   digoxin 125 mcg Oral Every Other Day   lactulose 10 g Oral TID   pantoprazole 40 mg Oral Daily   rifaximin 550 mg Oral Q12H            Assessment/Plan     Problem List Items Addressed This Visit     Dehydration - Primary      Other Visit Diagnoses     MODESTA (acute kidney injury)        Weakness generalized        Hyponatremia              1.  Acute renal failure In the presence of chronic kidney disease stage IV  2.  Acute hyperkalemia secondary to #1. Improved.  3.  Suspected hepatorenal syndrome.  4.  Supratherapeutic INR.  No bleeding noted.  5.  Cirrhosis with portal hypertension and esophageal varices.  6.  Permanent atrial fibrillation.  7.   significant leg edema  8.   Chronic venous insufficiency of the lower extremities..  9.  UTI with yeast    Plan:    Nephrology is following.  Patient is receiving aggressive Lasix therapy per nephrology.  Await further recommendations of nephrology regarding further diuresis.  Coumadin is on hold for elevated INR.  Patient apparently may need dialysis in the near future.  Palliative care is following as well.  We'll check lab work in the a.m. PT and OT have been consulted.  Further recommendations will depend on clinical course.  We will add allopurinol.  Will add Diflucan oral as well.  Patient has poor prognosis and is high risk for future readmissions.    Frankie Winchester DO  12/10/17  2:02 PM

## 2017-12-10 NOTE — PLAN OF CARE
Problem: Patient Care Overview (Adult)  Goal: Plan of Care Review  Outcome: Ongoing (interventions implemented as appropriate)    12/10/17 0108   Outcome Evaluation   Outcome Summary/Follow up Plan BP REMAINS LOW, EDEMA OF KARRI LOWER LEGS WITH LASIX IV STARTED EARLIER TODAY.    Coping/Psychosocial Response Interventions   Plan Of Care Reviewed With patient   Patient Care Overview   Progress no change         Problem: Pain, Acute (Adult)  Goal: Acceptable Pain Control/Comfort Level  Outcome: Ongoing (interventions implemented as appropriate)    Problem: Renal Failure/Kidney Injury, Acute (Adult)  Goal: Signs and Symptoms of Listed Potential Problems Will be Absent or Manageable (Renal Failure/Kidney Injury, Acute)  Outcome: Ongoing (interventions implemented as appropriate)

## 2017-12-10 NOTE — PLAN OF CARE
Problem: Patient Care Overview (Adult)  Goal: Plan of Care Review  Outcome: Ongoing (interventions implemented as appropriate)    12/10/17 1625   Outcome Evaluation   Outcome Summary/Follow up Plan Pt agreeable to getting OOB and sitting in chair and participated with B LE ex prior to getting OOB. Con't with PT POC   Coping/Psychosocial Response Interventions   Plan Of Care Reviewed With patient   Patient Care Overview   Progress progress toward functional goals as expected         Problem: Inpatient Physical Therapy  Goal: Bed Mobility Goal LTG- PT  Outcome: Ongoing (interventions implemented as appropriate)    12/08/17 1331 12/10/17 1625   Bed Mobility PT LTG   Bed Mobility PT LTG, Date Established 12/08/17 --    Bed Mobility PT LTG, Time to Achieve 2 wks --    Bed Mobility PT LTG, Activity Type supine to sit/sit to supine --    Bed Mobility PT LTG, [place] Level contact guard assist --    Bed Mobility PT Goal LTG, Assist Device bed rails --    Bed Mobility PT LTG, Outcome --  goal partially met       Goal: Transfer Training Goal 1 LTG- PT  Outcome: Ongoing (interventions implemented as appropriate)    12/08/17 1331 12/10/17 1625   Transfer Training PT LTG   Transfer Training PT LTG, Date Established 12/08/17 --    Transfer Training PT LTG, Time to Achieve 2 wks --    Transfer Training PT LTG, Activity Type sit to stand/stand to sit;bed to chair /chair to bed --    Transfer Training PT LTG, [place] Level contact guard assist --    Transfer Training PT LTG, Assist Device walker, rolling --    Transfer Training PT LTG, Outcome --  goal partially met       Goal: Gait Training Goal LTG- PT  Outcome: Ongoing (interventions implemented as appropriate)    12/08/17 1331 12/10/17 1625   Gait Training PT LTG   Gait Training Goal PT LTG, Date Established 12/08/17 --    Gait Training Goal PT LTG, Time to Achieve 2 wks --    Gait Training Goal PT LTG, [place] Level contact guard assist --    Gait Training Goal  PT LTG, Assist Device walker, rolling --    Gait Training Goal PT LTG, Distance to Achieve 50 --    Gait Training Goal PT LTG, Outcome --  goal ongoing       Goal: Strength Goal LTG- PT  Outcome: Ongoing (interventions implemented as appropriate)    12/08/17 1331 12/10/17 1625   Strength Goal PT LTG   Strength Goal PT LTG, Date Established 12/08/17 --    Strength Goal PT LTG, Time to Achieve 2 wks --    Strength Goal PT LTG, Measure to Achieve Patient will perform B LE ther ex x 15 reps to improve functional strength for mobility. --    Strength Goal PT LTG, Outcome --  goal partially met

## 2017-12-11 NOTE — CONSULTS
"Adult Nutrition  Assessment/PES    Patient Name:  Hodan Carter  YOB: 1940  MRN: [id number]  Admit Date:  12/7/2017    Assessment Date:  12/11/2017    Comments:  Rec #1: Continue current diet order; Encouraging intake. Pt receiving 47% PO intake over 8 meals. Nutritional supplement ordered Nnamdi BID to promote PO intake. Rec #2: Consider MVI with minerals daily. Rec #3: Continue to monitor/replace electrolytes PRN. RD to follow pt. Consult RD PRN.           Reason for Assessment       12/11/17 1613    Reason for Assessment    Reason For Assessment/Visit diagnosis/disease state;nurse/nurse practitioner consult    Identified At Risk By Screening Criteria BMI;large or nonhealing wound, burn or pressure ulcer    Diagnosis Diagnosis    Gastrointestinal Esophageal varices    Hepatic Cirrhosis    Metabolic/ICU Hyperkalemia    Renal CKD;ARF    Skin Pressure ulcer                Anthropometrics       12/11/17 1616    Anthropometrics    Height Method Stated    Height 170.2 cm (67\")    Weight Method Bed scale    Weight 96.6 kg (212 lb 15.4 oz)    Ideal Body Weight (IBW)    Ideal Body Weight (IBW), Female 62.26    % Ideal Body Weight 155.48    Body Mass Index (BMI)    BMI (kg/m2) 33.42    BMI Grade 30 - 34.9- obesity - grade I            Labs/Tests/Procedures/Meds       12/11/17 1616    Labs/Tests/Procedures/Meds    Labs/Tests Review Reviewed;Alb;Platlets   High:K, Creat, BUN, Phos, Mg    Medication Review Reviewed, pertinent              Estimated/Assessed Needs       12/11/17 1618    Calculation Measurements    Weight Used For Calculations 70.8 kg (156 lb)    Height Used for Calculations 1.702 m (5' 7\")    Estimated/Assessed Energy Needs    Energy Need Method [place]-St Jeor    Age 77    RMR ([place]-St[place] Jeor Equation) 1215.23    Activity Factors ([place] St Jeor)  Out of bed, ambulatory  1.3    Estimated Kcal Range  ~0694-1073    Estimated/Assessed Protein Needs    Weight Used for Protein Calculation 90.6 kg " (199 lb 11.8 oz)    Protein (gm/kg) 0.8    0.8 Gm Protein (gm) 72.48    Estimated Protein Range ~54-72 gm    Estimated/Assessed Fluid Needs    Fluid Need Method RDA method    RDA Method (mL)  --   Output + 1000 mL            Nutrition Prescription Ordered       12/11/17 1620    Nutrition Prescription PO    Current PO Diet Regular    Common Modifiers Renal            Evaluation of Received Nutrient/Fluid Intake       12/11/17 1618    PO Evaluation    Number of Days PO Intake Evaluated 3 days    Number of Meals 8    % PO Intake 47            Problem/Interventions:        Problem 1       12/11/17 1622    Nutrition Diagnoses Problem 1    Problem 1 Overweight/Obesity    Etiology (related to) Factors Affecting Nutrition    Food Habit/Preferences Large Meals    Signs/Symptoms (evidenced by) BMI    BMI 30 - 34.9            Problem 2       12/11/17 1622    Nutrition Diagnoses Problem 2    Problem 2 Impaired Nutrient Utilization    Etiology (related to) Medical Diagnosis    Renal CKD;ARF    Signs/Symptoms (evidenced by) Biochemical    Specific Labs Noted BUN;Creatinine;Phosphorus;Mg++;Na+;K+            Problem 3       12/11/17 1623    Nutrition Diagnoses Problem 3    Problem 3 Increased Nutrient Needs    Macronutrient Kcal;Fluid;Protein    Micronutrient Vitamin;Mineral    Etiology (related to) Medical Diagnosis    Skin Pressure ulcer    Signs/Symptoms (evidenced by) Other (comment)   Skin breakdown and pressure ulcer                Intervention Goal       12/11/17 1624    Intervention Goal    General Meet nutritional needs for age/condition    PO Meet estimated needs;Increase intake;PO intake (%)    PO Intake % 75 %    Weight No significant weight loss            Nutrition Intervention       12/11/17 1624    Nutrition Intervention    RD/Tech Action Care plan reviewd;Follow Tx progress;Recommend/ordered;Encourage intake    Recommended/Ordered Supplement            Nutrition Prescription       12/11/17 1624    Nutrition  Prescription PO    PO Prescription Begin/change supplement    Supplement Nnamdi    Supplement Frequency 2 times a day    New PO Prescription Ordered? Yes    Other Orders    Obtain Weight Daily    Obtain Weight Ordered? No, recommended    Supplement Vitamin mineral supplement    Supplement Ordered? No, recommended            Education/Evaluation       12/11/17 1016    Education    Education Will Instruct as appropriate    Monitor/Evaluation    Monitor Per protocol;I&O;PO intake;Supplement intake;Weight;Pertinent labs;Skin status        Electronically signed by:  Jessica Carlisle RD  12/11/17 4:28 PM

## 2017-12-11 NOTE — THERAPY TREATMENT NOTE
Acute Care - Physical Therapy Treatment Note  [facility]     Patient Name: Hodan Carter  : 1940  MRN: [id number]  Today's Date: 2017  Onset of Illness/Injury or Date of Surgery Date: 17  Date of Referral to PT: 17  Referring Physician: Dr. Morales    Admit Date: 2017    Visit Dx:    ICD-10-CM ICD-9-CM   1. Dehydration E86.0 276.51   2. MODESTA (acute kidney injury) N17.9 584.9   3. Weakness generalized R53.1 780.79   4. Hyponatremia E87.1 276.1   5. Impaired mobility and ADLs Z74.09 799.89   6. Impaired functional mobility, balance, gait, and endurance Z74.09 V49.89     Patient Active Problem List   Diagnosis   • Ascites   • Cirrhosis of liver with ascites   • Chronic kidney disease, stage IV (severe)   • Permanent atrial fibrillation   • Essential hypertension   • Chronic anemia   • Cellulitis and abscess of trunk   • Shortness of breath   • NSTEMI (non-ST elevated myocardial infarction)   • Chronic kidney disease-mineral and bone disorder   • Candida UTI   • Volume overload   • Acute hepatic encephalopathy   • Lymphedema of both lower extremities   • Vitamin B12 deficiency   • Hepatic encephalopathy   • Anemia of chronic disease   • Cellulitis of left lower extremity   • Decompensated hepatic cirrhosis   • Dehydration   • Acute renal failure   • Hyperkalemia               Adult Rehabilitation Note       17 1729 12/10/17 1352 17 1936    Rehab Assessment/Intervention    Discipline physical therapy assistant  -RM physical therapy assistant  -CC physical therapy assistant  -CC    Document Type therapy note (daily note)  -RM therapy note (daily note)  -CC therapy note (daily note)  -CC    Subjective Information agree to therapy;complains of;pain;weakness  -RM agree to therapy;complains of;weakness;pain  -CC agree to therapy;complains of;pain  -CC    Patient Effort, Rehab Treatment adequate  -RM good  -CC adequate  -CC    Symptoms Noted During/After Treatment fatigue  -RM fatigue   -CC fatigue  -CC    Precautions/Limitations fall precautions  -RM fall precautions  -CC fall precautions  -CC    Recorded by [RM] Gabriel Israel PTA [CC] Jamaica Farr PTA [CC] [place] Farr PTA    Pain Assessment    Pain Assessment 0-10  -RM 0-10  -CC 0-10  -CC    Pain Score 7  -RM 6  -CC 7  -CC    Post Pain Score 7  -RM 6  -CC 7  -CC    Pain Type Chronic pain  -RM Chronic pain  -CC Chronic pain  -CC    Pain Location Leg  -RM Leg  -CC Leg  -CC    Pain Orientation Left  -RM Left;Right  -CC Left;Right  -CC    Pain Intervention(s) Repositioned;Ambulation/increased activity  -RM Repositioned;Ambulation/increased activity  -CC Repositioned  -CC    Response to Interventions tolerated   -RM    tolerated   -CC tolerated  -CC    Recorded by [RM] Gabriel Israel PTA [CC] Jamaica Farr PTA [CC] Jamaica Farr PTA    Bed Mobility, Assessment/Treatment    Bed Mobility, Assistive Device bed rails;head of bed elevated  -RM bed rails  -CC     Bed Mobility, Roll Left, [place]  contact guard assist;verbal cues required  -CC     Bed Mobility, Roll Right, [place]  contact guard assist;verbal cues required  -CC     Bed Mob, Supine to Sit, [place] contact guard assist;verbal cues required  -RM contact guard assist;verbal cues required  -CC     Bed Mobility, Safety Issues decreased use of arms for pushing/pulling;decreased use of legs for bridging/pushing  -RM decreased use of arms for pushing/pulling;decreased use of legs for bridging/pushing  -CC     Bed Mobility, Impairments strength decreased;impaired balance  -RM strength decreased;impaired balance;pain  -CC     Recorded by [RM] Gabriel Israel PTA [CC] [place] Farr PTA     Transfer Assessment/Treatment    Transfers, Bed-Chair [place]  contact guard assist;verbal cues required  -CC     Transfers, Bed-Chair-Bed, Assist Device  rolling walker  -CC     Transfers, Sit-Stand [place] contact guard assist;verbal cues required  -RM contact  guard assist;verbal cues required  -CC     Transfers, Stand-Sit [place] contact guard assist;verbal cues required  -RM contact guard assist;stand by assist;verbal cues required  -CC     Transfers, Sit-Stand-Sit, Assist Device rolling walker  -RM rolling walker  -CC     Transfer, Safety Issues balance decreased during turns;step length decreased;weight-shifting ability decreased  -RM balance decreased during turns;weight-shifting ability decreased  -CC     Transfer, Impairments strength decreased;impaired balance;pain  -RM strength decreased;impaired balance;pain  -CC     Recorded by [RM] Gabriel Israel PTA [CC] [place] Farr PTA     Gait Assessment/Treatment    Gait, [place] Level contact guard assist  -RM minimum assist (75% patient effort);contact guard assist;verbal cues required  -CC     Gait, Assistive Device rolling walker  -RM rolling walker  -CC     Gait, Distance (Feet) 10  -RM 5  -CC     Gait, Safety Issues step length decreased;weight-shifting ability decreased;balance decreased during turns  -RM balance decreased during turns;step length decreased;weight-shifting ability decreased  -CC     Gait, Impairments strength decreased;impaired balance;pain  -RM strength decreased;impaired balance;pain  -CC     Recorded by [RM] Gabriel Israel PTA [CC] [place] Farr PTA     Therapy Exercises    Bilateral Lower Extremities  AROM:;15 reps;supine;ankle pumps/circles;glut sets;heel slides;hip abduction/adduction;quad sets  -CC AROM:;10 reps;supine;ankle pumps/circles;glut sets;heel slides;hip abduction/adduction;quad sets;SAQ;SLR  -CC    Recorded by  [CC] Jamaica Farr PTA [CC] Jamaica Farr PTA    Positioning and Restraints    Pre-Treatment Position in bed  -RM in bed  -CC in bed  -CC    Post Treatment Position chair  -RM chair  -CC bed  -CC    In Bed   supine;call light within reach;encouraged to call for assist;with family/caregiver  -CC    In Chair reclined;call light within  reach;encouraged to call for assist;with family/caregiver;heels elevated  -RM reclined;call light within reach;encouraged to call for assist;with family/caregiver  -CC     Recorded by [RM] Gabriel Israel, PTA [CC] Jamaica Farr, PTA [CC] Jamaica Farr, PTA      User Key  (r) = Recorded By, (t) = Taken By, (c) = Cosigned By    Initials Name Effective Dates    CC Jamaica Farr, PTA 10/26/16 -     RM Gabriel sIrael, PTA 10/26/16 -                 IP PT Goals       12/11/17 1819 12/10/17 1625 12/09/17 1653    Bed Mobility PT LTG    Bed Mobility PT LTG, Outcome goal met  -RM goal partially met  -CC goal ongoing  -CC    Transfer Training PT LTG    Transfer Training PT LTG, Outcome goal met  -RM goal partially met  -CC goal ongoing  -CC    Gait Training PT LTG    Gait Training Goal PT LTG, Outcome goal ongoing  -RM goal ongoing  -CC goal ongoing  -CC    Strength Goal PT LTG    Strength Goal PT LTG, Outcome  goal partially met  -CC goal ongoing  -CC      12/08/17 1331          Bed Mobility PT LTG    Bed Mobility PT LTG, Date Established 12/08/17  -LM      Bed Mobility PT LTG, Time to Achieve 2 wks  -LM      Bed Mobility PT LTG, Activity Type supine to sit/sit to supine  -LM      Bed Mobility PT LTG, [place] Level contact guard assist  -LM      Bed Mobility PT Goal  LTG, Assist Device bed rails  -LM      Bed Mobility PT LTG, Outcome goal ongoing  -LM      Transfer Training PT LTG    Transfer Training PT LTG, Date Established 12/08/17  -LM      Transfer Training PT LTG, Time to Achieve 2 wks  -LM      Transfer Training PT LTG, Activity Type sit to stand/stand to sit;bed to chair /chair to bed  -LM      Transfer Training PT LTG, [place] Level contact guard assist  -LM      Transfer Training PT LTG, Assist Device walker, rolling  -LM      Transfer Training PT LTG, Outcome goal ongoing  -LM      Gait Training PT LTG    Gait Training Goal PT LTG, Date Established 12/08/17  -LM      Gait Training Goal PT  LTG, Time to Achieve 2 wks  -LM      Gait Training Goal PT LTG, [place] Level contact guard assist  -LM      Gait Training Goal PT LTG, Assist Device walker, rolling  -LM      Gait Training Goal PT LTG, Distance to Achieve 50  -LM      Gait Training Goal PT LTG, Outcome goal ongoing  -LM      Strength Goal PT LTG    Strength Goal PT LTG, Date Established 12/08/17  -LM      Strength Goal PT LTG, Time to Achieve 2 wks  -LM      Strength Goal PT LTG, Measure to Achieve Patient will perform B LE ther ex x 15 reps to improve functional strength for mobility.  -LM      Strength Goal PT LTG, Outcome goal ongoing  -LM        User Key  (r) = Recorded By, (t) = Taken By, (c) = Cosigned By    Initials Name Provider Type    LM Serene Khan, PT Physical Therapist    CC [place] Farr, PTA Physical Therapy Assistant     Gabriel Israel, PTA Physical Therapy Assistant          Physical Therapy Education     Title: PT OT SLP Therapies (Active)     Topic: Physical Therapy (Done)     Point: Mobility training (Done)    Learning Progress Summary    Learner Readiness Method Response Comment Documented by Status   Patient Acceptance E,D VU,NR   12/11/17 1818 Done    Acceptance E VU,NR Increase mobility daily  12/10/17 1624 Done    Acceptance E VU Purpose of PT/POC.  12/08/17 1331 Done               Point: Home exercise program (Done)    Learning Progress Summary    Learner Readiness Method Response Comment Documented by Status   Patient Acceptance E NR,VU Perform ex daily  12/10/17 1501 Done    Acceptance E VU Purpose of PT/POC.  12/08/17 1331 Done               Point: Precautions (Done)    Learning Progress Summary    Learner Readiness Method Response Comment Documented by Status   Patient Acceptance E VU Purpose of PT/POC.  12/08/17 1331 Done                      User Key     Initials Effective Dates Name Provider Type Discipline     10/26/16 -  Serene Khan, PT Physical Therapist PT    CC 10/26/16 -  Jamaica CUEVAS  ALBINA Farr Physical Therapy Assistant PT     10/26/16 -  Gabriel Israel PTA Physical Therapy Assistant PT                    PT Recommendation and Plan  Planned Therapy Interventions: balance training, bed mobility training, gait training, home exercise program, patient/family education, strengthening, transfer training  PT Frequency: daily  Plan of Care Review  Plan Of Care Reviewed With: patient  Progress: progress toward functional goals as expected  Outcome Summary/Follow up Plan: Pt presented with decreased assistance with mobility as well as increased distance during gait trainning. See flowsheet for details.            Outcome Measures       12/10/17 1352 12/09/17 1936       How much help from another person do you currently need...    Turning from your back to your side while in flat bed without using bedrails? 3  -CC 3  -CC     Moving from lying on back to sitting on the side of a flat bed without bedrails? 3  -CC 3  -CC     Moving to and from a bed to a chair (including a wheelchair)? 3  -CC 3  -CC     Standing up from a chair using your arms (e.g., wheelchair, bedside chair)? 3  -CC 3  -CC     Climbing 3-5 steps with a railing? 2  -CC 2  -CC     To walk in hospital room? 3  -CC 2  -CC     AM-PAC 6 Clicks Score 17  -CC 16  -CC     Functional Assessment    Outcome Measure Options AM-PAC 6 Clicks Basic Mobility (PT)  -CC AM-PAC 6 Clicks Basic Mobility (PT)  -CC       User Key  (r) = Recorded By, (t) = Taken By, (c) = Cosigned By    Initials Name Provider Type     J[place] Farr PTA Physical Therapy Assistant           Time Calculation:         PT Charges       12/11/17 1821          Time Calculation    Start Time 1729  -RM      PT Received On 12/11/17  -RM      PT Goal Re-Cert Due Date 12/18/17  -RM      Time Calculation- PT    Total Timed Code Minutes- PT 15 minute(s)  -RM        User Key  (r) = Recorded By, (t) = Taken By, (c) = Cosigned By    Initials Name Provider Type     Gabriel Israel,  PTA Physical Therapy Assistant          Therapy Charges for Today     Code Description Service Date Service Provider Modifiers Qty    84427314159 HC GAIT TRAINING EA 15 MIN 12/11/2017 Gabriel Israel, PTA GP 1          PT G-Codes  Outcome Measure Options: AM-PAC 6 Clicks Basic Mobility (PT)    Gabriel Israel PTA  12/11/2017

## 2017-12-11 NOTE — PLAN OF CARE
Problem: Patient Care Overview (Adult)  Goal: Plan of Care Review  Outcome: Ongoing (interventions implemented as appropriate)    12/11/17 9490   Outcome Evaluation   Outcome Summary/Follow up Plan (discussed in depth, offloading, venous ulcers, pressure inju)   Coping/Psychosocial Response Interventions   Plan Of Care Reviewed With patient;daughter   Patient Care Overview   Progress no change         Problem: Pressure Ulcer (Adult)  Goal: Signs and Symptoms of Listed Potential Problems Will be Absent or Manageable (Pressure Ulcer)  Outcome: Ongoing (interventions implemented as appropriate)    12/11/17 6088   Pressure Ulcer   Problems Assessed (Pressure Ulcer) all   Problems Present (Pressure Ulcer) none

## 2017-12-11 NOTE — PROGRESS NOTES
[facility]    PROGRESS NOTE    Name:  Hodan Carter   Age:  77 y.o.  Sex:  female  :  1940  MRN:  [id number]   Visit Number:  [id number]  Admission Date:  2017  Date Of Service:  17  Primary Care Physician:  Pieter Farias DO     LOS: 3 days :  Patient Care Team:  Pieter Farias DO as PCP - General (Internal Medicine):    Chief Complaint:      Weakness/volume overload/CKD/Cirrhosis    Subjective / Interval History:     I have reviewed labs/imaging/records from this hospitalization, including ER staff and admitting/attending physicians H/P's and progress notes to establish a comprehensive understanding of this patient's clinical hospital course, as well as to establish a transition of care appropriately.    Pt is a 76 yo female, know hx of decompensated cirrhosis of liver; volume overload requiring aggressive IV diuresis; chronic KD, advanced and approaching need for HD; currently states improved edema to simón LE: no hemoptysis; no BRB/BTS; good UOP, no F/C; no rashes of new nature; pain well controlled; no N/V.    Review of Systems:     General ROS: Patient denies any fevers, chills or loss of consciousness.  Respiratory ROS: Denies cough or shortness of breath.  Cardiovascular ROS: Denies chest pain or palpitations. No history of exertional chest pain.  Gastrointestinal ROS: Denies nausea and vomiting. Denies any abdominal pain. No diarrhea.  Neurological ROS: Denies any focal weakness. No loss of consciousness. Denies any numbness. Denies neck pain.  Dermatological ROS: Denies any redness or pruritis.    Vital Signs:    Temp:  [97.6 °F (36.4 °C)-98.3 °F (36.8 °C)] 97.6 °F (36.4 °C)  Heart Rate:  [66-84] 82  Resp:  [17-18] 18  BP: ()/(42-72) 118/72    Intake and output:    I/O last 3 completed shifts:  In: 500 [I.V.:500]  Out: 800 [Urine:800]  I/O this shift:  In: -   Out: 300 [Urine:300]    Physical Examination:    General Appearance:  Alert and  cooperative, not in any acute distress.   Head:  Atraumatic and normocephalic, without obvious abnormality.   Eyes:          PERRLA, conjunctivae and sclerae normal, no Icterus. No pallor. Extra-occular movements are within normal limits.   Neck: Supple, trachea midline, no thyromegaly, no carotid bruit.   Lungs:   Chest shape is normal. Breath sounds heard bilaterally equally.  No crackles or wheezing. No Pleural rub or bronchial breathing.   Heart:  Normal S1 and S2, no murmur, no gallop, no rub. No JVD   Abdomen:   Normal bowel sounds, no masses, no organomegaly. Soft       non-tender, non-distended, no guarding, no rebound tenderness.   Extremities: Moves all extremities well, 1+ pitting edema, no cyanosis, no clubbing.  Chronic stasis dermatitis simón LE.   Skin: No bleeding, bruising or new rashes; chronic dark complexion.   Neurologic: Awake, alert and oriented times 3. Moves all 4 extremities equally.   Laboratory results:      Results from last 7 days  Lab Units 12/11/17  0536 12/10/17  0610 12/09/17  0639   SODIUM mmol/L 129* 127* 126*   POTASSIUM mmol/L 5.3* 5.1 5.2*   CHLORIDE mmol/L 103 103 102   CO2 mmol/L 17.0* 15.0* 12.0*   BUN mg/dL 90* 82* 79*   CREATININE mg/dL 4.40* 4.40* 4.40*   CALCIUM mg/dL 8.4 8.4 8.3*   BILIRUBIN mg/dL 0.7 0.7 0.6   ALK PHOS U/L 171* 177* 173*   ALT (SGPT) U/L 35 27 33   AST (SGOT) U/L 32 32 34   GLUCOSE mg/dL 97 81 87       Results from last 7 days  Lab Units 12/11/17  0536 12/10/17  0610 12/09/17  0639   WBC 10*3/mm3 6.14 5.79 7.30   HEMOGLOBIN g/dL 9.1* 8.8* 9.2*   HEMATOCRIT % 27.9* 25.9* 27.3*   PLATELETS 10*3/mm3 120* 110* 126*       Results from last 7 days  Lab Units 12/11/17  0536 12/10/17  0610 12/09/17  1015   INR  2.81* 3.96* 5.20*       Results from last 7 days  Lab Units 12/08/17  0002   TROPONIN I ng/mL 0.047*       Results from last 7 days  Lab Units 12/08/17  0418 12/08/17  0417 12/08/17  0049 12/08/17  0002   BLOODCX   --   --   --  No growth at 3 days    URINECX   --   --    >100,000 CFU/mL Yeast, Not Candida albicans*  --    WOUNDCX    Light growth (2+) Candida albicans*  --   --   --    MRSA SCREEN CX   --  No growth  --   --            I have reviewed the patient's laboratory results.    Radiology results:    Imaging Results (last 24 hours)     ** No results found for the last 24 hours. **          I have reviewed the patient's radiology reports.    Medication Review:     I have reviewed the patients active and prn medications.         Assessment:  Principal Problem:    Acute renal failure  Active Problems:    Permanent atrial fibrillation    Cirrhosis of liver with ascites    Chronic kidney disease, stage IV (severe)    Lymphedema of both lower extremities    Dehydration    Hyperkalemia        Plan:  Continue with diuresis; repeat labs in am; correct electrolytes as needed; possible d/c home tomorrow if continues to do well; pt and daughter verb understanding of progressing KD, and in lieu of advancing liver diseased, HD very likely at this best estimate;  WIll follow clinically and change regimen as needed.    I have spent a total of 40 mins in direct pt care time today.    Carlos Owen,   12/11/17  5:40 PM

## 2017-12-11 NOTE — PROGRESS NOTES
"Adult Nutrition  Assessment/PES    Patient Name:  Hodan Carter  YOB: 1940  MRN: [id number]  Admit Date:  12/7/2017    Assessment Date:  12/11/2017    Comments:  Rec #1: Continue current diet order; Encouraging intake. Pt receiving 47% PO intake over 8 meals. Nutritional supplement ordered Nnamdi BID to promote PO intake. Rec #2: Consider MVI with minerals daily. Rec #3: Continue to monitor/replace electrolytes PRN. RD to follow pt. Consult RD PRN.             Reason for Assessment       12/11/17 1613    Reason for Assessment    Reason For Assessment/Visit diagnosis/disease state;nurse/nurse practitioner consult    Identified At Risk By Screening Criteria BMI;large or nonhealing wound, burn or pressure ulcer    Diagnosis Diagnosis    Gastrointestinal Esophageal varices    Hepatic Cirrhosis    Metabolic/ICU Hyperkalemia    Renal CKD;ARF    Skin Pressure ulcer                Anthropometrics       12/11/17 1616    Anthropometrics    Height Method Stated    Height 170.2 cm (67\")    Weight Method Bed scale    Weight 96.6 kg (212 lb 15.4 oz)    Ideal Body Weight (IBW)    Ideal Body Weight (IBW), Female 62.26    % Ideal Body Weight 155.48    Body Mass Index (BMI)    BMI (kg/m2) 33.42    BMI Grade 30 - 34.9- obesity - grade I            Labs/Tests/Procedures/Meds       12/11/17 1616    Labs/Tests/Procedures/Meds    Labs/Tests Review Reviewed;Alb;Platlets   High:K, Creat, BUN, Phos, Mg    Medication Review Reviewed, pertinent              Estimated/Assessed Needs       12/11/17 1618    Calculation Measurements    Weight Used For Calculations 70.8 kg (156 lb)    Height Used for Calculations 1.702 m (5' 7\")    Estimated/Assessed Energy Needs    Energy Need Method [place]-St Jeor    Age 77    RMR ([place]-St. Jeor Equation) 1215.23    Activity Factors ([place] St Jeor)  Out of bed, ambulatory  1.3    Estimated Kcal Range  ~6142-7576    Estimated/Assessed Protein Needs    Weight Used for Protein Calculation 90.6 " kg (199 lb 11.8 oz)    Protein (gm/kg) 0.8    0.8 Gm Protein (gm) 72.48    Estimated Protein Range ~54-72 gm    Estimated/Assessed Fluid Needs    Fluid Need Method RDA method    RDA Method (mL)  --   Output + 1000 mL            Nutrition Prescription Ordered       12/11/17 1620    Nutrition Prescription PO    Current PO Diet Regular    Common Modifiers Renal            Evaluation of Received Nutrient/Fluid Intake       12/11/17 1618    PO Evaluation    Number of Days PO Intake Evaluated 3 days    Number of Meals 8    % PO Intake 47            Problem/Interventions:        Problem 1       12/11/17 1622    Nutrition Diagnoses Problem 1    Problem 1 Overweight/Obesity    Etiology (related to) Factors Affecting Nutrition    Food Habit/Preferences Large Meals    Signs/Symptoms (evidenced by) BMI    BMI 30 - 34.9            Problem 2       12/11/17 1622    Nutrition Diagnoses Problem 2    Problem 2 Impaired Nutrient Utilization    Etiology (related to) Medical Diagnosis    Renal CKD;ARF    Signs/Symptoms (evidenced by) Biochemical    Specific Labs Noted BUN;Creatinine;Phosphorus;Mg++;Na+;K+            Problem 3       12/11/17 1623    Nutrition Diagnoses Problem 3    Problem 3 Increased Nutrient Needs    Macronutrient Kcal;Fluid;Protein    Micronutrient Vitamin;Mineral    Etiology (related to) Medical Diagnosis    Skin Pressure ulcer    Signs/Symptoms (evidenced by) Other (comment)   Skin breakdown and pressure ulcer                Intervention Goal       12/11/17 1624    Intervention Goal    General Meet nutritional needs for age/condition    PO Meet estimated needs;Increase intake;PO intake (%)    PO Intake % 75 %    Weight No significant weight loss            Nutrition Intervention       12/11/17 1624    Nutrition Intervention    RD/Tech Action Care plan reviewd;Follow Tx progress;Recommend/ordered;Encourage intake    Recommended/Ordered Supplement            Nutrition Prescription       12/11/17 1624    Nutrition  Prescription PO    PO Prescription Begin/change supplement    Supplement Nnamdi    Supplement Frequency 2 times a day    New PO Prescription Ordered? Yes    Other Orders    Obtain Weight Daily    Obtain Weight Ordered? No, recommended    Supplement Vitamin mineral supplement    Supplement Ordered? No, recommended            Education/Evaluation       12/11/17 2445    Education    Education Will Instruct as appropriate    Monitor/Evaluation    Monitor Per protocol;I&O;PO intake;Supplement intake;Weight;Pertinent labs;Skin status        Electronically signed by:  Jessica Carlisle RD  12/11/17 4:26 PM

## 2017-12-11 NOTE — PLAN OF CARE
Problem: Patient Care Overview (Adult)  Goal: Plan of Care Review  Outcome: Ongoing (interventions implemented as appropriate)    12/11/17 1819   Outcome Evaluation   Outcome Summary/Follow up Plan Pt presented with decreased assistance with mobility as well as increased distance during gait trainning. See flowsheet for details.    Coping/Psychosocial Response Interventions   Plan Of Care Reviewed With patient   Patient Care Overview   Progress progress toward functional goals as expected         Problem: Inpatient Physical Therapy  Goal: Bed Mobility Goal LTG- PT  Outcome: Outcome(s) achieved Date Met:  12/11/17 12/08/17 1331 12/11/17 1819   Bed Mobility PT LTG   Bed Mobility PT LTG, Date Established 12/08/17 --    Bed Mobility PT LTG, Time to Achieve 2 wks --    Bed Mobility PT LTG, Activity Type supine to sit/sit to supine --    Bed Mobility PT LTG, Amelia Level contact guard assist --    Bed Mobility PT Goal LTG, Assist Device bed rails --    Bed Mobility PT LTG, Outcome --  goal met       Goal: Transfer Training Goal 1 LTG- PT  Outcome: Outcome(s) achieved Date Met:  12/11/17 12/08/17 1331 12/11/17 1819   Transfer Training PT LTG   Transfer Training PT LTG, Date Established 12/08/17 --    Transfer Training PT LTG, Time to Achieve 2 wks --    Transfer Training PT LTG, Activity Type sit to stand/stand to sit;bed to chair /chair to bed --    Transfer Training PT LTG, Amelia Level contact guard assist --    Transfer Training PT LTG, Assist Device walker, rolling --    Transfer Training PT LTG, Outcome --  goal met       Goal: Gait Training Goal LTG- PT  Outcome: Ongoing (interventions implemented as appropriate)    12/08/17 1331 12/11/17 1819   Gait Training PT LTG   Gait Training Goal PT LTG, Date Established 12/08/17 --    Gait Training Goal PT LTG, Time to Achieve 2 wks --    Gait Training Goal PT LTG, Amelia Level contact guard assist --    Gait Training Goal PT LTG, Assist Device  walker, rolling --    Gait Training Goal PT LTG, Distance to Achieve 50 --    Gait Training Goal PT LTG, Outcome --  goal ongoing

## 2017-12-11 NOTE — PROGRESS NOTES
"Nephrology Progress Note.    LOS: 3 days    Patient Care Team:  Pieter Farias DO as PCP - General (Internal Medicine)    Chief Complaint:    Chief Complaint   Patient presents with   • Fatigue       Subjective     Interval History:     Patient Complaints: none    Patient seen and examined this morning.  Events from last night noted.  Patient denies having any fevers chills.  No nausea or vomiting no abdominal pain.  Denies any chest pain shortness of breath cough or sputum production.  There is no significant edema in the upper body but both lower extremities still has some swelling..  Patient also denies having new onset weakness of numbness of either extremity    History taken from: patient    I have reviewed labs/imaging/records from this weekend, including admitting/attending physicians H/P's and progress notes to establish a comprehensive understanding of this patient's clinical hospital course, as well as to establish plan of care appropriately.     .      Review of Systems:    The pertinent  ROS was done and it is noted above, rest  was negative.    Objective     Vital Signs  BP (!) 78/49 (BP Location: Right arm, Patient Position: Lying) Comment: RN notified  Pulse 67  Temp 98 °F (36.7 °C) (Oral)   Resp 18  Ht 170.2 cm (67\")  Wt 96.6 kg (213 lb)  SpO2 100%  BMI 33.36 kg/m2           Intake/Output Summary (Last 24 hours) at 12/11/17 0849  Last data filed at 12/10/17 0900   Gross per 24 hour   Intake                0 ml   Output                0 ml   Net                0 ml       Physical Exam:    General Appearance: alert, oriented x 3, no acute distress,   HEENT: pupils round and reactive to light, oral mucosa dry, extra occular movements intact.  Neck: supple, no JVD, trachea midline  Lungs: Clear to Auscultation, unlabored breathing effort  Heart: RRR, normal S1 and S2, no S3, no rub  Abdomen: soft, non-tender, no palpable bladder, present bowel sounds to auscultation  Extremities: 1-2+ " bilateral lower extremity edema, cyanosis or clubbing.   Neuro: normal speech and mental status, grossly non focal.     Results Review:      Results from last 7 days  Lab Units 12/11/17  0536 12/10/17  0610 12/09/17  0639   SODIUM mmol/L 129* 127* 126*   POTASSIUM mmol/L 5.3* 5.1 5.2*   CHLORIDE mmol/L 103 103 102   CO2 mmol/L 17.0* 15.0* 12.0*   BUN mg/dL 90* 82* 79*   CREATININE mg/dL 4.40* 4.40* 4.40*   CALCIUM mg/dL 8.4 8.4 8.3*   BILIRUBIN mg/dL 0.7 0.7 0.6   ALK PHOS U/L 171* 177* 173*   ALT (SGPT) U/L 35 27 33   AST (SGOT) U/L 32 32 34   GLUCOSE mg/dL 97 81 87       Estimated Creatinine Clearance: 12.4 mL/min (by C-G formula based on Cr of 4.4).      Results from last 7 days  Lab Units 12/11/17  0536 12/10/17  0610 12/09/17  0639 12/08/17  0530   MAGNESIUM mg/dL 2.6* 2.5* 2.4*  --    PHOSPHORUS mg/dL 6.3* 6.0*  --  6.8*         Results from last 7 days  Lab Units 12/11/17  0536 12/10/17  0610   URIC ACID mg/dL 10.2* 10.4*         Results from last 7 days  Lab Units 12/11/17  0536 12/10/17  0610 12/09/17  0639 12/08/17  0530 12/08/17  0006   WBC 10*3/mm3 6.14 5.79 7.30 6.86 7.88   HEMOGLOBIN g/dL 9.1* 8.8* 9.2* 9.7* 11.1*   PLATELETS 10*3/mm3 120* 110* 126* 128* 141         Results from last 7 days  Lab Units 12/11/17  0536 12/10/17  0610 12/09/17  1015 12/08/17  0530 12/08/17  0106   INR  2.81* 3.96* 5.20* 5.39* 5.45*         Imaging Results (last 24 hours)     ** No results found for the last 24 hours. **          allopurinol 100 mg Oral BID   calcium acetate 667 mg Oral TID With Meals   digoxin 125 mcg Oral Every Other Day   fluconazole 100 mg Oral Q24H   lactulose 10 g Oral TID   pantoprazole 40 mg Oral Daily   rifaximin 550 mg Oral Q12H          Medication Review:   Current Facility-Administered Medications   Medication Dose Route Frequency Provider Last Rate Last Dose   • acetaminophen (TYLENOL) tablet 650 mg  650 mg Oral Q4H PRN Twin Morales MD   650 mg at 12/10/17 0856   • allopurinol (ZYLOPRIM) tablet  100 mg  100 mg Oral BID Frankie Winchester, DO   100 mg at 12/11/17 0848   • calcium acetate (PHOS BINDER)) capsule 667 mg  667 mg Oral TID With Meals Carlos Owen, DO   667 mg at 12/11/17 0848   • digoxin (LANOXIN) tablet 125 mcg  125 mcg Oral Every Other Day Twin Morales MD   125 mcg at 12/10/17 0857   • fluconazole (DIFLUCAN) tablet 100 mg  100 mg Oral Q24H Frankie Winchester, DO   100 mg at 12/10/17 1621   • HYDROcodone-acetaminophen (NORCO)  MG per tablet 1 tablet  1 tablet Oral Q6H PRN Twin Morales MD   1 tablet at 12/10/17 1848   • ipratropium-albuterol (DUO-NEB) nebulizer solution 3 mL  3 mL Nebulization Q4H PRN Twin Morales MD       • lactulose (CHRONULAC) 10 GM/15ML solution 10 g  10 g Oral TID Twin Morales MD   10 g at 12/11/17 0847   • ondansetron (ZOFRAN) tablet 4 mg  4 mg Oral Q6H PRN Twin Morales MD        Or   • ondansetron ODT (ZOFRAN-ODT) disintegrating tablet 4 mg  4 mg Oral Q6H PRN Twin Morales MD        Or   • ondansetron (ZOFRAN) injection 4 mg  4 mg Intravenous Q6H PRN Twin Morales MD   4 mg at 12/08/17 2140   • pantoprazole (PROTONIX) EC tablet 40 mg  40 mg Oral Daily Twin Morales MD   40 mg at 12/11/17 0848   • rifaximin (XIFAXAN) tablet 550 mg  550 mg Oral Q12H Twin Morales MD   550 mg at 12/11/17 0848   • sodium chloride 0.9 % flush 1-10 mL  1-10 mL Intravenous PRN Twin Morales MD   10 mL at 12/09/17 1639       Assessment/Plan     1.   Acute renal failure  2.   Chronic kidney disease, stage IV (severe)  3.   Cirrhosis of liver with ascites and esophageal varices  4.   Hyponatremia   5.   Hyperkalemia  6.   Type IV RTA  7.   Permanent atrial fibrillation  8.   Lymphedema of both lower extremities  9.   Dehydration  10.   Thrombocytopenia  11.   Supratherapeutic INR  12.   Anemia of chronic kidney disease requiring erythropoietin therapy.    Plan:  · I will hold off the diuretic as the blood pressures on the low side and reassess on day-to-day basis  · I will start the normal saline  and 50 cc and on for next 24 hours.  · INR is improving.  · Surveillance labs  · Prognosis is very poor, no indication for dialysis at this time.  Although I have discussed with the daughter about dialysis issues and told her to discuss with the family along with the patient to see if they're willing to go ahead and do dialysis if needed otherwise she will need comfort care with hospice.    Details were discussed with the patient there is no family in the room.  Further recommendations will depend on clinical course of the patient during the current hospitalization.      I also discussed the details with the nursing staff.    Rest as ordered.    Julio Franco MD  12/11/17  8:49 AM      EMR Dragon/Transcription disclaimer:   Much of this encounter note is an electronic transcription/translation of spoken language to printed text. The electronic translation of spoken language may permit erroneous, or at times, nonsensical words or phrases to be inadvertently transcribed; Although I have reviewed the note for such errors, some may still exist.

## 2017-12-11 NOTE — NURSING NOTE
Pt resting comfortably, daughter at bedside; venous ulcers noted to left and right lower extremity; cleansed well, skin prep to periwound and allowed to dry, multidex gel applied to wound bed and dressings applied; stage 3 pressure injury to coccyx and stage 2 pressure injury to right buttocks; scratch to right buttocks also noted; cleansed with wound cleanser, skin prep to periwound, allowed to dry and dressing applied; discussed in length with pt and daughter, pressure injury prevention and treatment, as well as, venous disease and elevation ; all questions answered, emotional support given

## 2017-12-11 NOTE — PLAN OF CARE
Problem: Patient Care Overview (Adult)  Goal: Plan of Care Review  Outcome: Ongoing (interventions implemented as appropriate)    12/10/17 1625 12/11/17 0330   Outcome Evaluation   Outcome Summary/Follow up Plan --  Pt receiving iv lasix twice a day. Edema is some improved. Pt states that she does feel better.   Coping/Psychosocial Response Interventions   Plan Of Care Reviewed With --  patient   Patient Care Overview   Progress progress toward functional goals as expected --        Goal: Adult Individualization and Mutuality  Outcome: Ongoing (interventions implemented as appropriate)  Goal: Discharge Needs Assessment  Outcome: Ongoing (interventions implemented as appropriate)    Problem: Pain, Acute (Adult)  Goal: Acceptable Pain Control/Comfort Level  Outcome: Ongoing (interventions implemented as appropriate)    Problem: Skin Integrity Impairment, Risk/Actual (Adult)  Goal: Skin Integrity/Wound Healing  Outcome: Ongoing (interventions implemented as appropriate)

## 2017-12-12 NOTE — PLAN OF CARE
Problem: Patient Care Overview (Adult)  Goal: Plan of Care Review  Outcome: Ongoing (interventions implemented as appropriate)    12/11/17 1819 12/11/17 2000 12/12/17 0247   Outcome Evaluation   Outcome Summary/Follow up Plan --  --  Pt receiving iv fluids as ordered. Gets iv lasix as ordered. Assist of 1 to bedside commode.   Coping/Psychosocial Response Interventions   Plan Of Care Reviewed With --  patient --    Patient Care Overview   Progress progress toward functional goals as expected --  --        Goal: Adult Individualization and Mutuality  Outcome: Ongoing (interventions implemented as appropriate)  Goal: Discharge Needs Assessment  Outcome: Ongoing (interventions implemented as appropriate)

## 2017-12-12 NOTE — PROGRESS NOTES
"Dr Franco informed me this am pt is approaching need for dialysis.  Stated he spoke with pt but wants to speak with family as well to discuss pt's need for dialysis with the understanding it may just prolong her life expectancey 6-12months.   If pt and family decide against dialysis, his recommendation will be for hospice care.    Visited with pt this afternoon.  Pt's son visiting.  Pt smiling.  Inquired what Dr Franco had said this morning.  She reported that she \"may need dialysis but not now\".   She added he told her she may need \"our services\" (referring to Palliative Care) but that's \"what you were talking about\".   I explained he may have been talking about Hospice Care.  I explained the difference between Hospice and Palliative Care .Stated she was going to speak with her daughters.   PC will continue to follow  "

## 2017-12-12 NOTE — PROGRESS NOTES
"Nephrology Progress Note.    LOS: 4 days    Patient Care Team:  Pieter Farias DO as PCP - General (Internal Medicine)    Chief Complaint:    Chief Complaint   Patient presents with   • Fatigue       Subjective     Interval History:     Patient Complaints: none    Patient seen and examined this morning.  Events from last night noted.  Patient denies having any fevers chills.  No nausea or vomiting no abdominal pain.  Denies any chest pain shortness of breath cough or sputum production.  There is no significant edema in the upper body but both lower extremities still has some swelling..  Patient also denies having new onset weakness of numbness of either extremity, She did say that she wants to do dialysis if needed.    History taken from: patient      Review of Systems:    The pertinent  ROS was done and it is noted above, rest  was negative.    Objective     Vital Signs  BP 90/58  Pulse 68  Temp 98.3 °F (36.8 °C) (Oral)   Resp 18  Ht 170.2 cm (67\")  Wt 99.9 kg (220 lb 3.2 oz)  SpO2 96%  BMI 34.49 kg/m2           Intake/Output Summary (Last 24 hours) at 12/12/17 1034  Last data filed at 12/12/17 0546   Gross per 24 hour   Intake              975 ml   Output              500 ml   Net              475 ml       Physical Exam:    General Appearance: alert, oriented x 3, no acute distress,   HEENT: pupils round and reactive to light, oral mucosa dry, extra occular movements intact.  Neck: supple, no JVD, trachea midline  Lungs: Clear to Auscultation, unlabored breathing effort  Heart: IRRR, normal S1 and S2, no S3, no rub  Abdomen: soft, non-tender, no palpable bladder, present bowel sounds to auscultation  Extremities: 1-2+ bilateral lower extremity edema, cyanosis or clubbing.   Neuro: normal speech and mental status, grossly non focal.     Results Review:      Results from last 7 days  Lab Units 12/12/17  0611 12/11/17  0536 12/10/17  0610 12/09/17  0639   SODIUM mmol/L 129* 129* 127* 126*   POTASSIUM " mmol/L 5.8* 5.3* 5.1 5.2*   CHLORIDE mmol/L 102 103 103 102   CO2 mmol/L 18.0* 17.0* 15.0* 12.0*   BUN mg/dL 91* 90* 82* 79*   CREATININE mg/dL 4.30* 4.40* 4.40* 4.40*   CALCIUM mg/dL 8.2* 8.4 8.4 8.3*   BILIRUBIN mg/dL  --  0.7 0.7 0.6   ALK PHOS U/L  --  171* 177* 173*   ALT (SGPT) U/L  --  35 27 33   AST (SGOT) U/L  --  32 32 34   GLUCOSE mg/dL 76 97 81 87       Estimated Creatinine Clearance: 12.9 mL/min (by C-G formula based on Cr of 4.3).      Results from last 7 days  Lab Units 12/12/17  0611 12/11/17  0536 12/10/17  0610 12/09/17  0639   MAGNESIUM mg/dL  --  2.6* 2.5* 2.4*   PHOSPHORUS mg/dL 5.8* 6.3* 6.0*  --          Results from last 7 days  Lab Units 12/11/17  0536 12/10/17  0610   URIC ACID mg/dL 10.2* 10.4*         Results from last 7 days  Lab Units 12/12/17  0611 12/11/17  0536 12/10/17  0610 12/09/17  0639 12/08/17  0530   WBC 10*3/mm3 4.94 6.14 5.79 7.30 6.86   HEMOGLOBIN g/dL 8.8* 9.1* 8.8* 9.2* 9.7*   PLATELETS 10*3/mm3 97* 120* 110* 126* 128*         Results from last 7 days  Lab Units 12/12/17  0611 12/11/17  0536 12/10/17  0610 12/09/17  1015 12/08/17  0530   INR  2.17* 2.81* 3.96* 5.20* 5.39*         Imaging Results (last 24 hours)     ** No results found for the last 24 hours. **          allopurinol 100 mg Oral BID   calcium acetate 667 mg Oral TID With Meals   digoxin 125 mcg Oral Every Other Day   fluconazole 100 mg Oral Q24H   lactulose 10 g Oral TID   pantoprazole 40 mg Oral Daily   rifaximin 550 mg Oral Q12H          Medication Review:   Current Facility-Administered Medications   Medication Dose Route Frequency Provider Last Rate Last Dose   • acetaminophen (TYLENOL) tablet 650 mg  650 mg Oral Q4H PRN Twin Morales MD   650 mg at 12/11/17 1625   • allopurinol (ZYLOPRIM) tablet 100 mg  100 mg Oral BID Frankie Winchester DO   100 mg at 12/12/17 1021   • calcium acetate (PHOS BINDER)) capsule 667 mg  667 mg Oral TID With Meals Carlos Owen DO   667 mg at 12/12/17 1021   • digoxin  (LANOXIN) tablet 125 mcg  125 mcg Oral Every Other Day Twin Morales MD   125 mcg at 12/12/17 1021   • fluconazole (DIFLUCAN) tablet 100 mg  100 mg Oral Q24H Frankie Winchester DO   100 mg at 12/11/17 1210   • HYDROcodone-acetaminophen (NORCO)  MG per tablet 1 tablet  1 tablet Oral Q6H PRN Twin Morales MD   1 tablet at 12/12/17 1021   • ipratropium-albuterol (DUO-NEB) nebulizer solution 3 mL  3 mL Nebulization Q4H PRN Twin Morales MD       • lactulose (CHRONULAC) 10 GM/15ML solution 10 g  10 g Oral TID Twin Morales MD   10 g at 12/12/17 1021   • ondansetron (ZOFRAN) tablet 4 mg  4 mg Oral Q6H PRN Twin Morales MD   4 mg at 12/11/17 1625    Or   • ondansetron ODT (ZOFRAN-ODT) disintegrating tablet 4 mg  4 mg Oral Q6H PRN Twin Morales MD        Or   • ondansetron (ZOFRAN) injection 4 mg  4 mg Intravenous Q6H PRN Twin Morales MD   4 mg at 12/08/17 2140   • pantoprazole (PROTONIX) EC tablet 40 mg  40 mg Oral Daily Twin Morales MD   40 mg at 12/12/17 1021   • rifaximin (XIFAXAN) tablet 550 mg  550 mg Oral Q12H Twin Morales MD   550 mg at 12/12/17 1021   • sodium chloride 0.9 % flush 1-10 mL  1-10 mL Intravenous PRN Twin Morales MD   10 mL at 12/09/17 1639       Assessment/Plan     1.   Acute renal failure  2.   Chronic kidney disease, stage IV (severe)  3.   Cirrhosis of liver with ascites and esophageal varices  4.   Hyponatremia   5.   Hyperkalemia  6.   Type IV RTA  7.   Permanent atrial fibrillation  8.   Lymphedema of both lower extremities  9.   Dehydration  10.   Thrombocytopenia  11.   Supratherapeutic INR  12.   Anemia of chronic kidney disease requiring erythropoietin therapy.    Plan:  · I will hold off the diuretic as the blood pressures on the low side and reassess on day-to-day basis  · Hold off the diuretics for another day and no iv fluids I will reconsider again in the morning.  · INR is improving.  · Surveillance labs  · Prognosis is very poor, no indication for dialysis at this time.  Although I  have discussed with the daughter about dialysis issues and told her to discuss with the family along with the patient to see if they're willing to go ahead and do dialysis if needed otherwise she will need comfort care with hospice. Today she said she wants to do dialysis if needed.    Details were discussed with the patient there is no family in the room.    Details discussed with Dr. Farias.  Further recommendations will depend on clinical course of the patient during the current hospitalization.      I also discussed the details with the nursing staff.    Rest as ordered.    Julio Franco MD  12/12/17  10:34 AM      EMR Dragon/Transcription disclaimer:   Much of this encounter note is an electronic transcription/translation of spoken language to printed text. The electronic translation of spoken language may permit erroneous, or at times, nonsensical words or phrases to be inadvertently transcribed; Although I have reviewed the note for such errors, some may still exist.

## 2017-12-12 NOTE — PROGRESS NOTES
[facility]    Name:  Hodan Carter   Age:  77 y.o.  Sex:  female  :  1940  MRN:  [id number]   Visit Number:  [id number]  Admission Date:  2017  Date Of Service:  17  Primary Care Physician:  Pieter Farias DO     LOS: 4 days :  Patient Care Team:  Pieter Farias DO as PCP - General (Internal Medicine):    Chief Complaint:    MODESTA    Subjective / Interval History:   Patient feels well this AM, no new complaints.  LE edema is improving.      Review of Systems:   Review of Systems   Constitutional: Negative for chills, fatigue and fever.   HENT: Negative for congestion, ear pain, rhinorrhea, sinus pressure and sore throat.    Eyes: Negative for visual disturbance.   Respiratory: Negative for cough, chest tightness, shortness of breath and wheezing.    Cardiovascular: Negative for chest pain, palpitations and leg swelling.   Gastrointestinal: Negative for abdominal pain, blood in stool, constipation, diarrhea, nausea and vomiting.   Endocrine: Negative for polydipsia and polyuria.   Genitourinary: Negative for dysuria and hematuria.   Musculoskeletal: Negative for back pain.   Skin: Negative for rash.   Neurological: Negative for dizziness, light-headedness, numbness and headaches.   Psychiatric/Behavioral: Negative for dysphoric mood and sleep disturbance. The patient is not nervous/anxious.          Vital Signs:    Temp:  [97.6 °F (36.4 °C)-98.4 °F (36.9 °C)] 97.6 °F (36.4 °C)  Heart Rate:  [60-89] 84  Resp:  [17-18] 17  BP: ()/(50-69) 101/62    Intake and output:    I/O last 3 completed shifts:  In: 975 [I.V.:975]  Out: 500 [Urine:500]       Physical Examination:  Physical Exam   Constitutional: She is oriented to person, place, and time.   Chronically ill appearing elderly female, NAD   HENT:   Head: Normocephalic and atraumatic.   Mouth/Throat: Oropharynx is clear and moist.   Eyes: Conjunctivae are normal. Pupils are equal, round, and  reactive to light.   Neck: Normal range of motion. No JVD present. No thyromegaly present.   Cardiovascular: Normal rate, regular rhythm and normal heart sounds.    No murmur heard.  Pulmonary/Chest: Effort normal and breath sounds normal. No respiratory distress. She has no wheezes.   Abdominal: Soft. Bowel sounds are normal. She exhibits no distension. There is no tenderness. There is no rebound and no guarding.   Musculoskeletal: Normal range of motion. She exhibits no edema or tenderness.   3+ pitting edema bilateral lower extremities   Neurological: She is alert and oriented to person, place, and time.   Skin: Skin is warm and dry.   Psychiatric: She has a normal mood and affect. Her behavior is normal.         Laboratory results:  I have reviewed the patient's laboratory results.      Results from last 7 days  Lab Units 12/12/17  0611 12/11/17  0536 12/10/17  0610 12/09/17  0639   SODIUM mmol/L 129* 129* 127* 126*   POTASSIUM mmol/L 5.8* 5.3* 5.1 5.2*   CHLORIDE mmol/L 102 103 103 102   CO2 mmol/L 18.0* 17.0* 15.0* 12.0*   BUN mg/dL 91* 90* 82* 79*   CREATININE mg/dL 4.30* 4.40* 4.40* 4.40*   CALCIUM mg/dL 8.2* 8.4 8.4 8.3*   BILIRUBIN mg/dL  --  0.7 0.7 0.6   ALK PHOS U/L  --  171* 177* 173*   ALT (SGPT) U/L  --  35 27 33   AST (SGOT) U/L  --  32 32 34   GLUCOSE mg/dL 76 97 81 87       Results from last 7 days  Lab Units 12/12/17  0611 12/11/17  0536 12/10/17  0610   WBC 10*3/mm3 4.94 6.14 5.79   HEMOGLOBIN g/dL 8.8* 9.1* 8.8*   HEMATOCRIT % 27.1* 27.9* 25.9*   PLATELETS 10*3/mm3 97* 120* 110*       Results from last 7 days  Lab Units 12/12/17  0611 12/11/17  0536 12/10/17  0610   INR  2.17* 2.81* 3.96*       Results from last 7 days  Lab Units 12/08/17  0002   TROPONIN I ng/mL 0.047*       Results from last 7 days  Lab Units 12/08/17  0418 12/08/17  0417 12/08/17  0049 12/08/17  0002   BLOODCX   --   --   --  No growth at 4 days   URINECX   --   --    >100,000 CFU/mL Yeast, Not Candida albicans*  --     WOUNDCX    Light growth (2+) Candida albicans*  --   --   --    MRSA SCREEN CX   --  No growth  --   --        Radiology results:  I have reviewed the patient's radiology reports.  Imaging Results (last 24 hours)     ** No results found for the last 24 hours. **              Medication Review:   I have reviewed the patients active and prn medications.     Assessment:  Principal Problem:    Acute renal failure  Active Problems:    Cirrhosis of liver with ascites    Chronic kidney disease, stage IV (severe)    Permanent atrial fibrillation    Lymphedema of both lower extremities    Dehydration    Hyperkalemia        Plan:  Patient's mental status remains stable, Renal function remains stable but K was high, kayexalate was given.  Dialysis is highly likely, I spoke with Dr. Franco regarding the current situation, patient still seems interested in dialysis despite multiple comorbidities.  Palliative option seems to be put to the side frequently. IVF given today for low BP, may continue diuresis per Nephrology.    Pieter Farias DO  12/12/17  6:30 PM

## 2017-12-12 NOTE — THERAPY TREATMENT NOTE
Acute Care - Occupational Therapy Treatment Note  [facility]     Patient Name: Hodan Carter  : 1940  MRN: [id number]  Today's Date: 2017  Onset of Illness/Injury or Date of Surgery Date: 17  Date of Referral to OT: 17  Referring Physician: Dr. Morales      Admit Date: 2017    Visit Dx:     ICD-10-CM ICD-9-CM   1. Dehydration E86.0 276.51   2. MODESTA (acute kidney injury) N17.9 584.9   3. Weakness generalized R53.1 780.79   4. Hyponatremia E87.1 276.1   5. Impaired mobility and ADLs Z74.09 799.89   6. Impaired functional mobility, balance, gait, and endurance Z74.09 V49.89     Patient Active Problem List   Diagnosis   • Ascites   • Cirrhosis of liver with ascites   • Chronic kidney disease, stage IV (severe)   • Permanent atrial fibrillation   • Essential hypertension   • Chronic anemia   • Cellulitis and abscess of trunk   • Shortness of breath   • NSTEMI (non-ST elevated myocardial infarction)   • Chronic kidney disease-mineral and bone disorder   • Candida UTI   • Volume overload   • Acute hepatic encephalopathy   • Lymphedema of both lower extremities   • Vitamin B12 deficiency   • Hepatic encephalopathy   • Anemia of chronic disease   • Cellulitis of left lower extremity   • Decompensated hepatic cirrhosis   • Dehydration   • Acute renal failure   • Hyperkalemia             Adult Rehabilitation Note       17 1310 17 1729 12/10/17 1352    Rehab Assessment/Intervention    Discipline occupational therapist  -HE physical therapy assistant  -RM physical therapy assistant  -CC    Document Type therapy note (daily note)  -HE therapy note (daily note)  -RM therapy note (daily note)  -CC    Subjective Information agree to therapy;complains of;pain  -HE agree to therapy;complains of;pain;weakness  -RM agree to therapy;complains of;weakness;pain  -CC    Patient Effort, Rehab Treatment fair  -HE adequate  -RM good  -CC    Symptoms Noted During/After Treatment fatigue  -HE fatigue  -RM  fatigue  -CC    Precautions/Limitations fall precautions  -HE fall precautions  -RM fall precautions  -CC    Recorded by [HE] Remigio Galicia [RM] Gabriel Israel PTA [CC] [place] Farr PTA    Pain Assessment    Pain Assessment 0-10  -HE 0-10  -RM 0-10  -CC    Pain Score 8  -HE 7  -RM 6  -CC    Post Pain Score 8  -HE 7  -RM 6  -CC    Pain Type Chronic pain  -HE Chronic pain  -RM Chronic pain  -CC    Pain Location Leg  -HE Leg  -RM Leg  -CC    Pain Orientation Left;Lower  -HE Left  -RM Left;Right  -CC    Pain Intervention(s) Repositioned  -HE Repositioned;Ambulation/increased activity  -RM Repositioned;Ambulation/increased activity  -CC    Response to Interventions  tolerated   -RM    tolerated   -CC    Recorded by [HE] Remigio Galicia [RM] Gabriel Israel PTA [CC] [place] Farr PTA    Bed Mobility, Assessment/Treatment    Bed Mobility, Assistive Device  bed rails;head of bed elevated  -RM bed rails  -CC    Bed Mobility, Roll Left, [place]   contact guard assist;verbal cues required  -CC    Bed Mobility, Roll Right, [place]   contact guard assist;verbal cues required  -CC    Bed Mob, Supine to Sit, [place] verbal cues required;contact guard assist  - contact guard assist;verbal cues required  -RM contact guard assist;verbal cues required  -CC    Bed Mob, Sit to Supine, [place] verbal cues required;moderate assist (50% patient effort)  -      Bed Mobility, Safety Issues  decreased use of arms for pushing/pulling;decreased use of legs for bridging/pushing  -RM decreased use of arms for pushing/pulling;decreased use of legs for bridging/pushing  -CC    Bed Mobility, Impairments  strength decreased;impaired balance  -RM strength decreased;impaired balance;pain  -CC    Recorded by [HE] Remigio Gailcia [RM] Gabriel Israel PTA [CC] Jamaica Farr PTA    Transfer Assessment/Treatment    Transfers, Bed-Chair [place]   contact guard assist;verbal cues required  -CC    Transfers,  Bed-Chair-Bed, Assist Device   rolling walker  -CC    Transfers, Sit-Stand [place]  contact guard assist;verbal cues required  -RM contact guard assist;verbal cues required  -CC    Transfers, Stand-Sit [place]  contact guard assist;verbal cues required  -RM contact guard assist;stand by assist;verbal cues required  -CC    Transfers, Sit-Stand-Sit, Assist Device  rolling walker  -RM rolling walker  -CC    Transfer, Safety Issues  balance decreased during turns;step length decreased;weight-shifting ability decreased  -RM balance decreased during turns;weight-shifting ability decreased  -CC    Transfer, Impairments  strength decreased;impaired balance;pain  -RM strength decreased;impaired balance;pain  -CC    Recorded by  [RM] Gabriel Israel PTA [CC] Jamaica Farr PTA    Gait Assessment/Treatment    Gait, [place] Level  contact guard assist  -RM minimum assist (75% patient effort);contact guard assist;verbal cues required  -CC    Gait, Assistive Device  rolling walker  -RM rolling walker  -CC    Gait, Distance (Feet)  10  -RM 5  -CC    Gait, Safety Issues  step length decreased;weight-shifting ability decreased;balance decreased during turns  -RM balance decreased during turns;step length decreased;weight-shifting ability decreased  -CC    Gait, Impairments  strength decreased;impaired balance;pain  -RM strength decreased;impaired balance;pain  -CC    Recorded by  [RM] Gabriel Israel PTA [CC] Jamaica Farr PTA    Therapy Exercises    Bilateral Lower Extremities   AROM:;15 reps;supine;ankle pumps/circles;glut sets;heel slides;hip abduction/adduction;quad sets  -CC    Bilateral Upper Extremity 10 reps;sitting;elbow flexion/extension;shoulder abduction/adduction;shoulder extension/flexion;shoulder ER/IR;shoulder horizontal abd/add  -HE      BUE Resistance manual resistance- minimal  -HE      Recorded by [HE] Remigio Galicia  [CC] [place] Farr PTA    Positioning and Restraints     Pre-Treatment Position in bed  -HE in bed  -RM in bed  -CC    Post Treatment Position bed  -HE chair  -RM chair  -CC    In Chair  reclined;call light within reach;encouraged to call for assist;with family/caregiver;heels elevated  -RM reclined;call light within reach;encouraged to call for assist;with family/caregiver  -CC    Recorded by [HE] Remigio Galicia [] Gabriel Israel PTA [CC] Jamaica Farr PTA      12/09/17 1936          Rehab Assessment/Intervention    Discipline physical therapy assistant  -CC      Document Type therapy note (daily note)  -CC      Subjective Information agree to therapy;complains of;pain  -CC      Patient Effort, Rehab Treatment adequate  -CC      Symptoms Noted During/After Treatment fatigue  -CC      Precautions/Limitations fall precautions  -CC      Recorded by [CC] Jamaica Farr PTA      Pain Assessment    Pain Assessment 0-10  -CC      Pain Score 7  -CC      Post Pain Score 7  -CC      Pain Type Chronic pain  -CC      Pain Location Leg  -CC      Pain Orientation Left;Right  -CC      Pain Intervention(s) Repositioned  -      Response to Interventions tolerated  -CC      Recorded by [CC] Jamaica Farr PTA      Therapy Exercises    Bilateral Lower Extremities AROM:;10 reps;supine;ankle pumps/circles;glut sets;heel slides;hip abduction/adduction;quad sets;SAQ;SLR  -CC      Recorded by [CC] Jamaica Farr PTA      Positioning and Restraints    Pre-Treatment Position in bed  -CC      Post Treatment Position bed  -CC      In Bed supine;call light within reach;encouraged to call for assist;with family/caregiver  -CC      Recorded by [CC] Jamaica Farr PTA        User Key  (r) = Recorded By, (t) = Taken By, (c) = Cosigned By    Initials Name Effective Dates    CC Jamaica Farr PTA 10/26/16 -      Gabriel Israel PTA 10/26/16 -     HE Remigio Galicia 06/26/17 -                 OT Goals       12/12/17 1408 12/08/17 1315       Bed Mobility OT LTG    Bed Mobility  OT LTG, Date Established  12/08/17  -     Bed Mobility OT LTG, Activity Type  supine to sit/sit to supine  -     Bed Mobility OT LTG, [place] Level  contact guard assist  -     Bed Mobility OT LTG, Assist Device  bed rails  -     Bed Mobility OT LTG, Date Goal Reviewed 12/12/17  -      Bed Mobility OT LTG, Outcome  goal ongoing  -     Transfer Training OT LTG    Transfer Training OT LTG, Date Established  12/08/17  -     Transfer Training OT LTG, Time to Achieve  by discharge  -     Transfer Training OT LTG, Activity Type  sit to stand/stand to sit  -     Transfer Training OT LTG, [place] Level  contact guard assist  -     Transfer Training OT LTG, Assist Device  walker, rolling  -     Transfer Training OT LTG, Date Goal Reviewed 12/12/17  -      Transfer Training OT LTG, Outcome  goal ongoing  -     Strength OT LTG    Strength Goal OT LTG, Date Established  12/08/17  -     Strength Goal OT LTG, Time to Achieve  by discharge  -     Strength Goal OT LTG, Functional Goal  Pt will perform 15 reps BUE strengthening ex using theraband for resistance  -     Strength Goal OT LTG, Date Goal Reviewed 12/12/17  -      Strength Goal OT LTG, Outcome goal ongoing  - goal ongoing  -     LB Dressing OT LTG    LB Dressing Goal OT LTG, Date Established  12/08/17  -     LB Dressing Goal OT LTG, Time to Achieve  by discharge  -     LB Dressing Goal OT LTG, [place] Level  minimum assist (75% patient effort)  -     LB Dressing Goal OT LTG, Adaptive Equipment  reacher;sock-aid  -     LB Dressing Goal OT LTG, Date Goal Reviewed 12/12/17  -      LB Dressing Goal OT LTG, Outcome goal ongoing  - goal ongoing  -     Functional Mobility OT LTG    Functional Mobility Goal OT LTG, Date Established  12/08/17  -     Functional Mobility Goal OT LTG, Time to Achieve  by discharge  -     Functional Mobility Goal OT LTG, [place] Level  contact guard  -     Functional  Mobility Goal OT LTG, Assist Device  rolling walker  -     Functional Mobility Goal OT LTG, Distance to Achieve  to the bathroom  -     Functional Mobility Goal OT LTG, Date Goal Reviewed 12/12/17  -      Functional Mobility Goal OT LTG, Outcome goal ongoing  - goal ongoing  -       User Key  (r) = Recorded By, (t) = Taken By, (c) = Cosigned By    Initials Name Provider Type     Rosalia Sepulveda Occupational Therapist     Remigio Galicia Occupational Therapist          Occupational Therapy Education     Title: PT OT SLP Therapies (Active)     Topic: Occupational Therapy (Active)     Point: ADL training (Done)    Description: Instruct learner(s) on proper safety adaptation and remediation techniques during self care or transfers.   Instruct in proper use of assistive devices.    Learning Progress Summary    Learner Readiness Method Response Comment Documented by Status   Patient Acceptance E VU Role of OT/POC  12/08/17 1315 Done               Point: Home exercise program (Active)    Description: Instruct learner(s) on appropriate technique for monitoring, assisting and/or progressing therapeutic exercises/activities.    Learning Progress Summary    Learner Readiness Method Response Comment Documented by Status   Patient Acceptance E,D NR OT instructed pt on UE strengthening program.  12/12/17 1407 Active                      User Key     Initials Effective Dates Name Provider Type Discipline     10/26/16 -  Rosalia Sepulveda Occupational Therapist OT     06/26/17 -  Remigio Galicia Occupational Therapist OT                  OT Recommendation and Plan  Anticipated Discharge Disposition: home with home health  Planned Therapy Interventions: adaptive equipment training, ADL retraining, bed mobility training, strengthening, transfer training  Therapy Frequency: 3-5 times/wk  Plan of Care Review  Plan Of Care Reviewed With: patient  Progress: improving  Outcome Summary/Follow up Plan: Pt completed BUE ex x 10  reps sitting at EOB with mod cues for form; rest prn. SBA supine to EOB. Mod assistance  for BLEs up in bed. C/O LLE pain at 8/10. Notified SN.                                                                                                             Outcome Measures       12/12/17 1310 12/10/17 1352 12/09/17 1936    How much help from another person do you currently need...    Turning from your back to your side while in flat bed without using bedrails?  3  -CC 3  -CC    Moving from lying on back to sitting on the side of a flat bed without bedrails?  3  -CC 3  -CC    Moving to and from a bed to a chair (including a wheelchair)?  3  -CC 3  -CC    Standing up from a chair using your arms (e.g., wheelchair, bedside chair)?  3  -CC 3  -CC    Climbing 3-5 steps with a railing?  2  -CC 2  -CC    To walk in hospital room?  3  -CC 2  -CC    AM-PAC 6 Clicks Score  17  -CC 16  -CC    How much help from another is currently needed...    Putting on and taking off regular lower body clothing? 2  -HE      Bathing (including washing, rinsing, and drying) 2  -HE      Toileting (which includes using toilet bed pan or urinal) 2  -HE      Putting on and taking off regular upper body clothing 3  -HE      Taking care of personal grooming (such as brushing teeth) 3  -HE      Eating meals 4  -HE      Score 16  -HE      Functional Assessment    Outcome Measure Options AM-PAC 6 Clicks Daily Activity (OT)  -HE AM-PAC 6 Clicks Basic Mobility (PT)  -CC AM-PAC 6 Clicks Basic Mobility (PT)  -CC      User Key  (r) = Recorded By, (t) = Taken By, (c) = Cosigned By    Initials Name Provider Type    CC Jamaica Farr PTA Physical Therapy Assistant    HE Remigio Galicia Occupational Therapist           Time Calculation:         Time Calculation- OT       12/12/17 1414          Time Calculation- OT    OT Start Time 1310  -HE      Total Timed Code Minutes- OT 15 minute(s)  -HE      OT Received On 12/12/17  -HE      OT Goal Re-Cert Due Date 12/18/17   -HE        User Key  (r) = Recorded By, (t) = Taken By, (c) = Cosigned By    Initials Name Provider Type    HE Remigio Galicia Occupational Therapist           Therapy Charges for Today     Code Description Service Date Service Provider Modifiers Qty    [id number]  OT THER PROC EA 15 MIN 12/12/2017 Remigio Galicia GO 1               Remigio Galicia  12/12/2017

## 2017-12-12 NOTE — THERAPY TREATMENT NOTE
Acute Care - Physical Therapy Treatment Note  [facility]     Patient Name: Hodan Carter  : 1940  MRN: [id number]  Today's Date: 2017  Onset of Illness/Injury or Date of Surgery Date: 17  Date of Referral to PT: 17  Referring Physician: Dr. Morales    Admit Date: 2017    Visit Dx:    ICD-10-CM ICD-9-CM   1. Dehydration E86.0 276.51   2. MODESTA (acute kidney injury) N17.9 584.9   3. Weakness generalized R53.1 780.79   4. Hyponatremia E87.1 276.1   5. Impaired mobility and ADLs Z74.09 799.89   6. Impaired functional mobility, balance, gait, and endurance Z74.09 V49.89     Patient Active Problem List   Diagnosis   • Ascites   • Cirrhosis of liver with ascites   • Chronic kidney disease, stage IV (severe)   • Permanent atrial fibrillation   • Essential hypertension   • Chronic anemia   • Cellulitis and abscess of trunk   • Shortness of breath   • NSTEMI (non-ST elevated myocardial infarction)   • Chronic kidney disease-mineral and bone disorder   • Candida UTI   • Volume overload   • Acute hepatic encephalopathy   • Lymphedema of both lower extremities   • Vitamin B12 deficiency   • Hepatic encephalopathy   • Anemia of chronic disease   • Cellulitis of left lower extremity   • Decompensated hepatic cirrhosis   • Dehydration   • Acute renal failure   • Hyperkalemia               Adult Rehabilitation Note       17 1409 17 1310 17 1729    Rehab Assessment/Intervention    Discipline physical therapy assistant  -RM occupational therapist  -HE physical therapy assistant  -RM    Document Type therapy note (daily note)  -RM therapy note (daily note)  -HE therapy note (daily note)  -RM    Subjective Information agree to therapy;complains of;fatigue;pain  -RM agree to therapy;complains of;pain  -HE agree to therapy;complains of;pain;weakness  -RM    Patient Effort, Rehab Treatment adequate  -RM fair  -HE adequate  -RM    Symptoms Noted During/After Treatment fatigue  -RM fatigue  -HE  fatigue  -RM    Precautions/Limitations fall precautions  -RM fall precautions  -HE fall precautions  -RM    Recorded by [] Gabriel Israel PTA [HE] Remigio Galicia [RM] Gabriel Israel PTA    Pain Assessment    Pain Assessment 0-10  -RM 0-10  -HE 0-10  -RM    Pain Score 8  -RM 8  -HE 7  -RM    Post Pain Score 8  -RM 8  -HE 7  -RM    Pain Type Acute pain  -RM Chronic pain  -HE Chronic pain  -RM    Pain Location Leg  -RM Leg  -HE Leg  -RM    Pain Orientation Left  -RM Left;Lower  -HE Left  -RM    Pain Intervention(s) Repositioned  -RM Repositioned  -HE Repositioned;Ambulation/increased activity  -RM    Response to Interventions   tolerated   -RM    Recorded by [] Gabriel Israel PTA [HE] Remigio Galicia [RM] Gabriel Israel PTA    Bed Mobility, Assessment/Treatment    Bed Mobility, Assistive Device   bed rails;head of bed elevated  -RM    Bed Mob, Supine to Sit, [place]  verbal cues required;contact guard assist  - contact guard assist;verbal cues required  -RM    Bed Mob, Sit to Supine, [place]  verbal cues required;moderate assist (50% patient effort)  -HE     Bed Mobility, Safety Issues   decreased use of arms for pushing/pulling;decreased use of legs for bridging/pushing  -RM    Bed Mobility, Impairments   strength decreased;impaired balance  -RM    Recorded by  [HE] Remigio Galicia [RM] Gabriel Israel PTA    Transfer Assessment/Treatment    Transfers, Sit-Stand [place]   contact guard assist;verbal cues required  -RM    Transfers, Stand-Sit [place]   contact guard assist;verbal cues required  -RM    Transfers, Sit-Stand-Sit, Assist Device   rolling walker  -RM    Transfer, Safety Issues   balance decreased during turns;step length decreased;weight-shifting ability decreased  -RM    Transfer, Impairments   strength decreased;impaired balance;pain  -RM    Recorded by   [] Gabriel Israel PTA    Gait Assessment/Treatment    Gait, [place] Level   contact guard assist  -RM     Gait, Assistive Device   rolling walker  -RM    Gait, Distance (Feet)   10  -RM    Gait, Safety Issues   step length decreased;weight-shifting ability decreased;balance decreased during turns  -RM    Gait, Impairments   strength decreased;impaired balance;pain  -RM    Recorded by   [RM] Gabriel Israel PTA    Therapy Exercises    Bilateral Lower Extremities AROM:;AAROM:;10 reps;supine;ankle pumps/circles;glut sets;heel slides;quad sets;SAQ;SLR  -RM      Bilateral Upper Extremity  10 reps;sitting;elbow flexion/extension;shoulder abduction/adduction;shoulder extension/flexion;shoulder ER/IR;shoulder horizontal abd/add  -HE     BUE Resistance  manual resistance- minimal  -HE     Recorded by [] Gabriel Israel PTA [HE] Remigio Galicia     Positioning and Restraints    Pre-Treatment Position in bed  -RM in bed  -HE in bed  -RM    Post Treatment Position bed  -RM bed  -HE chair  -RM    In Bed supine;call light within reach;encouraged to call for assist;heels elevated;notified nsg  -RM      In Chair   reclined;call light within reach;encouraged to call for assist;with family/caregiver;heels elevated  -RM    Recorded by [RM] Gabriel Israel PTA [HE] Remigio Galicia [] Gabriel Israel PTA      12/10/17 1352 12/09/17 1936       Rehab Assessment/Intervention    Discipline physical therapy assistant  -CC physical therapy assistant  -CC     Document Type therapy note (daily note)  -CC therapy note (daily note)  -CC     Subjective Information agree to therapy;complains of;weakness;pain  -CC agree to therapy;complains of;pain  -CC     Patient Effort, Rehab Treatment good  -CC adequate  -CC     Symptoms Noted During/After Treatment fatigue  -CC fatigue  -CC     Precautions/Limitations fall precautions  -CC fall precautions  -CC     Recorded by [CC] Jamaica Farr PTA [CC] Jamaica Farr PTA     Pain Assessment    Pain Assessment 0-10  -CC 0-10  -CC     Pain Score 6  -CC 7  -CC     Post Pain Score 6  -CC 7  -CC      Pain Type Chronic pain  -CC Chronic pain  -CC     Pain Location Leg  -CC Leg  -CC     Pain Orientation Left;Right  -CC Left;Right  -CC     Pain Intervention(s) Repositioned;Ambulation/increased activity  -CC Repositioned  -CC     Response to Interventions    tolerated   -CC tolerated  -CC     Recorded by [CC] Jamaica Farr PTA [CC] Jamaica Farr PTA     Bed Mobility, Assessment/Treatment    Bed Mobility, Assistive Device bed rails  -CC      Bed Mobility, Roll Left, [place] contact guard assist;verbal cues required  -CC      Bed Mobility, Roll Right, [place] contact guard assist;verbal cues required  -CC      Bed Mob, Supine to Sit, [place] contact guard assist;verbal cues required  -CC      Bed Mobility, Safety Issues decreased use of arms for pushing/pulling;decreased use of legs for bridging/pushing  -CC      Bed Mobility, Impairments strength decreased;impaired balance;pain  -CC      Recorded by [CC] Jamaica Farr PTA      Transfer Assessment/Treatment    Transfers, Bed-Chair [place] contact guard assist;verbal cues required  -CC      Transfers, Bed-Chair-Bed, Assist Device rolling walker  -CC      Transfers, Sit-Stand [place] contact guard assist;verbal cues required  -CC      Transfers, Stand-Sit [place] contact guard assist;stand by assist;verbal cues required  -CC      Transfers, Sit-Stand-Sit, Assist Device rolling walker  -CC      Transfer, Safety Issues balance decreased during turns;weight-shifting ability decreased  -CC      Transfer, Impairments strength decreased;impaired balance;pain  -CC      Recorded by [CC] Jamaica Farr PTA      Gait Assessment/Treatment    Gait, [place] Level minimum assist (75% patient effort);contact guard assist;verbal cues required  -CC      Gait, Assistive Device rolling walker  -CC      Gait, Distance (Feet) 5  -CC      Gait, Safety Issues balance decreased during turns;step length decreased;weight-shifting ability  decreased  -CC      Gait, Impairments strength decreased;impaired balance;pain  -CC      Recorded by [CC] Jamaica Farr PTA      Therapy Exercises    Bilateral Lower Extremities AROM:;15 reps;supine;ankle pumps/circles;glut sets;heel slides;hip abduction/adduction;quad sets  -CC AROM:;10 reps;supine;ankle pumps/circles;glut sets;heel slides;hip abduction/adduction;quad sets;SAQ;SLR  -CC     Recorded by [CC] Jamaica Farr PTA [CC] Jamaica Farr PTA     Positioning and Restraints    Pre-Treatment Position in bed  -CC in bed  -CC     Post Treatment Position chair  -CC bed  -CC     In Bed  supine;call light within reach;encouraged to call for assist;with family/caregiver  -CC     In Chair reclined;call light within reach;encouraged to call for assist;with family/caregiver  -CC      Recorded by [CC] Jamaica Farr PTA [CC] Jamaica Farr PTA       User Key  (r) = Recorded By, (t) = Taken By, (c) = Cosigned By    Initials Name Effective Dates    CC Jamaica Farr PTA 10/26/16 -     RM Gabriel Israel, ALBINA 10/26/16 -     HE Remigio Galicia 06/26/17 -                 IP PT Goals       12/12/17 1758 12/11/17 1819 12/10/17 1625    Bed Mobility PT LTG    Bed Mobility PT LTG, Outcome  goal met  -RM goal partially met  -CC    Transfer Training PT LTG    Transfer Training PT LTG, Outcome  goal met  -RM goal partially met  -CC    Gait Training PT LTG    Gait Training Goal PT LTG, Outcome  goal ongoing  -RM goal ongoing  -CC    Strength Goal PT LTG    Strength Goal PT LTG, Outcome goal partially met  -RM  goal partially met  -CC      12/09/17 1653 12/08/17 1331       Bed Mobility PT LTG    Bed Mobility PT LTG, Date Established  12/08/17  -LM     Bed Mobility PT LTG, Time to Achieve  2 wks  -LM     Bed Mobility PT LTG, Activity Type  supine to sit/sit to supine  -LM     Bed Mobility PT LTG, [place] Level  contact guard assist  -LM     Bed Mobility PT Goal  LTG, Assist Device  bed rails  -LM     Bed Mobility  PT LTG, Outcome goal ongoing  -CC goal ongoing  -LM     Transfer Training PT LTG    Transfer Training PT LTG, Date Established  12/08/17  -LM     Transfer Training PT LTG, Time to Achieve  2 wks  -LM     Transfer Training PT LTG, Activity Type  sit to stand/stand to sit;bed to chair /chair to bed  -LM     Transfer Training PT LTG, [place] Level  contact guard assist  -LM     Transfer Training PT LTG, Assist Device  walker, rolling  -LM     Transfer Training PT LTG, Outcome goal ongoing  -CC goal ongoing  -LM     Gait Training PT LTG    Gait Training Goal PT LTG, Date Established  12/08/17  -LM     Gait Training Goal PT LTG, Time to Achieve  2 wks  -LM     Gait Training Goal PT LTG, [place] Level  contact guard assist  -LM     Gait Training Goal PT LTG, Assist Device  walker, rolling  -LM     Gait Training Goal PT LTG, Distance to Achieve  50  -LM     Gait Training Goal PT LTG, Outcome goal ongoing  -CC goal ongoing  -LM     Strength Goal PT LTG    Strength Goal PT LTG, Date Established  12/08/17  -LM     Strength Goal PT LTG, Time to Achieve  2 wks  -LM     Strength Goal PT LTG, Measure to Achieve  Patient will perform B LE ther ex x 15 reps to improve functional strength for mobility.  -LM     Strength Goal PT LTG, Outcome goal ongoing  -CC goal ongoing  -LM       User Key  (r) = Recorded By, (t) = Taken By, (c) = Cosigned By    Initials Name Provider Type    LM Serene Khan, PT Physical Therapist    CC Jamaica Farr, PTA Physical Therapy Assistant     Gabriel Israel, PTA Physical Therapy Assistant          Physical Therapy Education     Title: PT OT SLP Therapies (Active)     Topic: Physical Therapy (Done)     Point: Mobility training (Done)    Learning Progress Summary    Learner Readiness Method Response Comment Documented by Status   Patient Acceptance E,D VU,NR  RM 12/11/17 1818 Done    Acceptance E VU,NR Increase mobility daily CC 12/10/17 1624 Done    Acceptance E VU Purpose of PT/POC. LM  12/08/17 1331 Done               Point: Home exercise program (Done)    Learning Progress Summary    Learner Readiness Method Response Comment Documented by Status   Patient Acceptance E,D VU,NR   12/12/17 1758 Done    Acceptance E NR,VU Perform ex daily  12/10/17 1501 Done    Acceptance E VU Purpose of PT/POC.  12/08/17 1331 Done               Point: Precautions (Done)    Learning Progress Summary    Learner Readiness Method Response Comment Documented by Status   Patient Acceptance E VU Purpose of PT/POC.  12/08/17 1331 Done                      User Key     Initials Effective Dates Name Provider Type Discipline     10/26/16 -  Serene Khan, PT Physical Therapist PT     10/26/16 -  Jamaica Farr, PTA Physical Therapy Assistant PT     10/26/16 -  Gabriel Israel, PTA Physical Therapy Assistant PT                    PT Recommendation and Plan  Planned Therapy Interventions: balance training, bed mobility training, gait training, home exercise program, patient/family education, strengthening, transfer training  PT Frequency: daily  Plan of Care Review  Plan Of Care Reviewed With: patient, daughter  Progress: progress toward functional goals is gradual  Outcome Summary/Follow up Plan: Pt declined OOB activities d/t L LE pain. PT was agreeable to performing ther ex in bed. See flowsheet for details.           Outcome Measures       12/12/17 1310 12/10/17 1352 12/09/17 1936    How much help from another person do you currently need...    Turning from your back to your side while in flat bed without using bedrails?  3  -CC 3  -CC    Moving from lying on back to sitting on the side of a flat bed without bedrails?  3  -CC 3  -CC    Moving to and from a bed to a chair (including a wheelchair)?  3  -CC 3  -CC    Standing up from a chair using your arms (e.g., wheelchair, bedside chair)?  3  -CC 3  -CC    Climbing 3-5 steps with a railing?  2  -CC 2  -CC    To walk in hospital room?  3  -CC 2  -CC    AM-PAC  6 Clicks Score  17  -CC 16  -CC    How much help from another is currently needed...    Putting on and taking off regular lower body clothing? 2  -HE      Bathing (including washing, rinsing, and drying) 2  -HE      Toileting (which includes using toilet bed pan or urinal) 2  -HE      Putting on and taking off regular upper body clothing 3  -HE      Taking care of personal grooming (such as brushing teeth) 3  -HE      Eating meals 4  -HE      Score 16  -HE      Functional Assessment    Outcome Measure Options AM-PAC 6 Clicks Daily Activity (OT)  -HE AM-PAC 6 Clicks Basic Mobility (PT)  -CC AM-PAC 6 Clicks Basic Mobility (PT)  -CC      User Key  (r) = Recorded By, (t) = Taken By, (c) = Cosigned By    Initials Name Provider Type    CC Jamaica Farr PTA Physical Therapy Assistant     Remigio Galicia Occupational Therapist           Time Calculation:         PT Charges       12/12/17 1800          Time Calculation    Start Time 1409  -RM      PT Received On 12/12/17  -RM      PT Goal Re-Cert Due Date 12/18/17  -RM      Time Calculation- PT    Total Timed Code Minutes- PT 10 minute(s)  -RM        User Key  (r) = Recorded By, (t) = Taken By, (c) = Cosigned By    Initials Name Provider Type     Gabriel Israel PTA Physical Therapy Assistant          Therapy Charges for Today     Code Description Service Date Service Provider Modifiers Qty    92701032327 HC GAIT TRAINING EA 15 MIN 12/11/2017 Gabriel Israel PTA GP 1    63367235784 HC PT THER PROC EA 15 MIN 12/12/2017 Gabriel Israel PTA GP 1          PT G-Codes  Outcome Measure Options: AM-PAC 6 Clicks Daily Activity (OT)    Gabriel Israel PTA  12/12/2017

## 2017-12-12 NOTE — PLAN OF CARE
Problem: Patient Care Overview (Adult)  Goal: Plan of Care Review  Outcome: Ongoing (interventions implemented as appropriate)    12/12/17 1758   Outcome Evaluation   Outcome Summary/Follow up Plan Pt declined OOB activities d/t L LE pain. PT was agreeable to performing ther ex in bed. See flowsheet for details.    Coping/Psychosocial Response Interventions   Plan Of Care Reviewed With patient;daughter   Patient Care Overview   Progress progress toward functional goals is gradual         Problem: Inpatient Physical Therapy  Goal: Strength Goal LTG- PT  Outcome: Ongoing (interventions implemented as appropriate)    12/08/17 1331 12/12/17 1758   Strength Goal PT LTG   Strength Goal PT LTG, Date Established 12/08/17 --    Strength Goal PT LTG, Time to Achieve 2 wks --    Strength Goal PT LTG, Measure to Achieve Patient will perform B LE ther ex x 15 reps to improve functional strength for mobility. --    Strength Goal PT LTG, Outcome --  goal partially met

## 2017-12-12 NOTE — PLAN OF CARE
Problem: Patient Care Overview (Adult)  Goal: Plan of Care Review  Outcome: Ongoing (interventions implemented as appropriate)    12/12/17 1408   Outcome Evaluation   Outcome Summary/Follow up Plan Pt completed BUE ex x 10 reps sitting at EOB with mod cues for form; rest prn. SBA supine to EOB. Mod assistance for BLEs up in bed. C/O LLE pain at 8/10. Notified SN.    Coping/Psychosocial Response Interventions   Plan Of Care Reviewed With patient   Patient Care Overview   Progress improving         Problem: Inpatient Occupational Therapy  Goal: Bed Mobility Goal LTG- OT  Outcome: Ongoing (interventions implemented as appropriate)    12/08/17 1315 12/12/17 1408   Bed Mobility OT LTG   Bed Mobility OT LTG, Date Established 12/08/17 --    Bed Mobility OT LTG, Activity Type supine to sit/sit to supine --    Bed Mobility OT LTG, [place] Level contact guard assist --    Bed Mobility OT LTG, Assist Device bed rails --    Bed Mobility OT LTG, Date Goal Reviewed --  12/12/17   Bed Mobility OT LTG, Outcome goal ongoing --        Goal: Transfer Training Goal 1 LTG- OT  Outcome: Ongoing (interventions implemented as appropriate)    12/08/17 1315 12/12/17 1408   Transfer Training OT LTG   Transfer Training OT LTG, Date Established 12/08/17 --    Transfer Training OT LTG, Time to Achieve by discharge --    Transfer Training OT LTG, Activity Type sit to stand/stand to sit --    Transfer Training OT LTG, [place] Level contact guard assist --    Transfer Training OT LTG, Assist Device walker, rolling --    Transfer Training OT LTG, Date Goal Reviewed --  12/12/17   Transfer Training OT LTG, Outcome goal ongoing --        Goal: Strength Goal LTG- OT  Outcome: Ongoing (interventions implemented as appropriate)    12/08/17 1315 12/12/17 1408   Strength OT LTG   Strength Goal OT LTG, Date Established 12/08/17 --    Strength Goal OT LTG, Time to Achieve by discharge --    Strength Goal OT LTG, Functional Goal Pt will perform 15  reps BUE strengthening ex using theraband for resistance --    Strength Goal OT LTG, Date Goal Reviewed --  12/12/17   Strength Goal OT LTG, Outcome --  goal ongoing       Goal: LB Dressing Goal LTG- OT  Outcome: Ongoing (interventions implemented as appropriate)    12/08/17 1315 12/12/17 1408   LB Dressing OT LTG   LB Dressing Goal OT LTG, Date Established 12/08/17 --    LB Dressing Goal OT LTG, Time to Achieve by discharge --    LB Dressing Goal OT LTG, [place] Level minimum assist (75% patient effort) --    LB Dressing Goal OT LTG, Adaptive Equipment reacher;sock-aid --    LB Dressing Goal OT LTG, Date Goal Reviewed --  12/12/17   LB Dressing Goal OT LTG, Outcome --  goal ongoing       Goal: Functional Mobility Goal LTG- OT  Outcome: Ongoing (interventions implemented as appropriate)    12/08/17 1315 12/12/17 1408   Functional Mobility OT LTG   Functional Mobility Goal OT LTG, Date Established 12/08/17 --    Functional Mobility Goal OT LTG, Time to Achieve by discharge --    Functional Mobility Goal OT LTG, [place] Level contact guard --    Functional Mobility Goal OT LTG, Assist Device rolling walker --    Functional Mobility Goal OT LTG, Distance to Achieve to the bathroom --    Functional Mobility Goal OT LTG, Date Goal Reviewed --  12/12/17   Functional Mobility Goal OT LTG, Outcome --  goal ongoing

## 2017-12-13 NOTE — PLAN OF CARE
Problem: Patient Care Overview (Adult)  Goal: Plan of Care Review  Outcome: Ongoing (interventions implemented as appropriate)    12/13/17 1154   Outcome Evaluation   Outcome Summary/Follow up Plan Pt seen for PT treatment this date. She requires SBA-CGA sup to sit and CGA sit to stand from EOB. She is able to ambulate 4 feet with RW and CGA but declines further ambulation today d/t fatigue. Patient performs B LE ther ex as indicated on care map. Cont to goals.   Coping/Psychosocial Response Interventions   Plan Of Care Reviewed With patient   Patient Care Overview   Progress improving         Problem: Inpatient Physical Therapy  Goal: Gait Training Goal LTG- PT  Outcome: Ongoing (interventions implemented as appropriate)    12/08/17 1331 12/13/17 1154   Gait Training PT LTG   Gait Training Goal PT LTG, Date Established 12/08/17 --    Gait Training Goal PT LTG, Time to Achieve 2 wks --    Gait Training Goal PT LTG, [place] Level contact guard assist --    Gait Training Goal PT LTG, Assist Device walker, rolling --    Gait Training Goal PT LTG, Distance to Achieve 50 --    Gait Training Goal PT LTG, Outcome --  goal ongoing       Goal: Strength Goal LTG- PT  Outcome: Ongoing (interventions implemented as appropriate)    12/08/17 1331 12/13/17 1154   Strength Goal PT LTG   Strength Goal PT LTG, Date Established 12/08/17 --    Strength Goal PT LTG, Time to Achieve 2 wks --    Strength Goal PT LTG, Measure to Achieve Patient will perform B LE ther ex x 15 reps to improve functional strength for mobility. --    Strength Goal PT LTG, Outcome --  goal partially met

## 2017-12-13 NOTE — THERAPY TREATMENT NOTE
Acute Care - Occupational Therapy Treatment Note  [facility]     Patient Name: Hodan Carter  : 1940  MRN: [id number]  Today's Date: 2017  Onset of Illness/Injury or Date of Surgery Date: 17  Date of Referral to OT: 17  Referring Physician: Dr. Morales      Admit Date: 2017    Visit Dx:     ICD-10-CM ICD-9-CM   1. Dehydration E86.0 276.51   2. MODESTA (acute kidney injury) N17.9 584.9   3. Weakness generalized R53.1 780.79   4. Hyponatremia E87.1 276.1   5. Impaired mobility and ADLs Z74.09 799.89   6. Impaired functional mobility, balance, gait, and endurance Z74.09 V49.89     Patient Active Problem List   Diagnosis   • Ascites   • Cirrhosis of liver with ascites   • Chronic kidney disease, stage IV (severe)   • Permanent atrial fibrillation   • Essential hypertension   • Chronic anemia   • Cellulitis and abscess of trunk   • Shortness of breath   • NSTEMI (non-ST elevated myocardial infarction)   • Chronic kidney disease-mineral and bone disorder   • Candida UTI   • Volume overload   • Acute hepatic encephalopathy   • Lymphedema of both lower extremities   • Vitamin B12 deficiency   • Hepatic encephalopathy   • Anemia of chronic disease   • Cellulitis of left lower extremity   • Decompensated hepatic cirrhosis   • Dehydration   • Acute renal failure   • Hyperkalemia             Adult Rehabilitation Note       17 1135 17 1132 17 1409    Rehab Assessment/Intervention    Discipline occupational therapist  -SD physical therapist  -LM physical therapy assistant  -RM    Document Type therapy note (daily note)  -SD therapy note (daily note)  -LM therapy note (daily note)  -RM    Subjective Information agree to therapy;complains of;fatigue  -SD agree to therapy;complains of;fatigue  -LM agree to therapy;complains of;fatigue;pain  -RM    Patient Effort, Rehab Treatment adequate  -SD adequate  -LM adequate  -RM    Symptoms Noted During/After Treatment fatigue  -SD fatigue  -LM  fatigue  -RM    Precautions/Limitations fall precautions  -SD fall precautions  -LM fall precautions  -RM    Recorded by [SD] China Logan, OT [LM] Serene Khan, PT [RM] Gabriel Israel, PTA    Pain Assessment    Pain Assessment No/denies pain  -SD No/denies pain  -LM 0-10  -RM    Pain Score   8  -RM    Post Pain Score   8  -RM    Pain Type   Acute pain  -RM    Pain Location   Leg  -RM    Pain Orientation   Left  -RM    Pain Intervention(s)   Repositioned  -RM    Recorded by [SD] China Logan, OT [LM] Serene Khan, PT [RM] Gabriel Israel, PTA    Bed Mobility, Assessment/Treatment    Bed Mobility, Assistive Device bed rails;head of bed elevated  -SD bed rails;head of bed elevated  -LM     Bed Mob, Supine to Sit, [place] contact guard assist  -SD contact guard assist  -LM     Bed Mobility, Safety Issues  decreased use of legs for bridging/pushing  -LM     Bed Mobility, Impairments  strength decreased;impaired balance;pain  -LM     Recorded by [SD] China Logan, OT [LM] Serene Khan, PT     Transfer Assessment/Treatment    Transfers, Bed-Chair [place] contact guard assist  -SD contact guard assist  -LM     Transfers, Bed-Chair-Bed, Assist Device rolling walker  -SD rolling walker  -LM     Transfers, Sit-Stand [place] contact guard assist  -SD contact guard assist  -LM     Transfers, Stand-Sit [place] contact guard assist  -SD contact guard assist  -LM     Transfers, Sit-Stand-Sit, Assist Device rolling walker  -SD rolling walker  -LM     Transfer, Safety Issues  balance decreased during turns;sequencing ability decreased;step length decreased  -LM     Transfer, Impairments  strength decreased;impaired balance;pain  -LM     Recorded by [SD] China Logan, OT [LM] Serene Khan, PT     Gait Assessment/Treatment    Gait, [place] Level  contact guard assist  -LM     Gait, Assistive Device  rolling walker  -LM     Gait, Distance (Feet)  4  -LM     Gait, Gait Pattern Analysis  swing-to  gait  -LM     Gait, Safety Issues  balance decreased during turns;sequencing ability decreased;step length decreased  -LM     Gait, Impairments  strength decreased;impaired balance;pain  -LM     Gait, Comment  Declines further ambulation this date.  -LM     Recorded by  [LM] Serene Khan, PT     Functional Mobility    Functional Mobility- Ind. Level contact guard assist  -SD      Functional Mobility- Device rolling walker  -SD      Functional Mobility-Distance (Feet) 4  -SD      Recorded by [SD] China Logan, LEE      Therapy Exercises    Bilateral Lower Extremities  AROM:;10 reps;sitting;ankle pumps/circles;heel raises;LAQ;other reps   seated marches  -LM AROM:;AAROM:;10 reps;supine;ankle pumps/circles;glut sets;heel slides;quad sets;SAQ;SLR  -RM    Bilateral Upper Extremity AROM:;15 reps;shoulder abduction/adduction;shoulder extension/flexion;elbow flexion/extension  -SD      BUE Resistance theraband  -SD      Recorded by [SD] China Logan OT [LM] Serene Khan, PT [RM] Gabriel Israel, [facility]    Positioning and Restraints    Pre-Treatment Position in bed  -SD in bed  -LM in bed  -RM    Post Treatment Position chair  -SD chair  -LM bed  -RM    In Bed   supine;call light within reach;encouraged to call for assist;heels elevated;notified nsg  -RM    In Chair reclined;call light within reach;encouraged to call for assist  -SD sitting;call light within reach;encouraged to call for assist;with OT  -LM     Recorded by [SD] China Logan OT [LM] Serene Khan, PT [RM] Gabriel Israel, [facility]      12/12/17 1310 12/11/17 1729       Rehab Assessment/Intervention    Discipline occupational therapist  -HE physical therapy assistant  -     Document Type therapy note (daily note)  -HE therapy note (daily note)  -     Subjective Information agree to therapy;complains of;pain  -HE agree to therapy;complains of;pain;weakness  -RM     Patient Effort, Rehab Treatment fair  -HE adequate  -RM     Symptoms Noted During/After  Treatment fatigue  -HE fatigue  -RM     Precautions/Limitations fall precautions  -HE fall precautions  -RM     Recorded by [HE] Remigio Galicia [RM] Gabriel Israel PTA     Pain Assessment    Pain Assessment 0-10  - 0-10  -RM     Pain Score 8  -HE 7  -RM     Post Pain Score 8  -HE 7  -RM     Pain Type Chronic pain  - Chronic pain  -RM     Pain Location Leg  - Leg  -RM     Pain Orientation Left;Lower  - Left  -     Pain Intervention(s) Repositioned  -HE Repositioned;Ambulation/increased activity  -RM     Response to Interventions  tolerated   -RM     Recorded by [HE] Remigio Galicia [RM] Gabriel Israel PTA     Bed Mobility, Assessment/Treatment    Bed Mobility, Assistive Device  bed rails;head of bed elevated  -RM     Bed Mob, Supine to Sit, [place] verbal cues required;contact guard assist  - contact guard assist;verbal cues required  -RM     Bed Mob, Sit to Supine, [place] verbal cues required;moderate assist (50% patient effort)  -HE      Bed Mobility, Safety Issues  decreased use of arms for pushing/pulling;decreased use of legs for bridging/pushing  -RM     Bed Mobility, Impairments  strength decreased;impaired balance  -RM     Recorded by [HE] Remigio Galicia [RM] Gabriel Israel PTA     Transfer Assessment/Treatment    Transfers, Sit-Stand [place]  contact guard assist;verbal cues required  -RM     Transfers, Stand-Sit [place]  contact guard assist;verbal cues required  -RM     Transfers, Sit-Stand-Sit, Assist Device  rolling walker  -RM     Transfer, Safety Issues  balance decreased during turns;step length decreased;weight-shifting ability decreased  -RM     Transfer, Impairments  strength decreased;impaired balance;pain  -RM     Recorded by  [RM] Gabriel Israel PTA     Gait Assessment/Treatment    Gait, [place] Level  contact guard assist  -RM     Gait, Assistive Device  rolling walker  -RM     Gait, Distance (Feet)  10  -RM     Gait, Safety Issues  step length  decreased;weight-shifting ability decreased;balance decreased during turns  -RM     Gait, Impairments  strength decreased;impaired balance;pain  -RM     Recorded by  [RM] Gabriel Israel PTA     Therapy Exercises    Bilateral Upper Extremity 10 reps;sitting;elbow flexion/extension;shoulder abduction/adduction;shoulder extension/flexion;shoulder ER/IR;shoulder horizontal abd/add  -HE      BUE Resistance manual resistance- minimal  -HE      Recorded by [HE] Remigio Galicia      Positioning and Restraints    Pre-Treatment Position in bed  -HE in bed  -RM     Post Treatment Position bed  -HE chair  -RM     In Chair  reclined;call light within reach;encouraged to call for assist;with family/caregiver;heels elevated  -RM     Recorded by [HE] Remigio Galicia [RM] Gabriel Israel PTA       User Key  (r) = Recorded By, (t) = Taken By, (c) = Cosigned By    Initials Name Effective Dates    LM Serene Khan, PT 10/26/16 -     RM Gabriel Israel PTA 10/26/16 -     HE Remigio Galicia 06/26/17 -     SD China Logan, OT 06/30/17 -                 OT Goals       12/13/17 1454 12/12/17 1408 12/08/17 1315    Bed Mobility OT LTG    Bed Mobility OT LTG, Date Established   12/08/17  -    Bed Mobility OT LTG, Activity Type   supine to sit/sit to supine  -    Bed Mobility OT LTG, [place] Level   contact guard assist  -AH    Bed Mobility OT LTG, Assist Device   bed rails  -    Bed Mobility OT LTG, Date Goal Reviewed 12/13/17  -SD 12/12/17  -     Bed Mobility OT LTG, Outcome goal met  -SD  goal ongoing  -    Transfer Training OT LTG    Transfer Training OT LTG, Date Established   12/08/17  -    Transfer Training OT LTG, Time to Achieve   by discharge  -    Transfer Training OT LTG, Activity Type   sit to stand/stand to sit  -    Transfer Training OT LTG, [place] Level   contact guard assist  -AH    Transfer Training OT LTG, Assist Device   walker, rolling  -AH    Transfer Training OT LTG, Date Goal Reviewed  12/13/17  -SD 12/12/17  -HE     Transfer Training OT LTG, Outcome goal met  -SD  goal ongoing  -    Strength OT LTG    Strength Goal OT LTG, Date Established   12/08/17  -    Strength Goal OT LTG, Time to Achieve   by discharge  -    Strength Goal OT LTG, Functional Goal   Pt will perform 15 reps BUE strengthening ex using theraband for resistance  -    Strength Goal OT LTG, Date Goal Reviewed 12/13/17  -SD 12/12/17  -     Strength Goal OT LTG, Outcome goal met  -SD goal ongoing  -HE goal ongoing  -    LB Dressing OT LTG    LB Dressing Goal OT LTG, Date Established   12/08/17  -    LB Dressing Goal OT LTG, Time to Achieve   by discharge  -    LB Dressing Goal OT LTG, [place] Level   minimum assist (75% patient effort)  -    LB Dressing Goal OT LTG, Adaptive Equipment   reacher;sock-aid  -    LB Dressing Goal OT LTG, Date Goal Reviewed 12/13/17  -SD 12/12/17  -HE     LB Dressing Goal OT LTG, Outcome goal ongoing  -SD goal ongoing  -HE goal ongoing  -    Functional Mobility OT LTG    Functional Mobility Goal OT LTG, Date Established   12/08/17  -    Functional Mobility Goal OT LTG, Time to Achieve   by discharge  -    Functional Mobility Goal OT LTG, [place] Level   contact guard  -    Functional Mobility Goal OT LTG, Assist Device   rolling walker  -    Functional Mobility Goal OT LTG, Distance to Achieve   to the bathroom  -    Functional Mobility Goal OT LTG, Date Goal Reviewed 12/13/17  -SD 12/12/17  -     Functional Mobility Goal OT LTG, Outcome goal ongoing  -SD goal ongoing  - goal ongoing  -      User Key  (r) = Recorded By, (t) = Taken By, (c) = Cosigned By    Initials Name Provider Type    EUGENIE Sepulveda Occupational Therapist    JENELLE Galicia Occupational Therapist    JOSE Logan OT Occupational Therapist          Occupational Therapy Education     Title: PT OT SLP Therapies (Done)     Topic: Occupational Therapy (Done)     Point: ADL training  (Done)    Description: Instruct learner(s) on proper safety adaptation and remediation techniques during self care or transfers.   Instruct in proper use of assistive devices.    Learning Progress Summary    Learner Readiness Method Response Comment Documented by Status   Patient Acceptance E VU  NO 12/13/17 0133 Done    Acceptance E VU Role of OT/POC  12/08/17 1315 Done               Point: Home exercise program (Done)    Description: Instruct learner(s) on appropriate technique for monitoring, assisting and/or progressing therapeutic exercises/activities.    Learning Progress Summary    Learner Readiness Method Response Comment Documented by Status   Patient Acceptance E VU Benefits of UB ther ex and various exercises using theraband for resistance. SD 12/13/17 1457 Done    Acceptance E VU  NO 12/13/17 0133 Done    Acceptance E,D NR OT instructed pt on UE strengthening program.  12/12/17 1407 Active               Point: Precautions (Done)    Description: Instruct learner(s) on prescribed precautions during self-care and functional transfers.    Learning Progress Summary    Learner Readiness Method Response Comment Documented by Status   Patient Acceptance E VU  NO 12/13/17 0133 Done                      User Key     Initials Effective Dates Name Provider Type Discipline     10/26/16 -  Rosalia Sepulveda Occupational Therapist OT    NO 12/13/16 -  Jada Enriquez, RN Registered Nurse Nurse     06/26/17 -  Remigio Galicia Occupational Therapist OT    SD 06/30/17 -  China Logan OT Occupational Therapist OT                  OT Recommendation and Plan  Anticipated Discharge Disposition: home with home health  Planned Therapy Interventions: adaptive equipment training, ADL retraining, bed mobility training, strengthening, transfer training  Therapy Frequency: 3-5 times/wk  Plan of Care Review  Plan Of Care Reviewed With: patient  Progress: improving  Outcome Summary/Follow up Plan: OT tx completed. Patient  performed bed mob with CGA; functional transfer EOB to chair using RW with CGA, completing 4' functional mobility, followed by UB ther ex using theraband. Did not attempt LBD task using AE d/t weeping of LEs. Cont OT POC.         Outcome Measures       12/13/17 1135 12/13/17 1132 12/12/17 1310    How much help from another person do you currently need...    Turning from your back to your side while in flat bed without using bedrails?  3  -LM     Moving from lying on back to sitting on the side of a flat bed without bedrails?  3  -LM     Moving to and from a bed to a chair (including a wheelchair)?  3  -LM     Standing up from a chair using your arms (e.g., wheelchair, bedside chair)?  3  -LM     Climbing 3-5 steps with a railing?  2  -LM     To walk in hospital room?  3  -LM     AM-PAC 6 Clicks Score  17  -LM     How much help from another is currently needed...    Putting on and taking off regular lower body clothing? 2  -SD  2  -HE    Bathing (including washing, rinsing, and drying) 2  -SD  2  -HE    Toileting (which includes using toilet bed pan or urinal) 2  -SD  2  -HE    Putting on and taking off regular upper body clothing 3  -SD  3  -HE    Taking care of personal grooming (such as brushing teeth) 3  -SD  3  -HE    Eating meals 4  -SD  4  -HE    Score 16  -SD  16  -HE    Functional Assessment    Outcome Measure Options AM-PAC 6 Clicks Daily Activity (OT)  -SD AM-PAC 6 Clicks Basic Mobility (PT)  -LM AM-PAC 6 Clicks Daily Activity (OT)  -HE      User Key  (r) = Recorded By, (t) = Taken By, (c) = Cosigned By    Initials Name Provider Type    LM Serene Khan, PT Physical Therapist    JENELLE Galicia Occupational Therapist    JOSE Logan OT Occupational Therapist           Time Calculation:         Time Calculation- OT       12/13/17 1459          Time Calculation- OT    OT Start Time 1135  -SD      Total Timed Code Minutes- OT 18 minute(s)  -SD      OT Received On 12/13/17  -SD      OT Goal Re-Cert Due  Date 12/18/17  -SD        User Key  (r) = Recorded By, (t) = Taken By, (c) = Cosigned By    Initials Name Provider Type    JOSE Logan OT Occupational Therapist           Therapy Charges for Today     Code Description Service Date Service Provider Modifiers Qty    [id number]  OT THERAPEUTIC ACT EA 15 MIN 12/13/2017 China Logan OT GO 1               China Logan OT  12/13/2017

## 2017-12-13 NOTE — PROGRESS NOTES
"Nephrology Progress Note.    LOS: 5 days    Patient Care Team:  Pieter Farias DO as PCP - General (Internal Medicine)    Chief Complaint:    Chief Complaint   Patient presents with   • Fatigue       Subjective     Interval History:     Patient Complaints: none    Patient seen and examined this morning.  Events from last night noted.  Patient denies having any fevers chills.  No nausea or vomiting no abdominal pain.  Denies any chest pain shortness of breath cough or sputum production.  There is no significant edema in the upper body but both lower extremities still has some swelling. Patient also denies having new onset weakness of numbness of either extremity, She did say that she wants to do dialysis if needed, this was a state when she had yesterday.  During the day she did not want it done.  This morning patient's daughter is in the room details were discussed with her.  She did say that she will discuss with the rest of the family and try to come up with a decision.      History taken from: patient      Review of Systems:    The pertinent  ROS was done and it is noted above, rest  was negative.    Objective     Vital Signs  BP (!) 99/38 (BP Location: Left arm, Patient Position: Lying)  Pulse 68  Temp 97.6 °F (36.4 °C) (Oral)   Resp 20  Ht 170.2 cm (67\")  Wt 100 kg (221 lb)  SpO2 100%  BMI 34.61 kg/m2           Intake/Output Summary (Last 24 hours) at 12/13/17 0811  Last data filed at 12/13/17 0526   Gross per 24 hour   Intake              400 ml   Output              300 ml   Net              100 ml       Physical Exam:    General Appearance: alert, oriented x 3, no acute distress,   HEENT: pupils round and reactive to light, oral mucosa dry, extra occular movements intact.  Neck: supple, no JVD, trachea midline  Lungs: Clear to Auscultation, unlabored breathing effort  Heart: IRRR, normal S1 and S2, no S3, no rub  Abdomen: soft, non-tender, no palpable bladder, present bowel sounds to " auscultation  Extremities: 1-2+ bilateral lower extremity edema, No cyanosis or clubbing.   Neuro: normal speech and mental status, grossly non focal.     Results Review:      Results from last 7 days  Lab Units 12/13/17  0556 12/12/17  0611 12/11/17  0536 12/10/17  0610 12/09/17  0639   SODIUM mmol/L 127* 129* 129* 127* 126*   POTASSIUM mmol/L 5.8* 5.8* 5.3* 5.1 5.2*   CHLORIDE mmol/L 101 102 103 103 102   CO2 mmol/L 18.0* 18.0* 17.0* 15.0* 12.0*   BUN mg/dL 95* 91* 90* 82* 79*   CREATININE mg/dL 4.10* 4.30* 4.40* 4.40* 4.40*   CALCIUM mg/dL 7.9* 8.2* 8.4 8.4 8.3*   BILIRUBIN mg/dL  --   --  0.7 0.7 0.6   ALK PHOS U/L  --   --  171* 177* 173*   ALT (SGPT) U/L  --   --  35 27 33   AST (SGOT) U/L  --   --  32 32 34   GLUCOSE mg/dL 92 76 97 81 87       Estimated Creatinine Clearance: 13.5 mL/min (by C-G formula based on Cr of 4.1).      Results from last 7 days  Lab Units 12/13/17  0556 12/12/17  0611 12/11/17  0536 12/10/17  0610 12/09/17  0639   MAGNESIUM mg/dL  --   --  2.6* 2.5* 2.4*   PHOSPHORUS mg/dL 5.5* 5.8* 6.3* 6.0*  --          Results from last 7 days  Lab Units 12/11/17  0536 12/10/17  0610   URIC ACID mg/dL 10.2* 10.4*         Results from last 7 days  Lab Units 12/13/17  0556 12/12/17  0611 12/11/17  0536 12/10/17  0610 12/09/17  0639   WBC 10*3/mm3 5.39 4.94 6.14 5.79 7.30   HEMOGLOBIN g/dL 8.5* 8.8* 9.1* 8.8* 9.2*   PLATELETS 10*3/mm3 113* 97* 120* 110* 126*         Results from last 7 days  Lab Units 12/13/17  0556 12/12/17  0611 12/11/17  0536 12/10/17  0610 12/09/17  1015   INR  1.76* 2.17* 2.81* 3.96* 5.20*         Imaging Results (last 24 hours)     ** No results found for the last 24 hours. **          allopurinol 100 mg Oral BID   calcium acetate 667 mg Oral TID With Meals   digoxin 125 mcg Oral Every Other Day   fluconazole 100 mg Oral Q24H   lactulose 10 g Oral TID   pantoprazole 40 mg Oral Daily   rifaximin 550 mg Oral Q12H   sodium polystyrene 15 g Oral Once   sodium polystyrene 30 g Oral  Daily          Medication Review:   Current Facility-Administered Medications   Medication Dose Route Frequency Provider Last Rate Last Dose   • acetaminophen (TYLENOL) tablet 650 mg  650 mg Oral Q4H PRN Twin Morales MD   650 mg at 12/11/17 1625   • allopurinol (ZYLOPRIM) tablet 100 mg  100 mg Oral BID Frankie Winchester DO   100 mg at 12/12/17 2119   • calcium acetate (PHOS BINDER)) capsule 667 mg  667 mg Oral TID With Meals Carlos Owne DO   667 mg at 12/12/17 1741   • digoxin (LANOXIN) tablet 125 mcg  125 mcg Oral Every Other Day Twin Morales MD   125 mcg at 12/12/17 1021   • fluconazole (DIFLUCAN) tablet 100 mg  100 mg Oral Q24H Frankie Winchester DO   100 mg at 12/12/17 1741   • HYDROcodone-acetaminophen (NORCO)  MG per tablet 1 tablet  1 tablet Oral Q6H PRN Twin Morales MD   1 tablet at 12/12/17 1021   • ipratropium-albuterol (DUO-NEB) nebulizer solution 3 mL  3 mL Nebulization Q4H PRN Twin Morales MD       • lactulose (CHRONULAC) 10 GM/15ML solution 10 g  10 g Oral TID Twin Morales MD   10 g at 12/12/17 2119   • ondansetron (ZOFRAN) tablet 4 mg  4 mg Oral Q6H PRN Twin Morales MD   4 mg at 12/11/17 1625    Or   • ondansetron ODT (ZOFRAN-ODT) disintegrating tablet 4 mg  4 mg Oral Q6H PRN Twin Morales MD        Or   • ondansetron (ZOFRAN) injection 4 mg  4 mg Intravenous Q6H PRN Twin Morales MD   4 mg at 12/08/17 2140   • pantoprazole (PROTONIX) EC tablet 40 mg  40 mg Oral Daily Twin Morales MD   40 mg at 12/12/17 1021   • rifaximin (XIFAXAN) tablet 550 mg  550 mg Oral Q12H Twin Morales MD   550 mg at 12/12/17 2119   • sodium chloride 0.9 % flush 1-10 mL  1-10 mL Intravenous PRN Twin Morales MD   10 mL at 12/09/17 1639   • sodium polystyrene (KAYEXALATE) 15 GM/60ML suspension 15 g  15 g Oral Once Pieter Farias DO       • sodium polystyrene (KAYEXALATE) 15 GM/60ML suspension 30 g  30 g Oral Daily Julio Franco MD   30 g at 12/12/17 1231       Assessment/Plan     1.   Acute renal failure  2.    Chronic kidney disease, stage IV (severe)  3.   Cirrhosis of liver with ascites and esophageal varices  4.   Hyponatremia   5.   Hyperkalemia  6.   Type IV RTA  7.   Permanent atrial fibrillation  8.   Lymphedema of both lower extremities  9.   Dehydration  10.   Thrombocytopenia  11.   Supratherapeutic INR  12.   Anemia of chronic kidney disease requiring erythropoietin therapy.    Plan:  · I will Go ahead and give 1 dose of the diuretic today.  Her blood pressures on the low side and reassess on day-to-day basis.  · Her potassium is persistently high for the last 2 days and she is not responding to Kayexalate.  I repeat dose is given this morning.  I'll go ahead and repeat to a renal panel in the evening.  · INR is improving, it is subtherapeutic at this point  · Surveillance labs  · Prognosis is very poor, the indication for dialysis at this point is persistent hyperkalemia.  Although I have discussed with the daughter about dialysis issues and told her to discuss with the family along with the patient to see if they're willing to go ahead and do dialysis if needed otherwise she will need comfort care with hospice. Today she said she wants to do discuss further with the family for making the decision about dialysis if needed.    Details were discussed with the patient there is a daughter in the room.    Details discussed with Dr. Farias.  Further recommendations will depend on clinical course of the patient during the current hospitalization.      I also discussed the details with the nursing staff.    Rest as ordered.    Julio Franco MD  12/13/17  8:11 AM      EMR Dragon/Transcription disclaimer:   Much of this encounter note is an electronic transcription/translation of spoken language to printed text. The electronic translation of spoken language may permit erroneous, or at times, nonsensical words or phrases to be inadvertently transcribed; Although I have reviewed the note for such errors, some may still  exist.

## 2017-12-13 NOTE — THERAPY TREATMENT NOTE
Acute Care - Physical Therapy Treatment Note  [facility]     Patient Name: Hodan Carter  : 1940  MRN: [id number]  Today's Date: 2017  Onset of Illness/Injury or Date of Surgery Date: 17  Date of Referral to PT: 17  Referring Physician: Dr. Morales    Admit Date: 2017    Visit Dx:    ICD-10-CM ICD-9-CM   1. Dehydration E86.0 276.51   2. MODESTA (acute kidney injury) N17.9 584.9   3. Weakness generalized R53.1 780.79   4. Hyponatremia E87.1 276.1   5. Impaired mobility and ADLs Z74.09 799.89   6. Impaired functional mobility, balance, gait, and endurance Z74.09 V49.89     Patient Active Problem List   Diagnosis   • Ascites   • Cirrhosis of liver with ascites   • Chronic kidney disease, stage IV (severe)   • Permanent atrial fibrillation   • Essential hypertension   • Chronic anemia   • Cellulitis and abscess of trunk   • Shortness of breath   • NSTEMI (non-ST elevated myocardial infarction)   • Chronic kidney disease-mineral and bone disorder   • Candida UTI   • Volume overload   • Acute hepatic encephalopathy   • Lymphedema of both lower extremities   • Vitamin B12 deficiency   • Hepatic encephalopathy   • Anemia of chronic disease   • Cellulitis of left lower extremity   • Decompensated hepatic cirrhosis   • Dehydration   • Acute renal failure   • Hyperkalemia               Adult Rehabilitation Note       17 1132 17 1409 17 1310    Rehab Assessment/Intervention    Discipline physical therapist  -LM physical therapy assistant  -RM occupational therapist  -HE    Document Type therapy note (daily note)  -LM therapy note (daily note)  -RM therapy note (daily note)  -HE    Subjective Information agree to therapy;complains of;fatigue  -LM agree to therapy;complains of;fatigue;pain  -RM agree to therapy;complains of;pain  -HE    Patient Effort, Rehab Treatment adequate  -LM adequate  -RM fair  -HE    Symptoms Noted During/After Treatment fatigue  -LM fatigue  -RM fatigue  -HE     Precautions/Limitations fall precautions  -LM fall precautions  -RM fall precautions  -HE    Recorded by [LM] Serene Khan, PT [RM] Gabriel Israel, PTA [HE] Remigio Galicia    Pain Assessment    Pain Assessment No/denies pain  -LM 0-10  -RM 0-10  -HE    Pain Score  8  -RM 8  -HE    Post Pain Score  8  -RM 8  -HE    Pain Type  Acute pain  -RM Chronic pain  -HE    Pain Location  Leg  -RM Leg  -HE    Pain Orientation  Left  -RM Left;Lower  -HE    Pain Intervention(s)  Repositioned  -RM Repositioned  -HE    Recorded by [LM] Serene Khan, PT [RM] Gabriel Israel, PTA [HE] Remigio Galicia    Bed Mobility, Assessment/Treatment    Bed Mobility, Assistive Device bed rails;head of bed elevated  -LM      Bed Mob, Supine to Sit, [place] contact guard assist  -LM  verbal cues required;contact guard assist  -HE    Bed Mob, Sit to Supine, [place]   verbal cues required;moderate assist (50% patient effort)  -HE    Bed Mobility, Safety Issues decreased use of legs for bridging/pushing  -LM      Bed Mobility, Impairments strength decreased;impaired balance;pain  -LM      Recorded by [LM] Serene Khan, PT  [HE] Remigio Galicia    Transfer Assessment/Treatment    Transfers, Bed-Chair [place] contact guard assist  -LM      Transfers, Bed-Chair-Bed, Assist Device rolling walker  -LM      Transfers, Sit-Stand [place] contact guard assist  -LM      Transfers, Stand-Sit [place] contact guard assist  -LM      Transfers, Sit-Stand-Sit, Assist Device rolling walker  -LM      Transfer, Safety Issues balance decreased during turns;sequencing ability decreased;step length decreased  -LM      Transfer, Impairments strength decreased;impaired balance;pain  -LM      Recorded by [LM] Serene Khan, PT      Gait Assessment/Treatment    Gait, [place] Level contact guard assist  -LM      Gait, Assistive Device rolling walker  -LM      Gait, Distance (Feet) 4  -LM      Gait, Gait Pattern Analysis swing-to gait  -LM      Gait,  Safety Issues balance decreased during turns;sequencing ability decreased;step length decreased  -LM      Gait, Impairments strength decreased;impaired balance;pain  -LM      Gait, Comment Declines further ambulation this date.  -LM      Recorded by [LM] Serene Khan PT      Therapy Exercises    Bilateral Lower Extremities AROM:;10 reps;sitting;ankle pumps/circles;heel raises;LAQ;other reps   seated marches  -LM AROM:;AAROM:;10 reps;supine;ankle pumps/circles;glut sets;heel slides;quad sets;SAQ;SLR  -RM     Bilateral Upper Extremity   10 reps;sitting;elbow flexion/extension;shoulder abduction/adduction;shoulder extension/flexion;shoulder ER/IR;shoulder horizontal abd/add  -HE    BUE Resistance   manual resistance- minimal  -HE    Recorded by [LM] Serene Khan, PT [RM] Gabriel Israel PTA [HE] Remigio Galicia    Positioning and Restraints    Pre-Treatment Position in bed  -LM in bed  -RM in bed  -HE    Post Treatment Position chair  -LM bed  -RM bed  -HE    In Bed  supine;call light within reach;encouraged to call for assist;heels elevated;notified nsg  -RM     In Chair sitting;call light within reach;encouraged to call for assist;with OT  -LM      Recorded by [LM] Serene Khan PT [RM] Gabriel Israel PTA [HE] Remigio Galicia      12/11/17 1729 12/10/17 1352       Rehab Assessment/Intervention    Discipline physical therapy assistant  -RM physical therapy assistant  -CC     Document Type therapy note (daily note)  -RM therapy note (daily note)  -CC     Subjective Information agree to therapy;complains of;pain;weakness  - agree to therapy;complains of;weakness;pain  -CC     Patient Effort, Rehab Treatment adequate  -RM good  -CC     Symptoms Noted During/After Treatment fatigue  -RM fatigue  -CC     Precautions/Limitations fall precautions  -RM fall precautions  -CC     Recorded by [RM] Gabriel Israel PTA [CC] Jamaica Farr PTA     Pain Assessment    Pain Assessment 0-10  -RM 0-10  -CC     Pain Score 7   -RM 6  -CC     Post Pain Score 7  -RM 6  -CC     Pain Type Chronic pain  -RM Chronic pain  -CC     Pain Location Leg  -RM Leg  -CC     Pain Orientation Left  -RM Left;Right  -CC     Pain Intervention(s) Repositioned;Ambulation/increased activity  -RM Repositioned;Ambulation/increased activity  -CC     Response to Interventions tolerated   -RM    tolerated   -CC     Recorded by [RM] Gabriel Israel PTA [CC] Jamaica Farr PTA     Bed Mobility, Assessment/Treatment    Bed Mobility, Assistive Device bed rails;head of bed elevated  -RM bed rails  -CC     Bed Mobility, Roll Left, [place]  contact guard assist;verbal cues required  -CC     Bed Mobility, Roll Right, [place]  contact guard assist;verbal cues required  -CC     Bed Mob, Supine to Sit, [place] contact guard assist;verbal cues required  -RM contact guard assist;verbal cues required  -CC     Bed Mobility, Safety Issues decreased use of arms for pushing/pulling;decreased use of legs for bridging/pushing  -RM decreased use of arms for pushing/pulling;decreased use of legs for bridging/pushing  -CC     Bed Mobility, Impairments strength decreased;impaired balance  -RM strength decreased;impaired balance;pain  -CC     Recorded by [RM] Gabriel Israel, PTA [CC] Jamaica Farr, PTA     Transfer Assessment/Treatment    Transfers, Bed-Chair [place]  contact guard assist;verbal cues required  -CC     Transfers, Bed-Chair-Bed, Assist Device  rolling walker  -CC     Transfers, Sit-Stand [place] contact guard assist;verbal cues required  -RM contact guard assist;verbal cues required  -CC     Transfers, Stand-Sit [place] contact guard assist;verbal cues required  -RM contact guard assist;stand by assist;verbal cues required  -CC     Transfers, Sit-Stand-Sit, Assist Device rolling walker  -RM rolling walker  -CC     Transfer, Safety Issues balance decreased during turns;step length decreased;weight-shifting ability decreased  -RM balance  decreased during turns;weight-shifting ability decreased  -CC     Transfer, Impairments strength decreased;impaired balance;pain  -RM strength decreased;impaired balance;pain  -CC     Recorded by [] Gabriel Israel PTA [CC] Jamaica Farr PTA     Gait Assessment/Treatment    Gait, [place] Level contact guard assist  -RM minimum assist (75% patient effort);contact guard assist;verbal cues required  -CC     Gait, Assistive Device rolling walker  -RM rolling walker  -CC     Gait, Distance (Feet) 10  -RM 5  -CC     Gait, Safety Issues step length decreased;weight-shifting ability decreased;balance decreased during turns  -RM balance decreased during turns;step length decreased;weight-shifting ability decreased  -CC     Gait, Impairments strength decreased;impaired balance;pain  -RM strength decreased;impaired balance;pain  -CC     Recorded by [] Gabriel Israel PTA [CC] Jamaica Farr PTA     Therapy Exercises    Bilateral Lower Extremities  AROM:;15 reps;supine;ankle pumps/circles;glut sets;heel slides;hip abduction/adduction;quad sets  -CC     Recorded by  [CC] Jamaica Farr PTA     Positioning and Restraints    Pre-Treatment Position in bed  -RM in bed  -CC     Post Treatment Position chair  -RM chair  -CC     In Chair reclined;call light within reach;encouraged to call for assist;with family/caregiver;heels elevated  -RM reclined;call light within reach;encouraged to call for assist;with family/caregiver  -CC     Recorded by [] Gabriel Israel PTA [CC] Jamaica Farr PTA       User Key  (r) = Recorded By, (t) = Taken By, (c) = Cosigned By    Initials Name Effective Dates    LM Serene Khan, PT 10/26/16 -     CC Jamaica Farr PTA 10/26/16 -     RM Gabriel Israel, ALBINA 10/26/16 -     JENELLE Galicia 06/26/17 -                 IP PT Goals       12/13/17 1154 12/12/17 1758 12/11/17 1819    Bed Mobility PT LTG    Bed Mobility PT LTG, Outcome   goal met  -RM    Transfer Training PT LTG     Transfer Training PT LTG, Outcome   goal met  -RM    Gait Training PT LTG    Gait Training Goal PT LTG, Outcome goal ongoing  -LM  goal ongoing  -RM    Strength Goal PT LTG    Strength Goal PT LTG, Outcome goal partially met  -LM goal partially met  -RM       12/10/17 1625 12/09/17 1653 12/08/17 1331    Bed Mobility PT LTG    Bed Mobility PT LTG, Date Established   12/08/17  -LM    Bed Mobility PT LTG, Time to Achieve   2 wks  -LM    Bed Mobility PT LTG, Activity Type   supine to sit/sit to supine  -LM    Bed Mobility PT LTG, [place] Level   contact guard assist  -LM    Bed Mobility PT Goal  LTG, Assist Device   bed rails  -LM    Bed Mobility PT LTG, Outcome goal partially met  -CC goal ongoing  -CC goal ongoing  -LM    Transfer Training PT LTG    Transfer Training PT LTG, Date Established   12/08/17  -LM    Transfer Training PT LTG, Time to Achieve   2 wks  -LM    Transfer Training PT LTG, Activity Type   sit to stand/stand to sit;bed to chair /chair to bed  -LM    Transfer Training PT LTG, [place] Level   contact guard assist  -LM    Transfer Training PT LTG, Assist Device   walker, rolling  -LM    Transfer Training PT LTG, Outcome goal partially met  -CC goal ongoing  -CC goal ongoing  -LM    Gait Training PT LTG    Gait Training Goal PT LTG, Date Established   12/08/17  -LM    Gait Training Goal PT LTG, Time to Achieve   2 wks  -LM    Gait Training Goal PT LTG, [place] Level   contact guard assist  -LM    Gait Training Goal PT LTG, Assist Device   walker, rolling  -LM    Gait Training Goal PT LTG, Distance to Achieve   50  -LM    Gait Training Goal PT LTG, Outcome goal ongoing  -CC goal ongoing  -CC goal ongoing  -LM    Strength Goal PT LTG    Strength Goal PT LTG, Date Established   12/08/17  -LM    Strength Goal PT LTG, Time to Achieve   2 wks  -LM    Strength Goal PT LTG, Measure to Achieve   Patient will perform B LE ther ex x 15 reps to improve functional strength for mobility.  -LM     Strength Goal PT LTG, Outcome goal partially met  -CC goal ongoing  -CC goal ongoing  -LM      User Key  (r) = Recorded By, (t) = Taken By, (c) = Cosigned By    Initials Name Provider Type    LM Serene Khan, PT Physical Therapist    CC [place] Farr, PTA Physical Therapy Assistant    RM Gabriel Israel, ALBINA Physical Therapy Assistant          Physical Therapy Education     Title: PT OT SLP Therapies (Done)     Topic: Physical Therapy (Done)     Point: Mobility training (Done)    Learning Progress Summary    Learner Readiness Method Response Comment Documented by Status   Patient Acceptance E VU Ther ex for HEP; proper use of RW for safe tranfers/ambulation. LM 12/13/17 1153 Done    Acceptance E VU  NO 12/13/17 0133 Done    Acceptance E,D VU,NR  RM 12/11/17 1818 Done    Acceptance E VU,NR Increase mobility daily CC 12/10/17 1624 Done    Acceptance E VU Purpose of PT/POC. LM 12/08/17 1331 Done               Point: Home exercise program (Done)    Learning Progress Summary    Learner Readiness Method Response Comment Documented by Status   Patient Acceptance E VU Ther ex for HEP; proper use of RW for safe tranfers/ambulation. LM 12/13/17 1153 Done    Acceptance E VU  NO 12/13/17 0133 Done    Acceptance E,D VU,NR  RM 12/12/17 1758 Done    Acceptance E NR,VU Perform ex daily CC 12/10/17 1501 Done    Acceptance E VU Purpose of PT/POC. LM 12/08/17 1331 Done               Point: Precautions (Done)    Learning Progress Summary    Learner Readiness Method Response Comment Documented by Status   Patient Acceptance E VU Ther ex for HEP; proper use of RW for safe tranfers/ambulation. LM 12/13/17 1153 Done    Acceptance E VU  NO 12/13/17 0133 Done    Acceptance E VU Purpose of PT/POC. LM 12/08/17 1331 Done                      User Key     Initials Effective Dates Name Provider Type Discipline     10/26/16 -  Serene Khan, PT Physical Therapist PT    CC 10/26/16 -  Jamaica Farr, ALBINA Physical Therapy Assistant PT    RM  10/26/16 -  Gabriel Israel PTA Physical Therapy Assistant PT    NO 12/13/16 -  Jada Enriquez, RN Registered Nurse Nurse                    PT Recommendation and Plan  Planned Therapy Interventions: balance training, bed mobility training, gait training, home exercise program, patient/family education, strengthening, transfer training  PT Frequency: daily  Plan of Care Review  Plan Of Care Reviewed With: patient  Progress: improving  Outcome Summary/Follow up Plan: Pt seen for PT treatment this date.  She requires SBA-CGA sup to sit and CGA sit to stand from EOB.  She is able to ambulate 4 feet with RW and CGA but declines further ambulation today d/t fatigue.  Patient performs B LE ther ex as indicated on care map.  Cont to goals.          Outcome Measures       12/13/17 1132 12/12/17 1310 12/10/17 1352    How much help from another person do you currently need...    Turning from your back to your side while in flat bed without using bedrails? 3  -LM  3  -CC    Moving from lying on back to sitting on the side of a flat bed without bedrails? 3  -LM  3  -CC    Moving to and from a bed to a chair (including a wheelchair)? 3  -LM  3  -CC    Standing up from a chair using your arms (e.g., wheelchair, bedside chair)? 3  -LM  3  -CC    Climbing 3-5 steps with a railing? 2  -LM  2  -CC    To walk in hospital room? 3  -LM  3  -CC    AM-PAC 6 Clicks Score 17  -LM  17  -CC    How much help from another is currently needed...    Putting on and taking off regular lower body clothing?  2  -HE     Bathing (including washing, rinsing, and drying)  2  -HE     Toileting (which includes using toilet bed pan or urinal)  2  -HE     Putting on and taking off regular upper body clothing  3  -HE     Taking care of personal grooming (such as brushing teeth)  3  -HE     Eating meals  4  -HE     Score  16  -HE     Functional Assessment    Outcome Measure Options AM-PAC 6 Clicks Basic Mobility (PT)  -LM AM-PAC 6 Clicks Daily Activity (OT)   -HE AM-PAC 6 Clicks Basic Mobility (PT)  -CC      User Key  (r) = Recorded By, (t) = Taken By, (c) = Cosigned By    Initials Name Provider Type    LM Serene Khan, PT Physical Therapist    CC [place] Farr, PTA Physical Therapy Assistant    JENELLE Galicia Occupational Therapist           Time Calculation:         PT Charges       12/13/17 1156          Time Calculation    Start Time 1132  -LM      PT Received On 12/13/17  -LM      Time Calculation- PT    Total Timed Code Minutes- PT 16 minute(s)  -LM        User Key  (r) = Recorded By, (t) = Taken By, (c) = Cosigned By    Initials Name Provider Type    LM Serene Khan, PT Physical Therapist          Therapy Charges for Today     Code Description Service Date Service Provider Modifiers Qty    07460944077 HC PT THER PROC EA 15 MIN 12/13/2017 Serene Khan, PT GP 1          PT G-Codes  Outcome Measure Options: AM-PAC 6 Clicks Basic Mobility (PT)    Serene Khan PT  12/13/2017

## 2017-12-13 NOTE — PROGRESS NOTES
[facility]    [facility]    Name:  Hodan Carter   Age:  77 y.o.  Sex:  female  :  1940  MRN:  [id number]   Visit Number:  [id number]  Admission Date:  2017  Date Of Service:  17  Primary Care Physician:  Pieter Farias DO     LOS: 5 days :  Patient Care Team:  Pieter Farias DO as PCP - General (Internal Medicine):    Chief Complaint:    MODESTA    Subjective / Interval History:   Patient feels well this AM, no new complaints.  LE edema is stable.      Review of Systems:   Review of Systems   Constitutional: Negative for chills, fatigue and fever.   HENT: Negative for congestion, ear pain, rhinorrhea, sinus pressure and sore throat.    Eyes: Negative for visual disturbance.   Respiratory: Negative for cough, chest tightness, shortness of breath and wheezing.    Cardiovascular: Positive for leg swelling. Negative for chest pain and palpitations.   Gastrointestinal: Negative for abdominal pain, blood in stool, constipation, diarrhea, nausea and vomiting.   Endocrine: Negative for polydipsia and polyuria.   Genitourinary: Negative for dysuria and hematuria.   Musculoskeletal: Negative for back pain.   Skin: Negative for rash.   Neurological: Negative for dizziness, light-headedness, numbness and headaches.   Psychiatric/Behavioral: Negative for dysphoric mood and sleep disturbance. The patient is not nervous/anxious.          Vital Signs:    Temp:  [97 °F (36.1 °C)-98.4 °F (36.9 °C)] 97.6 °F (36.4 °C)  Heart Rate:  [66-86] 66  Resp:  [17-20] 18  BP: ()/(38-62) 90/55    Intake and output:    I/O last 3 completed shifts:  In: 1375 [I.V.:1375]  Out: 300 [Urine:300]       Physical Examination:  Physical Exam   Constitutional: She is oriented to person, place, and time.   Chronically ill appearing elderly female, NAD   HENT:   Head: Normocephalic and atraumatic.   Mouth/Throat: Oropharynx is clear and moist.   Eyes: Conjunctivae are normal. Pupils are equal,  round, and reactive to light.   Neck: Normal range of motion. No JVD present. No thyromegaly present.   Cardiovascular: Normal rate, regular rhythm and normal heart sounds.    No murmur heard.  Pulmonary/Chest: Effort normal and breath sounds normal. No respiratory distress. She has no wheezes.   Abdominal: Soft. Bowel sounds are normal. She exhibits no distension. There is no tenderness. There is no rebound and no guarding.   Musculoskeletal: Normal range of motion. She exhibits no edema or tenderness.   3+ pitting edema bilateral lower extremities   Neurological: She is alert and oriented to person, place, and time.   Skin: Skin is warm and dry.   Psychiatric: She has a normal mood and affect. Her behavior is normal.         Laboratory results:  I have reviewed the patient's laboratory results.      Results from last 7 days  Lab Units 12/13/17 0556 12/12/17  0611 12/11/17 0536 12/10/17  0610 12/09/17  0639   SODIUM mmol/L 127* 129* 129* 127* 126*   POTASSIUM mmol/L 5.8* 5.8* 5.3* 5.1 5.2*   CHLORIDE mmol/L 101 102 103 103 102   CO2 mmol/L 18.0* 18.0* 17.0* 15.0* 12.0*   BUN mg/dL 95* 91* 90* 82* 79*   CREATININE mg/dL 4.10* 4.30* 4.40* 4.40* 4.40*   CALCIUM mg/dL 7.9* 8.2* 8.4 8.4 8.3*   BILIRUBIN mg/dL  --   --  0.7 0.7 0.6   ALK PHOS U/L  --   --  171* 177* 173*   ALT (SGPT) U/L  --   --  35 27 33   AST (SGOT) U/L  --   --  32 32 34   GLUCOSE mg/dL 92 76 97 81 87       Results from last 7 days  Lab Units 12/13/17  0556 12/12/17  0611 12/11/17  0536   WBC 10*3/mm3 5.39 4.94 6.14   HEMOGLOBIN g/dL 8.5* 8.8* 9.1*   HEMATOCRIT % 26.8* 27.1* 27.9*   PLATELETS 10*3/mm3 113* 97* 120*       Results from last 7 days  Lab Units 12/13/17  0556 12/12/17  0611 12/11/17  0536   INR  1.76* 2.17* 2.81*       Results from last 7 days  Lab Units 12/08/17  0002   TROPONIN I ng/mL 0.047*       Results from last 7 days  Lab Units 12/08/17  0418 12/08/17  0417 12/08/17  0049 12/08/17  0002   BLOODCX   --   --   --  No growth at 5  days   URINECX   --   --    >100,000 CFU/mL Yeast, Not Candida albicans*  --    WOUNDCX    Light growth (2+) Candida albicans*  --   --   --    MRSA SCREEN CX   --  No growth  --   --        Radiology results:  I have reviewed the patient's radiology reports.  Imaging Results (last 24 hours)     ** No results found for the last 24 hours. **              Medication Review:   I have reviewed the patients active and prn medications.     Assessment:  Principal Problem:    Acute renal failure  Active Problems:    Cirrhosis of liver with ascites    Chronic kidney disease, stage IV (severe)    Permanent atrial fibrillation    Lymphedema of both lower extremities    Dehydration    Hyperkalemia    Plan:  Patient's mental status remains stable.    Renal function remains stable but K was again high, additional kayexalate was given.  Dialysis remains highly likely, I spoke with Dr. Franco regarding the current situation, patient still seems interested in dialysis despite multiple comorbidities.  Diuresis will be continued today.    Family to discuss and consider palliative vs initiating dialysis later this evening.       Pieter Farias DO  12/13/17  11:41 AM

## 2017-12-13 NOTE — PLAN OF CARE
Problem: Patient Care Overview (Adult)  Goal: Plan of Care Review  Outcome: Ongoing (interventions implemented as appropriate)    12/13/17 1454   Outcome Evaluation   Outcome Summary/Follow up Plan OT tx completed. Patient performed bed mob with CGA; functional transfer EOB to chair using RW with CGA, completing 4' functional mobility, followed by UB ther ex using theraband. Did not attempt LBD task using AE d/t weeping of LEs. Cont OT POC.    Coping/Psychosocial Response Interventions   Plan Of Care Reviewed With patient   Patient Care Overview   Progress improving         Problem: Inpatient Occupational Therapy  Goal: Bed Mobility Goal LTG- OT  Outcome: Outcome(s) achieved Date Met:  12/13/17 12/08/17 1315 12/13/17 1454   Bed Mobility OT LTG   Bed Mobility OT LTG, Date Established 12/08/17 --    Bed Mobility OT LTG, Activity Type supine to sit/sit to supine --    Bed Mobility OT LTG, [place] Level contact guard assist --    Bed Mobility OT LTG, Assist Device bed rails --    Bed Mobility OT LTG, Date Goal Reviewed --  12/13/17   Bed Mobility OT LTG, Outcome --  goal met       Goal: Transfer Training Goal 1 LTG- OT  Outcome: Outcome(s) achieved Date Met:  12/13/17 12/08/17 1315 12/13/17 1454   Transfer Training OT LTG   Transfer Training OT LTG, Date Established 12/08/17 --    Transfer Training OT LTG, Time to Achieve by discharge --    Transfer Training OT LTG, Activity Type sit to stand/stand to sit --    Transfer Training OT LTG, [place] Level contact guard assist --    Transfer Training OT LTG, Assist Device walker, rolling --    Transfer Training OT LTG, Date Goal Reviewed --  12/13/17   Transfer Training OT LTG, Outcome --  goal met       Goal: Strength Goal LTG- OT  Outcome: Outcome(s) achieved Date Met:  12/13/17 12/08/17 1315 12/13/17 1454   Strength OT LTG   Strength Goal OT LTG, Date Established 12/08/17 --    Strength Goal OT LTG, Time to Achieve by discharge --    Strength Goal OT  LTG, Functional Goal Pt will perform 15 reps BUE strengthening ex using theraband for resistance --    Strength Goal OT LTG, Date Goal Reviewed --  12/13/17   Strength Goal OT LTG, Outcome --  goal met       Goal: LB Dressing Goal LTG- OT  Outcome: Ongoing (interventions implemented as appropriate)    12/13/17 1454   LB Dressing OT LTG   LB Dressing Goal OT LTG, Date Goal Reviewed 12/13/17   LB Dressing Goal OT LTG, Outcome goal ongoing       Goal: Functional Mobility Goal LTG- OT  Outcome: Ongoing (interventions implemented as appropriate)    12/13/17 1454   Functional Mobility OT LTG   Functional Mobility Goal OT LTG, Date Goal Reviewed 12/13/17   Functional Mobility Goal OT LTG, Outcome goal ongoing

## 2017-12-13 NOTE — PROGRESS NOTES
Continued Stay Note  JULIO Cortez     Patient Name: Hodan Carter  MRN: [id number]  Today's Date: 12/13/2017    Admit Date: 12/7/2017          Discharge Plan       12/13/17 3664    Case Management/Social Work Plan    Plan Home with palliative services    Patient/Family In Agreement With Plan yes    Additional Comments Pt and family are still discussing options to do dialysis.  Pt is being followed by palliatve nurse.  CM will continue to follow and assist with discharge as needed.              Discharge Codes     None        Expected Discharge Date and Time     Expected Discharge Date Expected Discharge Time    Dec 15, 2017             Genoveva Terrell

## 2017-12-14 PROBLEM — N18.6 ESRF (END STAGE RENAL FAILURE) (HCC): Status: ACTIVE | Noted: 2017-01-01

## 2017-12-14 NOTE — PLAN OF CARE
Problem: Patient Care Overview (Adult)  Goal: Plan of Care Review  Outcome: Ongoing (interventions implemented as appropriate)    12/14/17 1624   Outcome Evaluation   Outcome Summary/Follow up Plan Pt continues to c/o weakness but is agreeable to work with PT. She requires CGA sit to stand and to ambulate 4 feet with bedside chair with RW. She is able to perform B LE ther ex as indicated on care map. Cont to goals.   Coping/Psychosocial Response Interventions   Plan Of Care Reviewed With patient   Patient Care Overview   Progress improving         Problem: Inpatient Physical Therapy  Goal: Gait Training Goal LTG- PT  Outcome: Ongoing (interventions implemented as appropriate)    12/08/17 1331 12/14/17 1624   Gait Training PT LTG   Gait Training Goal PT LTG, Date Established 12/08/17 --    Gait Training Goal PT LTG, Time to Achieve 2 wks --    Gait Training Goal PT LTG, [place] Level contact guard assist --    Gait Training Goal PT LTG, Assist Device walker, rolling --    Gait Training Goal PT LTG, Distance to Achieve 50 --    Gait Training Goal PT LTG, Outcome --  goal ongoing       Goal: Strength Goal LTG- PT  Outcome: Ongoing (interventions implemented as appropriate)    12/08/17 1331 12/14/17 1624   Strength Goal PT LTG   Strength Goal PT LTG, Date Established 12/08/17 --    Strength Goal PT LTG, Time to Achieve 2 wks --    Strength Goal PT LTG, Measure to Achieve Patient will perform B LE ther ex x 15 reps to improve functional strength for mobility. --    Strength Goal PT LTG, Outcome --  goal ongoing

## 2017-12-14 NOTE — PROGRESS NOTES
"Nephrology Progress Note.    LOS: 6 days    Patient Care Team:  Pieter Farias DO as PCP - General (Internal Medicine)    Chief Complaint:    Chief Complaint   Patient presents with   • Fatigue       Subjective     Interval History:     Patient Complaints: none    Patient seen and examined this morning.  Events from last night noted.  Patient denies having any fevers chills.  No nausea or vomiting no abdominal pain.  Denies any chest pain shortness of breath cough or sputum production.  There is no significant edema in the upper body but both lower extremities still has some swelling. Patient also denies having new onset weakness of numbness of either extremity, She did say that she wants to do dialysis if needed. Today she is complaining about having poor taste has not been eating well.  Details about dialysis discussed with her today and she seems to be okay with it.  History taken from: patient      Review of Systems:    The pertinent  ROS was done and it is noted above, rest  was negative.    Objective     Vital Signs  /55 (BP Location: Right arm, Patient Position: Lying)  Pulse 78  Temp 98.4 °F (36.9 °C) (Oral)   Resp 18  Ht 170.2 cm (67\")  Wt 100 kg (221 lb 1.6 oz)  SpO2 98%  BMI 34.63 kg/m2           Intake/Output Summary (Last 24 hours) at 12/14/17 0758  Last data filed at 12/13/17 1905   Gross per 24 hour   Intake              840 ml   Output                0 ml   Net              840 ml       Physical Exam:    General Appearance: alert, oriented x 3, no acute distress,   HEENT: pupils round and reactive to light, oral mucosa dry, extra occular movements intact.  Neck: supple, no JVD, trachea midline  Lungs: Clear to Auscultation, unlabored breathing effort  Heart: IRRR, normal S1 and S2, no S3, no rub  Abdomen: soft, non-tender, no palpable bladder, present bowel sounds to auscultation  Extremities: 1-2+ bilateral lower extremity edema, No cyanosis or clubbing.   Neuro: normal speech and " mental status, grossly non focal.     Results Review:      Results from last 7 days  Lab Units 12/13/17  1424 12/13/17  0556 12/12/17  0611 12/11/17  0536 12/10/17  0610 12/09/17  0639   SODIUM mmol/L 128* 127* 129* 129* 127* 126*   POTASSIUM mmol/L 5.6* 5.8* 5.8* 5.3* 5.1 5.2*   CHLORIDE mmol/L 101 101 102 103 103 102   CO2 mmol/L 15.0* 18.0* 18.0* 17.0* 15.0* 12.0*   BUN mg/dL 95* 95* 91* 90* 82* 79*   CREATININE mg/dL 4.00* 4.10* 4.30* 4.40* 4.40* 4.40*   CALCIUM mg/dL 7.8* 7.9* 8.2* 8.4 8.4 8.3*   BILIRUBIN mg/dL  --   --   --  0.7 0.7 0.6   ALK PHOS U/L  --   --   --  171* 177* 173*   ALT (SGPT) U/L  --   --   --  35 27 33   AST (SGOT) U/L  --   --   --  32 32 34   GLUCOSE mg/dL 96 92 76 97 81 87       Estimated Creatinine Clearance: 13.9 mL/min (by C-G formula based on Cr of 4).      Results from last 7 days  Lab Units 12/13/17  1424 12/13/17  0556 12/12/17  0611 12/11/17  0536 12/10/17  0610 12/09/17  0639   MAGNESIUM mg/dL  --   --   --  2.6* 2.5* 2.4*   PHOSPHORUS mg/dL 5.4* 5.5* 5.8* 6.3* 6.0*  --          Results from last 7 days  Lab Units 12/11/17  0536 12/10/17  0610   URIC ACID mg/dL 10.2* 10.4*         Results from last 7 days  Lab Units 12/14/17  0649 12/13/17  0556 12/12/17  0611 12/11/17  0536 12/10/17  0610   WBC 10*3/mm3 6.41 5.39 4.94 6.14 5.79   HEMOGLOBIN g/dL 8.3* 8.5* 8.8* 9.1* 8.8*   PLATELETS 10*3/mm3 131 113* 97* 120* 110*         Results from last 7 days  Lab Units 12/14/17  0649 12/13/17  0556 12/12/17  0611 12/11/17  0536 12/10/17  0610   INR  1.46* 1.76* 2.17* 2.81* 3.96*         Imaging Results (last 24 hours)     ** No results found for the last 24 hours. **          allopurinol 100 mg Oral BID   calcium acetate 667 mg Oral TID With Meals   digoxin 125 mcg Oral Every Other Day   fluconazole 100 mg Oral Q24H   lactulose 10 g Oral TID   pantoprazole 40 mg Oral Daily   rifaximin 550 mg Oral Q12H   sodium polystyrene 30 g Oral Daily          Medication Review:   Current  Facility-Administered Medications   Medication Dose Route Frequency Provider Last Rate Last Dose   • acetaminophen (TYLENOL) tablet 650 mg  650 mg Oral Q4H PRN Twin Morales MD   650 mg at 12/11/17 1625   • allopurinol (ZYLOPRIM) tablet 100 mg  100 mg Oral BID Franike Winchester DO   100 mg at 12/13/17 2129   • calcium acetate (PHOS BINDER)) capsule 667 mg  667 mg Oral TID With Meals Carlos Owen, DO   667 mg at 12/13/17 1743   • digoxin (LANOXIN) tablet 125 mcg  125 mcg Oral Every Other Day Twin Morales MD   125 mcg at 12/12/17 1021   • fluconazole (DIFLUCAN) tablet 100 mg  100 mg Oral Q24H Frankie Winchester DO   100 mg at 12/13/17 1455   • HYDROcodone-acetaminophen (NORCO)  MG per tablet 1 tablet  1 tablet Oral Q6H PRN Twin Morales MD   1 tablet at 12/13/17 1254   • ipratropium-albuterol (DUO-NEB) nebulizer solution 3 mL  3 mL Nebulization Q4H PRN Twin Morales MD       • lactulose (CHRONULAC) 10 GM/15ML solution 10 g  10 g Oral TID Twin Morales MD   10 g at 12/13/17 2129   • ondansetron (ZOFRAN) tablet 4 mg  4 mg Oral Q6H PRN Twin Morales MD   4 mg at 12/11/17 1625    Or   • ondansetron ODT (ZOFRAN-ODT) disintegrating tablet 4 mg  4 mg Oral Q6H PRN Twin Morales MD        Or   • ondansetron (ZOFRAN) injection 4 mg  4 mg Intravenous Q6H PRN Twin Morales MD   4 mg at 12/08/17 2140   • pantoprazole (PROTONIX) EC tablet 40 mg  40 mg Oral Daily Twin Morales MD   40 mg at 12/13/17 0917   • rifaximin (XIFAXAN) tablet 550 mg  550 mg Oral Q12H Twin Morales MD   550 mg at 12/13/17 2129   • sodium chloride 0.9 % flush 1-10 mL  1-10 mL Intravenous PRN Twin Morales MD   10 mL at 12/09/17 1639   • sodium polystyrene (KAYEXALATE) 15 GM/60ML suspension 30 g  30 g Oral Daily Julio Franco MD   30 g at 12/13/17 0918       Assessment/Plan     1.   Acute renal failure  2.   Chronic kidney disease, stage IV (severe)  3.   Cirrhosis of liver with ascites and esophageal varices  4.   Hyponatremia   5.   Hyperkalemia  6.    Type IV RTA  7.   Permanent atrial fibrillation  8.   Lymphedema of both lower extremities  9.   Dehydration  10.   Thrombocytopenia  11.   Supratherapeutic INR  12.   Anemia of chronic kidney disease requiring erythropoietin therapy.    Plan:  · I will Go ahead and give 1 dose of the diuretic today as ordered.  Her blood pressures on the low side and reassess on day-to-day basis.  · Her potassium is persistently high for the last 4 days and she is not responding to Kayexalate.  I think she has significant complement of chronic kidney disease from diabetes and may have some overlying hepatorenal syndrome.  At this point I feel like that she will benefit from dialysis, details discussed with the patient and arrangements are being made with Dr. Skinenr for tunneled dialysis catheter placement and dialysis tomorrow.  · INR is improving, it is subtherapeutic at this point, continue to hold off for giving any Coumadin at this point until after the catheter placement.  · Surveillance labs  · Prognosis is very poor, the indication for dialysis at this point is persistent hyperkalemia.  Today she is complaining of other uremic symptoms including poor appetite and and bad taste unable to eat much.    Details were discussed with the patient there is no family members in the room.    Details discussed with Dr. Farias.  Further recommendations will depend on clinical course of the patient during the current hospitalization.      I also discussed the details with the nursing staff.    Rest as ordered.    Julio Franco MD  12/14/17  7:58 AM      EMR Dragon/Transcription disclaimer:   Much of this encounter note is an electronic transcription/translation of spoken language to printed text. The electronic translation of spoken language may permit erroneous, or at times, nonsensical words or phrases to be inadvertently transcribed; Although I have reviewed the note for such errors, some may still exist.

## 2017-12-14 NOTE — PLAN OF CARE
Problem: Patient Care Overview (Adult)  Goal: Plan of Care Review  Outcome: Ongoing (interventions implemented as appropriate)    12/14/17 1616   Outcome Evaluation   Outcome Summary/Follow up Plan Pt continues to c/o weakness,but was willing to work with therapy. Pt sba supine to sit at eob, cga to stand and cga to walk 4' to her chair. Pt completed ther ex as per flow sheet. Pt was left sitting reclined in her chair with call light within reach.   Coping/Psychosocial Response Interventions   Plan Of Care Reviewed With patient;daughter   Patient Care Overview   Progress improving         Problem: Inpatient Occupational Therapy  Goal: LB Dressing Goal LTG- OT  Outcome: Ongoing (interventions implemented as appropriate)    12/08/17 1315 12/14/17 1616   LB Dressing OT LTG   LB Dressing Goal OT LTG, Date Established 12/08/17 --    LB Dressing Goal OT LTG, Time to Achieve by discharge --    LB Dressing Goal OT LTG, [place] Level minimum assist (75% patient effort) --    LB Dressing Goal OT LTG, Adaptive Equipment reacher;sock-aid --    LB Dressing Goal OT LTG, Date Goal Reviewed --  12/14/17   LB Dressing Goal OT LTG, Outcome --  goal ongoing       Goal: Functional Mobility Goal LTG- OT  Outcome: Ongoing (interventions implemented as appropriate)    12/08/17 1315 12/14/17 1616   Functional Mobility OT LTG   Functional Mobility Goal OT LTG, Date Established 12/08/17 --    Functional Mobility Goal OT LTG, Time to Achieve by discharge --    Functional Mobility Goal OT LTG, [place] Level contact guard --    Functional Mobility Goal OT LTG, Assist Device rolling walker --    Functional Mobility Goal OT LTG, Distance to Achieve to the bathroom --    Functional Mobility Goal OT LTG, Date Goal Reviewed --  12/14/17   Functional Mobility Goal OT LTG, Outcome --  goal ongoing

## 2017-12-14 NOTE — PROGRESS NOTES
Visited pt this am.  Pt sleeping.  No family present.  Dr Franco's documentation revealed pt wanting to pursue dialysis.  PC will continue to follow.

## 2017-12-14 NOTE — PLAN OF CARE
Problem: Patient Care Overview (Adult)  Goal: Plan of Care Review  Outcome: Ongoing (interventions implemented as appropriate)    12/14/17 4706   Coping/Psychosocial Response Interventions   Plan Of Care Reviewed With patient   Patient Care Overview   Progress no change

## 2017-12-14 NOTE — PROGRESS NOTES
Continued Stay Note   Diego     Patient Name: Hodan Carter  MRN: [id number]  Today's Date: 12/14/2017    Admit Date: 12/7/2017          Discharge Plan       12/14/17 1423    Case Management/Social Work Plan    Plan  waiting to see if patient needs set up for new dialysis.               Discharge Codes     None        Expected Discharge Date and Time     Expected Discharge Date Expected Discharge Time    Dec 15, 2017             Whit Cates

## 2017-12-14 NOTE — PROGRESS NOTES
[facility]    [facility]    Name:  Hodan Carter   Age:  77 y.o.  Sex:  female  :  1940  MRN:  [id number]   Visit Number:  [id number]  Admission Date:  2017  Date Of Service:  17  Primary Care Physician:  Pieter Farias DO     LOS: 6 days :  Patient Care Team:  Pieter Farias DO as PCP - General (Internal Medicine):    Chief Complaint:    Renal Failure    Subjective / Interval History:   Patient remains comfortable, no new complaints.  She has elected to initiate dialysis following discussion with her family.     Review of Systems:   Review of Systems   Constitutional: Negative for chills, fatigue and fever.   HENT: Negative for congestion, ear pain, rhinorrhea, sinus pressure and sore throat.    Eyes: Negative for visual disturbance.   Respiratory: Negative for cough, chest tightness, shortness of breath and wheezing.    Cardiovascular: Positive for leg swelling. Negative for chest pain and palpitations.   Gastrointestinal: Negative for abdominal pain, blood in stool, constipation, diarrhea, nausea and vomiting.   Endocrine: Negative for polydipsia and polyuria.   Genitourinary: Negative for dysuria and hematuria.   Musculoskeletal: Negative for back pain.   Skin: Negative for rash.   Neurological: Negative for dizziness, light-headedness, numbness and headaches.   Psychiatric/Behavioral: Negative for dysphoric mood and sleep disturbance. The patient is not nervous/anxious.          Vital Signs:    Temp:  [97.8 °F (36.6 °C)-98.4 °F (36.9 °C)] 98.4 °F (36.9 °C)  Heart Rate:  [51-78] 74  Resp:  [16-18] 18  BP: ()/(51-60) 104/58    Intake and output:    I/O last 3 completed shifts:  In: 1240 [P.O.:840; I.V.:400]  Out: 300 [Urine:300]       Physical Examination:  Physical Exam   Constitutional: She is oriented to person, place, and time.   Chronically ill appearing elderly female, NAD   HENT:   Head: Normocephalic and atraumatic.   Mouth/Throat:  Oropharynx is clear and moist.   Eyes: Conjunctivae are normal. Pupils are equal, round, and reactive to light.   Neck: Normal range of motion. No JVD present. No thyromegaly present.   Cardiovascular: Normal rate, regular rhythm and normal heart sounds.    No murmur heard.  Pulmonary/Chest: Effort normal and breath sounds normal. No respiratory distress. She has no wheezes.   Abdominal: Soft. Bowel sounds are normal. She exhibits no distension. There is no tenderness. There is no rebound and no guarding.   Musculoskeletal: Normal range of motion. She exhibits no edema or tenderness.   3+ pitting edema bilateral lower extremities   Neurological: She is alert and oriented to person, place, and time.   Skin: Skin is warm and dry.   Psychiatric: She has a normal mood and affect. Her behavior is normal.         Laboratory results:  I have reviewed the patient's laboratory results.      Results from last 7 days  Lab Units 12/14/17  0649 12/13/17  1424 12/13/17  0556  12/11/17  0536 12/10/17  0610 12/09/17  0639   SODIUM mmol/L 128* 128* 127*  < > 129* 127* 126*   POTASSIUM mmol/L 5.1 5.6* 5.8*  < > 5.3* 5.1 5.2*   CHLORIDE mmol/L 101 101 101  < > 103 103 102   CO2 mmol/L 16.0* 15.0* 18.0*  < > 17.0* 15.0* 12.0*   BUN mg/dL 97* 95* 95*  < > 90* 82* 79*   CREATININE mg/dL 3.80* 4.00* 4.10*  < > 4.40* 4.40* 4.40*   CALCIUM mg/dL 7.5* 7.8* 7.9*  < > 8.4 8.4 8.3*   BILIRUBIN mg/dL  --   --   --   --  0.7 0.7 0.6   ALK PHOS U/L  --   --   --   --  171* 177* 173*   ALT (SGPT) U/L  --   --   --   --  35 27 33   AST (SGOT) U/L  --   --   --   --  32 32 34   GLUCOSE mg/dL 79 96 92  < > 97 81 87   < > = values in this interval not displayed.    Results from last 7 days  Lab Units 12/14/17  0649 12/13/17  0556 12/12/17  0611   WBC 10*3/mm3 6.41 5.39 4.94   HEMOGLOBIN g/dL 8.3* 8.5* 8.8*   HEMATOCRIT % 25.4* 26.8* 27.1*   PLATELETS 10*3/mm3 131 113* 97*       Results from last 7 days  Lab Units 12/14/17  0649 12/13/17  0556  12/12/17  0611   INR  1.46* 1.76* 2.17*       Results from last 7 days  Lab Units 12/08/17  0002   TROPONIN I ng/mL 0.047*       Results from last 7 days  Lab Units 12/08/17  0418 12/08/17  0417 12/08/17  0049 12/08/17  0002   BLOODCX   --   --   --  No growth at 5 days   URINECX   --   --    >100,000 CFU/mL Yeast, Not Candida albicans*  --    WOUNDCX    Light growth (2+) Candida albicans*  --   --   --    MRSA SCREEN CX   --  No growth  --   --        Radiology results:  I have reviewed the patient's radiology reports.  Imaging Results (last 24 hours)     ** No results found for the last 24 hours. **              Medication Review:   I have reviewed the patients active and prn medications.     Assessment:  Principal Problem:    ESRF (end stage renal failure)  Active Problems:    Cirrhosis of liver with ascites    Chronic kidney disease, stage IV (severe)    Permanent atrial fibrillation    Lymphedema of both lower extremities    Dehydration    Acute renal failure    Hyperkalemia    Plan:    Given hyperkalemia and progression of renal disease, patient will need dialysis for further fluid removal.  She has elected to start dialysis, Surgery has been consulted for catheter placement and dialysis will likely be initiated tomorrow.     Liver disease remains stable.    Hyperkalemia is slightly improved.       Pieter Farias DO  12/14/17  6:16 PM

## 2017-12-14 NOTE — THERAPY TREATMENT NOTE
Acute Care - Occupational Therapy Treatment Note  [facility]     Patient Name: Hodan Carter  : 1940  MRN: [id number]  Today's Date: 2017  Onset of Illness/Injury or Date of Surgery Date: 17  Date of Referral to OT: 17  Referring Physician: Dr. Morales      Admit Date: 2017    Visit Dx:     ICD-10-CM ICD-9-CM   1. Dehydration E86.0 276.51   2. MODESTA (acute kidney injury) N17.9 584.9   3. Weakness generalized R53.1 780.79   4. Hyponatremia E87.1 276.1   5. Impaired mobility and ADLs Z74.09 799.89   6. Impaired functional mobility, balance, gait, and endurance Z74.09 V49.89   7. ESRF (end stage renal failure) N18.6 585.6     Patient Active Problem List   Diagnosis   • Ascites   • Cirrhosis of liver with ascites   • Chronic kidney disease, stage IV (severe)   • Permanent atrial fibrillation   • Essential hypertension   • Chronic anemia   • Cellulitis and abscess of trunk   • Shortness of breath   • NSTEMI (non-ST elevated myocardial infarction)   • Chronic kidney disease-mineral and bone disorder   • Candida UTI   • Volume overload   • Acute hepatic encephalopathy   • Lymphedema of both lower extremities   • Vitamin B12 deficiency   • Hepatic encephalopathy   • Anemia of chronic disease   • Cellulitis of left lower extremity   • Decompensated hepatic cirrhosis   • Dehydration   • Acute renal failure   • Hyperkalemia   • ESRF (end stage renal failure)             Adult Rehabilitation Note       17 1442 17 1135 17 1132    Rehab Assessment/Intervention    Discipline occupational therapist  -AH occupational therapist  -SD physical therapist  -LM    Document Type therapy note (daily note)  -AH therapy note (daily note)  -SD therapy note (daily note)  -LM    Subjective Information agree to therapy;complains of;weakness  -AH agree to therapy;complains of;fatigue  -SD agree to therapy;complains of;fatigue  -LM    Patient Effort, Rehab Treatment adequate  -AH adequate  -SD adequate   -LM    Symptoms Noted During/After Treatment fatigue  - fatigue  -SD fatigue  -LM    Precautions/Limitations fall precautions  - fall precautions  -SD fall precautions  -LM    Recorded by [] Rosalia Sepulveda [SD] China Logan, OT [LM] Serene Khan, PT    Pain Assessment    Pain Assessment No/denies pain  - No/denies pain  -SD No/denies pain  -LM    Recorded by [] Rosalia Sepulveda [SD] China Logan, OT [LM] Serene Khan, PT    Bed Mobility, Assessment/Treatment    Bed Mobility, Assistive Device bed rails;head of bed elevated  - bed rails;head of bed elevated  -SD bed rails;head of bed elevated  -    Bed Mob, Supine to Sit, [place] supervision required  - contact guard assist  -SD contact guard assist  -LM    Bed Mobility, Safety Issues   decreased use of legs for bridging/pushing  -LM    Bed Mobility, Impairments   strength decreased;impaired balance;pain  -LM    Recorded by [AH] Rosalia Sepulveda [SD] China Logan, OT [LM] Serene Khan, PT    Transfer Assessment/Treatment    Transfers, Bed-Chair [place]  contact guard assist  -SD contact guard assist  -LM    Transfers, Bed-Chair-Bed, Assist Device  rolling walker  -SD rolling walker  -LM    Transfers, Sit-Stand [place] contact guard assist  - contact guard assist  -SD contact guard assist  -LM    Transfers, Stand-Sit [place] contact guard assist  - contact guard assist  -SD contact guard assist  -LM    Transfers, Sit-Stand-Sit, Assist Device rolling walker  - rolling walker  -SD rolling walker  -LM    Transfer, Safety Issues   balance decreased during turns;sequencing ability decreased;step length decreased  -LM    Transfer, Impairments   strength decreased;impaired balance;pain  -LM    Recorded by [] Rosalia Sepulveda [SD] China Logan, OT [LM] Serene Khan, PT    Gait Assessment/Treatment    Gait, [place] Level   contact guard assist  -LM    Gait, Assistive Device   rolling walker  -LM    Gait, Distance (Feet)   4   -LM    Gait, Gait Pattern Analysis   swing-to gait  -LM    Gait, Safety Issues   balance decreased during turns;sequencing ability decreased;step length decreased  -LM    Gait, Impairments   strength decreased;impaired balance;pain  -LM    Gait, Comment   Declines further ambulation this date.  -LM    Recorded by   [LM] Serene Khan, PT    Functional Mobility    Functional Mobility- Ind. Level contact guard assist  - contact guard assist  -SD     Functional Mobility- Device rolling walker  - rolling walker  -SD     Functional Mobility-Distance (Feet) 4  - 4  -SD     Recorded by [] Rosalia Sepulveda [SD] China Logan OT     Therapy Exercises    Bilateral Lower Extremities   AROM:;10 reps;sitting;ankle pumps/circles;heel raises;LAQ;other reps   seated marches  -    Bilateral Upper Extremity AROM:;15 reps;elbow flexion/extension;shoulder horizontal abd/add  - AROM:;15 reps;shoulder abduction/adduction;shoulder extension/flexion;elbow flexion/extension  -SD     BUE Resistance theraband  - theraband  -SD     Recorded by [] Rosalia Sepulveda [SD] China Logan, LEE [LM] Serene Khan, PT    Positioning and Restraints    Pre-Treatment Position in bed  - in bed  -SD in bed  -    Post Treatment Position chair  - chair  -SD chair  -    In Chair reclined;call light within reach;encouraged to call for assist;with family/caregiver  - reclined;call light within reach;encouraged to call for assist  -SD sitting;call light within reach;encouraged to call for assist;with OT  -    Recorded by [] Rosalia Sepulveda [SD] China Logan OT [LM] Serene Khan, PT      12/12/17 1409 12/12/17 1310 12/11/17 1729    Rehab Assessment/Intervention    Discipline physical therapy assistant  -RM occupational therapist  -HE physical therapy assistant  -RM    Document Type therapy note (daily note)  -RM therapy note (daily note)  -HE therapy note (daily note)  -RM    Subjective Information agree to therapy;complains  of;fatigue;pain  -RM agree to therapy;complains of;pain  -HE agree to therapy;complains of;pain;weakness  -RM    Patient Effort, Rehab Treatment adequate  -RM fair  -HE adequate  -RM    Symptoms Noted During/After Treatment fatigue  -RM fatigue  -HE fatigue  -RM    Precautions/Limitations fall precautions  -RM fall precautions  -HE fall precautions  -RM    Recorded by [] Gabriel Israel PTA [HE] Remigio Galicia [RM] Gabriel Israel PTA    Pain Assessment    Pain Assessment 0-10  -RM 0-10  -HE 0-10  -RM    Pain Score 8  -RM 8  -HE 7  -RM    Post Pain Score 8  -RM 8  -HE 7  -RM    Pain Type Acute pain  -RM Chronic pain  -HE Chronic pain  -RM    Pain Location Leg  -RM Leg  -HE Leg  -RM    Pain Orientation Left  -RM Left;Lower  -HE Left  -RM    Pain Intervention(s) Repositioned  -RM Repositioned  -HE Repositioned;Ambulation/increased activity  -RM    Response to Interventions   tolerated   -RM    Recorded by [] Gabriel Israel PTA [HE] Rmeigio Galicia [RM] Gabriel Israel PTA    Bed Mobility, Assessment/Treatment    Bed Mobility, Assistive Device   bed rails;head of bed elevated  -RM    Bed Mob, Supine to Sit, [place]  verbal cues required;contact guard assist  - contact guard assist;verbal cues required  -RM    Bed Mob, Sit to Supine, [place]  verbal cues required;moderate assist (50% patient effort)  -HE     Bed Mobility, Safety Issues   decreased use of arms for pushing/pulling;decreased use of legs for bridging/pushing  -RM    Bed Mobility, Impairments   strength decreased;impaired balance  -RM    Recorded by  [HE] Remigio Galicia [RM] Gabriel Israel PTA    Transfer Assessment/Treatment    Transfers, Sit-Stand [place]   contact guard assist;verbal cues required  -RM    Transfers, Stand-Sit [place]   contact guard assist;verbal cues required  -RM    Transfers, Sit-Stand-Sit, Assist Device   rolling walker  -RM    Transfer, Safety Issues   balance decreased during turns;step length  decreased;weight-shifting ability decreased  -RM    Transfer, Impairments   strength decreased;impaired balance;pain  -RM    Recorded by   [RM] Gabriel Israel PTA    Gait Assessment/Treatment    Gait, [place] Level   contact guard assist  -RM    Gait, Assistive Device   rolling walker  -RM    Gait, Distance (Feet)   10  -RM    Gait, Safety Issues   step length decreased;weight-shifting ability decreased;balance decreased during turns  -RM    Gait, Impairments   strength decreased;impaired balance;pain  -RM    Recorded by   [RM] Gabriel Israel PTA    Therapy Exercises    Bilateral Lower Extremities AROM:;AAROM:;10 reps;supine;ankle pumps/circles;glut sets;heel slides;quad sets;SAQ;SLR  -RM      Bilateral Upper Extremity  10 reps;sitting;elbow flexion/extension;shoulder abduction/adduction;shoulder extension/flexion;shoulder ER/IR;shoulder horizontal abd/add  -HE     BUE Resistance  manual resistance- minimal  -HE     Recorded by [RM] Gabriel Israel PTA [HE] Remigio Galicia     Positioning and Restraints    Pre-Treatment Position in bed  -RM in bed  -HE in bed  -RM    Post Treatment Position bed  -RM bed  -HE chair  -RM    In Bed supine;call light within reach;encouraged to call for assist;heels elevated;notified nsg  -RM      In Chair   reclined;call light within reach;encouraged to call for assist;with family/caregiver;heels elevated  -RM    Recorded by [] Gabriel Israel PTA [HE] Remigio Galicia [RM] Gabriel Israel PTA      User Key  (r) = Recorded By, (t) = Taken By, (c) = Cosigned By    Initials Name Effective Dates     Rosalia Sepulveda 10/26/16 -     LM Serene Khan, PT 10/26/16 -     RM Gabriel Israel PTA 10/26/16 -     HE Remigio Galicia 06/26/17 -     SD China Logan, OT 06/30/17 -                 OT Goals       12/14/17 1616 12/13/17 1454 12/12/17 1408    Bed Mobility OT LTG    Bed Mobility OT LTG, Date Goal Reviewed  12/13/17  -SD 12/12/17  -HE    Bed Mobility OT LTG, Outcome  goal  met  -SD     Transfer Training OT LTG    Transfer Training OT LTG, Date Goal Reviewed  12/13/17  -SD 12/12/17  -HE    Transfer Training OT LTG, Outcome  goal met  -SD     Strength OT LTG    Strength Goal OT LTG, Date Goal Reviewed  12/13/17  -SD 12/12/17  -HE    Strength Goal OT LTG, Outcome  goal met  -SD goal ongoing  -HE    LB Dressing OT LTG    LB Dressing Goal OT LTG, Date Goal Reviewed 12/14/17  -AH 12/13/17  -SD 12/12/17  -HE    LB Dressing Goal OT LTG, Outcome goal ongoing  -AH goal ongoing  -SD goal ongoing  -HE    Functional Mobility OT LTG    Functional Mobility Goal OT LTG, Date Goal Reviewed 12/14/17  -AH 12/13/17  -SD 12/12/17  -HE    Functional Mobility Goal OT LTG, Outcome goal ongoing  - goal ongoing  -SD goal ongoing  -      12/08/17 1315          Bed Mobility OT LTG    Bed Mobility OT LTG, Date Established 12/08/17  -      Bed Mobility OT LTG, Activity Type supine to sit/sit to supine  -      Bed Mobility OT LTG, [place] Level contact guard assist  -      Bed Mobility OT LTG, Assist Device bed rails  -      Bed Mobility OT LTG, Outcome goal ongoing  -      Transfer Training OT LTG    Transfer Training OT LTG, Date Established 12/08/17  -      Transfer Training OT LTG, Time to Achieve by discharge  -      Transfer Training OT LTG, Activity Type sit to stand/stand to sit  -      Transfer Training OT LTG, [place] Level contact guard assist  -      Transfer Training OT LTG, Assist Device walker, rolling  -      Transfer Training OT LTG, Outcome goal ongoing  -      Strength OT LTG    Strength Goal OT LTG, Date Established 12/08/17  -      Strength Goal OT LTG, Time to Achieve by discharge  -      Strength Goal OT LTG, Functional Goal Pt will perform 15 reps BUE strengthening ex using theraband for resistance  -      Strength Goal OT LTG, Outcome goal ongoing  -      LB Dressing OT LTG    LB Dressing Goal OT LTG, Date Established 12/08/17  -      LB  Dressing Goal OT LTG, Time to Achieve by discharge  -      LB Dressing Goal OT LTG, [place] Level minimum assist (75% patient effort)  -      LB Dressing Goal OT LTG, Adaptive Equipment reacher;sock-aid  -      LB Dressing Goal OT LTG, Outcome goal ongoing  -      Functional Mobility OT LTG    Functional Mobility Goal OT LTG, Date Established 12/08/17  -      Functional Mobility Goal OT LTG, Time to Achieve by discharge  -      Functional Mobility Goal OT LTG, [place] Level contact guard  -      Functional Mobility Goal OT LTG, Assist Device rolling walker  -      Functional Mobility Goal OT LTG, Distance to Achieve to the bathroom  -      Functional Mobility Goal OT LTG, Outcome goal ongoing  -        User Key  (r) = Recorded By, (t) = Taken By, (c) = Cosigned By    Initials Name Provider Type    EUGENIE Sepulveda Occupational Therapist    JENELLE Galicia Occupational Therapist    JOSE Logan, OT Occupational Therapist          Occupational Therapy Education     Title: PT OT SLP Therapies (Done)     Topic: Occupational Therapy (Done)     Point: ADL training (Done)    Description: Instruct learner(s) on proper safety adaptation and remediation techniques during self care or transfers.   Instruct in proper use of assistive devices.    Learning Progress Summary    Learner Readiness Method Response Comment Documented by Status   Patient Acceptance E VU benefit of activity and ther ex  12/14/17 1615 Done    Acceptance E VU  NO 12/13/17 0133 Done    Acceptance E VU Role of OT/POC  12/08/17 1315 Done               Point: Home exercise program (Done)    Description: Instruct learner(s) on appropriate technique for monitoring, assisting and/or progressing therapeutic exercises/activities.    Learning Progress Summary    Learner Readiness Method Response Comment Documented by Status   Patient Acceptance E VU benefit of activity and ther ex  12/14/17 1615 Done    Acceptance E VU  Benefits of UB ther ex and various exercises using theraband for resistance. SD 12/13/17 1457 Done    Acceptance E VU  NO 12/13/17 0133 Done    Acceptance E,D NR OT instructed pt on UE strengthening program. HE 12/12/17 1407 Active               Point: Precautions (Done)    Description: Instruct learner(s) on prescribed precautions during self-care and functional transfers.    Learning Progress Summary    Learner Readiness Method Response Comment Documented by Status   Patient Acceptance E VU  NO 12/13/17 0133 Done                      User Key     Initials Effective Dates Name Provider Type Discipline     10/26/16 -  Rosalia Sepulveda Occupational Therapist OT    NO 12/13/16 -  Jada Enriquez, RN Registered Nurse Nurse    HE 06/26/17 -  Remigio Gailcia Occupational Therapist OT    SD 06/30/17 -  China Logan OT Occupational Therapist OT                  OT Recommendation and Plan  Anticipated Discharge Disposition: home with home health  Planned Therapy Interventions: adaptive equipment training, ADL retraining, bed mobility training, strengthening, transfer training  Therapy Frequency: 3-5 times/wk  Plan of Care Review  Plan Of Care Reviewed With: patient, daughter  Progress: improving  Outcome Summary/Follow up Plan: Pt continues to c/o weakness,but was willing to work with therapy.  Pt sba supine to sit at eob, cga to stand and cga to walk 4' to her chair.  Pt completed ther ex as per flow sheet.  Pt was left sitting reclined in her chair with call light within reach.        Outcome Measures       12/14/17 1442 12/13/17 1135 12/13/17 1132    How much help from another person do you currently need...    Turning from your back to your side while in flat bed without using bedrails?   3  -LM    Moving from lying on back to sitting on the side of a flat bed without bedrails?   3  -LM    Moving to and from a bed to a chair (including a wheelchair)?   3  -LM    Standing up from a chair using your arms (e.g.,  wheelchair, bedside chair)?   3  -LM    Climbing 3-5 steps with a railing?   2  -LM    To walk in hospital room?   3  -LM    AM-PAC 6 Clicks Score   17  -LM    How much help from another is currently needed...    Putting on and taking off regular lower body clothing? 2  -AH 2  -SD     Bathing (including washing, rinsing, and drying) 2  -AH 2  -SD     Toileting (which includes using toilet bed pan or urinal) 2  -AH 2  -SD     Putting on and taking off regular upper body clothing 3  -AH 3  -SD     Taking care of personal grooming (such as brushing teeth) 3  - 3  -SD     Eating meals 4  - 4  -SD     Score 16  -AH 16  -SD     Functional Assessment    Outcome Measure Options AM-PAC 6 Clicks Daily Activity (OT)  -AH AM-PAC 6 Clicks Daily Activity (OT)  -SD AM-PAC 6 Clicks Basic Mobility (PT)  -LM      12/12/17 1310          How much help from another is currently needed...    Putting on and taking off regular lower body clothing? 2  -HE      Bathing (including washing, rinsing, and drying) 2  -HE      Toileting (which includes using toilet bed pan or urinal) 2  -HE      Putting on and taking off regular upper body clothing 3  -HE      Taking care of personal grooming (such as brushing teeth) 3  -HE      Eating meals 4  -HE      Score 16  -HE      Functional Assessment    Outcome Measure Options -[facility] 6 Clicks Daily Activity (OT)  -        User Key  (r) = Recorded By, (t) = Taken By, (c) = Cosigned By    Initials Name Provider Type    EUGENIE Sepulveda Occupational Therapist    EARLENE Khan, PT Physical Therapist    JENELLE Galicia Occupational Therapist    JOSE Logan, OT Occupational Therapist           Time Calculation:         Time Calculation- OT       12/14/17 1619          Time Calculation- OT    OT Start Time 1442  -      Total Timed Code Minutes- OT 24 minute(s)  -      OT Received On 12/14/17  -      OT Goal Re-Cert Due Date 12/18/17  -        User Key  (r) = Recorded By, (t) = Taken By,  (c) = Cosigned By    Initials Name Provider Type     Rosalia Sepulveda Occupational Therapist           Therapy Charges for Today     Code Description Service Date Service Provider Modifiers Qty    [id number]  OT THER PROC EA 15 MIN 12/14/2017 Rosalia Sepulveda GO 1               Rosalia Sepulveda  12/14/2017

## 2017-12-14 NOTE — CONSULTS
Inpatient Consult to General Surgery  Consult performed by: HALIMA BLEDSOE  Consult ordered by: DEL MONTERO            Referring Provider: Twin Morales MD    Reason for Consultation: Dialysis catheter placement    Patient Care Team:  Pieter Farias DO as PCP - General (Internal Medicine)    Chief complaint   Chief Complaint   Patient presents with   • Fatigue       Subjective .     History of present illness: I was asked to see this patient for dialysis catheter placement.  Patient has chronic renal insufficiency but now has acute on chronic renal failure requiring dialysis.  She was brought in initially with worsening weakness and hypotension.  She has had significant peripheral swelling as well as especially in the lower extremities with weeping of her legs.  Currently the patient states that she is feeling relatively well.  She denies any other specific issues.  She denies any current nausea or vomiting.  No lower GI complaints.    Review of Systems:    Review of Systems - General ROS: negative for - chills, fatigue, fever, hot flashes, malaise or night sweats  Psychological ROS: negative for - Depression or anxiety  HEENT ROS: negative for -  No nasal/oral pharynx drainage or pain. No acute visual complaints.  Respiratory ROS: negative for - Shortness of breath, cough or hemoptysis.  Cardiovascular ROS: negative for - Chest pain or palpitations.  Patient does have lower extremity edema.  Gastrointestinal ROS: negative for - change in stools,  hematemesis, melena or stool incontinence  Genito-Urinary ROS: negative for - dysuria or hematuria  Musculoskeletal ROS: negative for - gait disturbance or muscle pain  Neurological ROS: negative for - currently she denies any neurological issues.  Skin: no rash but with lower extremity weeping from her edematous legs.    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   • Wears dentures     UPPER ONLY       Past Surgical History:   Procedure Laterality Date   • ENDOSCOPY     • GALLBLADDER SURGERY     • HERNIA REPAIR     • HYSTERECTOMY     • REPLACEMENT TOTAL KNEE Right        Family History   Problem Relation Age of Onset   • Hypertension Mother    • Cancer Father      prostate   • Hypertension Father    • Diabetes Sister    • Hypertension Sister    • Hypertension Brother    • Cancer Daughter      breast       Social History     Social History   • Marital status:      Spouse name: N/A   • Number of children: N/A   • Years of education: N/A     Occupational History   • Not on file.     Social History Main Topics   • Smoking status: Never Smoker   • Smokeless tobacco: Never Used   • Alcohol use No   • Drug use: Defer   • Sexual activity: Defer     Other Topics Concern   • Not on file     Social History Narrative       Allergies   Allergen Reactions   • Cefepime Other (See Comments)     Redness at IV site       Objective     Medications  Current Facility-Administered Medications   Medication Dose Route Frequency Provider Last Rate Last Dose   • acetaminophen (TYLENOL) tablet 650 mg  650 mg Oral Q4H PRN Twin Morales MD   650 mg at 12/11/17 1625   • allopurinol (ZYLOPRIM) tablet 100 mg  100 mg Oral BID Frankie Winchester DO   100 mg at 12/14/17 0913   • b complex-vitamin c-folic acid (NEPHRO-QUANG) tablet 1 tablet  1 tablet Oral Daily Julio Franco MD       • calcium acetate (PHOS BINDER)) capsule 667 mg  667 mg Oral TID With Meals Carlos Owen DO   667 mg at 12/14/17 0913   • digoxin (LANOXIN) tablet 125 mcg  125 mcg Oral Every Other Day Twin Morales MD   125 mcg at 12/14/17 0913   • fluconazole  (DIFLUCAN) tablet 100 mg  100 mg Oral Q24H Frankie SULY HinsDO raji   100 mg at 12/13/17 1455   • HYDROcodone-acetaminophen (NORCO)  MG per tablet 1 tablet  1 tablet Oral Q6H PRN Twin Morales MD   1 tablet at 12/13/17 1254   • ipratropium-albuterol (DUO-NEB) nebulizer solution 3 mL  3 mL Nebulization Q4H PRN Twin Morales MD       • lactulose (CHRONULAC) 10 GM/15ML solution 10 g  10 g Oral TID Twin Morales MD   10 g at 12/14/17 0914   • ondansetron (ZOFRAN) tablet 4 mg  4 mg Oral Q6H PRN Twin Morales MD   4 mg at 12/11/17 1625    Or   • ondansetron ODT (ZOFRAN-ODT) disintegrating tablet 4 mg  4 mg Oral Q6H PRN Twin Morales MD        Or   • ondansetron (ZOFRAN) injection 4 mg  4 mg Intravenous Q6H PRN Twin Morales MD   4 mg at 12/08/17 2140   • pantoprazole (PROTONIX) EC tablet 40 mg  40 mg Oral Daily Twin Morales MD   40 mg at 12/14/17 0913   • rifaximin (XIFAXAN) tablet 550 mg  550 mg Oral Q12H Twin Morales MD   550 mg at 12/14/17 0913   • sodium chloride 0.9 % flush 1-10 mL  1-10 mL Intravenous PRN Twin Morales MD   10 mL at 12/09/17 1639   • sodium polystyrene (KAYEXALATE) 15 GM/60ML suspension 30 g  30 g Oral Daily Julio Franco MD   30 g at 12/14/17 0914         Vital Signs   Temp:  [97.7 °F (36.5 °C)-98.4 °F (36.9 °C)] 97.8 °F (36.6 °C)  Heart Rate:  [51-78] 75  Resp:  [16-18] 18  BP: ()/(47-64) 98/58    Physical Exam:     General Appearance:  Alert and cooperative, not in any acute distress.   Head:  Atraumatic and normocephalic, without obvious abnormality.   Eyes:          PERRLA, conjunctivae and sclerae normal, no Icterus. No pallor. Extra-occular movements are within normal limits.   Ears:  Ears appear intact with no abnormalities noted.   Respiratory/Lungs:   Breath sounds heard bilaterally equally.  No crackles or wheezing. No Pleural rub or bronchial breathing. Normal respiratory effort.    Cardiovascular/Heart:  Normal S1 and S2, no murmur.  edema   GI/Abdomen:   No masses, no  hepatosplenomegaly. Soft, non-tender, non-distended, Periumbilical hernia             Musculoskeletal/ Extremities:   Moves all extremities well   Skin: Lower extremity edema with weeping from the legs.  No rash.     Psychiatric : Alert and oriented ×3.  No depression or anxiety    Neurologic: Cranial nerves 2 - 12 grossly intact, sensation intact, Motor power is normal and equal bilaterally.     Results Review:  Lab Results (last 24 hours)     Procedure Component Value Units Date/Time    Renal Function Panel [[id number]]  (Abnormal) Collected:  12/13/17 1424    Specimen:  Blood Updated:  12/13/17 1522     Glucose 96 mg/dL      BUN 95 (C) mg/dL      Creatinine 4.00 (H) mg/dL      Sodium 128 (L) mmol/L      Potassium 5.6 (C) mmol/L      Chloride 101 mmol/L      CO2 15.0 (L) mmol/L      Calcium 7.8 (L) mg/dL      Albumin 2.10 (L) g/dL      Phosphorus 5.4 (H) mg/dL      Anion Gap 17.6 mmol/L      BUN/Creatinine Ratio 23.8 (H)     eGFR Non  Amer 11 (L) mL/min/1.73     Narrative:       The MDRD GFR formula is only valid for adults with stable renal function between ages 18 and 70.    CBC (No Diff) [[id number]]  (Abnormal) Collected:  12/14/17 0649    Specimen:  Blood Updated:  12/14/17 0739     WBC 6.41 10*3/mm3      RBC 2.76 (L) 10*6/mm3      Hemoglobin 8.3 (L) g/dL      Hematocrit 25.4 (L) %      MCV 92.0 fL      MCH 30.1 pg      MCHC 32.7 g/dL      RDW 18.9 (H) %      RDW-SD 61.7 (H) fl      MPV 9.8 fL      Platelets 131 10*3/mm3     Protime-INR [[id number]]  (Abnormal) Collected:  12/14/17 0649    Specimen:  Blood Updated:  12/14/17 0754     Protime 16.4 (H) Seconds      INR 1.46 (H)    Renal Function Panel [[id number]]  (Abnormal) Collected:  12/14/17 0649    Specimen:  Blood Updated:  12/14/17 0808     Glucose 79 mg/dL      BUN 97 (C) mg/dL      Creatinine 3.80 (H) mg/dL      Sodium 128 (L) mmol/L      Potassium 5.1 mmol/L      Chloride 101 mmol/L      CO2 16.0 (L) mmol/L      Calcium 7.5 (L) mg/dL      Albumin  2.00 (L) g/dL      Phosphorus 5.6 (H) mg/dL      Anion Gap 16.1 mmol/L      BUN/Creatinine Ratio 25.5 (H)     eGFR Non  Amer 11 (L) mL/min/1.73     Narrative:       The MDRD GFR formula is only valid for adults with stable renal function between ages 18 and 70.    Hepatitis B Surface Antibody [[id number]] Collected:  12/14/17 0649    Specimen:  Blood Updated:  12/14/17 0944          Assessment/Plan     Principal Problem:    Acute renal failure  Active Problems:    Cirrhosis of liver with ascites    Chronic kidney disease, stage IV (severe)    Permanent atrial fibrillation    Lymphedema of both lower extremities    Dehydration    Hyperkalemia      Patient with end-stage renal failure requiring dialysis.  I recommend a dialysis catheter to her.  She understands this procedure.  She also understands the risks of bleeding, infection as well as pneumothorax and she understands the ramifications of these potential cup occasions and she wishes to proceed.    Chronic ventral hernia.  With incarcerated large bowel.  Stable.  No evidence of obstruction.  No indication for repair at this time and given her comorbidities including cirrhosis I would not recommend fixing this    Elder Jensen MD  12/14/17  11:34 AM

## 2017-12-14 NOTE — THERAPY TREATMENT NOTE
Acute Care - Physical Therapy Treatment Note  [facility]     Patient Name: Hodan Carter  : 1940  MRN: [id number]  Today's Date: 2017  Onset of Illness/Injury or Date of Surgery Date: 17  Date of Referral to PT: 17  Referring Physician: Dr. Morales    Admit Date: 2017    Visit Dx:    ICD-10-CM ICD-9-CM   1. Dehydration E86.0 276.51   2. MODESTA (acute kidney injury) N17.9 584.9   3. Weakness generalized R53.1 780.79   4. Hyponatremia E87.1 276.1   5. Impaired mobility and ADLs Z74.09 799.89   6. Impaired functional mobility, balance, gait, and endurance Z74.09 V49.89   7. ESRF (end stage renal failure) N18.6 585.6     Patient Active Problem List   Diagnosis   • Ascites   • Cirrhosis of liver with ascites   • Chronic kidney disease, stage IV (severe)   • Permanent atrial fibrillation   • Essential hypertension   • Chronic anemia   • Cellulitis and abscess of trunk   • Shortness of breath   • NSTEMI (non-ST elevated myocardial infarction)   • Chronic kidney disease-mineral and bone disorder   • Candida UTI   • Volume overload   • Acute hepatic encephalopathy   • Lymphedema of both lower extremities   • Vitamin B12 deficiency   • Hepatic encephalopathy   • Anemia of chronic disease   • Cellulitis of left lower extremity   • Decompensated hepatic cirrhosis   • Dehydration   • Acute renal failure   • Hyperkalemia   • ESRF (end stage renal failure)               Adult Rehabilitation Note       17 1451 17 1442 17 1135    Rehab Assessment/Intervention    Discipline physical therapist  -LM occupational therapist  -AH occupational therapist  -SD    Document Type therapy note (daily note)  -LM therapy note (daily note)  -AH therapy note (daily note)  -SD    Subjective Information agree to therapy;complains of;weakness  -LM agree to therapy;complains of;weakness  -AH agree to therapy;complains of;fatigue  -SD    Patient Effort, Rehab Treatment adequate  -LM adequate  -AH adequate  -SD     Symptoms Noted During/After Treatment fatigue  -LM fatigue  -AH fatigue  -SD    Precautions/Limitations fall precautions  -LM fall precautions  -AH fall precautions  -SD    Recorded by [LM] Serene Khan, PT [] Rosalia Sepulveda [SD] China Logan, OT    Pain Assessment    Pain Assessment No/denies pain  -LM No/denies pain  - No/denies pain  -SD    Recorded by [LM] Serene Khan, PT [] Rosalia Sepulveda [SD] China Logan, OT    Bed Mobility, Assessment/Treatment    Bed Mobility, Assistive Device bed rails;head of bed elevated  -LM bed rails;head of bed elevated  - bed rails;head of bed elevated  -SD    Bed Mob, Supine to Sit, [place] supervision required  -LM supervision required  - contact guard assist  -SD    Recorded by [LM] Serene Khan, PT [] Rosalia Sepulveda [SD] China Logan, OT    Transfer Assessment/Treatment    Transfers, Bed-Chair [place] contact guard assist  -LM  contact guard assist  -SD    Transfers, Bed-Chair-Bed, Assist Device rolling walker  -LM  rolling walker  -SD    Transfers, Sit-Stand [place] contact guard assist  -LM contact guard assist  - contact guard assist  -SD    Transfers, Stand-Sit [place] contact guard assist  -LM contact guard assist  - contact guard assist  -SD    Transfers, Sit-Stand-Sit, Assist Device rolling walker  -LM rolling walker  -AH rolling walker  -SD    Transfer, Safety Issues balance decreased during turns;sequencing ability decreased;step length decreased  -      Transfer, Impairments strength decreased;impaired balance  -      Recorded by [LM] Serene Khan, PT [] Rosalia Sepulveda [SD] China Logan, OT    Gait Assessment/Treatment    Gait, [place] Level contact guard assist  -LM      Gait, Assistive Device rolling walker  -LM      Gait, Distance (Feet) 4  -LM      Gait, Gait Pattern Analysis swing-through gait  -      Gait, Gait Deviations diogo decreased;forward flexed posture;step length decreased;toe-to-floor  clearance decreased  -LM      Gait, Safety Issues balance decreased during turns;step length decreased  -LM      Gait, Impairments strength decreased;impaired balance  -LM      Recorded by [LM] Serene Khan PT      Functional Mobility    Functional Mobility- Ind. Level  contact guard assist  - contact guard assist  -SD    Functional Mobility- Device  rolling walker  - rolling walker  -SD    Functional Mobility-Distance (Feet)  4  -AH 4  -SD    Recorded by  [] Rosalia Sepulveda [SD] China Logan, OT    Therapy Exercises    Bilateral Lower Extremities AAROM:;10 reps;supine;ankle pumps/circles;hip abduction/adduction;heel slides;SLR  -LM      Bilateral Upper Extremity  AROM:;15 reps;elbow flexion/extension;shoulder horizontal abd/add  - AROM:;15 reps;shoulder abduction/adduction;shoulder extension/flexion;elbow flexion/extension  -SD    BUE Resistance  theraband  - theraband  -SD    Recorded by [LM] Serene Khan, PT [] Rosalia Sepulveda [SD] China Logan, OT    Positioning and Restraints    Pre-Treatment Position in bed  - in bed  - in bed  -SD    Post Treatment Position chair  -LM chair  -AH chair  -SD    In Chair reclined;call light within reach;encouraged to call for assist;with family/caregiver  - reclined;call light within reach;encouraged to call for assist;with family/caregiver  - reclined;call light within reach;encouraged to call for assist  -SD    Recorded by [LM] Serene Khan, PT [] Rosalia Sepulveda [SD] China Logan, OT      12/13/17 1132 12/12/17 1409 12/12/17 1310    Rehab Assessment/Intervention    Discipline physical therapist  -LM physical therapy assistant  -RM occupational therapist  -HE    Document Type therapy note (daily note)  -LM therapy note (daily note)  -RM therapy note (daily note)  -HE    Subjective Information agree to therapy;complains of;fatigue  -LM agree to therapy;complains of;fatigue;pain  -RM agree to therapy;complains of;pain  -HE    Patient Effort, Rehab  Treatment adequate  -LM adequate  -RM fair  -HE    Symptoms Noted During/After Treatment fatigue  -LM fatigue  -RM fatigue  -HE    Precautions/Limitations fall precautions  -LM fall precautions  -RM fall precautions  -HE    Recorded by [LM] Seerne Khan, PT [RM] Gabriel Israel, PTA [HE] Remigio Galicia    Pain Assessment    Pain Assessment No/denies pain  -LM 0-10  -RM 0-10  -HE    Pain Score  8  -RM 8  -HE    Post Pain Score  8  -RM 8  -HE    Pain Type  Acute pain  -RM Chronic pain  -HE    Pain Location  Leg  -RM Leg  -HE    Pain Orientation  Left  -RM Left;Lower  -HE    Pain Intervention(s)  Repositioned  -RM Repositioned  -HE    Recorded by [LM] Serene Khan, PT [RM] Gabriel Israel, ALBINA [HE] Remigio Galicia    Bed Mobility, Assessment/Treatment    Bed Mobility, Assistive Device bed rails;head of bed elevated  -LM      Bed Mob, Supine to Sit, [place] contact guard assist  -LM  verbal cues required;contact guard assist  -HE    Bed Mob, Sit to Supine, [place]   verbal cues required;moderate assist (50% patient effort)  -HE    Bed Mobility, Safety Issues decreased use of legs for bridging/pushing  -LM      Bed Mobility, Impairments strength decreased;impaired balance;pain  -LM      Recorded by [LM] Serene Khan, PT  [HE] Remigio Galicia    Transfer Assessment/Treatment    Transfers, Bed-Chair [place] contact guard assist  -LM      Transfers, Bed-Chair-Bed, Assist Device rolling walker  -LM      Transfers, Sit-Stand [place] contact guard assist  -LM      Transfers, Stand-Sit [place] contact guard assist  -LM      Transfers, Sit-Stand-Sit, Assist Device rolling walker  -LM      Transfer, Safety Issues balance decreased during turns;sequencing ability decreased;step length decreased  -LM      Transfer, Impairments strength decreased;impaired balance;pain  -LM      Recorded by [LM] Serene Khan, PT      Gait Assessment/Treatment    Gait, [place] Level contact guard assist  -LM      Gait,  Assistive Device rolling walker  -LM      Gait, Distance (Feet) 4  -LM      Gait, Gait Pattern Analysis swing-to gait  -LM      Gait, Safety Issues balance decreased during turns;sequencing ability decreased;step length decreased  -      Gait, Impairments strength decreased;impaired balance;pain  -LM      Gait, Comment Declines further ambulation this date.  -LM      Recorded by [LM] Serene Khan PT      Therapy Exercises    Bilateral Lower Extremities AROM:;10 reps;sitting;ankle pumps/circles;heel raises;LAQ;other reps   seated march  - AROM:;AAROM:;10 reps;supine;ankle pumps/circles;glut sets;heel slides;quad sets;SAQ;SLR  -RM     Bilateral Upper Extremity   10 reps;sitting;elbow flexion/extension;shoulder abduction/adduction;shoulder extension/flexion;shoulder ER/IR;shoulder horizontal abd/add  -HE    BUE Resistance   manual resistance- minimal  -HE    Recorded by [LM] Serene Khan, PT [RM] Gabriel Israel PTA [HE] Remigio Galicia    Positioning and Restraints    Pre-Treatment Position in bed  -LM in bed  -RM in bed  -HE    Post Treatment Position chair  -LM bed  -RM bed  -HE    In Bed  supine;call light within reach;encouraged to call for assist;heels elevated;notified nsg  -RM     In Chair sitting;call light within reach;encouraged to call for assist;with OT  -LM      Recorded by [LM] Serene Khan PT [RM] Gabriel Israel PTA [HE] Remigio Galicia      12/11/17 8850          Rehab Assessment/Intervention    Discipline physical therapy assistant  -      Document Type therapy note (daily note)  -RM      Subjective Information agree to therapy;complains of;pain;weakness  -RM      Patient Effort, Rehab Treatment adequate  -RM      Symptoms Noted During/After Treatment fatigue  -RM      Precautions/Limitations fall precautions  -RM      Recorded by [RM] Gabriel Israel PTA      Pain Assessment    Pain Assessment 0-10  -RM      Pain Score 7  -RM      Post Pain Score 7  -RM      Pain Type Chronic pain  -RM       Pain Location Leg  -RM      Pain Orientation Left  -RM      Pain Intervention(s) Repositioned;Ambulation/increased activity  -RM      Response to Interventions tolerated   -RM      Recorded by [RM] Gabriel Israel PTA      Bed Mobility, Assessment/Treatment    Bed Mobility, Assistive Device bed rails;head of bed elevated  -RM      Bed Mob, Supine to Sit, [place] contact guard assist;verbal cues required  -RM      Bed Mobility, Safety Issues decreased use of arms for pushing/pulling;decreased use of legs for bridging/pushing  -RM      Bed Mobility, Impairments strength decreased;impaired balance  -RM      Recorded by [RM] Gabriel Israel PTA      Transfer Assessment/Treatment    Transfers, Sit-Stand [place] contact guard assist;verbal cues required  -RM      Transfers, Stand-Sit [place] contact guard assist;verbal cues required  -RM      Transfers, Sit-Stand-Sit, Assist Device rolling walker  -RM      Transfer, Safety Issues balance decreased during turns;step length decreased;weight-shifting ability decreased  -RM      Transfer, Impairments strength decreased;impaired balance;pain  -RM      Recorded by [] Gabriel Israel PTA      Gait Assessment/Treatment    Gait, [place] Level contact guard assist  -RM      Gait, Assistive Device rolling walker  -RM      Gait, Distance (Feet) 10  -RM      Gait, Safety Issues step length decreased;weight-shifting ability decreased;balance decreased during turns  -RM      Gait, Impairments strength decreased;impaired balance;pain  -RM      Recorded by [] Gabriel Israel PTA      Positioning and Restraints    Pre-Treatment Position in bed  -RM      Post Treatment Position chair  -RM      In Chair reclined;call light within reach;encouraged to call for assist;with family/caregiver;heels elevated  -RM      Recorded by [RM] Gabriel Israel PTA        User Key  (r) = Recorded By, (t) = Taken By, (c) = Cosigned By    Initials Name Effective Dates      Rosalia Sepulveda 10/26/16 -     LM Serene Erin, PT 10/26/16 -     RM Gabriel Israel, PTA 10/26/16 -     HE Remigio W Galicia 06/26/17 -     SD China Logan, OT 06/30/17 -                 IP PT Goals       12/14/17 1624 12/13/17 1154 12/12/17 1758    Gait Training PT LTG    Gait Training Goal PT LTG, Outcome goal ongoing  -LM goal ongoing  -LM     Strength Goal PT LTG    Strength Goal PT LTG, Outcome goal ongoing  -LM goal partially met  -LM goal partially met  -RM      12/11/17 1819 12/10/17 1625 12/09/17 1653    Bed Mobility PT LTG    Bed Mobility PT LTG, Outcome goal met  -RM goal partially met  -CC goal ongoing  -CC    Transfer Training PT LTG    Transfer Training PT LTG, Outcome goal met  -RM goal partially met  -CC goal ongoing  -CC    Gait Training PT LTG    Gait Training Goal PT LTG, Outcome goal ongoing  -RM goal ongoing  -CC goal ongoing  -CC    Strength Goal PT LTG    Strength Goal PT LTG, Outcome  goal partially met  -CC goal ongoing  -CC      12/08/17 1331          Bed Mobility PT LTG    Bed Mobility PT LTG, Date Established 12/08/17  -LM      Bed Mobility PT LTG, Time to Achieve 2 wks  -LM      Bed Mobility PT LTG, Activity Type supine to sit/sit to supine  -LM      Bed Mobility PT LTG, [place] Level contact guard assist  -LM      Bed Mobility PT Goal  LTG, Assist Device bed rails  -LM      Bed Mobility PT LTG, Outcome goal ongoing  -LM      Transfer Training PT LTG    Transfer Training PT LTG, Date Established 12/08/17  -LM      Transfer Training PT LTG, Time to Achieve 2 wks  -LM      Transfer Training PT LTG, Activity Type sit to stand/stand to sit;bed to chair /chair to bed  -LM      Transfer Training PT LTG, [place] Level contact guard assist  -LM      Transfer Training PT LTG, Assist Device walker, rolling  -LM      Transfer Training PT LTG, Outcome goal ongoing  -LM      Gait Training PT LTG    Gait Training Goal PT LTG, Date Established 12/08/17  -LM      Gait Training Goal PT LTG,  Time to Achieve 2 wks  -LM      Gait Training Goal PT LTG, [place] Level contact guard assist  -LM      Gait Training Goal PT LTG, Assist Device walker, rolling  -LM      Gait Training Goal PT LTG, Distance to Achieve 50  -LM      Gait Training Goal PT LTG, Outcome goal ongoing  -LM      Strength Goal PT LTG    Strength Goal PT LTG, Date Established 12/08/17  -LM      Strength Goal PT LTG, Time to Achieve 2 wks  -LM      Strength Goal PT LTG, Measure to Achieve Patient will perform B LE ther ex x 15 reps to improve functional strength for mobility.  -LM      Strength Goal PT LTG, Outcome goal ongoing  -LM        User Key  (r) = Recorded By, (t) = Taken By, (c) = Cosigned By    Initials Name Provider Type    LM Serene Khan, PT Physical Therapist    CC [place] Farr, PTA Physical Therapy Assistant    RM Gabriel Israel, PTA Physical Therapy Assistant          Physical Therapy Education     Title: PT OT SLP Therapies (Done)     Topic: Physical Therapy (Done)     Point: Mobility training (Done)    Learning Progress Summary    Learner Readiness Method Response Comment Documented by Status   Patient Acceptance E VU Ther ex for HEP. LM 12/14/17 1624 Done    Acceptance E VU Ther ex for HEP; proper use of RW for safe tranfers/ambulation. LM 12/13/17 1153 Done    Acceptance E VU  NO 12/13/17 0133 Done    Acceptance E,D VU,NR  RM 12/11/17 1818 Done    Acceptance E VU,NR Increase mobility daily CC 12/10/17 1624 Done    Acceptance E VU Purpose of PT/POC. LM 12/08/17 1331 Done               Point: Home exercise program (Done)    Learning Progress Summary    Learner Readiness Method Response Comment Documented by Status   Patient Acceptance E VU Ther ex for HEP. LM 12/14/17 1624 Done    Acceptance E VU Ther ex for HEP; proper use of RW for safe tranfers/ambulation. LM 12/13/17 1153 Done    Acceptance E VU  NO 12/13/17 0133 Done    Acceptance E,D VU,NR  RM 12/12/17 1758 Done    Acceptance E NR,VU Perform ex daily CC  12/10/17 1501 Done    Acceptance E VU Purpose of PT/POC. LM 12/08/17 1331 Done               Point: Precautions (Done)    Learning Progress Summary    Learner Readiness Method Response Comment Documented by Status   Patient Acceptance E VU Ther ex for HEP. LM 12/14/17 1624 Done    Acceptance E VU Ther ex for HEP; proper use of RW for safe tranfers/ambulation. LM 12/13/17 1153 Done    Acceptance E VU  NO 12/13/17 0133 Done    Acceptance E VU Purpose of PT/POC. LM 12/08/17 1331 Done                      User Key     Initials Effective Dates Name Provider Type Discipline    LM 10/26/16 -  Serene Khan, PT Physical Therapist PT    CC 10/26/16 -  Jamaica Farr, PTA Physical Therapy Assistant PT    RM 10/26/16 -  Gabriel Israel, PTA Physical Therapy Assistant PT    NO 12/13/16 -  Jada Enriquez, RN Registered Nurse Nurse                    PT Recommendation and Plan  Planned Therapy Interventions: balance training, bed mobility training, gait training, home exercise program, patient/family education, strengthening, transfer training  PT Frequency: daily  Plan of Care Review  Plan Of Care Reviewed With: patient  Progress: improving  Outcome Summary/Follow up Plan: Pt continues to c/o weakness but is agreeable to work with PT.  She requires CGA sit to stand and to ambulate 4 feet with bedside chair with RW.  She is able to perform B LE ther ex as indicated on care map.  Cont to goals.          Outcome Measures       12/14/17 1451 12/14/17 1442 12/13/17 1135    How much help from another person do you currently need...    Turning from your back to your side while in flat bed without using bedrails? 3  -LM      Moving from lying on back to sitting on the side of a flat bed without bedrails? 3  -LM      Moving to and from a bed to a chair (including a wheelchair)? 3  -LM      Standing up from a chair using your arms (e.g., wheelchair, bedside chair)? 3  -LM      Climbing 3-5 steps with a railing? 2  -LM      To walk in  hospital room? 3  -LM      AM-PAC 6 Clicks Score 17  -LM      How much help from another is currently needed...    Putting on and taking off regular lower body clothing?  2  - 2  -SD    Bathing (including washing, rinsing, and drying)  2  - 2  -SD    Toileting (which includes using toilet bed pan or urinal)  2  - 2  -SD    Putting on and taking off regular upper body clothing  3  - 3  -SD    Taking care of personal grooming (such as brushing teeth)  3  - 3  -SD    Eating meals  4  - 4  -SD    Score  16  - 16  -SD    Functional Assessment    Outcome Measure Options AM-PAC 6 Clicks Basic Mobility (PT)  -LM AM-[facility] 6 Clicks Daily Activity (OT)  [facility]  -SD      12/13/17 1132 12/12/17 1310       How much help from another person do you currently need...    Turning from your back to your side while in flat bed without using bedrails? 3  -LM      Moving from lying on back to sitting on the side of a flat bed without bedrails? 3  -LM      Moving to and from a bed to a chair (including a wheelchair)? 3  -LM      Standing up from a chair using your arms (e.g., wheelchair, bedside chair)? 3  -LM      Climbing 3-5 steps with a railing? 2  -LM      To walk in hospital room? 3  -LM      AM-PAC 6 Clicks Score 17  -LM      How much help from another is currently needed...    Putting on and taking off regular lower body clothing?  2  -HE     Bathing (including washing, rinsing, and drying)  2  -HE     Toileting (which includes using toilet bed pan or urinal)  2  -HE     Putting on and taking off regular upper body clothing  3  -HE     Taking care of personal grooming (such as brushing teeth)  3  -HE     Eating meals  4  -HE     Score  16  -     Functional Assessment    Outcome Measure Options -PAC 6 Clicks Basic Mobility (PT)  -LM -[facility] 6 Clicks Daily Activity (OT)  -       User Key  (r) = Recorded By, (t) = Taken By, (c) = Cosigned By    Initials Name Provider Type    On license of [facility]  [place] Occupational Therapist    LM Serene Khan, PT Physical Therapist    JENELLE Galicia Occupational Therapist    JOSE Logan, OT Occupational Therapist           Time Calculation:         PT Charges       12/14/17 1626          Time Calculation    Start Time 1451  -LM      PT Received On 12/14/17  -LM      Time Calculation- PT    Total Timed Code Minutes- PT 15 minute(s)  -LM        User Key  (r) = Recorded By, (t) = Taken By, (c) = Cosigned By    Initials Name Provider Type    LM Serene Khan, PT Physical Therapist          Therapy Charges for Today     Code Description Service Date Service Provider Modifiers Qty    08033959701 HC PT THER PROC EA 15 MIN 12/13/2017 Serene Khan, PT GP 1    [id number] HC PT THER PROC EA 15 MIN 12/14/2017 Serene Khan, PT GP 1          PT G-Codes  Outcome Measure Options: AM-PAC 6 Clicks Basic Mobility (PT)    Serene Khan, PT  12/14/2017

## 2017-12-15 PROBLEM — I48.91 ATRIAL FIBRILLATION WITH RVR (HCC): Status: ACTIVE | Noted: 2017-01-01

## 2017-12-15 NOTE — PROGRESS NOTES
"Pt experienced episode of hypotension with AF with RVR when getting up to bathe.  Transferred to CCU.    Visited pt this afternoon.  Pt drowsy but did awaken when I entered room.  She reports feeling \"very tired\".  Stated she did not sleep much last night.  Pt reports recently medicated for pain.  Visit kept short at this time.  Informed her I would continue to follow her.    Pt's three daughters in waiting room.  The reported pt has been having leg pain since \"they have been trying to get the fluid off her legs\".   Reported pt was scheduled to have the dialysis cath placed today but that has been post-poned.   Reassured them at this time it seems her BP a little low but is stable.  Will continue to follow  "

## 2017-12-15 NOTE — CONSULTS
Referring Provider: Cesar   Reason for Consultation: afib with rapid vent rate     Patient Care Team:  Pieter Farias DO as PCP - General (Internal Medicine)        History of present illness:  Elderly lady with multiple medical problems presented with swelling of the lower extremities loss of appetite is being diuresed aggressively for the leg edema has been noted to be in renal failure also.  He is being planned for possible dialysis with a temporary dialysis catheter placement soon.  This morning she was getting a bad when she abruptly get into atrial fibrillation with rapid ventricular rate.  Has received some bolus fluids with no significant improvement subsequently has been transferred down received IV amiodarone therapy.  Review of Systems   Pertinent items are noted in HPI  Review of Systems      History    EKG: Sinus bradycardia low-voltage complexes.  Nonspecific ST wave changes.    #2 atrial fibrillation-for the last 2 years on rate control therapy with Inderal.    #3 coronary artery disease-no recent workup at    #4 and a graduation therapy.    #5 cirrhosis of the liver with portal hypertension.    #6 chronic leg edema.    #7 minimal functional status with leg being in a dependent position most of the times.    #8 cor pulmonale-severe RV dilatation reduced RV systolic function with elevated pulmonary pressures echo 9/17.    #9 DNR CODE STATUS    #10 severe biatrial enlargement.    Personal history: Nonsmoker does not drink alcohol to be struck function status the patient is minimal to none.    Family history review of symptoms not available.  Past Surgical History:   Procedure Laterality Date   • ENDOSCOPY     • GALLBLADDER SURGERY     • HERNIA REPAIR     • HYSTERECTOMY     • REPLACEMENT TOTAL KNEE Right    , Family History   Problem Relation Age of Onset   • Hypertension Mother    • Cancer Father      prostate   • Hypertension Father    • Diabetes Sister    • Hypertension Sister    •  Hypertension Brother    • Cancer Daughter      breast   , Social History   Substance Use Topics   • Smoking status: Never Smoker   • Smokeless tobacco: Never Used   • Alcohol use No   , Prescriptions Prior to Admission   Medication Sig Dispense Refill Last Dose   • pantoprazole (PROTONIX) 40 MG EC tablet Take 40 mg by mouth Daily.   12/7/2017   • atorvastatin (LIPITOR) 10 MG tablet Take 1 tablet by mouth Every Night. 30 tablet 0 12/6/2017   • bumetanide (BUMEX) 1 MG tablet Take 1 tablet by mouth 2 (Two) Times a Day. 60 tablet 0 12/7/2017   • cyanocobalamin 1000 MCG/ML injection Inject 1 mL into the shoulder, thigh, or buttocks Every 28 (Twenty-Eight) Days. (Patient taking differently: Inject 1,000 mcg into the shoulder, thigh, or buttocks Every 14 (Fourteen) Days.) 1 mL 0 12/7/2017   • digoxin (LANOXIN) 125 MCG tablet Take 1 tablet by mouth Every Other Day. 30 tablet 0 12/7/2017   • epoetin parveen (EPOGEN,PROCRIT) 20739 UNIT/ML injection Inject 1 mL under the skin Every 14 (Fourteen) Days. Indications: Chronic Hemolytic Anemia 1 each 0 12/7/2017   • HYDROcodone-acetaminophen (NORCO)  MG per tablet Take 1 tablet by mouth Every 6 (Six) Hours As Needed for Moderate Pain . 90 tablet 0 12/7/2017   • ipratropium-albuterol (DUO-NEB) 0.5-2.5 mg/mL nebulizer Take 3 mL by nebulization Every 6 (Six) Hours. 180 mL 0 12/7/2017   • lactulose (CHRONULAC) 10 GM/15ML solution Take 15 mL by mouth 3 (Three) Times a Day. 1892 mL 6 12/7/2017   • levoFLOXacin (LEVAQUIN) 750 MG tablet Take 1 tablet by mouth Daily. (Patient not taking: Reported on 12/8/2017) 4 tablet 0 Not Taking at Unknown time   • Nebulizers (COMPRESSOR/NEBULIZER) misc For use with nebulizer solution, Q6H prn soa 1 each 0 12/7/2017   • ondansetron (ZOFRAN) 4 MG tablet Take 4 mg by mouth Every 8 (Eight) Hours As Needed for Nausea or Vomiting.   12/7/2017   • propranolol (INDERAL) 40 MG tablet Take 1 tablet by mouth Every 8 (Eight) Hours. 90 tablet 0 Unknown at Unknown  "time   • rifaximin (XIFAXAN) 550 MG tablet Take 1 tablet by mouth Every 12 (Twelve) Hours. 60 tablet 5 12/7/2017   • spironolactone (ALDACTONE) 100 MG tablet Take 1 tablet by mouth 2 (Two) Times a Day. 60 tablet 3 12/7/2017   • warfarin (COUMADIN) 3 MG tablet Take 1 tablet by mouth once daily 30 tablet 0 12/7/2017   , Scheduled Meds:    allopurinol 100 mg Oral BID   b complex-vitamin c-folic acid 1 tablet Oral Daily   calcium acetate 667 mg Oral TID With Meals   digoxin 125 mcg Oral Every Other Day   fluconazole 100 mg Oral Q24H   lactulose 10 g Oral TID   metoprolol tartrate 2.5 mg Intravenous Once   midodrine 5 mg Oral TID AC   pantoprazole 40 mg Oral Daily   rifaximin 550 mg Oral Q12H   sodium polystyrene 30 g Oral Daily   , Continuous Infusions:   , PRN Meds:  •  acetaminophen  •  HYDROcodone-acetaminophen  •  ipratropium-albuterol  •  ondansetron **OR** ondansetron ODT **OR** ondansetron  •  sodium chloride, Allergies:  Cefepime     Objective     Vital Sign Min/Max for last 24 hours  Temp  Min: 97.9 °F (36.6 °C)  Max: 98.4 °F (36.9 °C)   BP  Min: 64/50  Max: 104/58   Pulse  Min: 66  Max: 146   Resp  Min: 17  Max: 18   SpO2  Min: 97 %  Max: 100 %   No Data Recorded   No Data Recorded     Flowsheet Rows         First Filed Value    Admission Height  170.2 cm (67\") Documented at 12/07/2017 2247    Admission Weight  109 kg (240 lb) Documented at 12/07/2017 2247               Physical Exam:     General Appearance:    Alert, cooperative, in no acute distress   Head:    Normocephalic, without obvious abnormality, atraumatic   Eyes:            Lids and lashes normal, conjunctivae and sclerae normal, no   icterus, no pallor, corneas clear, PERRLA   Ears:    Ears appear intact with no abnormalities noted   Throat:   No oral lesions, no thrush, oral mucosa moist   Neck:   No adenopathy, supple, trachea midline, no thyromegaly, no     carotid bruit, no JVD   Back:     No kyphosis present, no scoliosis present, no skin " lesions,       erythema or scars, no tenderness to percussion or                   palpation,   range of motion normal   Lungs:     Clear to auscultation,respirations regular, even and                   unlabored    Heart:    Regular rhythm and normal rate, normal S1 and S2, no            murmur, no gallop, no rub, no click   Breast Exam:    Deferred   Abdomen:   Ascites grade 3.     Genitalia:    Deferred   Extremities: 2+ leg edema present.  Ulcers under rapid this time.     Pulses:   Pulses palpable and equal bilaterally   Skin:   No bleeding, bruising or rash   Lymph nodes:   No palpable adenopathy   Neurologic:   Cranial nerves 2 - 12 grossly intact, sensation intact, DTR        present and equal bilaterally         Results Review:   I reviewed the patient's new clinical results.    EKG: Sinus bradycardia and nonspecific ST and T-wave changes poor R-wave progression with low-voltage complexes.    LAB DATA :         WBC   Date Value Ref Range Status   12/15/2017 7.76 4.80 - 10.80 10*3/mm3 Final     RBC   Date Value Ref Range Status   12/15/2017 2.98 (L) 4.20 - 5.40 10*6/mm3 Final     Hemoglobin   Date Value Ref Range Status   12/15/2017 9.1 (L) 12.0 - 16.0 g/dL Final     Hematocrit   Date Value Ref Range Status   12/15/2017 28.0 (L) 37.0 - 47.0 % Final     MCV   Date Value Ref Range Status   12/15/2017 94.0 81.0 - 99.0 fL Final     MCH   Date Value Ref Range Status   12/15/2017 30.5 27.0 - 31.0 pg Final     MCHC   Date Value Ref Range Status   12/15/2017 32.5 30.0 - 37.0 g/dL Final     RDW   Date Value Ref Range Status   12/15/2017 19.1 (H) 11.5 - 14.5 % Final     RDW-SD   Date Value Ref Range Status   12/15/2017 64.1 (H) 37.0 - 54.0 fl Final     MPV   Date Value Ref Range Status   12/15/2017 9.3 6.0 - 12.0 fL Final     Platelets   Date Value Ref Range Status   12/15/2017 116 (L) 130 - 400 10*3/mm3 Final       Lab Results   Component Value Date    GLUCOSE 124 (H) 12/15/2017    BUN 96 (C) 12/15/2017    CREATININE  3.50 (H) 12/15/2017    EGFRIFNONA 13 (L) 12/15/2017    BCR 27.4 (H) 12/15/2017    CO2 17.0 (L) 12/15/2017    CALCIUM 7.5 (L) 12/15/2017    ALBUMIN 2.00 (L) 12/15/2017    LABIL2 0.6 (L) 12/11/2017    AST 32 12/11/2017    ALT 35 12/11/2017       Lab Results   Component Value Date    CKTOTAL 83 11/26/2017    CKMB 0.3 07/29/2015    CKMBINDEX 0 07/29/2015    TROPONINI 0.047 (H) 12/08/2017       No results found for: DDIMER    No results found for: SITE, ALLENTEST, PHART, BII2KUT, PO2ART, VAV4TFG, BASEEXCESS, V0DWCOPZ, HGBBG, HCTABG, OXYHEMOGLOBI, METHHGBN, CARBOXYHGB, CO2CT, BAROMETRIC, MODALITY, FIO2  No results found for: HGBA1C      Lab Results   Component Value Date    LIPASE 113 10/10/2017       IMAGING DATA:     Ct Abdomen Pelvis Without Contrast    Result Date: 12/8/2017  Narrative: PROCEDURE: CT ABDOMEN PELVIS WO CONTRAST-  HISTORY: N/V, MODESTA, weakness  COMPARISON: None .  PROCEDURE: Axial images were obtained from the lung bases through the pubic symphysis without intravenous contrast.    .  FINDINGS:  ABDOMEN: Lack of intravenous contrast limits evaluation of the solid organs, the mediastinum, and the vasculature. Mild atelectasis in the bibasilar regions.The limited noncontrast images of the liver demonstrate a nodular border suggestive of cirrhosis. There are subcentimeter nodular areas that are not well assessed without contrast. Esophageal and splenic varices are noted hiatal hernia is noted. Additionally there is a ventral hernia containing nondistended loops of transverse colon. The gallbladder is absent . The spleen is normal. 1.7 cm right adrenal adenoma.  The pancreas is atrophic. Nonobstructing stone present within the right collecting system. There is no hydronephrosis. The aorta is normal in caliber. There is no significant free fluid or adenopathy.  No abnormally dilated loops of bowel are appreciated. Postoperative changes are seen to small bowel. There is retained stool throughout the colon.   Nonspecific body wall edema is noted.  PELVIS: The appendix is not identified. The urinary bladder is unremarkable. There is no significant fluid or adenopathy. There are degenerative changes of the spine.         Impression: 1. No evidence of an obstructive uropathy. 2. Significant interval improvement of ascites and pleural effusions compared to prior. 3. Ventral wall defect containing nondistended loop of transverse colon. 4. Cirrhotic appearance to the liver with subcentimeter nodular densities, dedicated follow-up imaging of the liver is recommended.. 5. 1.7 cm right adrenal adenoma.          .     This study was performed with techniques to keep radiation doses as low as reasonably achievable (ALARA). Individualized dose reduction techniques using automated exposure control or adjustment of mA and/or kV according to the patient size were employed.  This report was finalized on 12/8/2017 7:26 AM by Shabbir Sher DO.    Ct Head Without Contrast    Result Date: 11/25/2017  Narrative: PROCEDURE: CT HEAD WO CONTRAST-  HISTORY: confusion, elderly, on coumadin  TECHNIQUE: Multiple axial CT images were performed from the foramen magnum to the vertex without contrast. Coronal reformatted images were also obtained.  FINDINGS: Comparison is made to the prior exam dated October 10, 2017. Low-attenuation is again seen in the cerebral white matter consistent with moderate to severe chronic ischemic changes.The ventricles are normal in size. There is no evidence of hemorrhage .  No masses are identified.  No abnormal extra-axial fluid collection is seen.  There is no evidence of shift of the midline structures. Images of the sinuses reveal no evidence of mucosal thickening. No bony abnormality is seen on the bone window images.         Impression: No acute intracranial abnormality.  Moderate to severe chronic ischemic changes, stable.   This study was performed with techniques to keep radiation doses as low as reasonably  achievable (ALARA). Individualized dose reduction techniques using automated exposure control or adjustment of mA and/or kV according to the patient size were employed.  This report was finalized on 11/25/2017 7:56 AM by Chele Blair MD.    Us Renal Limited    Result Date: 11/27/2017  Narrative: PROCEDURE: US RENAL LIMITED-  HISTORY: MODESTA, CKD4; K72.90-Hepatic failure, unspecified without coma; N17.9-Acute kidney failure, unspecified; N18.9-Chronic kidney disease, unspecified; D68.9-Coagulation defect, unspecified; T45.515A-Adverse effect of anticoagulants, initial encounter; J18.1-Lobar pneumonia, unspecified organism; Z74.09-Other reduced mobility  PROCEDURE: Ultrasound images of the kidneys were obtained.  FINDINGS:.  The kidneys are imaged by ultrasound with the right kidney measuring 6.7 cm and the left kidney measuring 9.7 cm. There is bilateral renal cortical thinning. There is no cystic or solid mass. There is no hydronephrosis.      Impression: Bilateral renal cortical thinning which can be associated with medical renal disease.    Images were reviewed, interpreted, and dictated by Dr. Sher. Transcribed by Laura Yao PA-C.  This report was finalized on 11/27/2017 11:33 AM by Shabbir Sher DO.    Xr Chest 1 View    Result Date: 12/8/2017  Narrative: PROCEDURE: XR CHEST 1 VW-  HISTORY: Weak/Dizzy/AMS triage protocol  COMPARISON: November 27, 2017.  FINDINGS: Electrodes overlie the chest. The heart is normal in size. The mediastinum is unremarkable. There are chronic interstitial changes. There is no pneumothorax.  There are no acute osseous abnormalities.         Impression: No acute cardiopulmonary process.  Continued followup is recommended.  This report was finalized on 12/8/2017 7:28 AM by Shabbir Sher DO.    Xr Chest 1 View    Result Date: 11/27/2017  Narrative: PROCEDURE: XR CHEST 1 VW-  HISTORY: pneumonia follow up; K72.90-Hepatic failure, unspecified without coma; N17.9-Acute kidney failure,  unspecified; N18.9-Chronic kidney disease, unspecified; D68.9-Coagulation defect, unspecified; T45.515A-Adverse effect of anticoagulants, initial encounter; J18.1-Lobar pneumonia, unspecified organism; Z74.09-Other reduced mobility  COMPARISON: November 25, 2017.  FINDINGS: Electrodes overlie the chest. The heart is normal in size. The mediastinum is unremarkable. There is evidence of calcified granulomatous disease. There are chronic interstitial changes. There is no pneumothorax.  There are no acute osseous abnormalities. Atherosclerosis is noted.      Impression: No acute cardiopulmonary process.  Continued followup is recommended.  This report was finalized on 11/27/2017 2:12 PM by Shabbir Sher DO.    Xr Chest 1 View    Result Date: 11/25/2017  Narrative: PROCEDURE: XR CHEST 1 VW-  HISTORY: Weak/Dizzy/AMS triage protocol.  COMPARISON: October 10, 2017.   FINDINGS:    . The heart is normal in size. The mediastinum is unremarkable. There are low lung volumes. Bibasilar pulmonary opacities are seen left worse than right which may represent atelectasis or pneumonia. There is no pneumothorax. The bony thorax in intact.         Impression: Bibasilar atelectasis or pneumonia, greater on the left.   This report was finalized on 11/25/2017 8:14 AM by Chele Blair MD.    Xr Abdomen Kub    Result Date: 11/25/2017  Narrative: FINAL REPORT CLINICAL HISTORY: NG TUBE PLACEMENT FINDINGS: SINGLE VIEW ABDOMEN. A single view of the abdomen demonstrates a nonspecific, nonobstructive bowel gas pattern. There are no abnormally dilated loops of small bowel.  No abnormal calcifications are identified. An NG tube terminates in the proximal stomach.     Impression: Nonspecific bowel gas pattern. Authenticated by JUAN Escobar MD on 11/25/2017 01:53:57 PM        DIAGNOSIS  #1 atrial fibrillation with rapid ventricular rate: Patient is having paroxysmal atrial fibrillation with recurrence at this time.  Her blood pressures are low.   She is a high risk candidate for amiodarone infusion secondary to her cirrhosis of the liver.  Would discontinue the amiodarone infusion at this time.  The reason for the tachycardia appears to be the discontinuation of the beta blocker that she was on at home.  Would resume the same at this time.     #2 hypotension: A major aspect of her hypertension appears to be secondary to right heart failure with dilated RV which is the consequence of the pulmonary hypertension in the cirrhosis of the liver.  Aggressive diuresis makes this clinical syndrome much worse.  Would hold off on the aggressive diuresis.  Dialysis also should help.    #3 coronary artery disease: Has a reasonable risk profile for the same.  Using Midodrin would be a double milian.  Will make sure the troponin is not escalating steeply and use that for her blood pressure control.     #4 cor pulmonale: Patient has cor pulmonale secondary to liver failure which is extremely difficult hemodynamic patent.  And this in turn has led to renal failure also.  Would consider dobutamine infusion if she has blood pressure issues after the volume status has been stabilized.    #5 cirrhosis with hepatorenal syndrome: Patient has advanced cirrhosis with hepatorenal syndrome at this time.  Overall prognosis of the patient is extremely poor.    Prognosis of the patient appears to be guarded to very poor.  Thank you for letting me take part in the care of Mrs. Hodan Carter.            Principal Problem:    ESRF (end stage renal failure)  Active Problems:    Cirrhosis of liver with ascites    Chronic kidney disease, stage IV (severe)    Permanent atrial fibrillation    Lymphedema of both lower extremities    Dehydration    Acute renal failure    Hyperkalemia      I discussed the patients findings and my recommendations with patient    Tae Irby MD  12/15/17  8:15 AM      Please note that portions of this note may have been completed with a voice recognition  program. Efforts were made to edit the dictations, but occasionally words are mistranscribed.

## 2017-12-15 NOTE — SIGNIFICANT NOTE
12/15/17 0905   Rehab Treatment   Discipline physical therapist   Rehab Evaluation   Evaluation Not Performed unable to evaluate, medical status change  (Hold PT re-eval this date d/t BP too low to safely advance activity. If BP rises, pt is scheduled to have a dialysis cath placed and initiate dialysis.  PT to follow up with pt tomorrow and proceed with re-eval as appropriate and tolerated.)

## 2017-12-15 NOTE — PLAN OF CARE
Problem: Patient Care Overview (Adult)  Goal: Plan of Care Review  Outcome: Ongoing (interventions implemented as appropriate)    12/15/17 1642   Outcome Evaluation   Outcome Summary/Follow up Plan monitoring bp and labs   Coping/Psychosocial Response Interventions   Plan Of Care Reviewed With patient;family       Goal: Adult Individualization and Mutuality  Outcome: Ongoing (interventions implemented as appropriate)  Goal: Discharge Needs Assessment  Outcome: Ongoing (interventions implemented as appropriate)    Problem: Pain, Acute (Adult)  Goal: Acceptable Pain Control/Comfort Level  Outcome: Ongoing (interventions implemented as appropriate)    12/15/17 1642   Pain, Acute (Adult)   Acceptable Pain Control/Comfort Level (pt has chronic leg pain)         Problem: Renal Failure/Kidney Injury, Acute (Adult)  Goal: Signs and Symptoms of Listed Potential Problems Will be Absent or Manageable (Renal Failure/Kidney Injury, Acute)  Outcome: Ongoing (interventions implemented as appropriate)    12/15/17 1642   Renal Failure/Kidney Injury, Acute   Problems Assessed (Acute Renal Failure/Kidney Injury) all   Problems Present (Acute Renal Failure/Kidney Injury) hematologic complications  (Jay Farias and Mahamed monitoring pt)         Problem: Pressure Ulcer (Adult)  Goal: Signs and Symptoms of Listed Potential Problems Will be Absent or Manageable (Pressure Ulcer)  Outcome: Ongoing (interventions implemented as appropriate)    12/15/17 1642   Pressure Ulcer   Problems Assessed (Pressure Ulcer) all   Problems Present (Pressure Ulcer) pain

## 2017-12-15 NOTE — PROGRESS NOTES
[facility]    Name:  Hodan Carter   Age:  77 y.o.  Sex:  female  :  1940  MRN:  [id number]   Visit Number:  [id number]  Admission Date:  2017  Date Of Service:  12/15/17  Primary Care Physician:  Pieter Farias DO     LOS: 7 days :  Patient Care Team:  Pieter Farias DO as PCP - General (Internal Medicine):    Chief Complaint:    Renal Failure / Hypotension    Subjective / Interval History:   Patient had significant hypotension and episode of Afib with RVR after she had gotten up to take a bath.  Overnight, she was bolused fluid and started on Amiodarone drip  Following transfer to ICU.  Patient's mental status has improved and she is feeling well this AM.     Review of Systems:   Review of Systems   Constitutional: Negative for chills, fatigue and fever.   HENT: Negative for congestion, ear pain, rhinorrhea, sinus pressure and sore throat.    Eyes: Negative for visual disturbance.   Respiratory: Negative for cough, chest tightness, shortness of breath and wheezing.    Cardiovascular: Positive for leg swelling. Negative for chest pain and palpitations.   Gastrointestinal: Negative for abdominal pain, blood in stool, constipation, diarrhea, nausea and vomiting.   Endocrine: Negative for polydipsia and polyuria.   Genitourinary: Negative for dysuria and hematuria.   Musculoskeletal: Negative for back pain.   Skin: Negative for rash.   Neurological: Negative for dizziness, light-headedness, numbness and headaches.   Psychiatric/Behavioral: Negative for dysphoric mood and sleep disturbance. The patient is not nervous/anxious.          Vital Signs:    Temp:  [97.5 °F (36.4 °C)-98.4 °F (36.9 °C)] 97.5 °F (36.4 °C)  Heart Rate:  [] 112  Resp:  [11-18] 11  BP: ()/(41-73) 84/69    Intake and output:    I/O last 3 completed shifts:  In: 240 [P.O.:240]  Out: -        Physical Examination:  Physical Exam   Constitutional: She is oriented to  person, place, and time.   Chronically ill appearing elderly female, NAD   HENT:   Head: Normocephalic and atraumatic.   Mouth/Throat: Oropharynx is clear and moist.   Eyes: Conjunctivae are normal. Pupils are equal, round, and reactive to light.   Neck: Normal range of motion. No JVD present. No thyromegaly present.   Cardiovascular: Normal rate, regular rhythm and normal heart sounds.    No murmur heard.  Pulmonary/Chest: Effort normal and breath sounds normal. No respiratory distress. She has no wheezes.   Abdominal: Soft. Bowel sounds are normal. She exhibits no distension. There is no tenderness. There is no rebound and no guarding.   Musculoskeletal: Normal range of motion. She exhibits no edema or tenderness.   3+ pitting edema bilateral lower extremities   Neurological: She is alert and oriented to person, place, and time.   Skin: Skin is warm and dry.   Psychiatric: She has a normal mood and affect. Her behavior is normal.         Laboratory results:  I have reviewed the patient's laboratory results.      Results from last 7 days  Lab Units 12/15/17  0658 12/14/17  0649 12/13/17  1424  12/11/17  0536 12/10/17  0610 12/09/17  0639   SODIUM mmol/L 130* 128* 128*  < > 129* 127* 126*   POTASSIUM mmol/L 4.2 5.1 5.6*  < > 5.3* 5.1 5.2*   CHLORIDE mmol/L 101 101 101  < > 103 103 102   CO2 mmol/L 17.0* 16.0* 15.0*  < > 17.0* 15.0* 12.0*   BUN mg/dL 96* 97* 95*  < > 90* 82* 79*   CREATININE mg/dL 3.50* 3.80* 4.00*  < > 4.40* 4.40* 4.40*   CALCIUM mg/dL 7.5* 7.5* 7.8*  < > 8.4 8.4 8.3*   BILIRUBIN mg/dL  --   --   --   --  0.7 0.7 0.6   ALK PHOS U/L  --   --   --   --  171* 177* 173*   ALT (SGPT) U/L  --   --   --   --  35 27 33   AST (SGOT) U/L  --   --   --   --  32 32 34   GLUCOSE mg/dL 124* 79 96  < > 97 81 87   < > = values in this interval not displayed.    Results from last 7 days  Lab Units 12/15/17  0658 12/14/17  0649 12/13/17  0556   WBC 10*3/mm3 7.76 6.41 5.39   HEMOGLOBIN g/dL 9.1* 8.3* 8.5*   HEMATOCRIT  % 28.0* 25.4* 26.8*   PLATELETS 10*3/mm3 116* 131 113*       Results from last 7 days  Lab Units 12/15/17  0658 12/14/17  0649 12/13/17  0556   INR  1.42* 1.46* 1.76*       Results from last 7 days  Lab Units 12/15/17  0658   TROPONIN I ng/mL 0.109*           Radiology results:  I have reviewed the patient's radiology reports.  Imaging Results (last 24 hours)     ** No results found for the last 24 hours. **              Medication Review:   I have reviewed the patients active and prn medications.     Assessment:  Principal Problem:    ESRF (end stage renal failure)  Active Problems:    Cirrhosis of liver with ascites    Chronic kidney disease, stage IV (severe)    Permanent atrial fibrillation    Lymphedema of both lower extremities    Dehydration    Acute renal failure    Hyperkalemia    Atrial fibrillation with RVR    Plan:    Unfortunately patient likely fell in to Afib episode due to over-diuresis.  IVF has helped and patient has been started back on betablocker, Amiodarone drip was stopped due to multiple co morbidities.  Medications have been adjusted and patient has been started on midodrine per Dr. Irby, His recommendations were appreciated.    Dr. Franco has decided to hold on dialysis for today as renal function appears to be stable. Tunnel dialysis cath placement also on hold for now.     We will monitor the patient in the ICU for today and consider moving back to the floor tomorrow if medically appropriate.  Her low BP is expected given liver disease, boluses and vasopressors may be considered in cases of mental status changes.     Patient's overall prognosis remains very poor.  We will continue current measures and family is very involved for major decisions as they come.      Pieter Farias DO  12/15/17  12:57 PM

## 2017-12-15 NOTE — PLAN OF CARE
Problem: Patient Care Overview (Adult)  Goal: Plan of Care Review  Outcome: Ongoing (interventions implemented as appropriate)    12/15/17 0101   Coping/Psychosocial Response Interventions   Plan Of Care Reviewed With patient   Patient Care Overview   Progress no change

## 2017-12-15 NOTE — PROGRESS NOTES
Called by RN to evaluate patient for tachycardia.   Patient's HR noted to be in 140s during normal vital check with BP in 90s/50s.   Evaluated patient immediately at the bedside.   She reports feeling fine. Denies any palpitations, chest pain, soa, headache or dizziness. She does state that she has history of atrial fibrillation and that she has seen Dr. Irby in the past. She's unsure of the medications she takes at home.   On exam, she's tachycardic, irregularly irregular, lungs ctab, no edema  EKG noted - a. Fib with rvr with rate 130s  Will go ahead and give small bolus of 250cc and see if there's any improvement in her heart rate. If BP tolertes will give 5mg IV metoprolol.   Of note, she takes propranolol at home which has been held since admission, likely due to hypotension.     No improvement in BP despite IV bolus x 2. I'm unable to give her digoxin due to renal failure, bb and cbb due to hypotension. As she already has liver cirrhosis, Amiodarone seems to be my last option. I would like to initiate amiodarone.     Spoke with patient's daughter Rita around 4:45AM regarding initiation of Amiodarone. She would like to discuss with the rest of the family members and call me back. Awaiting return phone call.     Patient will be transferred to the ICU and initiated on amiodarone drip.   Dr. Irby will consult tomorrow morning.

## 2017-12-15 NOTE — PROGRESS NOTES
"Nephrology Progress Note.    LOS: 7 days    Patient Care Team:  Pieter Farias DO as PCP - General (Internal Medicine)    Chief Complaint:    Chief Complaint   Patient presents with   • Fatigue       Subjective     Interval History:     Patient Complaints: none    Patient seen and examined this morning.  Events from last night noted.  Patient denies having any fevers chills.  No nausea or vomiting no abdominal pain.  Denies any chest pain shortness of breath cough or sputum production.  There is no significant edema in the upper body but both lower extremities still has some swelling. Patient also denies having new onset weakness of numbness of either extremity, She did say that she wants to do dialysis if needed. Today she is complaining about having poor taste has not been eating well.  Details about dialysis discussed with her today and she seems to be okay with it. She was transferred to ICU because of A. fib with RVR.  Her renal function has improved potassium is down.    History taken from: patient      Review of Systems:    The pertinent  ROS was done and it is noted above, rest  was negative.    Objective     Vital Signs  BP (!) 89/58  Pulse 105  Temp 98 °F (36.7 °C) (Oral)   Resp 18  Ht 170.2 cm (67\")  Wt 100 kg (221 lb 1.6 oz)  SpO2 99%  BMI 34.63 kg/m2         No intake or output data in the 24 hours ending 12/15/17 1120    Physical Exam:    General Appearance: alert, oriented x 3, no acute distress,   HEENT: pupils round and reactive to light, oral mucosa dry, extra occular movements intact.  Neck: supple, no JVD, trachea midline  Lungs: Clear to Auscultation, unlabored breathing effort  Heart: IRRR, normal S1 and S2, no S3, no rub  Abdomen: soft, non-tender, no palpable bladder, present bowel sounds to auscultation  Extremities: 1-2+ bilateral lower extremity edema, No cyanosis or clubbing.   Neuro: normal speech and mental status, grossly non focal.     Results Review:      Results from last " 7 days  Lab Units 12/15/17  0658 12/14/17  0649 12/13/17  1424  12/11/17  0536 12/10/17  0610 12/09/17  0639   SODIUM mmol/L 130* 128* 128*  < > 129* 127* 126*   POTASSIUM mmol/L 4.2 5.1 5.6*  < > 5.3* 5.1 5.2*   CHLORIDE mmol/L 101 101 101  < > 103 103 102   CO2 mmol/L 17.0* 16.0* 15.0*  < > 17.0* 15.0* 12.0*   BUN mg/dL 96* 97* 95*  < > 90* 82* 79*   CREATININE mg/dL 3.50* 3.80* 4.00*  < > 4.40* 4.40* 4.40*   CALCIUM mg/dL 7.5* 7.5* 7.8*  < > 8.4 8.4 8.3*   BILIRUBIN mg/dL  --   --   --   --  0.7 0.7 0.6   ALK PHOS U/L  --   --   --   --  171* 177* 173*   ALT (SGPT) U/L  --   --   --   --  35 27 33   AST (SGOT) U/L  --   --   --   --  32 32 34   GLUCOSE mg/dL 124* 79 96  < > 97 81 87   < > = values in this interval not displayed.    Estimated Creatinine Clearance: 15.8 mL/min (by C-G formula based on Cr of 3.5).      Results from last 7 days  Lab Units 12/15/17  0658 12/14/17 0649 12/13/17  1424  12/11/17  0536 12/10/17  0610  12/09/17  0639   MAGNESIUM mg/dL  --   --   --   --  2.6* 2.5*  --  2.4*   PHOSPHORUS mg/dL 5.8* 5.6* 5.4*  < > 6.3* 6.0*  < >  --    < > = values in this interval not displayed.      Results from last 7 days  Lab Units 12/11/17  0536 12/10/17  0610   URIC ACID mg/dL 10.2* 10.4*         Results from last 7 days  Lab Units 12/15/17  0658 12/14/17  0649 12/13/17  0556 12/12/17  0611 12/11/17  0536   WBC 10*3/mm3 7.76 6.41 5.39 4.94 6.14   HEMOGLOBIN g/dL 9.1* 8.3* 8.5* 8.8* 9.1*   PLATELETS 10*3/mm3 116* 131 113* 97* 120*         Results from last 7 days  Lab Units 12/15/17  0658 12/14/17  0649 12/13/17  0556 12/12/17  0611 12/11/17  0536   INR  1.42* 1.46* 1.76* 2.17* 2.81*         Imaging Results (last 24 hours)     ** No results found for the last 24 hours. **          [START ON 12/16/2017] allopurinol 100 mg Oral Daily   b complex-vitamin c-folic acid 1 tablet Oral Daily   calcium acetate 667 mg Oral TID With Meals   digoxin 125 mcg Oral Every Other Day   fluconazole 100 mg Oral Q24H    lactulose 10 g Oral TID   metoprolol tartrate 2.5 mg Intravenous Q6H   midodrine 5 mg Oral TID AC   pantoprazole 40 mg Oral Daily   rifaximin 550 mg Oral Q12H          Medication Review:   Current Facility-Administered Medications   Medication Dose Route Frequency Provider Last Rate Last Dose   • acetaminophen (TYLENOL) tablet 650 mg  650 mg Oral Q4H PRN Twin Morales MD   650 mg at 12/11/17 1625   • [START ON 12/16/2017] allopurinol (ZYLOPRIM) tablet 100 mg  100 mg Oral Daily Julio Franco MD       • b complex-vitamin c-folic acid (NEPHRO-QUANG) tablet 1 tablet  1 tablet Oral Daily Julio Franco MD   1 tablet at 12/14/17 1153   • calcium acetate (PHOS BINDER)) capsule 667 mg  667 mg Oral TID With Meals Carlos Owen DO   667 mg at 12/14/17 1757   • digoxin (LANOXIN) tablet 125 mcg  125 mcg Oral Every Other Day Twin Morales MD   125 mcg at 12/14/17 0913   • fluconazole (DIFLUCAN) tablet 100 mg  100 mg Oral Q24H Frankie Winchester DO   100 mg at 12/14/17 1553   • HYDROcodone-acetaminophen (NORCO)  MG per tablet 1 tablet  1 tablet Oral Q6H PRN Twin Morales MD   1 tablet at 12/14/17 2222   • ipratropium-albuterol (DUO-NEB) nebulizer solution 3 mL  3 mL Nebulization Q4H PRN Twin Morales MD       • lactulose (CHRONULAC) 10 GM/15ML solution 10 g  10 g Oral TID Twin Morales MD   10 g at 12/14/17 2222   • metoprolol tartrate (LOPRESSOR) injection 2.5 mg  2.5 mg Intravenous Q6H Tae Irby MD       • midodrine (PROAMATINE) tablet 5 mg  5 mg Oral TID AC Tae Irby MD       • ondansetron (ZOFRAN) tablet 4 mg  4 mg Oral Q6H PRN Twin Morales MD   4 mg at 12/11/17 1625    Or   • ondansetron ODT (ZOFRAN-ODT) disintegrating tablet 4 mg  4 mg Oral Q6H PRN Twin Morales MD        Or   • ondansetron (ZOFRAN) injection 4 mg  4 mg Intravenous Q6H PRN Twin Morales MD   4 mg at 12/08/17 2140   • pantoprazole (PROTONIX) EC tablet 40 mg  40 mg Oral Daily Twin Morales MD   40 mg at 12/14/17 0913   •  rifaximin (XIFAXAN) tablet 550 mg  550 mg Oral Q12H Twin Morales MD   550 mg at 12/14/17 2221   • sodium chloride 0.9 % flush 1-10 mL  1-10 mL Intravenous PRN Twin Morales MD   10 mL at 12/09/17 1639       Assessment/Plan     1.   Acute renal failure  2.   Chronic kidney disease, stage IV (severe)  3.   Cirrhosis of liver with ascites and esophageal varices  4.   Hyponatremia   5.   Hyperkalemia  6.   Type IV RTA  7.   Permanent atrial fibrillation  8.   Lymphedema of both lower extremities  9.   Dehydration  10.   Thrombocytopenia  11.   Supratherapeutic INR  12.   Anemia of chronic kidney disease requiring erythropoietin therapy.    Plan:  · I her blood pressure dropped with A. fib and RVR, she is getting IV fluids.  Her renal function is starting to improve but BUN is slightly high.  I think we can hold off plans for dialysis.  If she gets any worse or symptoms get any worse we'll reconsider at that time.  Her blood pressures on the low side and reassess on day-to-day basis.  Medication changes noted details also discussed with Dr. Irby.  · Her potassium is back to normal.  I think she has significant complement of chronic kidney disease from diabetes and may have some overlying hepatorenal syndrome.  At this point I feel like that we can hold off dialysis and reassess on day-to-day basis.    · INR is improving, it is subtherapeutic at this point.  · Surveillance labs  · Prognosis is very poor, with all the comorbidities she has.    Details were discussed with the patient there is no family members in the room.    Details discussed with Dr. Farias.  Further recommendations will depend on clinical course of the patient during the current hospitalization.      I also discussed the details with the nursing staff.    Rest as ordered.    Julio Franco MD  12/15/17  11:20 AM      EMR Dragon/Transcription disclaimer:   Much of this encounter note is an electronic transcription/translation of spoken language to printed  text. The electronic translation of spoken language may permit erroneous, or at times, nonsensical words or phrases to be inadvertently transcribed; Although I have reviewed the note for such errors, some may still exist.

## 2017-12-15 NOTE — SIGNIFICANT NOTE
12/15/17 0941   Rehab Treatment   Discipline occupational therapist   Rehab Evaluation   Evaluation Not Performed unable to evaluate, medical status change  (Pt on hold today d/t low BP.  Will follow up with pt once she is medically stable.)

## 2017-12-16 NOTE — PROGRESS NOTES
"   LOS: 8 days    Patient Care Team:  Pieter Farias DO as PCP - General (Internal Medicine)    Chief Complaint:    Chief Complaint   Patient presents with   • Fatigue       Subjective   F/u MODESTA CKD4  Interval History:   BP low, 70s - 90s systolic, denies SOA or n/v    Objective     Vital Signs  Temp:  [96.4 °F (35.8 °C)-97.5 °F (36.4 °C)] 97.5 °F (36.4 °C)  Heart Rate:  [] 73  Resp:  [10-16] 16  BP: ()/(42-76) 96/45    Flowsheet Rows         First Filed Value    Admission Height  170.2 cm (67\") Documented at 12/07/2017 2247    Admission Weight  109 kg (240 lb) Documented at 12/07/2017 2247             I/O last 3 completed shifts:  In: 1945 [P.O.:200; I.V.:1745]  Out: 1375 [Urine:1375]    Intake/Output Summary (Last 24 hours) at 12/16/17 1030  Last data filed at 12/16/17 0540   Gross per 24 hour   Intake             1945 ml   Output             1375 ml   Net              570 ml       Physical Exam:  gen nad, alert  Neck supple no jvd  Lungs CTA Bilat no rales   CV RRR  abd soft mild dist, NT  vasc 2+ BLE edema 2+ radial pulses     Results Review:      Results from last 7 days  Lab Units 12/16/17  0541 12/15/17  0658 12/14/17  0649  12/11/17  0536 12/10/17  0610   SODIUM mmol/L 131* 130* 128*  < > 129* 127*   POTASSIUM mmol/L 4.0 4.2 5.1  < > 5.3* 5.1   CHLORIDE mmol/L 102 101 101  < > 103 103   CO2 mmol/L 18.0* 17.0* 16.0*  < > 17.0* 15.0*   BUN mg/dL 93* 96* 97*  < > 90* 82*   CREATININE mg/dL 3.20* 3.50* 3.80*  < > 4.40* 4.40*   CALCIUM mg/dL 7.4* 7.5* 7.5*  < > 8.4 8.4   BILIRUBIN mg/dL 0.6  --   --   --  0.7 0.7   ALK PHOS U/L 165*  --   --   --  171* 177*   ALT (SGPT) U/L 30  --   --   --  35 27   AST (SGOT) U/L 36  --   --   --  32 32   GLUCOSE mg/dL 87 124* 79  < > 97 81   < > = values in this interval not displayed.    Estimated Creatinine Clearance: 17.4 mL/min (by C-G formula based on Cr of 3.2).      Results from last 7 days  Lab Units 12/16/17  0541 12/15/17  0658 12/14/17  0649  " 12/11/17  0536 12/10/17  0610   MAGNESIUM mg/dL  --   --   --   --  2.6* 2.5*   PHOSPHORUS mg/dL 5.8* 5.8* 5.6*  < > 6.3* 6.0*   < > = values in this interval not displayed.      Results from last 7 days  Lab Units 12/11/17  0536 12/10/17  0610   URIC ACID mg/dL 10.2* 10.4*         Results from last 7 days  Lab Units 12/16/17  0541 12/15/17  0658 12/14/17  0649 12/13/17  0556 12/12/17  0611   WBC 10*3/mm3 6.16 7.76 6.41 5.39 4.94   HEMOGLOBIN g/dL 8.5* 9.1* 8.3* 8.5* 8.8*   PLATELETS 10*3/mm3 124* 116* 131 113* 97*         Results from last 7 days  Lab Units 12/16/17  0541 12/15/17  0658 12/14/17  0649 12/13/17  0556 12/12/17  0611   INR  1.43* 1.42* 1.46* 1.76* 2.17*         Imaging Results (last 24 hours)     ** No results found for the last 24 hours. **          allopurinol 100 mg Oral Daily   b complex-vitamin c-folic acid 1 tablet Oral Daily   calcium acetate 667 mg Oral TID With Meals   digoxin 125 mcg Oral Every Other Day   enoxaparin 30 mg Subcutaneous Q24H   fluconazole 100 mg Oral Q24H   lactulose 10 g Oral TID   midodrine 10 mg Oral TID AC   pantoprazole 40 mg Oral Daily   propranolol 40 mg Oral Q8H   rifaximin 550 mg Oral Q12H   warfarin 2 mg Oral Daily          Medication Review:   Current Facility-Administered Medications   Medication Dose Route Frequency Provider Last Rate Last Dose   • acetaminophen (TYLENOL) tablet 650 mg  650 mg Oral Q4H PRN Twin Morales MD   650 mg at 12/11/17 1625   • allopurinol (ZYLOPRIM) tablet 100 mg  100 mg Oral Daily Julio Franco MD   100 mg at 12/16/17 0841   • b complex-vitamin c-folic acid (NEPHRO-QUANG) tablet 1 tablet  1 tablet Oral Daily Julio Franco MD   1 tablet at 12/16/17 0841   • calcium acetate (PHOS BINDER)) capsule 667 mg  667 mg Oral TID With Meals Carlos Owen DO   667 mg at 12/16/17 0840   • digoxin (LANOXIN) tablet 125 mcg  125 mcg Oral Every Other Day Twin Morales MD   125 mcg at 12/16/17 0841   • enoxaparin (LOVENOX) syringe 30 mg  30 mg  Subcutaneous Q24H Tae Irby MD   30 mg at 12/15/17 1813   • fluconazole (DIFLUCAN) tablet 100 mg  100 mg Oral Q24H Frankie Winchester DO   100 mg at 12/15/17 1711   • HYDROcodone-acetaminophen (NORCO)  MG per tablet 0.5 tablet  0.5 tablet Oral Q6H PRN Tae Irby MD   0.5 tablet at 12/16/17 0317   • ipratropium-albuterol (DUO-NEB) nebulizer solution 3 mL  3 mL Nebulization Q4H PRN Twin Morales MD       • lactulose (CHRONULAC) 10 GM/15ML solution 10 g  10 g Oral TID Twin Morales MD   10 g at 12/16/17 0847   • midodrine (PROAMATINE) tablet 10 mg  10 mg Oral TID AC Tigre Alegre MD       • ondansetron (ZOFRAN) tablet 4 mg  4 mg Oral Q6H PRN Twin Morales MD   4 mg at 12/11/17 1625    Or   • ondansetron ODT (ZOFRAN-ODT) disintegrating tablet 4 mg  4 mg Oral Q6H PRN Twin Morales MD        Or   • ondansetron (ZOFRAN) injection 4 mg  4 mg Intravenous Q6H PRN Twin Morales MD   4 mg at 12/08/17 2140   • pantoprazole (PROTONIX) EC tablet 40 mg  40 mg Oral Daily Twin Morales MD   40 mg at 12/16/17 0841   • propranolol (INDERAL) tablet 40 mg  40 mg Oral Q8H Tae Irby MD       • rifaximin (XIFAXAN) tablet 550 mg  550 mg Oral Q12H Twin Morales MD   550 mg at 12/16/17 0841   • sodium chloride 0.9 % bolus 250 mL  250 mL Intravenous PRN Pieter Farias DO   250 mL at 12/15/17 2121   • sodium chloride 0.9 % flush 1-10 mL  1-10 mL Intravenous PRN Twin Morales MD   10 mL at 12/09/17 1639   • warfarin (COUMADIN) tablet 2 mg  2 mg Oral Daily Tae Irby MD           Assessment/Plan   1.   Acute renal failure  2.   Chronic kidney disease, stage IV (severe)  3.   Cirrhosis of liver with ascites and esophageal varices  4.   Hyponatremia   5.   Hyperkalemia  6.   Type IV RTA  7.   Permanent atrial fibrillation  8.   Lymphedema of both lower extremities  9.   Dehydration  10.   Thrombocytopenia  11.   Supratherapeutic INR  12.   Anemia of chronic kidney disease requiring  erythropoietin therapy.    Plan  - azotemia slowly improving & electrolytes stable.  No indication for dialysis  - hold additional IVF  - BP low, will inc midodrine  - ok with transfer out of ICU     Principal Problem:    ESRF (end stage renal failure)  Active Problems:    Cirrhosis of liver with ascites    Chronic kidney disease, stage IV (severe)    Permanent atrial fibrillation    Lymphedema of both lower extremities    Dehydration    Acute renal failure    Hyperkalemia    Atrial fibrillation with RVR              Tigre Alegre MD  12/16/17  10:30 AM

## 2017-12-16 NOTE — PLAN OF CARE
Problem: Patient Care Overview (Adult)  Goal: Plan of Care Review  Outcome: Ongoing (interventions implemented as appropriate)    12/16/17 0352   Coping/Psychosocial Response Interventions   Plan Of Care Reviewed With patient   Patient Care Overview   Progress progress toward functional goals as expected         Problem: Pain, Acute (Adult)  Goal: Acceptable Pain Control/Comfort Level  Outcome: Ongoing (interventions implemented as appropriate)    Problem: Renal Failure/Kidney Injury, Acute (Adult)  Goal: Signs and Symptoms of Listed Potential Problems Will be Absent or Manageable (Renal Failure/Kidney Injury, Acute)  Outcome: Ongoing (interventions implemented as appropriate)    Problem: Pressure Ulcer (Adult)  Goal: Signs and Symptoms of Listed Potential Problems Will be Absent or Manageable (Pressure Ulcer)  Outcome: Ongoing (interventions implemented as appropriate)

## 2017-12-16 NOTE — PROGRESS NOTES
[facility]    Name:  Hodan Carter   Age:  77 y.o.  Sex:  female  :  1940  MRN:  [id number]   Visit Number:  [id number]  Admission Date:  2017  Date Of Service:  17  Primary Care Physician:  Pieter Farias DO     LOS: 8 days :  Patient Care Team:  Pieter Farias DO as PCP - General (Internal Medicine):    Chief Complaint:      Weakness/volume overload/CKD/Cirrhosis    Subjective / Interval History:     I have reviewed labs/imaging/records from this hospitalization, including ER staff and admitting/attending physicians H/P's and progress notes to establish a comprehensive understanding of this patient's clinical hospital course, as well as to establish a transition of care appropriately.    Pt is a 76 yo female, know hx of decompensated cirrhosis of liver; volume overload requiring aggressive IV diuresis; chronic KD, advanced and was approaching need for HD.    No reported hemoptysis; no BRB/BTS; good UOP, no F/C; no rashes of new nature; pain well controlled; no N/V.    Nephrology and cardiology of evaluated patient.  She did not have hemodialysis catheter placed, and subsequently has not required hemodialysis to this point.  Her heart rate has been well controlled.  Cardiology has switched her to oral beta blocker therapy.    Review of Systems:     General ROS: Patient denies any fevers, chills or loss of consciousness.  Respiratory ROS: Denies cough or shortness of breath.  Cardiovascular ROS: Denies chest pain or palpitations. No history of exertional chest pain.  Gastrointestinal ROS: Denies nausea and vomiting. Denies any abdominal pain. No diarrhea.  Neurological ROS: Denies any focal weakness. No loss of consciousness. Denies any numbness. Denies neck pain.  Dermatological ROS: Denies any redness or pruritis.    Vital Signs:    Temp:  [96.4 °F (35.8 °C)-97.8 °F (36.6 °C)] 97.8 °F (36.6 °C)  Heart Rate:  [] 70  Resp:  [10-16]  10  BP: ()/(41-76) 96/50    Intake and output:    I/O last 3 completed shifts:  In: 1945 [P.O.:200; I.V.:1745]  Out: 1375 [Urine:1375]  I/O this shift:  In: -   Out: 125 [Urine:125]    Physical Examination:    General Appearance:  Alert and cooperative, not in any acute distress.   Head:  Atraumatic and normocephalic, without obvious abnormality.   Eyes:          PERRLA, conjunctivae and sclerae normal, no Icterus. No pallor. Extra-occular movements are within normal limits.   Neck: Supple, trachea midline, no thyromegaly, no carotid bruit.   Lungs:   Chest shape is normal. Breath sounds heard bilaterally equally.  No crackles or wheezing. No Pleural rub or bronchial breathing.   Heart:  Normal S1 and S2, no murmur, no gallop, no rub. No JVD   Abdomen:   Normal bowel sounds, no masses, no organomegaly. Soft       non-tender, non-distended, no guarding, no rebound tenderness.   Extremities: Moves all extremities well, 1+ pitting edema, no cyanosis, no clubbing.  Chronic stasis dermatitis simón LE.   Skin: No bleeding, bruising or new rashes; chronic dark complexion.   Neurologic: Awake, alert and oriented times 3. Moves all 4 extremities equally.   Laboratory results:      Results from last 7 days  Lab Units 12/16/17  0541 12/15/17  0658 12/14/17  0649  12/11/17  0536 12/10/17  0610   SODIUM mmol/L 131* 130* 128*  < > 129* 127*   POTASSIUM mmol/L 4.0 4.2 5.1  < > 5.3* 5.1   CHLORIDE mmol/L 102 101 101  < > 103 103   CO2 mmol/L 18.0* 17.0* 16.0*  < > 17.0* 15.0*   BUN mg/dL 93* 96* 97*  < > 90* 82*   CREATININE mg/dL 3.20* 3.50* 3.80*  < > 4.40* 4.40*   CALCIUM mg/dL 7.4* 7.5* 7.5*  < > 8.4 8.4   BILIRUBIN mg/dL 0.6  --   --   --  0.7 0.7   ALK PHOS U/L 165*  --   --   --  171* 177*   ALT (SGPT) U/L 30  --   --   --  35 27   AST (SGOT) U/L 36  --   --   --  32 32   GLUCOSE mg/dL 87 124* 79  < > 97 81   < > = values in this interval not displayed.    Results from last 7 days  Lab Units 12/16/17  0541 12/15/17  0658  12/14/17  0649   WBC 10*3/mm3 6.16 7.76 6.41   HEMOGLOBIN g/dL 8.5* 9.1* 8.3*   HEMATOCRIT % 26.5* 28.0* 25.4*   PLATELETS 10*3/mm3 124* 116* 131       Results from last 7 days  Lab Units 12/16/17  0541 12/15/17  0658 12/14/17  0649   INR  1.43* 1.42* 1.46*       Results from last 7 days  Lab Units 12/15/17  0658   TROPONIN I ng/mL 0.109*               I have reviewed the patient's laboratory results.    Radiology results:    Imaging Results (last 24 hours)     ** No results found for the last 24 hours. **          I have reviewed the patient's radiology reports.    Medication Review:     I have reviewed the patients active and prn medications.         Assessment:  Principal Problem:    ESRF (end stage renal failure)  Active Problems:    Permanent atrial fibrillation    Cirrhosis of liver with ascites    Chronic kidney disease, stage IV (severe)    Lymphedema of both lower extremities    Dehydration    Acute renal failure    Hyperkalemia    Atrial fibrillation with RVR        Plan:  Patient's prerenal azotemia continues to improve.  Her electrolytes are relatively stable today.  Given her low blood pressure and improving labs, hemodialysis has been deferred again.  Nephrology is seeing patient today, have discussed the case in detail with Dr. Alegre.  Cardiology continues to follow patient as well.  Midodrine dosing has been increased.  Hold IV fluids today, continue to encourage by mouth intake.  Transfer to telemetry floor today.  Subtherapeutic INR.  Adjustment to Coumadin dosing made, Lovenox for bridging therapy..    I have spent a total of 35 mins in direct pt care time today.    Carlos Owen,   12/16/17  5:13 PM

## 2017-12-16 NOTE — PROGRESS NOTES
"   LOS: 8 days   Patient Care Team:  Pieter Farias DO as PCP - General (Internal Medicine)      Interval History: Patient feels significantly better is sitting and eating at this time.  Has not had any weakness and tiredness as yesterday.  Has tolerated her medications.    Review of Systems:   Pertinent items are noted in HPI.      Objective     Vital Sign Min/Max for last 24 hours  Temp  Min: 96.4 °F (35.8 °C)  Max: 97.5 °F (36.4 °C)   BP  Min: 61/46  Max: 103/64   Pulse  Min: 63  Max: 126   Resp  Min: 10  Max: 16   SpO2  Min: 90 %  Max: 100 %   No Data Recorded   Weight  Min: 101 kg (222 lb 1.6 oz)  Max: 101 kg (222 lb 1.6 oz)     Flowsheet Rows         First Filed Value    Admission Height  170.2 cm (67\") Documented at 12/07/2017 2247    Admission Weight  109 kg (240 lb) Documented at 12/07/2017 2247          Physical Exam:     General Appearance:    Alert, cooperative, in no acute distress   Head:    Normocephalic, without obvious abnormality, atraumatic   Eyes:            Lids and lashes normal, conjunctivae and sclerae normal, no   icterus, no pallor, corneas clear, PERRLA   Ears:    Ears appear intact with no abnormalities noted   Throat:   No oral lesions, no thrush, oral mucosa moist   Neck:   No adenopathy, supple, trachea midline, no thyromegaly, no     carotid bruit, no JVD   Back:     No kyphosis present, no scoliosis present, no skin lesions,       erythema or scars, no tenderness to percussion or                   palpation,   range of motion normal   Lungs:     Clear to auscultation,respirations regular, even and                   unlabored    Heart:    Regular rhythm and normal rate, normal S1 and S2, no            murmur, no gallop, no rub, no click   Breast Exam:    Deferred   Abdomen:   Ascites present.     Genitalia:    Deferred   Extremities: Edema present.     Pulses:   Pulses palpable and equal bilaterally   Skin:   No bleeding, bruising or rash   Lymph nodes:   No palpable adenopathy "   Neurologic:   Cranial nerves 2 - 12 grossly intact, sensation intact, DTR        present and equal bilaterally          Results Review:     I reviewed the patient's new clinical results.    Results Review:   I reviewed the patient's new clinical results.    Telemetry: Atrial ablation with controlled ventricular rate.    LAB DATA :           Laboratory results:    Results from last 7 days  Lab Units 12/16/17  0541 12/15/17  0658 12/14/17  0649 12/13/17  1424 12/13/17  0556 12/12/17  0611 12/11/17  0536 12/10/17  0610   SODIUM mmol/L 131* 130* 128* 128* 127* 129* 129* 127*   POTASSIUM mmol/L 4.0 4.2 5.1 5.6* 5.8* 5.8* 5.3* 5.1   CHLORIDE mmol/L 102 101 101 101 101 102 103 103   CO2 mmol/L 18.0* 17.0* 16.0* 15.0* 18.0* 18.0* 17.0* 15.0*   BUN mg/dL 93* 96* 97* 95* 95* 91* 90* 82*   CREATININE mg/dL 3.20* 3.50* 3.80* 4.00* 4.10* 4.30* 4.40* 4.40*   CALCIUM mg/dL 7.4* 7.5* 7.5* 7.8* 7.9* 8.2* 8.4 8.4   BILIRUBIN mg/dL 0.6  --   --   --   --   --  0.7 0.7   ALK PHOS U/L 165*  --   --   --   --   --  171* 177*   ALT (SGPT) U/L 30  --   --   --   --   --  35 27   AST (SGOT) U/L 36  --   --   --   --   --  32 32   GLUCOSE mg/dL 87 124* 79 96 92 76 97 81       Results from last 7 days  Lab Units 12/16/17  0541 12/15/17  0658 12/14/17  0649 12/13/17  0556 12/12/17  0611 12/11/17  0536 12/10/17  0610   WBC 10*3/mm3 6.16 7.76 6.41 5.39 4.94 6.14 5.79   HEMOGLOBIN g/dL 8.5* 9.1* 8.3* 8.5* 8.8* 9.1* 8.8*   HEMATOCRIT % 26.5* 28.0* 25.4* 26.8* 27.1* 27.9* 25.9*   PLATELETS 10*3/mm3 124* 116* 131 113* 97* 120* 110*       Results from last 7 days  Lab Units 12/16/17  0541 12/15/17  0658 12/14/17  0649 12/13/17  0556 12/12/17  0611 12/11/17  0536 12/10/17  0610   INR  1.43* 1.42* 1.46* 1.76* 2.17* 2.81* 3.96*               Results from last 7 days  Lab Units 12/15/17  0658   TROPONIN I ng/mL 0.109*                 No results found for: HGBA1C            IMAGING DATA:     Ct Abdomen Pelvis Without Contrast    Result Date:  12/8/2017  Narrative: PROCEDURE: CT ABDOMEN PELVIS WO CONTRAST-  HISTORY: N/V, MODESTA, weakness  COMPARISON: None .  PROCEDURE: Axial images were obtained from the lung bases through the pubic symphysis without intravenous contrast.    .  FINDINGS:  ABDOMEN: Lack of intravenous contrast limits evaluation of the solid organs, the mediastinum, and the vasculature. Mild atelectasis in the bibasilar regions.The limited noncontrast images of the liver demonstrate a nodular border suggestive of cirrhosis. There are subcentimeter nodular areas that are not well assessed without contrast. Esophageal and splenic varices are noted hiatal hernia is noted. Additionally there is a ventral hernia containing nondistended loops of transverse colon. The gallbladder is absent . The spleen is normal. 1.7 cm right adrenal adenoma.  The pancreas is atrophic. Nonobstructing stone present within the right collecting system. There is no hydronephrosis. The aorta is normal in caliber. There is no significant free fluid or adenopathy.  No abnormally dilated loops of bowel are appreciated. Postoperative changes are seen to small bowel. There is retained stool throughout the colon.  Nonspecific body wall edema is noted.  PELVIS: The appendix is not identified. The urinary bladder is unremarkable. There is no significant fluid or adenopathy. There are degenerative changes of the spine.         Impression: 1. No evidence of an obstructive uropathy. 2. Significant interval improvement of ascites and pleural effusions compared to prior. 3. Ventral wall defect containing nondistended loop of transverse colon. 4. Cirrhotic appearance to the liver with subcentimeter nodular densities, dedicated follow-up imaging of the liver is recommended.. 5. 1.7 cm right adrenal adenoma.          .     This study was performed with techniques to keep radiation doses as low as reasonably achievable (ALARA). Individualized dose reduction techniques using automated  exposure control or adjustment of mA and/or kV according to the patient size were employed.  This report was finalized on 12/8/2017 7:26 AM by Shabbir Sher DO.    Ct Head Without Contrast    Result Date: 11/25/2017  Narrative: PROCEDURE: CT HEAD WO CONTRAST-  HISTORY: confusion, elderly, on coumadin  TECHNIQUE: Multiple axial CT images were performed from the foramen magnum to the vertex without contrast. Coronal reformatted images were also obtained.  FINDINGS: Comparison is made to the prior exam dated October 10, 2017. Low-attenuation is again seen in the cerebral white matter consistent with moderate to severe chronic ischemic changes.The ventricles are normal in size. There is no evidence of hemorrhage .  No masses are identified.  No abnormal extra-axial fluid collection is seen.  There is no evidence of shift of the midline structures. Images of the sinuses reveal no evidence of mucosal thickening. No bony abnormality is seen on the bone window images.         Impression: No acute intracranial abnormality.  Moderate to severe chronic ischemic changes, stable.   This study was performed with techniques to keep radiation doses as low as reasonably achievable (ALARA). Individualized dose reduction techniques using automated exposure control or adjustment of mA and/or kV according to the patient size were employed.  This report was finalized on 11/25/2017 7:56 AM by Chele Blair MD.    Us Renal Limited    Result Date: 11/27/2017  Narrative: PROCEDURE: US RENAL LIMITED-  HISTORY: MODESTA, CKD4; K72.90-Hepatic failure, unspecified without coma; N17.9-Acute kidney failure, unspecified; N18.9-Chronic kidney disease, unspecified; D68.9-Coagulation defect, unspecified; T45.515A-Adverse effect of anticoagulants, initial encounter; J18.1-Lobar pneumonia, unspecified organism; Z74.09-Other reduced mobility  PROCEDURE: Ultrasound images of the kidneys were obtained.  FINDINGS:.  The kidneys are imaged by ultrasound with the  right kidney measuring 6.7 cm and the left kidney measuring 9.7 cm. There is bilateral renal cortical thinning. There is no cystic or solid mass. There is no hydronephrosis.      Impression: Bilateral renal cortical thinning which can be associated with medical renal disease.    Images were reviewed, interpreted, and dictated by Dr. Sher. Transcribed by Laura Yao PA-C.  This report was finalized on 11/27/2017 11:33 AM by Shabbir Sher DO.    Xr Chest 1 View    Result Date: 12/8/2017  Narrative: PROCEDURE: XR CHEST 1 VW-  HISTORY: Weak/Dizzy/AMS triage protocol  COMPARISON: November 27, 2017.  FINDINGS: Electrodes overlie the chest. The heart is normal in size. The mediastinum is unremarkable. There are chronic interstitial changes. There is no pneumothorax.  There are no acute osseous abnormalities.         Impression: No acute cardiopulmonary process.  Continued followup is recommended.  This report was finalized on 12/8/2017 7:28 AM by Shabbir Sher DO.    Xr Chest 1 View    Result Date: 11/27/2017  Narrative: PROCEDURE: XR CHEST 1 VW-  HISTORY: pneumonia follow up; K72.90-Hepatic failure, unspecified without coma; N17.9-Acute kidney failure, unspecified; N18.9-Chronic kidney disease, unspecified; D68.9-Coagulation defect, unspecified; T45.515A-Adverse effect of anticoagulants, initial encounter; J18.1-Lobar pneumonia, unspecified organism; Z74.09-Other reduced mobility  COMPARISON: November 25, 2017.  FINDINGS: Electrodes overlie the chest. The heart is normal in size. The mediastinum is unremarkable. There is evidence of calcified granulomatous disease. There are chronic interstitial changes. There is no pneumothorax.  There are no acute osseous abnormalities. Atherosclerosis is noted.      Impression: No acute cardiopulmonary process.  Continued followup is recommended.  This report was finalized on 11/27/2017 2:12 PM by Shabbir Sher DO.    Xr Chest 1 View    Result Date: 11/25/2017  Narrative: PROCEDURE:  XR CHEST 1 VW-  HISTORY: Weak/Dizzy/AMS triage protocol.  COMPARISON: October 10, 2017.   FINDINGS:    . The heart is normal in size. The mediastinum is unremarkable. There are low lung volumes. Bibasilar pulmonary opacities are seen left worse than right which may represent atelectasis or pneumonia. There is no pneumothorax. The bony thorax in intact.         Impression: Bibasilar atelectasis or pneumonia, greater on the left.   This report was finalized on 11/25/2017 8:14 AM by Chele Blair MD.    Xr Abdomen Kub    Result Date: 11/25/2017  Narrative: FINAL REPORT CLINICAL HISTORY: NG TUBE PLACEMENT FINDINGS: SINGLE VIEW ABDOMEN. A single view of the abdomen demonstrates a nonspecific, nonobstructive bowel gas pattern. There are no abnormally dilated loops of small bowel.  No abnormal calcifications are identified. An NG tube terminates in the proximal stomach.     Impression: Nonspecific bowel gas pattern. Authenticated by JUAN Escobar MD on 11/25/2017 01:53:57 PM            Assessment/Plan    #1 hypotension: Seems to be reasonable at this time.  Has responded to IV hydration.  Would be careful with respect to aggressive diuresis in view of her propensity towards hypotension because of right heart failure.  He is been kept on midodrine at this point in time.    #2 Atrial fibrillation: Patient has history of long-standing atrial fibrillation.  Her rate control became an issue as the beta blockers were initially stopped.  Has done better with IV beta blocker.  Will switch her to oral beta blockers that she was on at home.  This with a close eye on her blood pressure readings but    #3 anticoagulation therapy: Will need to resume the same If okay by the hospitalist team.    #4 cor pulmonale: This is been long-standing secondary to cirrhosis of the liver with portal hypertension.  Review of the echocardiogram from September not sure about the observation of severe reduction in RV function.  Would continue to  manage medically at this time.    #5 leg edema: Appears to be secondary to her cirrhosis renal would continue compression therapy for now.      Principal Problem:    ESRF (end stage renal failure)  Active Problems:    Cirrhosis of liver with ascites    Chronic kidney disease, stage IV (severe)    Permanent atrial fibrillation    Lymphedema of both lower extremities    Dehydration    Acute renal failure    Hyperkalemia    Atrial fibrillation with RVR            Tae Irby MD  12/16/17  8:33 AM

## 2017-12-16 NOTE — PLAN OF CARE
Problem: Pain, Acute (Adult)  Goal: Acceptable Pain Control/Comfort Level  Outcome: Ongoing (interventions implemented as appropriate)    12/16/17 1852   Pain, Acute (Adult)   Acceptable Pain Control/Comfort Level making progress toward outcome         Problem: Renal Failure/Kidney Injury, Acute (Adult)  Goal: Signs and Symptoms of Listed Potential Problems Will be Absent or Manageable (Renal Failure/Kidney Injury, Acute)  Outcome: Ongoing (interventions implemented as appropriate)    12/16/17 1852   Renal Failure/Kidney Injury, Acute   Problems Assessed (Acute Renal Failure/Kidney Injury) all   Problems Present (Acute Renal Failure/Kidney Injury) situational response;infection;fluid imbalance;electrolyte imbalance         Problem: Pressure Ulcer (Adult)  Goal: Signs and Symptoms of Listed Potential Problems Will be Absent or Manageable (Pressure Ulcer)  Outcome: Ongoing (interventions implemented as appropriate)    12/16/17 1852   Pressure Ulcer   Problems Assessed (Pressure Ulcer) all   Problems Present (Pressure Ulcer) pain;infection;wound complications

## 2017-12-17 NOTE — PLAN OF CARE
Problem: Patient Care Overview (Adult)  Goal: Plan of Care Review  Outcome: Ongoing (interventions implemented as appropriate)    12/17/17 1102   Outcome Evaluation   Outcome Summary/Follow up Plan Patient is able to participate in skilled PT re-evaluation and treatment. She demonstrates decreased strength, balance and activity tolerance. She requires minimal assistance for mobility tasks. Patient is expected to benefit from additional PT services while hospitalized and upon discharge to home with home health care services.    Coping/Psychosocial Response Interventions   Plan Of Care Reviewed With patient;daughter         Problem: Inpatient Physical Therapy  Goal: Bed Mobility Goal LTG- PT  Outcome: Ongoing (interventions implemented as appropriate)    12/17/17 1102   Bed Mobility PT LTG   Bed Mobility PT LTG, Date Established 12/17/17   Bed Mobility PT LTG, Time to Achieve 2 wks   Bed Mobility PT LTG, Activity Type all bed mobility   Bed Mobility PT LTG, [place] Level supervision required       Goal: Transfer Training Goal 1 LTG- PT  Outcome: Ongoing (interventions implemented as appropriate)    12/17/17 1102   Transfer Training PT LTG   Transfer Training PT LTG, Date Established 12/17/17   Transfer Training PT LTG, Time to Achieve 2 wks   Transfer Training PT LTG, Activity Type sit to stand/stand to sit;bed to chair /chair to bed   Transfer Training PT LTG, [place] Level supervision required   Transfer Training PT LTG, Assist Device walker, rolling   Transfer Training PT LTG, Outcome goal revised       Goal: Gait Training Goal LTG- PT  Outcome: Ongoing (interventions implemented as appropriate)    12/17/17 1102   Gait Training PT LTG   Gait Training Goal PT LTG, Date Established 12/17/17   Gait Training Goal PT LTG, Time to Achieve 2 wks   Gait Training Goal PT LTG, [place] Level contact guard assist   Gait Training Goal PT LTG, Assist Device walker, rolling   Gait Training Goal PT LTG, Distance to  Achieve 25   Gait Training Goal PT LTG, Outcome goal revised       Goal: Strength Goal LTG- PT  Outcome: Ongoing (interventions implemented as appropriate)    12/17/17 1102   Strength Goal PT LTG   Strength Goal PT LTG, Date Established 12/17/17   Strength Goal PT LTG, Time to Achieve 2 wks   Strength Goal PT LTG, Functional Goal to perform BLE exercises x 20 reps to improve transfers and gait   Strength Goal PT LTG, Outcome goal revised

## 2017-12-17 NOTE — PLAN OF CARE
"Problem: Patient Care Overview (Adult)  Goal: Plan of Care Review  Outcome: Ongoing (interventions implemented as appropriate)    12/17/17 0251   Outcome Evaluation   Outcome Summary/Follow up Plan continue monitoring vs and labs, pt tearful this shift, states feeling hopeless   Coping/Psychosocial Response Interventions   Plan Of Care Reviewed With patient   Patient Care Overview   Progress progress toward functional goals as expected       Goal: Adult Individualization and Mutuality  Outcome: Ongoing (interventions implemented as appropriate)    12/08/17 0500 12/08/17 1601 12/17/17 0251   Individualization   Patient Specific Preferences Patient likes to be called Hodan. She likes the room warm. --  --    Patient Specific Goals --  Return home at [place]. Agreeable to PC --    Patient Specific Interventions --  --  pallative to continue to follow pt   Mutuality/Individual Preferences   What Anxieties, Fears or Concerns Do You Have About Your Health or Care? --  --  \"not getting well enough to go home\"   What Questions Do You Have About Your Health or Care? --  --  denies   What Information Would Help Us Give You More Personalized Care? --  --  denies       Goal: Discharge Needs Assessment  Outcome: Ongoing (interventions implemented as appropriate)    12/08/17 1617 12/17/17 0251   Discharge Needs Assessment   Concerns To Be Addressed --  coping/stress concerns   Readmission Within The Last 30 Days previous discharge plan unsuccessful --    Equipment Needed After Discharge --  other (see comments)  (unknown at this time)   Current Health   Outpatient/Agency/Support Group Needs homecare agency (specify level of care) --    Anticipated Changes Related to Illness --  inability to care for self   Living Environment   Transportation Available --  family or friend will provide   Self-Care   Equipment Currently Used at Home walker, standard;commode;wheelchair --          Problem: Pain, Acute (Adult)  Goal: Acceptable Pain " Control/Comfort Level  Outcome: Ongoing (interventions implemented as appropriate)    12/17/17 0251   Pain, Acute (Adult)   Acceptable Pain Control/Comfort Level making progress toward outcome  (denies pain this shift)         Problem: Renal Failure/Kidney Injury, Acute (Adult)  Goal: Signs and Symptoms of Listed Potential Problems Will be Absent or Manageable (Renal Failure/Kidney Injury, Acute)  Outcome: Ongoing (interventions implemented as appropriate)    12/17/17 0251   Renal Failure/Kidney Injury, Acute   Problems Assessed (Acute Renal Failure/Kidney Injury) all   Problems Present (Acute Renal Failure/Kidney Injury) situational response;infection;fluid imbalance;electrolyte imbalance  (legs with increased edema this shift, kept elevated)         Problem: Pressure Ulcer (Adult)  Goal: Signs and Symptoms of Listed Potential Problems Will be Absent or Manageable (Pressure Ulcer)  Outcome: Ongoing (interventions implemented as appropriate)    12/17/17 0251   Pressure Ulcer   Problems Assessed (Pressure Ulcer) all   Problems Present (Pressure Ulcer) pain;wound complications;infection  (denies pain at present)

## 2017-12-17 NOTE — PROGRESS NOTES
"   LOS: 9 days    Patient Care Team:  Pieter Farias DO as PCP - General (Internal Medicine)    Chief Complaint:    Chief Complaint   Patient presents with   • Fatigue       Subjective   F/u MODESTA    Interval History:   No dyspnea or n/v.  SBP 90 mm Hg currently    Objective     Vital Signs  Temp:  [97.4 °F (36.3 °C)-97.8 °F (36.6 °C)] 97.6 °F (36.4 °C)  Heart Rate:  [46-98] 59  Resp:  [10-20] 14  BP: ()/(36-67) 82/48    Flowsheet Rows         First Filed Value    Admission Height  170.2 cm (67\") Documented at 12/07/2017 2247    Admission Weight  109 kg (240 lb) Documented at 12/07/2017 2247             I/O last 3 completed shifts:  In: 1792 [P.O.:1060; I.V.:732]  Out: 875 [Urine:875]    Intake/Output Summary (Last 24 hours) at 12/17/17 0723  Last data filed at 12/17/17 0500   Gross per 24 hour   Intake             1205 ml   Output              450 ml   Net              755 ml       Physical Exam:  gen - frail WF in no acute distress  Neck supple no jvd  Lungs CTA Bilat no rales  CV RRR   abd soft NT/ND  vasc 2+ BLE edema 2+ radial pulses     Results Review:      Results from last 7 days  Lab Units 12/17/17  0413 12/16/17  0541 12/15/17  0658  12/11/17  0536   SODIUM mmol/L 130* 131* 130*  < > 129*   POTASSIUM mmol/L 3.7 4.0 4.2  < > 5.3*   CHLORIDE mmol/L 101 102 101  < > 103   CO2 mmol/L 18.0* 18.0* 17.0*  < > 17.0*   BUN mg/dL 99* 93* 96*  < > 90*   CREATININE mg/dL 3.00* 3.20* 3.50*  < > 4.40*   CALCIUM mg/dL 7.7* 7.4* 7.5*  < > 8.4   BILIRUBIN mg/dL  --  0.6  --   --  0.7   ALK PHOS U/L  --  165*  --   --  171*   ALT (SGPT) U/L  --  30  --   --  35   AST (SGOT) U/L  --  36  --   --  32   GLUCOSE mg/dL 88 87 124*  < > 97   < > = values in this interval not displayed.    Estimated Creatinine Clearance: 18.6 mL/min (by C-G formula based on Cr of 3).      Results from last 7 days  Lab Units 12/17/17  0413 12/16/17  0541 12/15/17  0658  12/11/17  0536   MAGNESIUM mg/dL  --   --   --   --  2.6*   PHOSPHORUS " mg/dL 5.8* 5.8* 5.8*  < > 6.3*   < > = values in this interval not displayed.      Results from last 7 days  Lab Units 12/11/17  0536   URIC ACID mg/dL 10.2*         Results from last 7 days  Lab Units 12/17/17  0413 12/16/17  0541 12/15/17  0658 12/14/17  0649 12/13/17  0556   WBC 10*3/mm3 6.80 6.16 7.76 6.41 5.39   HEMOGLOBIN g/dL 8.4* 8.5* 9.1* 8.3* 8.5*   PLATELETS 10*3/mm3 133 124* 116* 131 113*         Results from last 7 days  Lab Units 12/17/17  0413 12/16/17  0541 12/15/17  0658 12/14/17  0649 12/13/17  0556   INR  1.39* 1.43* 1.42* 1.46* 1.76*         Imaging Results (last 24 hours)     ** No results found for the last 24 hours. **          allopurinol 100 mg Oral Daily   b complex-vitamin c-folic acid 1 tablet Oral Daily   calcium acetate 667 mg Oral TID With Meals   digoxin 125 mcg Oral Every Other Day   enoxaparin 30 mg Subcutaneous Q24H   lactulose 10 g Oral TID   midodrine 10 mg Oral TID AC   pantoprazole 40 mg Oral Daily   propranolol 40 mg Oral Q8H   rifaximin 550 mg Oral Q12H   warfarin 3 mg Oral Daily          Medication Review:   Current Facility-Administered Medications   Medication Dose Route Frequency Provider Last Rate Last Dose   • acetaminophen (TYLENOL) tablet 650 mg  650 mg Oral Q4H PRN Twin Morales MD   650 mg at 12/11/17 1625   • allopurinol (ZYLOPRIM) tablet 100 mg  100 mg Oral Daily Julio Franco MD   100 mg at 12/16/17 0841   • b complex-vitamin c-folic acid (NEPHRO-QUANG) tablet 1 tablet  1 tablet Oral Daily Julio Franco MD   1 tablet at 12/16/17 0841   • calcium acetate (PHOS BINDER)) capsule 667 mg  667 mg Oral TID With Meals Carlos Owen DO   667 mg at 12/16/17 1709   • digoxin (LANOXIN) tablet 125 mcg  125 mcg Oral Every Other Day Twin Morales MD   125 mcg at 12/16/17 0841   • enoxaparin (LOVENOX) syringe 30 mg  30 mg Subcutaneous Q24H Tae Irby MD   30 mg at 12/16/17 1706   • HYDROcodone-acetaminophen (NORCO)  MG per tablet 0.5 tablet  0.5 tablet Oral  Q6H PRN Tae Irby MD   0.5 tablet at 12/16/17 1818   • ipratropium-albuterol (DUO-NEB) nebulizer solution 3 mL  3 mL Nebulization Q4H PRN Twin Morales MD       • lactulose (CHRONULAC) 10 GM/15ML solution 10 g  10 g Oral TID Twin Morales MD   10 g at 12/16/17 2124   • midodrine (PROAMATINE) tablet 10 mg  10 mg Oral TID AC Tigre Alegre MD   10 mg at 12/17/17 0646   • ondansetron (ZOFRAN) tablet 4 mg  4 mg Oral Q6H PRN Twin Morales MD   4 mg at 12/11/17 1625    Or   • ondansetron ODT (ZOFRAN-ODT) disintegrating tablet 4 mg  4 mg Oral Q6H PRN Twin Morales MD        Or   • ondansetron (ZOFRAN) injection 4 mg  4 mg Intravenous Q6H PRN Twin Morales MD   4 mg at 12/08/17 2140   • pantoprazole (PROTONIX) EC tablet 40 mg  40 mg Oral Daily Twin Morales MD   40 mg at 12/16/17 0841   • propranolol (INDERAL) tablet 40 mg  40 mg Oral Q8H Tae Irby MD   40 mg at 12/16/17 1422   • rifaximin (XIFAXAN) tablet 550 mg  550 mg Oral Q12H Twin Morales MD   550 mg at 12/16/17 2124   • sodium chloride 0.9 % bolus 250 mL  250 mL Intravenous PRN Pieter Farias DO   250 mL at 12/15/17 2121   • sodium chloride 0.9 % flush 1-10 mL  1-10 mL Intravenous PRN Twin Morales MD   10 mL at 12/09/17 1639   • warfarin (COUMADIN) tablet 3 mg  3 mg Oral Daily Carlos Owen DO   3 mg at 12/16/17 1818       Assessment/Plan   1.   Acute kidney injury - Cr trending down but BUN still 90s.  No indication for HD.    2.   Chronic kidney disease, stage IV  3.   Cirrhosis of liver with ascites and esophageal varices  4.   Hyponatremia   5.   Hyperkalemia  6.   Type IV RTA  7.   Permanent atrial fibrillation  8.   Lymphedema of both lower extremities - central vol stable, no need for diuretic (and BP too low)  9.   Hypotension - due to cirrhosis in all likelihood  10.   Thrombocytopenia  11.   coagulopathy  12.   Anemia of chronic kidney disease - Hgb stable 8.4    Plan  - inc'd midodrine 10 TID yest   - propranolol has  been dec'd   - ok with transfer out to floor  - no indication for dialysis but longterm prognosis remains poor    Principal Problem:    ESRF (end stage renal failure)  Active Problems:    Cirrhosis of liver with ascites    Chronic kidney disease, stage IV (severe)    Permanent atrial fibrillation    Lymphedema of both lower extremities    Dehydration    Acute renal failure    Hyperkalemia    Atrial fibrillation with RVR              Tigre Alegre MD  12/17/17  7:23 AM

## 2017-12-17 NOTE — PROGRESS NOTES
[facility]    Name:  Hodan Carter   Age:  77 y.o.  Sex:  female  :  1940  MRN:  [id number]   Visit Number:  [id number]  Admission Date:  2017  Date Of Service:  17  Primary Care Physician:  Pieter Farias DO     LOS: 9 days :  Patient Care Team:  Pieter Farias DO as PCP - General (Internal Medicine):    Chief Complaint:      Weakness/volume overload/CKD/Cirrhosis    Subjective / Interval History:     I have reviewed labs/imaging/records from this hospitalization, including ER staff and admitting/attending physicians H/P's and progress notes to establish a comprehensive understanding of this patient's clinical hospital course, as well as to establish a transition of care appropriately.    Pt is a 76 yo female, know hx of decompensated cirrhosis of liver; volume overload requiring aggressive IV diuresis; chronic KD, advanced and was approaching need for HD.    No reported hemoptysis overnight; no BRB/BTS; good UOP, no F/C; no rashes of new nature; pain well controlled; no N/V.    Nephrology and cardiology are following patient as well.  Heart rate continues to be well controlled.  No further episodes of uncontrolled tachyarrhythmias.  Increased dose of midodrine was tolerated without difficulty since yesterday's evening dose.  Mild improvement to her overall renal function, but relatively comparable to yesterday.      Review of Systems:     General ROS: Patient denies any fevers, chills or loss of consciousness.  Respiratory ROS: Denies cough or shortness of breath.  Cardiovascular ROS: Denies chest pain or palpitations. No history of exertional chest pain.  Gastrointestinal ROS: Denies nausea and vomiting. Denies any abdominal pain. No diarrhea.  Neurological ROS: Denies any focal weakness. No loss of consciousness. Denies any numbness. Denies neck pain.  Dermatological ROS: Denies any redness or pruritis.    Vital Signs:    Temp:  [97.4 °F  (36.3 °C)-97.8 °F (36.6 °C)] 97.6 °F (36.4 °C)  Heart Rate:  [46-91] 59  Resp:  [10-20] 14  BP: ()/(36-67) 82/48    Intake and output:    I/O last 3 completed shifts:  In: 1792 [P.O.:1060; I.V.:732]  Out: 875 [Urine:875]       Physical Examination:    General Appearance:  Alert and cooperative, not in any acute distress.   Head:  Atraumatic and normocephalic, without obvious abnormality.   Eyes:          PERRLA, conjunctivae and sclerae normal, no Icterus. No pallor. Extra-occular movements are within normal limits.   Neck: Supple, trachea midline, no thyromegaly, no carotid bruit.   Lungs:   Chest shape is normal. Breath sounds heard bilaterally equally.  No crackles or wheezing. No Pleural rub or bronchial breathing.   Heart:  Normal S1 and S2, no murmur, no gallop, no rub. No JVD   Abdomen:   Normal bowel sounds, no masses, no organomegaly. Soft       non-tender, non-distended, no guarding, no rebound tenderness.   Extremities: Moves all extremities well, 1+ pitting edema, no cyanosis, no clubbing.  Chronic stasis dermatitis simón LE.   Skin: No bleeding, bruising or new rashes; chronic dark complexion.   Neurologic: Awake, alert and oriented times 3. Moves all 4 extremities equally.   Laboratory results:      Results from last 7 days  Lab Units 12/17/17  0413 12/16/17  0541 12/15/17  0658  12/11/17  0536   SODIUM mmol/L 130* 131* 130*  < > 129*   POTASSIUM mmol/L 3.7 4.0 4.2  < > 5.3*   CHLORIDE mmol/L 101 102 101  < > 103   CO2 mmol/L 18.0* 18.0* 17.0*  < > 17.0*   BUN mg/dL 99* 93* 96*  < > 90*   CREATININE mg/dL 3.00* 3.20* 3.50*  < > 4.40*   CALCIUM mg/dL 7.7* 7.4* 7.5*  < > 8.4   BILIRUBIN mg/dL  --  0.6  --   --  0.7   ALK PHOS U/L  --  165*  --   --  171*   ALT (SGPT) U/L  --  30  --   --  35   AST (SGOT) U/L  --  36  --   --  32   GLUCOSE mg/dL 88 87 124*  < > 97   < > = values in this interval not displayed.    Results from last 7 days  Lab Units 12/17/17  0413 12/16/17  0541 12/15/17  0658   WBC  10*3/mm3 6.80 6.16 7.76   HEMOGLOBIN g/dL 8.4* 8.5* 9.1*   HEMATOCRIT % 26.1* 26.5* 28.0*   PLATELETS 10*3/mm3 133 124* 116*       Results from last 7 days  Lab Units 12/17/17  0413 12/16/17  0541 12/15/17  0658   INR  1.39* 1.43* 1.42*       Results from last 7 days  Lab Units 12/15/17  0658   TROPONIN I ng/mL 0.109*               I have reviewed the patient's laboratory results.    Radiology results:    Imaging Results (last 24 hours)     ** No results found for the last 24 hours. **          I have reviewed the patient's radiology reports.    Medication Review:     I have reviewed the patients active and prn medications.         Assessment:  Principal Problem:    ESRF (end stage renal failure)  Active Problems:    Permanent atrial fibrillation    Cirrhosis of liver with ascites    Chronic kidney disease, stage IV (severe)    Lymphedema of both lower extremities    Dehydration    Acute renal failure    Hyperkalemia    Atrial fibrillation with RVR        Plan:  Likely can transfer patient to a telemetry bed today.  Continue with current monitoring dosing.  Nephrology will be seeing patient today as well.  Pleased with at least stabilization of her renal function at this time.  Her responsiveness and alertness is also encouraging today.  She is tolerated by mouth intake without difficulty.  Continue digoxin at this time, rate controlled.  INR still subtherapeutic, will need dosing adjustment today, pharmacy consult it for assistance.  Likely result of concurrent Diflucan use.  Discussed plan of care in detail with patient, as well as her daughter who is at bedside today.  They both verbalize understanding and agree.    I have spent a total of 35 mins in direct pt care time today.    Carlos Owen,   12/17/17  9:10 AM

## 2017-12-17 NOTE — THERAPY RE-EVALUATION
Acute Care - Physical Therapy Re-Evaluation   Diego     Patient Name: Hodan Carter  : 1940  MRN: [id number]  Today's Date: 2017   Onset of Illness/Injury or Date of Surgery Date: 17  Date of Referral to PT: 17  Referring Physician: Dr. Morales      Admit Date: 2017     Visit Dx:    ICD-10-CM ICD-9-CM   1. Dehydration E86.0 276.51   2. MODESTA (acute kidney injury) N17.9 584.9   3. Weakness generalized R53.1 780.79   4. Hyponatremia E87.1 276.1   5. Impaired mobility and ADLs Z74.09 799.89   6. Impaired functional mobility, balance, gait, and endurance Z74.09 V49.89   7. ESRF (end stage renal failure) N18.6 585.6     Patient Active Problem List   Diagnosis   • Ascites   • Cirrhosis of liver with ascites   • Chronic kidney disease, stage IV (severe)   • Permanent atrial fibrillation   • Essential hypertension   • Chronic anemia   • Cellulitis and abscess of trunk   • Shortness of breath   • NSTEMI (non-ST elevated myocardial infarction)   • Chronic kidney disease-mineral and bone disorder   • Candida UTI   • Volume overload   • Acute hepatic encephalopathy   • Lymphedema of both lower extremities   • Vitamin B12 deficiency   • Hepatic encephalopathy   • Anemia of chronic disease   • Cellulitis of left lower extremity   • Decompensated hepatic cirrhosis   • Dehydration   • Acute renal failure   • Hyperkalemia   • ESRF (end stage renal failure)   • Atrial fibrillation with RVR     Past Medical History:   Diagnosis Date   • Anemia    • Arthritis    • Cancer     uterin    • Cirrhosis of liver    • Effect, radiation    • GERD (gastroesophageal reflux disease)    • Hernia of abdominal wall     JUST BELOW UMBILICUS   • History of pneumonia    • Hypertension    • NSTEMI (non-ST elevated myocardial infarction) 2017   • Pulmonary embolism    • 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   • Wears dentures     UPPER ONLY     Past Surgical History:   Procedure Laterality Date   • ENDOSCOPY      • GALLBLADDER SURGERY     • HERNIA REPAIR     • HYSTERECTOMY     • REPLACEMENT TOTAL KNEE Right           PT ASSESSMENT (last 72 hours)      PT Evaluation       12/17/17 0954 12/17/17 0802    Rehab Evaluation    Document Type re-evaluation  -JR     Subjective Information agree to therapy;complains of;weakness  -JR     Patient Effort, Rehab Treatment adequate  -JR     Symptoms Noted During/After Treatment fatigue  -JR     General Information    Patient Profile Review yes  -JR     General Observations Supine with guerrero cath, IV in LUE and tele,  Dtrs at bedside  -JR     Pertinent History Of Current Problem Acute renal failure, cirrhosis, HTN, CKD, anemia, hyperkalemia  -JR     Precautions/Limitations fall precautions  -JR     Prior Level of Function independent:;all household mobility  -JR     Equipment Currently Used at Home walker, standard;commode;wheelchair  -JR     Plans/Goals Discussed With patient and family;agreed upon  -JR     Risks Reviewed patient and family:;change in vital signs;increased discomfort  -JR     Benefits Reviewed patient and family:;increase balance;increase strength;increase independence;improve function  -JR     Barriers to Rehab medically complex  -JR     Living Environment    Lives With child(tasneem), adult  -JR     Living Arrangements house  -JR     Home Accessibility bed and bath on same level  -JR     Clinical Impression    PT Diagnosis Weakness, gait abnormality, poor balance  -JR     Prognosis Fair  -JR     Patient/Family Goals Statement Patient wants to go home  -JR     Criteria for Skilled Therapeutic Interventions Met yes;treatment indicated  -JR     Pathology/Pathophysiology Noted (Describe Specifically for Each System) musculoskeletal;neuromuscular;cardiovascular;endocrine/metabolic  -JR     Impairments Found (describe specific impairments) gait, locomotion, and balance;motor function;posture;aerobic capacity/endurance  -JR     Rehab Potential fair, will monitor progress closely   -     Pain Assessment    Pain Assessment No/denies pain  -     Cognitive Assessment/Intervention    Current Cognitive/Communication Assessment functional  -     Orientation Status oriented x 4  -JR     Follows Commands/Answers Questions able to follow multi-step instructions  -     Personal Safety WNL/WFL  -     Personal Safety Interventions fall prevention program maintained;gait belt;nonskid shoes/slippers when out of bed  -     ROM (Range of Motion)    General ROM no range of motion deficits identified  -     MMT (Manual Muscle Testing)    General MMT Assessment Detail Grossly BLE=4-/5  -JR     Muscle Tone Assessment    Bilateral Upper Extremities Muscle Tone Assessment  mildly decreased tone  -CM    Bilateral Lower Extremities Muscle Tone Assessment  mildly decreased tone  -CM    Bed Mobility, Assessment/Treatment    Bed Mobility, Assistive Device head of bed elevated;bed rails  -     Bed Mobility, Roll Left, [place] minimum assist (75% patient effort)  -     Bed Mobility, Roll Right, [place] minimum assist (75% patient effort)  -     Bed Mob, Supine to Sit, [place] minimum assist (75% patient effort)  -     Bed Mob, Sit to Supine, [place] minimum assist (75% patient effort)  -     Bed Mobility, Safety Issues decreased use of arms for pushing/pulling;decreased use of legs for bridging/pushing;impaired trunk control for bed mobility  -     Bed Mobility, Impairments strength decreased;impaired balance;postural control impaired  -     Transfer Assessment/Treatment    Transfers, Bed-Chair [place] minimum assist (75% patient effort)  -     Transfers, Sit-Stand [place] minimum assist (75% patient effort)  -     Transfers, Stand-Sit [place] minimum assist (75% patient effort)  -     Transfers, Sit-Stand-Sit, Assist Device rolling walker  -     Transfer, Safety Issues step length decreased;balance decreased during turns;sequencing ability  decreased;weight-shifting ability decreased  -     Transfer, Impairments strength decreased;impaired balance;postural control impaired  -     Gait Assessment/Treatment    Gait, [place] minimum assist (75% patient effort)  -     Gait, Assistive Device rolling walker  -     Gait, Distance (Feet) 5  -     Gait, Gait Pattern Analysis swing-through gait  -     Gait, Gait Deviations diogo decreased;stride width increased;stride length decreased;forward flexed posture  -     Gait, Safety Issues step length decreased;balance decreased during turns;weight-shifting ability decreased;sequencing ability decreased  -     Gait, Impairments strength decreased;impaired balance;postural control impaired  -     Positioning and Restraints    Pre-Treatment Position in bed  -     Post Treatment Position chair  -     In Chair sitting;call light within reach;encouraged to call for assist;with family/caregiver  -       12/17/17 0402 12/17/17 0251    Living Environment    Transportation Available  family or friend will provide  -RG    Muscle Tone Assessment    Muscle Tone Assessment Bilateral Upper Extremities;Bilateral Lower Extremities  -RG     Bilateral Upper Extremities Muscle Tone Assessment mildly decreased tone  -RG     Bilateral Lower Extremities Muscle Tone Assessment moderately decreased tone  -RG       12/17/17 0000 12/16/17 2000    Muscle Tone Assessment    Muscle Tone Assessment Bilateral Upper Extremities;Bilateral Lower Extremities  -RG Bilateral Upper Extremities;Bilateral Lower Extremities  -RG    Bilateral Upper Extremities Muscle Tone Assessment mildly decreased tone  -RG mildly decreased tone  -RG    Bilateral Lower Extremities Muscle Tone Assessment moderately decreased tone  -RG moderately decreased tone  -RG      12/16/17 1600 12/16/17 1203    Muscle Tone Assessment    Bilateral Upper Extremities Muscle Tone Assessment mildly decreased tone  -CM mildly decreased tone  -CM     Bilateral Lower Extremities Muscle Tone Assessment moderately decreased tone  -CM moderately decreased tone  -CM      12/16/17 0800 12/16/17 0335    Muscle Tone Assessment    Muscle Tone Assessment  Bilateral Lower Extremities;Bilateral Upper Extremities  -CH    Bilateral Upper Extremities Muscle Tone Assessment mildly decreased tone  -CM mildly decreased tone  -CH    Bilateral Lower Extremities Muscle Tone Assessment moderately decreased tone  -CM moderately decreased tone  -CH      12/15/17 2353 12/15/17 2001    Muscle Tone Assessment    Muscle Tone Assessment Bilateral Lower Extremities;Bilateral Upper Extremities  -CH Bilateral Lower Extremities;Bilateral Upper Extremities  -CH    Bilateral Upper Extremities Muscle Tone Assessment mildly decreased tone  -CH mildly decreased tone  -CH    Bilateral Lower Extremities Muscle Tone Assessment moderately decreased tone  -CH moderately decreased tone  -CH      12/15/17 1300 12/15/17 0941    Rehab Evaluation    Evaluation Not Performed  unable to evaluate, medical status change   Pt on hold today d/t low BP.  Will follow up with pt once she is medically stable.  -    Pain Assessment    Pain Score --   Family reports pt having leg pain. Has been medicated  -FF       12/15/17 0905 12/14/17 1451    Rehab Evaluation    Document Type  therapy note (daily note)  -LM    Subjective Information  agree to therapy;complains of;weakness  -LM    Evaluation Not Performed unable to evaluate, medical status change   Hold PT re-eval this date d/t BP too low to safely advance activity. If BP rises, pt is scheduled to have a dialysis cath placed and initiate dialysis.  PT to follow up with pt tomorrow and proceed with re-eval as appropriate and tolerated.  -LM     Patient Effort, Rehab Treatment  adequate  -LM    Symptoms Noted During/After Treatment  fatigue  -LM    General Information    Precautions/Limitations  fall precautions  -LM    Pain Assessment    Pain Assessment  No/denies  pain  -LM    Bed Mobility, Assessment/Treatment    Bed Mobility, Assistive Device  bed rails;head of bed elevated  -LM    Bed Mob, Supine to Sit, [place]  supervision required  -LM    Transfer Assessment/Treatment    Transfers, Bed-Chair [place]  contact guard assist  -LM    Transfers, Bed-Chair-Bed, Assist Device  rolling walker  -LM    Transfers, Sit-Stand [place]  contact guard assist  -LM    Transfers, Stand-Sit [place]  contact guard assist  -LM    Transfers, Sit-Stand-Sit, Assist Device  rolling walker  -LM    Transfer, Safety Issues  balance decreased during turns;sequencing ability decreased;step length decreased  -LM    Transfer, Impairments  strength decreased;impaired balance  -LM    Gait Assessment/Treatment    Gait, [place] Level  contact guard assist  -LM    Gait, Assistive Device  rolling walker  -LM    Gait, Distance (Feet)  4  -LM    Gait, Gait Pattern Analysis  swing-through gait  -    Gait, Gait Deviations  diogo decreased;forward flexed posture;step length decreased;toe-to-floor clearance decreased  -LM    Gait, Safety Issues  balance decreased during turns;step length decreased  -LM    Gait, Impairments  strength decreased;impaired balance  -    Therapy Exercises    Bilateral Lower Extremities  AAROM:;10 reps;supine;ankle pumps/circles;hip abduction/adduction;heel slides;SLR  -LM    Positioning and Restraints    Pre-Treatment Position  in bed  -LM    Post Treatment Position  chair  -LM    In Chair  reclined;call light within reach;encouraged to call for assist;with family/caregiver  -      12/14/17 5069       Rehab Evaluation    Document Type therapy note (daily note)  -     Subjective Information agree to therapy;complains of;weakness  -     Patient Effort, Rehab Treatment adequate  -     Symptoms Noted During/After Treatment fatigue  -     General Information    Precautions/Limitations fall precautions  -     Pain Assessment    Pain Assessment  No/denies pain  -     Bed Mobility, Assessment/Treatment    Bed Mobility, Assistive Device bed rails;head of bed elevated  -     Bed Mob, Supine to Sit, [place] supervision required  -     Transfer Assessment/Treatment    Transfers, Sit-Stand [place] contact guard assist  -     Transfers, Stand-Sit [place] contact guard assist  -     Transfers, Sit-Stand-Sit, Assist Device rolling walker  -     Therapy Exercises    Bilateral Upper Extremity AROM:;15 reps;elbow flexion/extension;shoulder horizontal abd/add  -     BUE Resistance theraband  -     Positioning and Restraints    Pre-Treatment Position in bed  -     Post Treatment Position chair  -     In Chair reclined;call light within reach;encouraged to call for assist;with family/caregiver  -       User Key  (r) = Recorded By, (t) = Taken By, (c) = Cosigned By    Initials Name Provider Type     Rosalia Sepulveda Occupational Therapist    JR Bettie Corrales, PT Physical Therapist    RG Neha Coy, RN Registered Nurse    CH Lebron English, RN Registered Nurse    EARLENE Khan, PT Physical Therapist    FF Katie Santos     CM Lizzeth Blake, RN Registered Nurse          Physical Therapy Education     Title: PT OT SLP Therapies (Done)     Topic: Physical Therapy (Done)     Point: Mobility training (Done)    Learning Progress Summary    Learner Readiness Method Response Comment Documented by Status   Patient Acceptance E VU Importance of LE movements to improve circulation while sitting with LE dependent JR 12/17/17 1058 Done    Acceptance E VU  EB 12/15/17 1134 Done    Acceptance E VU Ther ex for HEP. LM 12/14/17 1624 Done    Acceptance E VU Ther ex for HEP; proper use of RW for safe tranfers/ambulation. LM 12/13/17 1153 Done    Acceptance E VU  NO 12/13/17 0133 Done    Acceptance E,D VU,NR  RM 12/11/17 1818 Done    Acceptance E VU,NR Increase mobility daily CC 12/10/17 1624 Done    Acceptance E VU Purpose of PT/POC.   12/08/17 1331 Done   Family Acceptance E VU Importance of LE movements to improve circulation while sitting with LE dependent  12/17/17 1058 Done               Point: Home exercise program (Done)    Learning Progress Summary    Learner Readiness Method Response Comment Documented by Status   Patient Acceptance E VU  EB 12/15/17 1134 Done    Acceptance E VU Ther ex for HEP.  12/14/17 1624 Done    Acceptance E VU Ther ex for HEP; proper use of RW for safe tranfers/ambulation.  12/13/17 1153 Done    Acceptance E VU  NO 12/13/17 0133 Done    Acceptance E,D VU,NR  RM 12/12/17 1758 Done    Acceptance E NR,VU Perform ex daily CC 12/10/17 1501 Done    Acceptance E VU Purpose of PT/POC.  12/08/17 1331 Done               Point: Precautions (Done)    Learning Progress Summary    Learner Readiness Method Response Comment Documented by Status   Patient Acceptance E VU  EB 12/15/17 1134 Done    Acceptance E VU Ther ex for HEP.  12/14/17 1624 Done    Acceptance E VU Ther ex for HEP; proper use of RW for safe tranfers/ambulation.  12/13/17 1153 Done    Acceptance E VU  NO 12/13/17 0133 Done    Acceptance E VU Purpose of PT/POC.  12/08/17 1331 Done                      User Key     Initials Effective Dates Name Provider Type Discipline     10/26/16 -  Bettie Corrales, PT Physical Therapist PT    EB 11/07/16 -  Lindsey Mcduffie, RN Registered Nurse Nurse     10/26/16 -  Serene Khan, PT Physical Therapist PT    CC 10/26/16 -  Jamaica Farr PTA Physical Therapy Assistant PT     10/26/16 -  Gabriel Israel, PTA Physical Therapy Assistant PT    NO 12/13/16 -  Jada Enriquez, RN Registered Nurse Nurse                PT Recommendation and Plan  Anticipated Discharge Disposition: home with home health  Planned Therapy Interventions: balance training, strengthening, bed mobility training, transfer training, gait training, patient/family education  PT Frequency: daily  Plan of Care Review  Plan Of Care Reviewed With:  patient, daughter  Outcome Summary/Follow up Plan: Patient is able to participate in skilled PT re-evaluation and treatment.  She demonstrates decreased strength, balance and activity tolerance.  She requires minimal assistance for mobility tasks.  Patient is expected to benefit from additional PT services while hospitalized and upon discharge to home with home health care services.                                  IP PT Goals       12/17/17 1102 12/14/17 1624 12/13/17 1154    Bed Mobility PT LTG    Bed Mobility PT LTG, Date Established 12/17/17  -JR      Bed Mobility PT LTG, Time to Achieve 2 wks  -JR      Bed Mobility PT LTG, Activity Type all bed mobility  -JR      Bed Mobility PT LTG, [place] Level supervision required  -JR      Transfer Training PT LTG    Transfer Training PT LTG, Date Established 12/17/17  -JR      Transfer Training PT LTG, Time to Achieve 2 wks  -JR      Transfer Training PT LTG, Activity Type sit to stand/stand to sit;bed to chair /chair to bed  -JR      Transfer Training PT LTG, [place] Level supervision required  -JR      Transfer Training PT LTG, Assist Device walker, rolling  -JR      Transfer Training PT LTG, Outcome goal revised  -JR      Gait Training PT LTG    Gait Training Goal PT LTG, Date Established 12/17/17  -JR      Gait Training Goal PT LTG, Time to Achieve 2 wks  -JR      Gait Training Goal PT LTG, [place] Level contact guard assist  -JR      Gait Training Goal PT LTG, Assist Device walker, rolling  -JR      Gait Training Goal PT LTG, Distance to Achieve 25  -JR      Gait Training Goal PT LTG, Outcome goal revised  -JR goal ongoing  -LM goal ongoing  -LM    Strength Goal PT LTG    Strength Goal PT LTG, Date Established 12/17/17  -JR      Strength Goal PT LTG, Time to Achieve 2 wks  -JR      Strength Goal PT LTG, Functional Goal to perform BLE exercises x 20 reps to improve transfers and gait  -JR      Strength Goal PT LTG, Outcome goal revised  -JR goal ongoing   -LM goal partially met  -LM      12/12/17 1758 12/11/17 1819 12/10/17 1625    Bed Mobility PT LTG    Bed Mobility PT LTG, Outcome  goal met  -RM goal partially met  -CC    Transfer Training PT LTG    Transfer Training PT LTG, Outcome  goal met  -RM goal partially met  -CC    Gait Training PT LTG    Gait Training Goal PT LTG, Outcome  goal ongoing  -RM goal ongoing  -CC    Strength Goal PT LTG    Strength Goal PT LTG, Outcome goal partially met  -RM  goal partially met  -CC      12/09/17 1653 12/08/17 1331       Bed Mobility PT LTG    Bed Mobility PT LTG, Date Established  12/08/17  -LM     Bed Mobility PT LTG, Time to Achieve  2 wks  -LM     Bed Mobility PT LTG, Activity Type  supine to sit/sit to supine  -LM     Bed Mobility PT LTG, [place] Level  contact guard assist  -LM     Bed Mobility PT Goal  LTG, Assist Device  bed rails  -LM     Bed Mobility PT LTG, Outcome goal ongoing  -CC goal ongoing  -LM     Transfer Training PT LTG    Transfer Training PT LTG, Date Established  12/08/17  -LM     Transfer Training PT LTG, Time to Achieve  2 wks  -LM     Transfer Training PT LTG, Activity Type  sit to stand/stand to sit;bed to chair /chair to bed  -LM     Transfer Training PT LTG, [place] Level  contact guard assist  -LM     Transfer Training PT LTG, Assist Device  walker, rolling  -LM     Transfer Training PT LTG, Outcome goal ongoing  -CC goal ongoing  -LM     Gait Training PT LTG    Gait Training Goal PT LTG, Date Established  12/08/17  -LM     Gait Training Goal PT LTG, Time to Achieve  2 wks  -LM     Gait Training Goal PT LTG, [place] Level  contact guard assist  -LM     Gait Training Goal PT LTG, Assist Device  walker, rolling  -LM     Gait Training Goal PT LTG, Distance to Achieve  50  -LM     Gait Training Goal PT LTG, Outcome goal ongoing  -CC goal ongoing  -LM     Strength Goal PT LTG    Strength Goal PT LTG, Date Established  12/08/17  -LM     Strength Goal PT LTG, Time to Achieve  2 wks  -LM      Strength Goal PT LTG, Measure to Achieve  Patient will perform B LE ther ex x 15 reps to improve functional strength for mobility.  -LM     Strength Goal PT LTG, Outcome goal ongoing  -CC goal ongoing  -LM       User Key  (r) = Recorded By, (t) = Taken By, (c) = Cosigned By    Initials Name Provider Type    JR Bettie Corrales, PT Physical Therapist    LM Serene Khan, PT Physical Therapist    CC [place] Farr, [facility] Physical Therapy Assistant     Gabriel Israel, [facility] Physical Therapy Assistant                Outcome Measures       12/17/17 0954 12/14/17 1451 12/14/17 1442    How much help from another person do you currently need...    Turning from your back to your side while in flat bed without using bedrails? 3  -JR 3  -LM     Moving from lying on back to sitting on the side of a flat bed without bedrails? 3  -JR 3  -LM     Moving to and from a bed to a chair (including a wheelchair)? 3  -JR 3  -LM     Standing up from a chair using your arms (e.g., wheelchair, bedside chair)? 3  -JR 3  -LM     Climbing 3-5 steps with a railing? 2  -JR 2  -LM     To walk in hospital room? 3  -JR 3  -LM     AM-PAC 6 Clicks Score 17  -JR 17  -LM     How much help from another is currently needed...    Putting on and taking off regular lower body clothing?   2  -AH    Bathing (including washing, rinsing, and drying)   2  -AH    Toileting (which includes using toilet bed pan or urinal)   2  -AH    Putting on and taking off regular upper body clothing   3  -AH    Taking care of personal grooming (such as brushing teeth)   3  -AH    Eating meals   4  -    Score   16  -AH    Functional Assessment    Outcome Measure Options AM-PAC 6 Clicks Basic Mobility (PT)  -JR AM-PAC 6 Clicks Basic Mobility (PT)  -LM AM-PAC 6 Clicks Daily Activity (OT)  -AH      User Key  (r) = Recorded By, (t) = Taken By, (c) = Cosigned By    Initials Name Provider Type     Rosalia Sepulveda Occupational Therapist    JR Bettie Corrales, PT Physical Therapist    LM  Serene Khan, PT Physical Therapist           Time Calculation:         PT Charges       12/17/17 1101 12/17/17 1100       Time Calculation    Start Time  0954  -     PT Received On  12/17/17  -     PT Goal Re-Cert Due Date  12/27/17  -     Time Calculation- PT    Total Timed Code Minutes- PT 18 minute(s)  -        User Key  (r) = Recorded By, (t) = Taken By, (c) = Cosigned By    Initials Name Provider Type     Bettie Corrales, PT Physical Therapist          Therapy Charges for Today     Code Description Service Date Service Provider Modifiers Qty    26689603330 HC PT RE-EVAL ESTABLISHED PLAN 2 12/17/2017 Bettie Corrales, PT GP 1    47975205328 HC PT THER PROC EA 15 MIN 12/17/2017 Bettie Corrales, PT GP 1          PT G-Codes  Outcome Measure Options: AM-PAC 6 Clicks Basic Mobility (PT)      Bettie Corrales, PT  12/17/2017

## 2017-12-17 NOTE — PROGRESS NOTES
"   LOS: 9 days   Patient Care Team:  Pieter Farias DO as PCP - General (Internal Medicine)    Interval History: Dmitry feels okmacy has had her breakfast.  Has no new complaints at this time.      Review of Systems:   Pertinent items are noted in HPI.      Objective     Vital Sign Min/Max for last 24 hours  Temp  Min: 97.4 °F (36.3 °C)  Max: 97.8 °F (36.6 °C)   BP  Min: 71/54  Max: 108/53   Pulse  Min: 46  Max: 91   Resp  Min: 10  Max: 20   SpO2  Min: 94 %  Max: 100 %   No Data Recorded   Weight  Min: 101 kg (222 lb 1.6 oz)  Max: 101 kg (222 lb 1.6 oz)     Flowsheet Rows         First Filed Value    Admission Height  170.2 cm (67\") Documented at 12/07/2017 2247    Admission Weight  109 kg (240 lb) Documented at 12/07/2017 2247          Physical Exam:     General Appearance:    Alert, cooperative, in no acute distress   Head:    Normocephalic, without obvious abnormality, atraumatic   Eyes:            Lids and lashes normal, conjunctivae and sclerae normal, no   icterus, no pallor, corneas clear, PERRLA   Ears:    Ears appear intact with no abnormalities noted   Throat:   No oral lesions, no thrush, oral mucosa moist   Neck:   No adenopathy, supple, trachea midline, no thyromegaly, no     carotid bruit, no JVD   Back:     No kyphosis present, no scoliosis present, no skin lesions,       erythema or scars, no tenderness to percussion or                   palpation,   range of motion normal   Lungs:     Clear to auscultation,respirations regular, even and                   unlabored    Heart:    Regular rhythm and normal rate, normal S1 and S2, no            murmur, no gallop, no rub, no click   Breast Exam:    Deferred   Abdomen:     Normal bowel sounds, no masses, no organomegaly, soft        non-tender, non-distended, no guarding, no rebound                 tenderness   Genitalia:    Deferred   Extremities: Anasarca present    Pulses:   Pulses palpable and equal bilaterally   Skin:   No bleeding, bruising or rash "   Lymph nodes:   No palpable adenopathy   Neurologic:   Cranial nerves 2 - 12 grossly intact, sensation intact, DTR        present and equal bilaterally          Results Review:     I reviewed the patient's new clinical results.    Results Review:   I reviewed the patient's new clinical results.    Telemetry: Rate is very well controlled heart rates in the 60s.    LAB DATA :         Laboratory results:      Results from last 7 days  Lab Units 12/17/17  0413 12/16/17  0541 12/15/17  0658 12/14/17  0649 12/13/17  1424 12/13/17  0556 12/12/17  0611 12/11/17  0536   SODIUM mmol/L 130* 131* 130* 128* 128* 127* 129* 129*   POTASSIUM mmol/L 3.7 4.0 4.2 5.1 5.6* 5.8* 5.8* 5.3*   CHLORIDE mmol/L 101 102 101 101 101 101 102 103   CO2 mmol/L 18.0* 18.0* 17.0* 16.0* 15.0* 18.0* 18.0* 17.0*   BUN mg/dL 99* 93* 96* 97* 95* 95* 91* 90*   CREATININE mg/dL 3.00* 3.20* 3.50* 3.80* 4.00* 4.10* 4.30* 4.40*   CALCIUM mg/dL 7.7* 7.4* 7.5* 7.5* 7.8* 7.9* 8.2* 8.4   BILIRUBIN mg/dL  --  0.6  --   --   --   --   --  0.7   ALK PHOS U/L  --  165*  --   --   --   --   --  171*   ALT (SGPT) U/L  --  30  --   --   --   --   --  35   AST (SGOT) U/L  --  36  --   --   --   --   --  32   GLUCOSE mg/dL 88 87 124* 79 96 92 76 97       Results from last 7 days  Lab Units 12/17/17  0413 12/16/17  0541 12/15/17  0658 12/14/17  0649 12/13/17  0556 12/12/17  0611 12/11/17  0536   WBC 10*3/mm3 6.80 6.16 7.76 6.41 5.39 4.94 6.14   HEMOGLOBIN g/dL 8.4* 8.5* 9.1* 8.3* 8.5* 8.8* 9.1*   HEMATOCRIT % 26.1* 26.5* 28.0* 25.4* 26.8* 27.1* 27.9*   PLATELETS 10*3/mm3 133 124* 116* 131 113* 97* 120*       Results from last 7 days  Lab Units 12/17/17  0413 12/16/17  0541 12/15/17  0658 12/14/17  0649 12/13/17  0556 12/12/17  0611 12/11/17  0536   INR  1.39* 1.43* 1.42* 1.46* 1.76* 2.17* 2.81*               Results from last 7 days  Lab Units 12/15/17  0658   TROPONIN I ng/mL 0.109*                 No results found for: HGBA1C            IMAGING DATA:     Ct Abdomen  Pelvis Without Contrast    Result Date: 12/8/2017  Narrative: PROCEDURE: CT ABDOMEN PELVIS WO CONTRAST-  HISTORY: N/V, MODESTA, weakness  COMPARISON: None .  PROCEDURE: Axial images were obtained from the lung bases through the pubic symphysis without intravenous contrast.    .  FINDINGS:  ABDOMEN: Lack of intravenous contrast limits evaluation of the solid organs, the mediastinum, and the vasculature. Mild atelectasis in the bibasilar regions.The limited noncontrast images of the liver demonstrate a nodular border suggestive of cirrhosis. There are subcentimeter nodular areas that are not well assessed without contrast. Esophageal and splenic varices are noted hiatal hernia is noted. Additionally there is a ventral hernia containing nondistended loops of transverse colon. The gallbladder is absent . The spleen is normal. 1.7 cm right adrenal adenoma.  The pancreas is atrophic. Nonobstructing stone present within the right collecting system. There is no hydronephrosis. The aorta is normal in caliber. There is no significant free fluid or adenopathy.  No abnormally dilated loops of bowel are appreciated. Postoperative changes are seen to small bowel. There is retained stool throughout the colon.  Nonspecific body wall edema is noted.  PELVIS: The appendix is not identified. The urinary bladder is unremarkable. There is no significant fluid or adenopathy. There are degenerative changes of the spine.         Impression: 1. No evidence of an obstructive uropathy. 2. Significant interval improvement of ascites and pleural effusions compared to prior. 3. Ventral wall defect containing nondistended loop of transverse colon. 4. Cirrhotic appearance to the liver with subcentimeter nodular densities, dedicated follow-up imaging of the liver is recommended.. 5. 1.7 cm right adrenal adenoma.          .     This study was performed with techniques to keep radiation doses as low as reasonably achievable (ALARA). Individualized dose  reduction techniques using automated exposure control or adjustment of mA and/or kV according to the patient size were employed.  This report was finalized on 12/8/2017 7:26 AM by Shabbir Sher DO.    Ct Head Without Contrast    Result Date: 11/25/2017  Narrative: PROCEDURE: CT HEAD WO CONTRAST-  HISTORY: confusion, elderly, on coumadin  TECHNIQUE: Multiple axial CT images were performed from the foramen magnum to the vertex without contrast. Coronal reformatted images were also obtained.  FINDINGS: Comparison is made to the prior exam dated October 10, 2017. Low-attenuation is again seen in the cerebral white matter consistent with moderate to severe chronic ischemic changes.The ventricles are normal in size. There is no evidence of hemorrhage .  No masses are identified.  No abnormal extra-axial fluid collection is seen.  There is no evidence of shift of the midline structures. Images of the sinuses reveal no evidence of mucosal thickening. No bony abnormality is seen on the bone window images.         Impression: No acute intracranial abnormality.  Moderate to severe chronic ischemic changes, stable.   This study was performed with techniques to keep radiation doses as low as reasonably achievable (ALARA). Individualized dose reduction techniques using automated exposure control or adjustment of mA and/or kV according to the patient size were employed.  This report was finalized on 11/25/2017 7:56 AM by Chele Blair MD.    Us Renal Limited    Result Date: 11/27/2017  Narrative: PROCEDURE: US RENAL LIMITED-  HISTORY: MODESTA, CKD4; K72.90-Hepatic failure, unspecified without coma; N17.9-Acute kidney failure, unspecified; N18.9-Chronic kidney disease, unspecified; D68.9-Coagulation defect, unspecified; T45.515A-Adverse effect of anticoagulants, initial encounter; J18.1-Lobar pneumonia, unspecified organism; Z74.09-Other reduced mobility  PROCEDURE: Ultrasound images of the kidneys were obtained.  FINDINGS:.  The  kidneys are imaged by ultrasound with the right kidney measuring 6.7 cm and the left kidney measuring 9.7 cm. There is bilateral renal cortical thinning. There is no cystic or solid mass. There is no hydronephrosis.      Impression: Bilateral renal cortical thinning which can be associated with medical renal disease.    Images were reviewed, interpreted, and dictated by Dr. Sher. Transcribed by Laura Yao PA-C.  This report was finalized on 11/27/2017 11:33 AM by Shabbir Sher DO.    Xr Chest 1 View    Result Date: 12/8/2017  Narrative: PROCEDURE: XR CHEST 1 VW-  HISTORY: Weak/Dizzy/AMS triage protocol  COMPARISON: November 27, 2017.  FINDINGS: Electrodes overlie the chest. The heart is normal in size. The mediastinum is unremarkable. There are chronic interstitial changes. There is no pneumothorax.  There are no acute osseous abnormalities.         Impression: No acute cardiopulmonary process.  Continued followup is recommended.  This report was finalized on 12/8/2017 7:28 AM by Shabbir Sher DO.    Xr Chest 1 View    Result Date: 11/27/2017  Narrative: PROCEDURE: XR CHEST 1 VW-  HISTORY: pneumonia follow up; K72.90-Hepatic failure, unspecified without coma; N17.9-Acute kidney failure, unspecified; N18.9-Chronic kidney disease, unspecified; D68.9-Coagulation defect, unspecified; T45.515A-Adverse effect of anticoagulants, initial encounter; J18.1-Lobar pneumonia, unspecified organism; Z74.09-Other reduced mobility  COMPARISON: November 25, 2017.  FINDINGS: Electrodes overlie the chest. The heart is normal in size. The mediastinum is unremarkable. There is evidence of calcified granulomatous disease. There are chronic interstitial changes. There is no pneumothorax.  There are no acute osseous abnormalities. Atherosclerosis is noted.      Impression: No acute cardiopulmonary process.  Continued followup is recommended.  This report was finalized on 11/27/2017 2:12 PM by Shabbir Sher DO.    Xr Chest 1  View    Result Date: 11/25/2017  Narrative: PROCEDURE: XR CHEST 1 VW-  HISTORY: Weak/Dizzy/AMS triage protocol.  COMPARISON: October 10, 2017.   FINDINGS:    . The heart is normal in size. The mediastinum is unremarkable. There are low lung volumes. Bibasilar pulmonary opacities are seen left worse than right which may represent atelectasis or pneumonia. There is no pneumothorax. The bony thorax in intact.         Impression: Bibasilar atelectasis or pneumonia, greater on the left.   This report was finalized on 11/25/2017 8:14 AM by Chele Blair MD.    Xr Abdomen Kub    Result Date: 11/25/2017  Narrative: FINAL REPORT CLINICAL HISTORY: NG TUBE PLACEMENT FINDINGS: SINGLE VIEW ABDOMEN. A single view of the abdomen demonstrates a nonspecific, nonobstructive bowel gas pattern. There are no abnormally dilated loops of small bowel.  No abnormal calcifications are identified. An NG tube terminates in the proximal stomach.     Impression: Nonspecific bowel gas pattern. Authenticated by JUAN Escobar MD on 11/25/2017 01:53:57 PM            Assessment/Plan    #1 hypertension: Patient's blood pressures have stabilized at this time.  Amiodarone is been increased to 10 mg.  Would be cautious with respect to the same.  She has not had any acute coronary event as of now.    #2 portal hypertension: Patient has portal hypertension with atrial fibrillation.  Needs to be on the low-dose beta blocker therapy.  Apparently the Inderal dose was kept as of yesterday we'll decrease the dose to 20 mg 3 times a day and see if she can handle the same.    #3 anticoagulation therapy: Her INR is normalized at this time.  This is quite encouraging as it may be suggestive synthetic function of the liver.  Her Coumadin is been reinitiated at this time.    #4 edema: Appears to be related to her protein status as well as her portal hypertension.  Would be consistent with the known dose of diuretic without when necessary aggressive diuresis  unless there are issues with respect to hypoxemia.  She is very prone for hypotension on aggressive diuresis.  This was explained to her as well as her family.    Principal Problem:    ESRF (end stage renal failure)  Active Problems:    Cirrhosis of liver with ascites    Chronic kidney disease, stage IV (severe)    Permanent atrial fibrillation    Lymphedema of both lower extremities    Dehydration    Acute renal failure    Hyperkalemia    Atrial fibrillation with RVR            Tae Irby MD  12/17/17  9:27 AM

## 2017-12-18 NOTE — PROGRESS NOTES
Pt sleeping at time of visit.  Spoke with pt's primary nurse, Windy FARIAS, who reported pt's BP remains low with systolic BP in 70's & 80's.   She reported pt has not been eating much however family brought in mashed potatoes and coleslaw which pt did eat.   PC will continue to visit.

## 2017-12-18 NOTE — PLAN OF CARE
Problem: Pressure Ulcer (Adult)  Intervention: Prevent/Manage Excess Moisture    12/17/17 2300 12/18/17 1100 12/18/17 1200   Hygiene Care Assistance   Perineal Care --  cleansed;absorbent pad changed --    Hygiene Care   Bathing/Skin Care back care;bath, chlorhexidine;bath, complete;dressed/undressed;incontinence care;linen changed --  --    Skin Interventions   Skin Protection --  --  incontinence pads utilized         Goal: Signs and Symptoms of Listed Potential Problems Will be Absent or Manageable (Pressure Ulcer)  Outcome: Ongoing (interventions implemented as appropriate)

## 2017-12-18 NOTE — PLAN OF CARE
Problem: Patient Care Overview (Adult)  Goal: Plan of Care Review  Outcome: Ongoing (interventions implemented as appropriate)    12/18/17 1533   Outcome Evaluation   Outcome Summary/Follow up Plan Spoke with patient and her daughter at her bedside. I was present for physician's rounding visit and heard the physician express her concern that the patient was not eating. Conversation with the patient's daughter focused on the patient's changing condition, her need for nutrition, and ways to encourage the patient to eat. I provided encouragement, assisted the patient in identifying her favorite foods, and made a list of food she likes to eat- baked and mashed potatoes and mac and cheese.  I stopped by at lunch time and offered further encouragemnt as I witnessed the patient eating from her tray . I will continue to follow.    Coping/Psychosocial Response Interventions   Plan Of Care Reviewed With patient;daughter         12/18/17 1533   Outcome Evaluation   Outcome Summary/Follow up Plan Spoke with tatiana and her daughter at her bedside. I was present for physician's rounding visit with patient and her concern that the patient was not eating. Conversation with the patient's daughter focused on the patient's changing condition, her need for nutrution, and ways to encourage th patient to eat. I provided encouragement, assisted the patient in identifying foods she likes, and made a list of foods she likes to eat. I stopped by at lunch time and offered further encouragemnt as I witnessed the patient eating from her tray . I will continue to follow.    Coping/Psychosocial Response Interventions   Plan Of Care Reviewed With patient;daughter

## 2017-12-18 NOTE — PROGRESS NOTES
"   LOS: 10 days    Patient Care Team:  Pieter Farias DO as PCP - General (Internal Medicine)    Chief Complaint:    Chief Complaint   Patient presents with   • Fatigue       Subjective   F/u MODESTA CKD4  Interval History:   UOP poor and patient more confused; denies dyspnea; received NS boluses overnight for low BP, 97 systolic now    Objective     Vital Signs  Temp:  [97.4 °F (36.3 °C)-97.8 °F (36.6 °C)] 97.5 °F (36.4 °C)  Heart Rate:  [] 72  Resp:  [10-18] 15  BP: ()/(33-80) 90/56    Flowsheet Rows         First Filed Value    Admission Height  170.2 cm (67\") Documented at 12/07/2017 2247    Admission Weight  109 kg (240 lb) Documented at 12/07/2017 2247          I/O this shift:  In: 120 [P.O.:120]  Out: -   I/O last 3 completed shifts:  In: 2047 [P.O.:660; I.V.:1387]  Out: 825 [Urine:825]    Intake/Output Summary (Last 24 hours) at 12/18/17 0943  Last data filed at 12/18/17 0830   Gross per 24 hour   Intake             1906 ml   Output              575 ml   Net             1331 ml       Physical Exam:  gen - frail WF in no acute distress, confused, blank stare  Neck supple no JVD   Lungs CTA bilat no rales   CV RRR no M/G   abd soft NT/ND   vasc 2+ BLE edema   scant urine in guerrero     Results Review:      Results from last 7 days  Lab Units 12/18/17  0434 12/17/17  0413 12/16/17  0541   SODIUM mmol/L 131* 130* 131*   POTASSIUM mmol/L 3.8 3.7 4.0   CHLORIDE mmol/L 103 101 102   CO2 mmol/L 18.0* 18.0* 18.0*   BUN mg/dL 100* 99* 93*   CREATININE mg/dL 2.90* 3.00* 3.20*   CALCIUM mg/dL 7.8* 7.7* 7.4*   BILIRUBIN mg/dL  --   --  0.6   ALK PHOS U/L  --   --  165*   ALT (SGPT) U/L  --   --  30   AST (SGOT) U/L  --   --  36   GLUCOSE mg/dL 78 88 87       Estimated Creatinine Clearance: 19.3 mL/min (by C-G formula based on Cr of 2.9).      Results from last 7 days  Lab Units 12/18/17  0434 12/17/17  0413 12/16/17  0541   PHOSPHORUS mg/dL 5.6* 5.8* 5.8*               Results from last 7 days  Lab Units " 12/18/17  0434 12/17/17  0413 12/16/17  0541 12/15/17  0658 12/14/17  0649   WBC 10*3/mm3 5.71 6.80 6.16 7.76 6.41   HEMOGLOBIN g/dL 8.9* 8.4* 8.5* 9.1* 8.3*   PLATELETS 10*3/mm3 120* 133 124* 116* 131         Results from last 7 days  Lab Units 12/18/17  0434 12/17/17  0413 12/16/17  0541 12/15/17  0658 12/14/17  0649   INR  1.55* 1.39* 1.43* 1.42* 1.46*         Imaging Results (last 24 hours)     ** No results found for the last 24 hours. **          allopurinol 100 mg Oral Daily   b complex-vitamin c-folic acid 1 tablet Oral Daily   calcium acetate 667 mg Oral TID With Meals   digoxin 125 mcg Oral Every Other Day   enoxaparin 30 mg Subcutaneous Q24H   lactulose 10 g Oral TID   midodrine 10 mg Oral TID AC   pantoprazole 40 mg Oral Daily   propranolol 20 mg Oral Q8H   rifaximin 550 mg Oral Q12H   warfarin 3 mg Oral Daily          Medication Review:   Current Facility-Administered Medications   Medication Dose Route Frequency Provider Last Rate Last Dose   • acetaminophen (TYLENOL) tablet 650 mg  650 mg Oral Q4H PRN Twin Morales MD   650 mg at 12/11/17 1625   • allopurinol (ZYLOPRIM) tablet 100 mg  100 mg Oral Daily Julio Franco MD   100 mg at 12/18/17 0902   • b complex-vitamin c-folic acid (NEPHRO-QUANG) tablet 1 tablet  1 tablet Oral Daily Julio Franco MD   1 tablet at 12/18/17 0901   • calcium acetate (PHOS BINDER)) capsule 667 mg  667 mg Oral TID With Meals Carlos Owen DO   667 mg at 12/18/17 0902   • digoxin (LANOXIN) tablet 125 mcg  125 mcg Oral Every Other Day Twin Morales MD   125 mcg at 12/18/17 0901   • enoxaparin (LOVENOX) syringe 30 mg  30 mg Subcutaneous Q24H Tae Irby MD   30 mg at 12/17/17 1752   • HYDROcodone-acetaminophen (NORCO)  MG per tablet 0.5 tablet  0.5 tablet Oral Q6H PRN Tae Irby MD   0.5 tablet at 12/17/17 1753   • ipratropium-albuterol (DUO-NEB) nebulizer solution 3 mL  3 mL Nebulization Q4H PRN Twin Morales MD       • lactulose (CHRONULAC) 10  GM/15ML solution 10 g  10 g Oral TID Twin Morales MD   10 g at 12/18/17 0901   • midodrine (PROAMATINE) tablet 10 mg  10 mg Oral TID AC Tigre Alegre MD   10 mg at 12/18/17 0658   • ondansetron (ZOFRAN) tablet 4 mg  4 mg Oral Q6H PRN Twin Morales MD   4 mg at 12/11/17 1625    Or   • ondansetron ODT (ZOFRAN-ODT) disintegrating tablet 4 mg  4 mg Oral Q6H PRN Twin Morales MD        Or   • ondansetron (ZOFRAN) injection 4 mg  4 mg Intravenous Q6H PRN Twin Morales MD   4 mg at 12/08/17 2140   • pantoprazole (PROTONIX) EC tablet 40 mg  40 mg Oral Daily Twin Morales MD   40 mg at 12/18/17 0902   • propranolol (INDERAL) tablet 20 mg  20 mg Oral Q8H Taepippa Irby MD   20 mg at 12/18/17 0658   • rifaximin (XIFAXAN) tablet 550 mg  550 mg Oral Q12H Twin Morales MD   550 mg at 12/18/17 0901   • sodium chloride 0.9 % bolus 250 mL  250 mL Intravenous PRN Pieter Farias DO   250 mL at 12/15/17 2121   • sodium chloride 0.9 % flush 1-10 mL  1-10 mL Intravenous PRN Twin Morales MD   10 mL at 12/09/17 1639   • warfarin (COUMADIN) tablet 3 mg  3 mg Oral Daily Carlos Owen, DO   3 mg at 12/17/17 1752       Assessment/Plan   1.   Acute kidney injury - Cr trending down but BUN up to 100 and UOP poor.  I discussed with patient and daughter that she is not a candidate for dialysis due to comorbidities (namely cirrhosis with assoc severe hypotension).  Cr 2.8 still fairly impressive given pt's low muscle mass   2.   Chronic kidney disease, stage IV  3.   Cirrhosis of liver with ascites and esophageal varices  4.   Hyponatremia   5.   Hyperkalemia  6.   Type IV RTA  7.   Permanent atrial fibrillation  8.   Lymphedema of both lower extremities   9.   Hypotension - due to cirrhosis; inc'd midodrine over weekend to 10 mg TID  10.   Thrombocytopenia  11.   coagulopathy  12.   Anemia of chronic kidney disease - Hgb stable 8.4  13.   Acute encephalopathy - hepatic vs uremic or both; on lactulose & xifaxan    Plan  - s/p  lasix 40mg IV x 1  - allow SBP 80s - 90s, inc'd midodrine over weekend  - 24h urine for CrCl   - decrease propranolol (on for AFIB rate control but not getting it based on hold parameters)  - prognosis is very poor and we may need to involve palliative care again for further goals of care discussion.  D/w Dr Casarez.    Principal Problem:    ESRF (end stage renal failure)  Active Problems:    Cirrhosis of liver with ascites    Chronic kidney disease, stage IV (severe)    Permanent atrial fibrillation    Lymphedema of both lower extremities    Dehydration    Acute renal failure    Hyperkalemia    Atrial fibrillation with RVR              Tigre Alegre MD  12/18/17  9:43 AM

## 2017-12-18 NOTE — PLAN OF CARE
Problem: Renal Failure/Kidney Injury, Acute (Adult)  Intervention: Prevent/Manage Infection    12/17/17 0605 12/17/17 2000 12/18/17 0600   Safety Interventions   Infection Prevention --  --  environmental surveillance performed;personal protective equipment utilized   Safety Interventions   Infection Management aseptic technique maintained --  --    Pain/Comfort/Sleep Interventions   Sleep/Rest Enhancement --  awakenings minimized;consistent schedule promoted;regular sleep/rest pattern promoted;relaxation techniques promoted --    Hygiene Care Assistance   Oral Care --  --  --      12/18/17 0900   Safety Interventions   Infection Prevention --    Safety Interventions   Infection Management --    Pain/Comfort/Sleep Interventions   Sleep/Rest Enhancement --    Hygiene Care Assistance   Oral Care oral care provided         Goal: Signs and Symptoms of Listed Potential Problems Will be Absent or Manageable (Renal Failure/Kidney Injury, Acute)  Outcome: Ongoing (interventions implemented as appropriate)

## 2017-12-18 NOTE — PROGRESS NOTES
"   LOS: 10 days   Patient Care Team:  Pieter Farias DO as PCP - General (Internal Medicine)    Interval History: Patient is unchanged from yesterday.  Received some more fluids through the night.  Has no symptoms to report.        Review of Systems:   Pertinent items are noted in HPI.      Objective     Vital Sign Min/Max for last 24 hours  Temp  Min: 97.4 °F (36.3 °C)  Max: 97.8 °F (36.6 °C)   BP  Min: 65/47  Max: 100/66   Pulse  Min: 56  Max: 101   Resp  Min: 10  Max: 18   SpO2  Min: 81 %  Max: 100 %   No Data Recorded   Weight  Min: 102 kg (224 lb 14.4 oz)  Max: 102 kg (224 lb 14.4 oz)     Flowsheet Rows         First Filed Value    Admission Height  170.2 cm (67\") Documented at 12/07/2017 2247    Admission Weight  109 kg (240 lb) Documented at 12/07/2017 2247          Physical Exam:     General Appearance:    Alert, cooperative, in no acute distress   Head:    Normocephalic, without obvious abnormality, atraumatic   Eyes:            Lids and lashes normal, conjunctivae and sclerae normal, no   icterus, no pallor, corneas clear, PERRLA   Ears:    Ears appear intact with no abnormalities noted   Throat:   No oral lesions, no thrush, oral mucosa moist   Neck:   No adenopathy, supple, trachea midline, no thyromegaly, no     carotid bruit, no JVD   Back:     No kyphosis present, no scoliosis present, no skin lesions,       erythema or scars, no tenderness to percussion or                   palpation,   range of motion normal   Lungs:     Clear to auscultation,respirations regular, even and                   unlabored    Heart:    Regular rhythm and normal rate, normal S1 and S2, no            murmur, no gallop, no rub, no click   Breast Exam:    Deferred   Abdomen:     Normal bowel sounds, no masses, no organomegaly, soft        non-tender, non-distended, no guarding, no rebound                 tenderness   Genitalia:    Deferred   Extremities: Anasarca present.     Pulses:   Pulses palpable and equal " bilaterally   Skin:   No bleeding, bruising or rash   Lymph nodes:   No palpable adenopathy   Neurologic:   Cranial nerves 2 - 12 grossly intact, sensation intact, DTR        present and equal bilaterally          Results Review:     I reviewed the patient's new clinical results.    Results Review:   I reviewed the patient's new clinical results.        LAB DATA :         Laboratory results:      Results from last 7 days  Lab Units 12/18/17  0434 12/17/17  0413 12/16/17  0541 12/15/17  0658 12/14/17  0649 12/13/17  1424 12/13/17  0556   SODIUM mmol/L 131* 130* 131* 130* 128* 128* 127*   POTASSIUM mmol/L 3.8 3.7 4.0 4.2 5.1 5.6* 5.8*   CHLORIDE mmol/L 103 101 102 101 101 101 101   CO2 mmol/L 18.0* 18.0* 18.0* 17.0* 16.0* 15.0* 18.0*   BUN mg/dL 100* 99* 93* 96* 97* 95* 95*   CREATININE mg/dL 2.90* 3.00* 3.20* 3.50* 3.80* 4.00* 4.10*   CALCIUM mg/dL 7.8* 7.7* 7.4* 7.5* 7.5* 7.8* 7.9*   BILIRUBIN mg/dL  --   --  0.6  --   --   --   --    ALK PHOS U/L  --   --  165*  --   --   --   --    ALT (SGPT) U/L  --   --  30  --   --   --   --    AST (SGOT) U/L  --   --  36  --   --   --   --    GLUCOSE mg/dL 78 88 87 124* 79 96 92       Results from last 7 days  Lab Units 12/18/17  0434 12/17/17  0413 12/16/17  0541 12/15/17  0658 12/14/17  0649 12/13/17  0556 12/12/17  0611   WBC 10*3/mm3 5.71 6.80 6.16 7.76 6.41 5.39 4.94   HEMOGLOBIN g/dL 8.9* 8.4* 8.5* 9.1* 8.3* 8.5* 8.8*   HEMATOCRIT % 27.0* 26.1* 26.5* 28.0* 25.4* 26.8* 27.1*   PLATELETS 10*3/mm3 120* 133 124* 116* 131 113* 97*       Results from last 7 days  Lab Units 12/18/17  0434 12/17/17  0413 12/16/17  0541 12/15/17  0658 12/14/17  0649 12/13/17  0556 12/12/17  0611   INR  1.55* 1.39* 1.43* 1.42* 1.46* 1.76* 2.17*               Results from last 7 days  Lab Units 12/15/17  0658   TROPONIN I ng/mL 0.109*                 No results found for: HGBA1C            IMAGING DATA:     Ct Abdomen Pelvis Without Contrast    Result Date: 12/8/2017  Narrative: PROCEDURE: CT  ABDOMEN PELVIS WO CONTRAST-  HISTORY: N/V, MODESTA, weakness  COMPARISON: None .  PROCEDURE: Axial images were obtained from the lung bases through the pubic symphysis without intravenous contrast.    .  FINDINGS:  ABDOMEN: Lack of intravenous contrast limits evaluation of the solid organs, the mediastinum, and the vasculature. Mild atelectasis in the bibasilar regions.The limited noncontrast images of the liver demonstrate a nodular border suggestive of cirrhosis. There are subcentimeter nodular areas that are not well assessed without contrast. Esophageal and splenic varices are noted hiatal hernia is noted. Additionally there is a ventral hernia containing nondistended loops of transverse colon. The gallbladder is absent . The spleen is normal. 1.7 cm right adrenal adenoma.  The pancreas is atrophic. Nonobstructing stone present within the right collecting system. There is no hydronephrosis. The aorta is normal in caliber. There is no significant free fluid or adenopathy.  No abnormally dilated loops of bowel are appreciated. Postoperative changes are seen to small bowel. There is retained stool throughout the colon.  Nonspecific body wall edema is noted.  PELVIS: The appendix is not identified. The urinary bladder is unremarkable. There is no significant fluid or adenopathy. There are degenerative changes of the spine.         Impression: 1. No evidence of an obstructive uropathy. 2. Significant interval improvement of ascites and pleural effusions compared to prior. 3. Ventral wall defect containing nondistended loop of transverse colon. 4. Cirrhotic appearance to the liver with subcentimeter nodular densities, dedicated follow-up imaging of the liver is recommended.. 5. 1.7 cm right adrenal adenoma.          .     This study was performed with techniques to keep radiation doses as low as reasonably achievable (ALARA). Individualized dose reduction techniques using automated exposure control or adjustment of mA  and/or kV according to the patient size were employed.  This report was finalized on 12/8/2017 7:26 AM by Shabbir Sher DO.    Ct Head Without Contrast    Result Date: 11/25/2017  Narrative: PROCEDURE: CT HEAD WO CONTRAST-  HISTORY: confusion, elderly, on coumadin  TECHNIQUE: Multiple axial CT images were performed from the foramen magnum to the vertex without contrast. Coronal reformatted images were also obtained.  FINDINGS: Comparison is made to the prior exam dated October 10, 2017. Low-attenuation is again seen in the cerebral white matter consistent with moderate to severe chronic ischemic changes.The ventricles are normal in size. There is no evidence of hemorrhage .  No masses are identified.  No abnormal extra-axial fluid collection is seen.  There is no evidence of shift of the midline structures. Images of the sinuses reveal no evidence of mucosal thickening. No bony abnormality is seen on the bone window images.         Impression: No acute intracranial abnormality.  Moderate to severe chronic ischemic changes, stable.   This study was performed with techniques to keep radiation doses as low as reasonably achievable (ALARA). Individualized dose reduction techniques using automated exposure control or adjustment of mA and/or kV according to the patient size were employed.  This report was finalized on 11/25/2017 7:56 AM by Chele Blair MD.    Us Renal Limited    Result Date: 11/27/2017  Narrative: PROCEDURE: US RENAL LIMITED-  HISTORY: MODESTA, CKD4; K72.90-Hepatic failure, unspecified without coma; N17.9-Acute kidney failure, unspecified; N18.9-Chronic kidney disease, unspecified; D68.9-Coagulation defect, unspecified; T45.515A-Adverse effect of anticoagulants, initial encounter; J18.1-Lobar pneumonia, unspecified organism; Z74.09-Other reduced mobility  PROCEDURE: Ultrasound images of the kidneys were obtained.  FINDINGS:.  The kidneys are imaged by ultrasound with the right kidney measuring 6.7 cm and  the left kidney measuring 9.7 cm. There is bilateral renal cortical thinning. There is no cystic or solid mass. There is no hydronephrosis.      Impression: Bilateral renal cortical thinning which can be associated with medical renal disease.    Images were reviewed, interpreted, and dictated by Dr. Sher. Transcribed by Laura Yao PA-C.  This report was finalized on 11/27/2017 11:33 AM by Shabbir Sher DO.    Xr Chest 1 View    Result Date: 12/8/2017  Narrative: PROCEDURE: XR CHEST 1 VW-  HISTORY: Weak/Dizzy/AMS triage protocol  COMPARISON: November 27, 2017.  FINDINGS: Electrodes overlie the chest. The heart is normal in size. The mediastinum is unremarkable. There are chronic interstitial changes. There is no pneumothorax.  There are no acute osseous abnormalities.         Impression: No acute cardiopulmonary process.  Continued followup is recommended.  This report was finalized on 12/8/2017 7:28 AM by Shabbir Sher DO.    Xr Chest 1 View    Result Date: 11/27/2017  Narrative: PROCEDURE: XR CHEST 1 VW-  HISTORY: pneumonia follow up; K72.90-Hepatic failure, unspecified without coma; N17.9-Acute kidney failure, unspecified; N18.9-Chronic kidney disease, unspecified; D68.9-Coagulation defect, unspecified; T45.515A-Adverse effect of anticoagulants, initial encounter; J18.1-Lobar pneumonia, unspecified organism; Z74.09-Other reduced mobility  COMPARISON: November 25, 2017.  FINDINGS: Electrodes overlie the chest. The heart is normal in size. The mediastinum is unremarkable. There is evidence of calcified granulomatous disease. There are chronic interstitial changes. There is no pneumothorax.  There are no acute osseous abnormalities. Atherosclerosis is noted.      Impression: No acute cardiopulmonary process.  Continued followup is recommended.  This report was finalized on 11/27/2017 2:12 PM by Shabbir Sher DO.    Xr Chest 1 View    Result Date: 11/25/2017  Narrative: PROCEDURE: XR CHEST 1 VW-  HISTORY:  Weak/Dizzy/AMS triage protocol.  COMPARISON: October 10, 2017.   FINDINGS:    . The heart is normal in size. The mediastinum is unremarkable. There are low lung volumes. Bibasilar pulmonary opacities are seen left worse than right which may represent atelectasis or pneumonia. There is no pneumothorax. The bony thorax in intact.         Impression: Bibasilar atelectasis or pneumonia, greater on the left.   This report was finalized on 11/25/2017 8:14 AM by Chele Blair MD.    Xr Abdomen Kub    Result Date: 11/25/2017  Narrative: FINAL REPORT CLINICAL HISTORY: NG TUBE PLACEMENT FINDINGS: SINGLE VIEW ABDOMEN. A single view of the abdomen demonstrates a nonspecific, nonobstructive bowel gas pattern. There are no abnormally dilated loops of small bowel.  No abnormal calcifications are identified. An NG tube terminates in the proximal stomach.     Impression: Nonspecific bowel gas pattern. Authenticated by JUAN Escobar MD on 11/25/2017 01:53:57 PM            Assessment/Plan    #1 hypertension: Patient has bottle and blood pressures still.  Nevertheless clinically is doing well.  Would tolerate blood pressures of 80 x 60 if she is still mentating urinating and his renal function is improving.    #2 atrial fibrillation: The rate seems to be much improved on hydration and resumption of beta blocker therapy would consider continuation of the beta blocker.    #3 coronary artery disease: No evidence for acute coronary event at this time.  No plans on further workup as per patient wishes.    #4 edema: Appears to be multifactorial including low albumin portal hypertension as well as right-sided pressures.  Would continue compression therapy.    #5 anticoagulation therapy: Await the INR to cross 2 Coumadin has been resumed.    At this point would sign off from her care.  Please feel free to call if any further questions.    Principal Problem:    ESRF (end stage renal failure)  Active Problems:    Cirrhosis of liver with  ascites    Chronic kidney disease, stage IV (severe)    Permanent atrial fibrillation    Lymphedema of both lower extremities    Dehydration    Acute renal failure    Hyperkalemia    Atrial fibrillation with RVR            aTe Irby MD  12/18/17  8:24 AM

## 2017-12-18 NOTE — PROGRESS NOTES
Multiple episodes of hypotension throughout the in 70s/40s.   Patient seen and examined and is completely asymptomatic and has no comlaints.   Placed in trendelenberg, received a total of 3 250cc fluid boluses.   Will hold off on any further fluids at this time.

## 2017-12-18 NOTE — PLAN OF CARE
Problem: Patient Care Overview (Adult)  Goal: Plan of Care Review  Outcome: Ongoing (interventions implemented as appropriate)    12/18/17 1539   Outcome Evaluation   Outcome Summary/Follow up Plan Pt agreeable to perform ther ex but declined OOB activities. See flowsheeet for details.    Coping/Psychosocial Response Interventions   Plan Of Care Reviewed With patient;daughter   Patient Care Overview   Progress no change         Problem: Inpatient Physical Therapy  Goal: Strength Goal LTG- PT  Outcome: Ongoing (interventions implemented as appropriate)    12/08/17 1331 12/17/17 1102 12/18/17 1539   Strength Goal PT LTG   Strength Goal PT LTG, Date Established --  12/17/17 --    Strength Goal PT LTG, Time to Achieve --  2 wks --    Strength Goal PT LTG, Measure to Achieve Patient will perform B LE ther ex x 15 reps to improve functional strength for mobility. --  --    Strength Goal PT LTG, Functional Goal --  to perform BLE exercises x 20 reps to improve transfers and gait --    Strength Goal PT LTG, Outcome --  --  goal ongoing

## 2017-12-18 NOTE — THERAPY TREATMENT NOTE
Acute Care - Physical Therapy Treatment Note  [facility]     Patient Name: Hodan Carter  : 1940  MRN: [id number]  Today's Date: 2017  Onset of Illness/Injury or Date of Surgery Date: 17  Date of Referral to PT: 17  Referring Physician: Dr. Morales    Admit Date: 2017    Visit Dx:    ICD-10-CM ICD-9-CM   1. Dehydration E86.0 276.51   2. MODESTA (acute kidney injury) N17.9 584.9   3. Weakness generalized R53.1 780.79   4. Hyponatremia E87.1 276.1   5. Impaired mobility and ADLs Z74.09 799.89   6. Impaired functional mobility, balance, gait, and endurance Z74.09 V49.89   7. ESRF (end stage renal failure) N18.6 585.6     Patient Active Problem List   Diagnosis   • Ascites   • Cirrhosis of liver with ascites   • Chronic kidney disease, stage IV (severe)   • Permanent atrial fibrillation   • Essential hypertension   • Chronic anemia   • Cellulitis and abscess of trunk   • Shortness of breath   • NSTEMI (non-ST elevated myocardial infarction)   • Chronic kidney disease-mineral and bone disorder   • Candida UTI   • Volume overload   • Acute hepatic encephalopathy   • Lymphedema of both lower extremities   • Vitamin B12 deficiency   • Hepatic encephalopathy   • Anemia of chronic disease   • Cellulitis of left lower extremity   • Decompensated hepatic cirrhosis   • Dehydration   • Acute renal failure   • Hyperkalemia   • ESRF (end stage renal failure)   • Atrial fibrillation with RVR               Adult Rehabilitation Note       17 1047          Rehab Assessment/Intervention    Discipline physical therapy assistant  -RM      Document Type therapy note (daily note)  -RM      Subjective Information agree to therapy;complains of;weakness  -RM      Patient Effort, Rehab Treatment fair  -RM      Symptoms Noted During/After Treatment fatigue  -RM      Precautions/Limitations fall precautions  -RM      Recorded by [RM] Gabriel Israel PTA      Pain Assessment    Pain Assessment No/denies pain  -RM       Recorded by [RM] Gabriel Israel PTA      Therapy Exercises    Bilateral Lower Extremities AAROM:;10 reps;supine;ankle pumps/circles;heel slides;hip abduction/adduction;quad sets;SLR;SAQ  -RM      Recorded by [RM] Gabriel Israel PTA      Positioning and Restraints    Pre-Treatment Position in bed  -RM      Post Treatment Position bed  -RM      In Bed supine;call light within reach;encouraged to call for assist;with nsg  -RM      Recorded by [RM] Gabriel Israel PTA        User Key  (r) = Recorded By, (t) = Taken By, (c) = Cosigned By    Initials Name Effective Dates     Gabriel Israel PTA 10/26/16 -                 IP PT Goals       12/18/17 1539 12/17/17 1102 12/14/17 1624    Bed Mobility PT LTG    Bed Mobility PT LTG, Date Established  12/17/17  -JR     Bed Mobility PT LTG, Time to Achieve  2 wks  -JR     Bed Mobility PT LTG, Activity Type  all bed mobility  -JR     Bed Mobility PT LTG, [place] Level  supervision required  -JR     Transfer Training PT LTG    Transfer Training PT LTG, Date Established  12/17/17  -JR     Transfer Training PT LTG, Time to Achieve  2 wks  -JR     Transfer Training PT LTG, Activity Type  sit to stand/stand to sit;bed to chair /chair to bed  -JR     Transfer Training PT LTG, [place] Level  supervision required  -JR     Transfer Training PT LTG, Assist Device  walker, rolling  -JR     Transfer Training PT LTG, Outcome  goal revised  -JR     Gait Training PT LTG    Gait Training Goal PT LTG, Date Established  12/17/17  -JR     Gait Training Goal PT LTG, Time to Achieve  2 wks  -JR     Gait Training Goal PT LTG, [place] Level  contact guard assist  -JR     Gait Training Goal PT LTG, Assist Device  walker, rolling  -JR     Gait Training Goal PT LTG, Distance to Achieve  25  -JR     Gait Training Goal PT LTG, Outcome  goal revised  -JR goal ongoing  -LM    Strength Goal PT LTG    Strength Goal PT LTG, Date Established  12/17/17  -JR     Strength Goal PT LTG,  Time to Achieve  2 wks  -JR     Strength Goal PT LTG, Functional Goal  to perform BLE exercises x 20 reps to improve transfers and gait  -JR     Strength Goal PT LTG, Outcome goal ongoing  -RM goal revised  -JR goal ongoing  -LM      12/13/17 1154 12/12/17 1758 12/11/17 1819    Bed Mobility PT LTG    Bed Mobility PT LTG, Outcome   goal met  -RM    Transfer Training PT LTG    Transfer Training PT LTG, Outcome   goal met  -RM    Gait Training PT LTG    Gait Training Goal PT LTG, Outcome goal ongoing  -LM  goal ongoing  -RM    Strength Goal PT LTG    Strength Goal PT LTG, Outcome goal partially met  -LM goal partially met  -RM       12/10/17 1625 12/09/17 1653 12/08/17 1331    Bed Mobility PT LTG    Bed Mobility PT LTG, Date Established   12/08/17  -LM    Bed Mobility PT LTG, Time to Achieve   2 wks  -LM    Bed Mobility PT LTG, Activity Type   supine to sit/sit to supine  -LM    Bed Mobility PT LTG, [place] Level   contact guard assist  -LM    Bed Mobility PT Goal  LTG, Assist Device   bed rails  -LM    Bed Mobility PT LTG, Outcome goal partially met  -CC goal ongoing  -CC goal ongoing  -LM    Transfer Training PT LTG    Transfer Training PT LTG, Date Established   12/08/17  -LM    Transfer Training PT LTG, Time to Achieve   2 wks  -LM    Transfer Training PT LTG, Activity Type   sit to stand/stand to sit;bed to chair /chair to bed  -LM    Transfer Training PT LTG, [place] Level   contact guard assist  -LM    Transfer Training PT LTG, Assist Device   walker, rolling  -LM    Transfer Training PT LTG, Outcome goal partially met  -CC goal ongoing  -CC goal ongoing  -LM    Gait Training PT LTG    Gait Training Goal PT LTG, Date Established   12/08/17  -LM    Gait Training Goal PT LTG, Time to Achieve   2 wks  -LM    Gait Training Goal PT LTG, [place] Level   contact guard assist  -LM    Gait Training Goal PT LTG, Assist Device   walker, rolling  -LM    Gait Training Goal PT LTG, Distance to Achieve   50  -LM     Gait Training Goal PT LTG, Outcome goal ongoing  -CC goal ongoing  -CC goal ongoing  -LM    Strength Goal PT LTG    Strength Goal PT LTG, Date Established   12/08/17  -LM    Strength Goal PT LTG, Time to Achieve   2 wks  -LM    Strength Goal PT LTG, Measure to Achieve   Patient will perform B LE ther ex x 15 reps to improve functional strength for mobility.  -LM    Strength Goal PT LTG, Outcome goal partially met  -CC goal ongoing  -CC goal ongoing  -LM      User Key  (r) = Recorded By, (t) = Taken By, (c) = Cosigned By    Initials Name Provider Type    JR Bettie Corrales, PT Physical Therapist    LM Serene Khan, PT Physical Therapist    CC [place] Farr, PTA Physical Therapy Assistant    RM Gabriel Israel, PTA Physical Therapy Assistant          Physical Therapy Education     Title: PT OT SLP Therapies (Done)     Topic: Physical Therapy (Done)     Point: Mobility training (Done)    Learning Progress Summary    Learner Readiness Method Response Comment Documented by Status   Patient Acceptance E VU Importance of LE movements to improve circulation while sitting with LE dependent  12/17/17 1058 Done    Acceptance E VU  EB 12/15/17 1134 Done    Acceptance E VU Ther ex for HEP. LM 12/14/17 1624 Done    Acceptance E VU Ther ex for HEP; proper use of RW for safe tranfers/ambulation. LM 12/13/17 1153 Done    Acceptance E VU  NO 12/13/17 0133 Done    Acceptance E,D VU,NR  RM 12/11/17 1818 Done    Acceptance E VU,NR Increase mobility daily CC 12/10/17 1624 Done    Acceptance E VU Purpose of PT/POC. LM 12/08/17 1331 Done   Family Acceptance E VU Importance of LE movements to improve circulation while sitting with LE dependent  12/17/17 1058 Done               Point: Home exercise program (Done)    Learning Progress Summary    Learner Readiness Method Response Comment Documented by Status   Patient Acceptance E,D VU,NR  RM 12/18/17 1539 Done    Acceptance E VU  EB 12/15/17 1134 Done    Acceptance E VU Ther ex for  HEP.  12/14/17 1624 Done    Acceptance E VU Ther ex for HEP; proper use of RW for safe tranfers/ambulation. LM 12/13/17 1153 Done    Acceptance E VU  NO 12/13/17 0133 Done    Acceptance E,D VU,NR  RM 12/12/17 1758 Done    Acceptance E NR,VU Perform ex daily CC 12/10/17 1501 Done    Acceptance E VU Purpose of PT/POC.  12/08/17 1331 Done               Point: Precautions (Done)    Learning Progress Summary    Learner Readiness Method Response Comment Documented by Status   Patient Acceptance E VU  EB 12/15/17 1134 Done    Acceptance E VU Ther ex for HEP.  12/14/17 1624 Done    Acceptance E VU Ther ex for HEP; proper use of RW for safe tranfers/ambulation.  12/13/17 1153 Done    Acceptance E VU  NO 12/13/17 0133 Done    Acceptance E VU Purpose of PT/POC.  12/08/17 1331 Done                      User Key     Initials Effective Dates Name Provider Type Discipline     10/26/16 -  Bettie Corrales, PT Physical Therapist PT    EB 11/07/16 -  Lindsey Mcduffie, RN Registered Nurse Nurse     10/26/16 -  Serene Khan, PT Physical Therapist PT    CC 10/26/16 -  Jamaica Farr, PTA Physical Therapy Assistant PT     10/26/16 -  Gabriel Israel, PTA Physical Therapy Assistant PT    NO 12/13/16 -  Jada Enriquez, RN Registered Nurse Nurse                    PT Recommendation and Plan  Anticipated Discharge Disposition: home with home health  Planned Therapy Interventions: balance training, strengthening, bed mobility training, transfer training, gait training, patient/family education  PT Frequency: daily  Plan of Care Review  Plan Of Care Reviewed With: patient, daughter  Progress: no change  Outcome Summary/Follow up Plan: Pt agreeable to perform ther ex but declined OOB activities. See flowsheeet for details.           Outcome Measures       12/17/17 0954          How much help from another person do you currently need...    Turning from your back to your side while in flat bed without using bedrails? 3  -JR      Moving  from lying on back to sitting on the side of a flat bed without bedrails? 3  -JR      Moving to and from a bed to a chair (including a wheelchair)? 3  -JR      Standing up from a chair using your arms (e.g., wheelchair, bedside chair)? 3  -JR      Climbing 3-5 steps with a railing? 2  -JR      To walk in hospital room? 3  -JR      AM-PAC 6 Clicks Score 17  -JR      Functional Assessment    Outcome Measure Options AM-PAC 6 Clicks Basic Mobility (PT)  -JR        User Key  (r) = Recorded By, (t) = Taken By, (c) = Cosigned By    Initials Name Provider Type    JR Bettie Corrales, PT Physical Therapist           Time Calculation:         PT Charges       12/18/17 1540          Time Calculation    Start Time 1047  -RM      PT Received On 12/18/17  -RM      PT Goal Re-Cert Due Date 12/27/17  -RM      Time Calculation- PT    Total Timed Code Minutes- PT 13 minute(s)  -RM        User Key  (r) = Recorded By, (t) = Taken By, (c) = Cosigned By    Initials Name Provider Type     Gabriel Israel PTA Physical Therapy Assistant          Therapy Charges for Today     Code Description Service Date Service Provider Modifiers Qty    55208892562 HC PT THER PROC EA 15 MIN 12/18/2017 Gabriel Israel PTA GP 1          PT G-Codes  Outcome Measure Options: AM-PAC 6 Clicks Basic Mobility (PT)    Gabriel Israel PTA  12/18/2017

## 2017-12-18 NOTE — PROGRESS NOTES
Continued Stay Note  [facility]     Patient Name: Hodan Carter  MRN: [id number]  Today's Date: 12/18/2017    Admit Date: 12/7/2017          Discharge Plan       12/18/17 1327    Case Management/Social Work Plan    Plan  following for discharge needs. Spoke to patient's daughter and they are watching her medical care at this time. LACE status being followed. May need dialysis. Family usually takes patient home. Palliative following.              Discharge Codes     None        Expected Discharge Date and Time     Expected Discharge Date Expected Discharge Time    Dec 19, 2017             Whit Cates

## 2017-12-18 NOTE — PLAN OF CARE
"Problem: Patient Care Overview (Adult)  Goal: Plan of Care Review  Outcome: Ongoing (interventions implemented as appropriate)    12/18/17 0535   Outcome Evaluation   Outcome Summary/Follow up Plan Pt hypotinsive throughout shift, pt denies any symptoms at this time stating\" I feel real good, I feel fine. \" MD following, Will continut to monitor. MALATHI León RN    Coping/Psychosocial Response Interventions   Plan Of Care Reviewed With patient   Patient Care Overview   Progress no change       Goal: Adult Individualization and Mutuality  Outcome: Ongoing (interventions implemented as appropriate)  Goal: Discharge Needs Assessment  Outcome: Ongoing (interventions implemented as appropriate)    Problem: Pain, Acute (Adult)  Goal: Acceptable Pain Control/Comfort Level  Outcome: Ongoing (interventions implemented as appropriate)    Problem: Renal Failure/Kidney Injury, Acute (Adult)  Goal: Signs and Symptoms of Listed Potential Problems Will be Absent or Manageable (Renal Failure/Kidney Injury, Acute)  Outcome: Ongoing (interventions implemented as appropriate)    Problem: Pressure Ulcer (Adult)  Goal: Signs and Symptoms of Listed Potential Problems Will be Absent or Manageable (Pressure Ulcer)  Outcome: Ongoing (interventions implemented as appropriate)      "

## 2017-12-19 NOTE — PLAN OF CARE
Problem: Patient Care Overview (Adult)  Goal: Discharge Needs Assessment  Outcome: Ongoing (interventions implemented as appropriate)    Problem: Pain, Acute (Adult)  Goal: Acceptable Pain Control/Comfort Level  Outcome: Ongoing (interventions implemented as appropriate)    Problem: Renal Failure/Kidney Injury, Acute (Adult)  Goal: Signs and Symptoms of Listed Potential Problems Will be Absent or Manageable (Renal Failure/Kidney Injury, Acute)  Outcome: Ongoing (interventions implemented as appropriate)    Problem: Pressure Ulcer (Adult)  Goal: Signs and Symptoms of Listed Potential Problems Will be Absent or Manageable (Pressure Ulcer)  Outcome: Ongoing (interventions implemented as appropriate)

## 2017-12-19 NOTE — PROGRESS NOTES
"   LOS: 11 days    Patient Care Team:  Pieter Farias DO as PCP - General (Internal Medicine)    Chief Complaint:    Chief Complaint   Patient presents with   • Fatigue       Subjective   F/u MODESTA CKD4  Interval History:   Seen and examined. Multiple family members on bedside. Going to discuss with palliative team again tomorrow. D/W staff. Confused.    Objective     Vital Signs  Temp:  [97.3 °F (36.3 °C)-97.9 °F (36.6 °C)] 97.4 °F (36.3 °C)  Heart Rate:  [50-78] 70  Resp:  [17-18] 18  BP: ()/(38-58) 107/58    Flowsheet Rows         First Filed Value    Admission Height  170.2 cm (67\") Documented at 12/07/2017 2247    Admission Weight  109 kg (240 lb) Documented at 12/07/2017 2247          I/O this shift:  In: -   Out: 775 [Urine:775]  I/O last 3 completed shifts:  In: 1623 [P.O.:460; I.V.:1163]  Out: 800 [Urine:800]    Intake/Output Summary (Last 24 hours) at 12/19/17 1847  Last data filed at 12/19/17 1700   Gross per 24 hour   Intake                0 ml   Output             1125 ml   Net            -1125 ml       Physical Exam:  gen - frail WF in no acute distress, confused, blank stare  Neck supple no JVD   Lungs CTA bilat no rales   CV RRR no M/G   abd soft NT/ND   vasc 2+ BLE edema   scant urine in guerrero     Results Review:      Results from last 7 days  Lab Units 12/19/17  0557 12/18/17  0434 12/17/17  0413 12/16/17  0541   SODIUM mmol/L 130* 131* 130* 131*   POTASSIUM mmol/L 4.0 3.8 3.7 4.0   CHLORIDE mmol/L 102 103 101 102   CO2 mmol/L 16.0* 18.0* 18.0* 18.0*   BUN mg/dL 103* 100* 99* 93*   CREATININE mg/dL 2.90* 2.90* 3.00* 3.20*   CALCIUM mg/dL 8.0* 7.8* 7.7* 7.4*   BILIRUBIN mg/dL  --   --   --  0.6   ALK PHOS U/L  --   --   --  165*   ALT (SGPT) U/L  --   --   --  30   AST (SGOT) U/L  --   --   --  36   GLUCOSE mg/dL 70* 78 88 87       Estimated Creatinine Clearance: 19.4 mL/min (by C-G formula based on Cr of 2.9).      Results from last 7 days  Lab Units 12/19/17  0557 12/18/17  0434 " 12/17/17  0413   PHOSPHORUS mg/dL 5.7* 5.6* 5.8*               Results from last 7 days  Lab Units 12/19/17  0557 12/18/17  0434 12/17/17  0413 12/16/17  0541 12/15/17  0658   WBC 10*3/mm3 9.57 5.71 6.80 6.16 7.76   HEMOGLOBIN g/dL 9.4* 8.9* 8.4* 8.5* 9.1*   PLATELETS 10*3/mm3 150 120* 133 124* 116*         Results from last 7 days  Lab Units 12/19/17  0557 12/18/17  0434 12/17/17  0413 12/16/17  0541 12/15/17  0658   INR  2.12* 1.55* 1.39* 1.43* 1.42*         Imaging Results (last 24 hours)     ** No results found for the last 24 hours. **          allopurinol 100 mg Oral Daily   b complex-vitamin c-folic acid 1 tablet Oral Daily   calcium acetate 667 mg Oral TID With Meals   digoxin 125 mcg Oral Every Other Day   lactulose 10 g Oral TID   lactulose 10 g Oral Q1H   midodrine 10 mg Oral TID AC   pantoprazole 40 mg Oral Daily   propranolol 10 mg Oral Q12H   rifaximin 550 mg Oral Q12H   warfarin 3 mg Oral Daily          Medication Review:   Current Facility-Administered Medications   Medication Dose Route Frequency Provider Last Rate Last Dose   • acetaminophen (TYLENOL) tablet 650 mg  650 mg Oral Q4H PRN Twin Morales MD   650 mg at 12/11/17 1625   • allopurinol (ZYLOPRIM) tablet 100 mg  100 mg Oral Daily Julio Franco MD   100 mg at 12/19/17 0807   • b complex-vitamin c-folic acid (NEPHRO-QUANG) tablet 1 tablet  1 tablet Oral Daily Julio Franco MD   1 tablet at 12/19/17 0807   • calcium acetate (PHOS BINDER)) capsule 667 mg  667 mg Oral TID With Meals Carlos Owen DO   667 mg at 12/19/17 1719   • digoxin (LANOXIN) tablet 125 mcg  125 mcg Oral Every Other Day Twin Morales MD   125 mcg at 12/18/17 0901   • HYDROcodone-acetaminophen (NORCO)  MG per tablet 0.5 tablet  0.5 tablet Oral Q6H PRN Tae Irby MD   0.5 tablet at 12/19/17 1207   • ipratropium-albuterol (DUO-NEB) nebulizer solution 3 mL  3 mL Nebulization Q4H PRN Twin Morales MD       • lactulose (CHRONULAC) 10 GM/15ML solution 10 g  10 g  Oral TID Twin Morales MD   10 g at 12/19/17 0806   • lactulose solution 10 g  10 g Oral Q1H Bettie Casarez DO       • midodrine (PROAMATINE) tablet 10 mg  10 mg Oral TID AC Tigre Alegre MD   10 mg at 12/19/17 1719   • ondansetron (ZOFRAN) tablet 4 mg  4 mg Oral Q6H PRN Twin Morales MD   4 mg at 12/11/17 1625    Or   • ondansetron ODT (ZOFRAN-ODT) disintegrating tablet 4 mg  4 mg Oral Q6H PRN Twin Morales MD        Or   • ondansetron (ZOFRAN) injection 4 mg  4 mg Intravenous Q6H PRN Twin Morales MD   4 mg at 12/08/17 2140   • pantoprazole (PROTONIX) EC tablet 40 mg  40 mg Oral Daily Twin Morales MD   40 mg at 12/19/17 0807   • propranolol (INDERAL) tablet 10 mg  10 mg Oral Q12H Tigre Alegre MD       • rifaximin (XIFAXAN) tablet 550 mg  550 mg Oral Q12H Twin Morales MD   550 mg at 12/19/17 0807   • sodium chloride 0.9 % bolus 250 mL  250 mL Intravenous PRN Pieter Farias DO   250 mL at 12/15/17 2121   • sodium chloride 0.9 % flush 1-10 mL  1-10 mL Intravenous PRN Twin Morales MD   10 mL at 12/09/17 1639   • warfarin (COUMADIN) tablet 3 mg  3 mg Oral Daily Carlos Owen DO   3 mg at 12/19/17 1719       Assessment/Plan   1.   Acute kidney injury - Cr trending down but BUN up to 100 and UOP poor.  I discussed with patient and daughter that she is not a candidate for dialysis due to comorbidities (namely cirrhosis with assoc severe hypotension).   2.   Chronic kidney disease, stage IV  3.   Cirrhosis of liver with ascites and esophageal varices  4.   Hyponatremia   5.   Hyperkalemia  6.   Type IV RTA  7.   Permanent atrial fibrillation  8.   Lymphedema of both lower extremities   9.   Hypotension - due to cirrhosis; inc'd midodrine over weekend to 10 mg TID  10.   Thrombocytopenia  11.   coagulopathy  12.   Anemia of chronic kidney disease - Hgb stable 8.4  13.   Acute encephalopathy - hepatic vs uremic or both; on lactulose & xifaxan    Plan  D/W whole family on length. Palliative is very  appropriate.  Will continue to monitor     Principal Problem:    ESRF (end stage renal failure)  Active Problems:    Cirrhosis of liver with ascites    Chronic kidney disease, stage IV (severe)    Permanent atrial fibrillation    Lymphedema of both lower extremities    Dehydration    Acute renal failure    Hyperkalemia    Atrial fibrillation with RVR              Enrrique Emerson MD  12/19/17  6:47 PM

## 2017-12-19 NOTE — SIGNIFICANT NOTE
12/19/17 1035   Rehab Treatment   Discipline occupational therapist   Rehab Evaluation   Evaluation Not Performed patient unavailable for evaluation  (Hold per Nsg d/t increased confusion and lethargy)

## 2017-12-19 NOTE — SIGNIFICANT NOTE
12/19/17 1034   Rehab Treatment   Discipline physical therapy assistant   Treatment Not Performed other (see comments)  (Pt presented with decreased alertness A/O to self  and very slow to respond when asked question.  Nurse was informed of this change. Nurse requested to hold treatment this date.  )

## 2017-12-19 NOTE — PLAN OF CARE
Problem: Patient Care Overview (Adult)  Goal: Plan of Care Review  Outcome: Ongoing (interventions implemented as appropriate)    12/19/17 0009   Outcome Evaluation   Outcome Summary/Follow up Plan Patient was transferred out of the ICU. BP continues to stay systolic 70-90. Patient is resting well. Will continue to monitor vital signs and wound care.   Coping/Psychosocial Response Interventions   Plan Of Care Reviewed With patient   Patient Care Overview   Progress no change       Goal: Adult Individualization and Mutuality  Outcome: Ongoing (interventions implemented as appropriate)  Goal: Discharge Needs Assessment  Outcome: Ongoing (interventions implemented as appropriate)    Problem: Pain, Acute (Adult)  Goal: Acceptable Pain Control/Comfort Level  Outcome: Ongoing (interventions implemented as appropriate)    Problem: Renal Failure/Kidney Injury, Acute (Adult)  Goal: Signs and Symptoms of Listed Potential Problems Will be Absent or Manageable (Renal Failure/Kidney Injury, Acute)  Outcome: Ongoing (interventions implemented as appropriate)    Problem: Pressure Ulcer (Adult)  Goal: Signs and Symptoms of Listed Potential Problems Will be Absent or Manageable (Pressure Ulcer)  Outcome: Ongoing (interventions implemented as appropriate)

## 2017-12-19 NOTE — PLAN OF CARE
Problem: Patient Care Overview (Adult)  Goal: Plan of Care Review  Outcome: Ongoing (interventions implemented as appropriate)  Goal: Discharge Needs Assessment  Outcome: Ongoing (interventions implemented as appropriate)    Problem: Pain, Acute (Adult)  Goal: Acceptable Pain Control/Comfort Level  Outcome: Ongoing (interventions implemented as appropriate)    Problem: Renal Failure/Kidney Injury, Acute (Adult)  Goal: Signs and Symptoms of Listed Potential Problems Will be Absent or Manageable (Renal Failure/Kidney Injury, Acute)  Outcome: Ongoing (interventions implemented as appropriate)    Problem: Pressure Ulcer (Adult)  Goal: Signs and Symptoms of Listed Potential Problems Will be Absent or Manageable (Pressure Ulcer)  Outcome: Ongoing (interventions implemented as appropriate)

## 2017-12-20 NOTE — PROGRESS NOTES
"   LOS: 12 days    Patient Care Team:  Pieter Farias DO as PCP - General (Internal Medicine)    Chief Complaint:    Chief Complaint   Patient presents with   • Fatigue       Subjective   F/u MODESTA CKD4  Interval History:   Seen and examined. Worsening mental status. Poor oral intake. Family is discussing palliative  Objective     Vital Signs  Temp:  [97.5 °F (36.4 °C)-97.9 °F (36.6 °C)] 97.5 °F (36.4 °C)  Heart Rate:  [70-80] 72  Resp:  [18-20] 18  BP: ()/(37-60) 90/46    Flowsheet Rows         First Filed Value    Admission Height  170.2 cm (67\") Documented at 12/07/2017 2247    Admission Weight  109 kg (240 lb) Documented at 12/07/2017 2247          I/O this shift:  In: 340 [P.O.:340]  Out: -   I/O last 3 completed shifts:  In: -   Out: 1325 [Urine:1325]    Intake/Output Summary (Last 24 hours) at 12/20/17 1808  Last data filed at 12/20/17 1200   Gross per 24 hour   Intake              340 ml   Output              200 ml   Net              140 ml       Physical Exam:  gen - frail WF in no acute distress, confused, blank stare  Neck supple no JVD   Lungs CTA bilat no rales   CV RRR no M/G   abd soft NT/ND   vasc 2+ BLE edema   scant urine in guerrero     Results Review:      Results from last 7 days  Lab Units 12/20/17  0544 12/19/17  0557 12/18/17  0434  12/16/17  0541   SODIUM mmol/L 131* 130* 131*  < > 131*   POTASSIUM mmol/L 4.3 4.0 3.8  < > 4.0   CHLORIDE mmol/L 103 102 103  < > 102   CO2 mmol/L 13.0* 16.0* 18.0*  < > 18.0*   BUN mg/dL 113* 103* 100*  < > 93*   CREATININE mg/dL 3.30* 2.90* 2.90*  < > 3.20*   CALCIUM mg/dL 8.4 8.0* 7.8*  < > 7.4*   BILIRUBIN mg/dL  --   --   --   --  0.6   ALK PHOS U/L  --   --   --   --  165*   ALT (SGPT) U/L  --   --   --   --  30   AST (SGOT) U/L  --   --   --   --  36   GLUCOSE mg/dL 79 70* 78  < > 87   < > = values in this interval not displayed.    Estimated Creatinine Clearance: 17.1 mL/min (by C-G formula based on Cr of 3.3).      Results from last 7 days  Lab " Units 12/20/17  0544 12/19/17  0557 12/18/17  0434   PHOSPHORUS mg/dL 6.4* 5.7* 5.6*               Results from last 7 days  Lab Units 12/20/17  0544 12/19/17  0557 12/18/17  0434 12/17/17  0413 12/16/17  0541   WBC 10*3/mm3 11.34* 9.57 5.71 6.80 6.16   HEMOGLOBIN g/dL 9.2* 9.4* 8.9* 8.4* 8.5*   PLATELETS 10*3/mm3 160 150 120* 133 124*         Results from last 7 days  Lab Units 12/20/17  0544 12/19/17  0557 12/18/17  0434 12/17/17  0413 12/16/17  0541   INR  2.52* 2.12* 1.55* 1.39* 1.43*         Imaging Results (last 24 hours)     ** No results found for the last 24 hours. **          allopurinol 100 mg Oral Daily   b complex-vitamin c-folic acid 1 tablet Oral Daily   calcium acetate 667 mg Oral TID With Meals   digoxin 125 mcg Oral Every Other Day   lactulose 10 g Oral TID   midodrine 10 mg Oral TID AC   pantoprazole 40 mg Oral Daily   propranolol 10 mg Oral Q12H   rifaximin 550 mg Oral Q12H   warfarin 3 mg Oral Daily          Medication Review:   Current Facility-Administered Medications   Medication Dose Route Frequency Provider Last Rate Last Dose   • acetaminophen (TYLENOL) tablet 650 mg  650 mg Oral Q4H PRN Twin Morales MD   650 mg at 12/11/17 1625   • allopurinol (ZYLOPRIM) tablet 100 mg  100 mg Oral Daily Julio Franco MD   100 mg at 12/20/17 1047   • b complex-vitamin c-folic acid (NEPHRO-QUANG) tablet 1 tablet  1 tablet Oral Daily Julio Franco MD   1 tablet at 12/20/17 1048   • calcium acetate (PHOS BINDER)) capsule 667 mg  667 mg Oral TID With Meals Carlos Owen DO   667 mg at 12/20/17 1200   • digoxin (LANOXIN) tablet 125 mcg  125 mcg Oral Every Other Day Twin Morales MD   125 mcg at 12/20/17 1048   • HYDROcodone-acetaminophen (NORCO)  MG per tablet 0.5 tablet  0.5 tablet Oral Q6H PRN Tae Irby MD   0.5 tablet at 12/20/17 0408   • ipratropium-albuterol (DUO-NEB) nebulizer solution 3 mL  3 mL Nebulization Q4H PRN Twin Morales MD       • lactulose (CHRONULAC) 10 GM/15ML solution  10 g  10 g Oral TID Twin Morales MD   10 g at 12/20/17 1700   • midodrine (PROAMATINE) tablet 10 mg  10 mg Oral TID AC Tigre Alegre MD   10 mg at 12/20/17 1755   • ondansetron (ZOFRAN) tablet 4 mg  4 mg Oral Q6H PRN Twin Morales MD   4 mg at 12/11/17 1625    Or   • ondansetron ODT (ZOFRAN-ODT) disintegrating tablet 4 mg  4 mg Oral Q6H PRN Twin Morales MD        Or   • ondansetron (ZOFRAN) injection 4 mg  4 mg Intravenous Q6H PRN Twin Morales MD   4 mg at 12/08/17 2140   • pantoprazole (PROTONIX) EC tablet 40 mg  40 mg Oral Daily Twin Morales MD   40 mg at 12/20/17 1047   • propranolol (INDERAL) tablet 10 mg  10 mg Oral Q12H Tigre Alegre MD   10 mg at 12/20/17 1047   • rifaximin (XIFAXAN) tablet 550 mg  550 mg Oral Q12H Twin Morales MD   550 mg at 12/20/17 1048   • sodium chloride 0.9 % bolus 250 mL  250 mL Intravenous PRN Pieter Farias DO   250 mL at 12/15/17 2121   • sodium chloride 0.9 % flush 1-10 mL  1-10 mL Intravenous PRN Twin Morales MD   10 mL at 12/09/17 1639   • warfarin (COUMADIN) tablet 3 mg  3 mg Oral Daily Carlos Owen DO   3 mg at 12/20/17 1755       Assessment/Plan   1.   Acute kidney injury - Cr trending down but BUN up to 100 and UOP poor.  I discussed with patient and daughter that she is not a candidate for dialysis due to comorbidities (namely cirrhosis with assoc severe hypotension).   2.   Chronic kidney disease, stage IV  3.   Cirrhosis of liver with ascites and esophageal varices  4.   Hyponatremia   5.   Hyperkalemia  6.   Type IV RTA  7.   Permanent atrial fibrillation  8.   Lymphedema of both lower extremities   9.   Hypotension - due to cirrhosis; inc'd midodrine over weekend to 10 mg TID  10.   Thrombocytopenia  11.   coagulopathy  12.   Anemia of chronic kidney disease - Hgb stable 9.2  13.   Acute encephalopathy - hepatic vs uremic or both; on lactulose & xifaxan    Plan  D/W nurse. Palliative is very appropriate.  Will continue to monitor                Enrrique Emerson MD  12/20/17  6:08 PM

## 2017-12-20 NOTE — PLAN OF CARE
Problem: Patient Care Overview (Adult)  Goal: Plan of Care Review  Outcome: Ongoing (interventions implemented as appropriate)   12/20/17 1022   Outcome Evaluation   Outcome Summary/Follow up Plan Spoke to patient's daughter at her bedside. Patient was awake but did not participate in the conversation. Patient's daughter reported that the family will meet with Dr. Hewitt later today to discuss options for her mother's continuing care. Daughter reported that her mother seems to have never recovered from stomach surgery she had last spring. She also reported that her mother has been hospitalized several times and has not emotionally rallied since last fall. I will continue to visit and provide support to patient and family.    Coping/Psychosocial Response Interventions   Plan Of Care Reviewed With patient;daughter

## 2017-12-20 NOTE — PROGRESS NOTES
[facility]    Name:  Hodan Carter   Age:  77 y.o.  Sex:  female  :  1940  MRN:  [id number]   Visit Number:  [id number]  Admission Date:  2017  Date Of Service:  17  Primary Care Physician:  Pieter Farias DO     LOS: 12 days :      Chief Complaint:  Follow up renal failure. Altered mentation today slightly better.        Subjective / Interval History:   The patient continues to have poor oral intake and progressive decline. Her mental function slowly worsening with worsening renal failure and worsening cirrhosis.     She denies pain current but has chronic leg pain in her leg, for which she has been unable to complain. She has failure to thrive.     No acute events occurred overnight. She denies chest pain, cough, shortness of breath, abdominal pain. She has continued to show small decline daily basis and over the past year.     She did have bowel movement last night after lactulose and improved.     Review of Systems:     General ROS: Patient denies any fevers, chills or loss of consciousness. Generalized weakness persistent and worsening slowly  Ophthalmic ROS: Denies any diplopia or transient loss of vision.  ENT ROS: Denies sore throat, nasal congestion or ear pain.   Respiratory ROS: Denies cough or shortness of breath.  Cardiovascular ROS: Denies chest pain or palpitations. No history of exertional chest pain.   Gastrointestinal ROS: Denies nausea and vomiting. Denies any abdominal pain. No diarrhea.  Genito-Urinary ROS: Denies dysuria or hematuria.  Musculoskeletal ROS: Complains of chronic leg  Pain some to family, but reduced ability to state complaints with weakness . No muscle pain. No calf pain.  Neurological ROS: Denies any focal weakness. No loss of consciousness. Denies any numbness. Denies neck pain.   Dermatological ROS: Denies any redness or pruritis.    Vital Signs:    Temp:  [97.4 °F (36.3 °C)-97.9 °F (36.6 °C)] 97.5 °F  (36.4 °C)  Heart Rate:  [70-80] 72  Resp:  [18-20] 18  BP: ()/(37-60) 90/46    Intake and output:    I/O last 3 completed shifts:  In: -   Out: 1325 [Urine:1325]  I/O this shift:  In: 340 [P.O.:340]  Out: -     Physical Examination:    General Appearance:    Alert to voice, still very minimally communicative but not in any acute distress. Sitting up in bed with eyes open. Answers some questions and slightly more today than yesterday.   Head:    Atraumatic and normocephalic, without obvious abnormality.   Eyes:            PERRLA, conjunctivae and sclerae normal, no Icterus. No pallor. Extra-occular movements are within normal limits.   Throat:   No oral lesions, no thrush, oral mucosa moist.   Neck:   Supple, trachea midline, no thyromegaly, no carotid bruit.   Lungs:     Chest shape is normal. Breath sounds heard bilaterally equally reduced.  No crackles or wheezing. No Pleural rub or bronchial breathing.    Heart:    Normal S1 and S2, no murmur, no gallop, no rub. No JVD   Abdomen:     Normal bowel sounds, no masses, no organomegaly. Soft        non-tender, small ascites, no guarding, no rebound                tenderness   Extremities:   Moves arms most, legs some but equal, leg size is large bilaterally with severe lymphedema    Skin:   No bleeding, bruising or rash, chronic leg venous change, dark tinting to facial skin ; stable bilateral leg ulcers   Neurologic:   Gross sensation intact, no tremor   Laboratory results:    Lab Results (last 24 hours)     Procedure Component Value Units Date/Time    Creatine, 24 Hr Ur - Urine, Clean Catch [[id number]] Collected:  12/16/17 1249    Specimen:  24 Hour Urine from Urine, Clean Catch Updated:  12/19/17 1618     Creatine, U, mg/dL 2.5 mg/dL      Creatine, 24H Ur 31 mg/24 hr       This test was developed and its performance characteristics  determined by [facility]. It has not been cleared or approved  by the Food and Drug Administration.       Narrative:        Performed at:  [address] [place] [address]  [id number]  : Cole Simmons MD, Phone:  [phone]    Protime-INR [[id number]]  (Abnormal) Collected:  12/20/17 0544    Specimen:  Blood Updated:  12/20/17 0634     Protime 28.5 (H) Seconds      INR 2.52 (H)    CBC (No Diff) [[id number]]  (Abnormal) Collected:  12/20/17 0544    Specimen:  Blood Updated:  12/20/17 0636     WBC 11.34 (H) 10*3/mm3      RBC 2.94 (L) 10*6/mm3      Hemoglobin 9.2 (L) g/dL      Hematocrit 28.2 (L) %      MCV 95.9 fL      MCH 31.3 (H) pg      MCHC 32.6 g/dL      RDW 19.7 (H) %      RDW-SD 67.0 (H) fl      MPV 9.7 fL      Platelets 160 10*3/mm3     Renal Function Panel [[id number]]  (Abnormal) Collected:  12/20/17 0544    Specimen:  Blood Updated:  12/20/17 0647     Glucose 79 mg/dL       (C) mg/dL      Creatinine 3.30 (H) mg/dL      Sodium 131 (L) mmol/L      Potassium 4.3 mmol/L      Chloride 103 mmol/L      CO2 13.0 (L) mmol/L      Calcium 8.4 mg/dL      Albumin 2.00 (L) g/dL      Phosphorus 6.4 (C) mg/dL      Anion Gap 19.3 mmol/L      BUN/Creatinine Ratio 34.2 (H)     eGFR Non  Amer 13 (L) mL/min/1.73     Narrative:       The MDRD GFR formula is only valid for adults with stable renal function between ages 18 and 70.    Ammonia [[id number]]  (Normal) Collected:  12/20/17 0921    Specimen:  Blood Updated:  12/20/17 0950     Ammonia 20 umol/L           I have reviewed the patient's laboratory results.    Radiology results:    Imaging Results (last 24 hours)     ** No results found for the last 24 hours. **          I have reviewed the patient's radiology reports.    Medication Review:     I have reviewed the patients active and prn medications.     Assessment:      Principal Problem:    ESRF (end stage renal failure)  Active Problems:    Permanent atrial fibrillation    Cirrhosis of liver with ascites    Chronic kidney disease, stage IV (severe)    Lymphedema of both lower extremities chronic      Dehydration, improved    Acute renal failure, improved     Hyperkalemia    Atrial fibrillation with RVR, resolved to paroxymal rate controlled afib   Poor prognosis  Malnutrition with failure to thrive  Acute encephalopathy, multifactoral with ESRD and liver disease and hyperammonemia       Plan:  I recommend continued meeting with palliative care tonight but I definitely feel she is a candidate to go home with Hospice if that's what the family desires. She continues to progressively worsen. She is not a candidate for hemodialysis. Her blood pressure remains low and she is barely eating at all. She has failure to thrive and malnutrition. Her ammonia level is better today with lactulose, though her mentation does not improve. The patient had wanted to go home. Await meeting tonight with Dr Hewitt.     Again, I have explained to family that her prognosis is very poor at this time. Continue INR monitoring and warfarin for now.     Medication risks and benefits were discussed in detail. Family had reported satisfaction with current care provided.     Anticipate discharge tomorrow to home with palliative care vs hospice.    Bettie Casarez,   12/20/17  2:58 PM

## 2017-12-20 NOTE — PLAN OF CARE
Problem: Patient Care Overview (Adult)  Goal: Plan of Care Review  Outcome: Ongoing (interventions implemented as appropriate)    12/20/17 0101   Outcome Evaluation   Outcome Summary/Follow up Plan Continue to monitor patient's bp and ammonia level. Patient has not had a bowel movement this evening.    Coping/Psychosocial Response Interventions   Plan Of Care Reviewed With patient   Patient Care Overview   Progress no change       Goal: Adult Individualization and Mutuality  Outcome: Ongoing (interventions implemented as appropriate)  Goal: Discharge Needs Assessment  Outcome: Ongoing (interventions implemented as appropriate)    Problem: Pain, Acute (Adult)  Goal: Acceptable Pain Control/Comfort Level  Outcome: Ongoing (interventions implemented as appropriate)    Problem: Renal Failure/Kidney Injury, Acute (Adult)  Goal: Signs and Symptoms of Listed Potential Problems Will be Absent or Manageable (Renal Failure/Kidney Injury, Acute)  Outcome: Ongoing (interventions implemented as appropriate)    Problem: Pressure Ulcer (Adult)  Goal: Signs and Symptoms of Listed Potential Problems Will be Absent or Manageable (Pressure Ulcer)  Outcome: Ongoing (interventions implemented as appropriate)

## 2017-12-20 NOTE — PLAN OF CARE
Problem: Patient Care Overview (Adult)  Goal: Plan of Care Review  Outcome: Ongoing (interventions implemented as appropriate)   12/20/17 1829   Coping/Psychosocial Response Interventions   Plan Of Care Reviewed With daughter   Patient Care Overview   Progress no change     Goal: Adult Individualization and Mutuality  Outcome: Ongoing (interventions implemented as appropriate)    Goal: Discharge Needs Assessment  Outcome: Ongoing (interventions implemented as appropriate)      Problem: Pain, Acute (Adult)  Goal: Acceptable Pain Control/Comfort Level  Outcome: Ongoing (interventions implemented as appropriate)      Problem: Renal Failure/Kidney Injury, Acute (Adult)  Goal: Signs and Symptoms of Listed Potential Problems Will be Absent or Manageable (Renal Failure/Kidney Injury, Acute)  Outcome: Ongoing (interventions implemented as appropriate)      Problem: Pressure Ulcer (Adult)  Goal: Signs and Symptoms of Listed Potential Problems Will be Absent or Manageable (Pressure Ulcer)  Outcome: Ongoing (interventions implemented as appropriate)

## 2017-12-20 NOTE — SIGNIFICANT NOTE
12/20/17 0930   Rehab Treatment   Discipline occupational therapist   Rehab Evaluation   Evaluation Not Performed unable to evaluate, medical status change  (Eval not completed. Pt is being discharged to community with Hospice care.                                                       )

## 2017-12-20 NOTE — PROGRESS NOTES
[facility]    Name:  Hodan Carter   Age:  77 y.o.  Sex:  female  :  1940  MRN:  [id number]   Visit Number:  [id number]  Admission Date:  2017  Date Of Service:  17  Primary Care Physician:  Pieter Farias DO     LOS: 11 days :      Chief Complaint:  Follow up renal failure. Altered mentation today intermittently        Subjective / Interval History:   The patient was seen today just before lunch. She was awake, intermittently talkative some but intermittently altered similar to elevated ammonia in past per family. The patient had denied chest pain, shortness of breath, abdominal pain. She has stable edema legs. No acute events have occurred overnight. Dr Hewitt spoke to patient and family today and meeting again with entire family tomorrow evening.     She has barely been eating any meals. She ate tiny amount of breakfast today. She has not had bowel movement despite lactulose given.    Review of Systems:     General ROS: Patient denies any fevers, chills or loss of consciousness. Generalized weakness.   Ophthalmic ROS: Denies any diplopia or transient loss of vision.  ENT ROS: Denies sore throat, nasal congestion or ear pain.   Respiratory ROS: Denies cough or shortness of breath.  Cardiovascular ROS: Denies chest pain or palpitations. No history of exertional chest pain.   Gastrointestinal ROS: Denies nausea and vomiting. Denies any abdominal pain. No diarrhea.  Genito-Urinary ROS: Denies dysuria or hematuria.  Musculoskeletal ROS: Complains of chronic back pain. No muscle pain. No calf pain.  Neurological ROS: Denies any focal weakness. No loss of consciousness. Denies any numbness. Denies neck pain.   Dermatological ROS: Denies any redness or pruritis.    Vital Signs:    Temp:  [97.3 °F (36.3 °C)-97.9 °F (36.6 °C)] 97.8 °F (36.6 °C)  Heart Rate:  [50-73] 70  Resp:  [17-18] 18  BP: ()/(40-60) 91/60    Intake and output:    I/O last 3  completed shifts:  In: 447 [P.O.:220; I.V.:227]  Out: 1375 [Urine:1375]       Physical Examination:    General Appearance:    Alert to voice, and minimally cooperative, not in any acute distress. Sitting up in bed with eyes open. Answers some questions    Head:    Atraumatic and normocephalic, without obvious abnormality.   Eyes:            PERRLA, conjunctivae and sclerae normal, no Icterus. No pallor. Extra-occular movements are within normal limits.   Throat:   No oral lesions, no thrush, oral mucosa moist.   Neck:   Supple, trachea midline, no thyromegaly, no carotid bruit.   Lungs:     Chest shape is normal. Breath sounds heard bilaterally equally.  No crackles or wheezing. No Pleural rub or bronchial breathing.    Heart:    Normal S1 and S2, no murmur, no gallop, no rub. No JVD   Abdomen:     Normal bowel sounds, no masses, no organomegaly. Soft        non-tender, non-distended, no guarding, no rebound                tenderness   Extremities:   Moves all extremities, reduced legs but equal due to increased bilateral severe lymphedema of legs,no cyanosis   Skin:   No bleeding, bruising or rash, chronic leg venous change, dark tinting to facial skin ; stable bilateral leg ulcers   Neurologic:   Gross sensation intact, no tremor   Laboratory results:    Lab Results (last 24 hours)     Procedure Component Value Units Date/Time    Protime-INR [[id number]]  (Abnormal) Collected:  12/19/17 0557    Specimen:  Blood Updated:  12/19/17 0636     Protime 24.0 (H) Seconds      INR 2.12 (H)    Renal Function Panel [[id number]]  (Abnormal) Collected:  12/19/17 0557    Specimen:  Blood Updated:  12/19/17 0657     Glucose 70 (L) mg/dL       (C) mg/dL      Creatinine 2.90 (H) mg/dL      Sodium 130 (L) mmol/L      Potassium 4.0 mmol/L      Chloride 102 mmol/L      CO2 16.0 (L) mmol/L      Calcium 8.0 (L) mg/dL      Albumin 1.90 (L) g/dL      Phosphorus 5.7 (H) mg/dL      Anion Gap 16.0 mmol/L      BUN/Creatinine Ratio 35.5  (H)     eGFR Non  Amer 16 (L) mL/min/1.73     Narrative:       The MDRD GFR formula is only valid for adults with stable renal function between ages 18 and 70.    CBC (No Diff) [[id number]]  (Abnormal) Collected:  12/19/17 0557    Specimen:  Blood Updated:  12/19/17 0710     WBC 9.57 10*3/mm3      RBC 3.05 (L) 10*6/mm3      Hemoglobin 9.4 (L) g/dL      Hematocrit 28.7 (L) %      MCV 94.1 fL      MCH 30.8 pg      MCHC 32.8 g/dL      RDW 19.3 (H) %      RDW-SD 64.8 (H) fl      MPV 9.6 fL      Platelets 150 10*3/mm3     Creatinine Clearance - Urine, Clean Catch [[id number]] Collected:  12/19/17 1107    Specimen:  24 Hour Urine from Urine, Clean Catch Updated:  12/19/17 1145    Narrative:       The following orders were created for panel order Creatinine Clearance - Urine, Clean Catch.  Procedure                               Abnormality         Status                     ---------                               -----------         ------                     Creatinine Clearance, Ur...[[id number]]  Abnormal            Final result                 Please view results for these tests on the individual orders.    Creatinine Clearance, Urine, 24 Hour - Urine, Clean Catch [[id number]]  (Abnormal) Collected:  12/19/17 1107    Specimen:  24 Hour Urine from Urine, Catheter Updated:  12/19/17 1145     Creatinine Clearance 6.1 (L) ml/min      Creatinine, Urine 105.4 mg/dL      Time (Hours) 24 hrs      Creatinine, 24H 0.32 (L) g/24 hr      CREATININE CLEARANCE L/24 HOUR 8.8 (L) L/24 hr     Ammonia [[id number]]  (Abnormal) Collected:  12/19/17 1346    Specimen:  Blood Updated:  12/19/17 1421     Ammonia 69 (H) umol/L     Creatine, 24 Hr Ur - Urine, Clean Catch [[id number]] Collected:  12/16/17 1249    Specimen:  24 Hour Urine from Urine, Clean Catch Updated:  12/19/17 1618     Creatine, U, mg/dL 2.5 mg/dL      Creatine, 24H Ur 31 mg/24 hr       This test was developed and its performance characteristics  determined by [facility]. It  has not been cleared or approved  by the Food and Drug Administration.       Narrative:       Performed at:  01 - [facility] [address], [place], [place]  [id number]  : Cole Simmons MD, Phone:  [phone]          I have reviewed the patient's laboratory results.    Radiology results:    Imaging Results (last 24 hours)     ** No results found for the last 24 hours. **          I have reviewed the patient's radiology reports.    Medication Review:     I have reviewed the patients active and prn medications.     Assessment:      Principal Problem:    ESRF (end stage renal failure)  Active Problems:    Permanent atrial fibrillation    Cirrhosis of liver with ascites    Chronic kidney disease, stage IV (severe)    Lymphedema of both lower extremities chronic     Dehydration, improved    Acute renal failure, improved     Hyperkalemia    Atrial fibrillation with RVR, resolved to paroxymal rate controlled afib   Poor prognosis  Malnutrition  Acute encephalopathy, multifactoral with ESRD and liver disease and hyperammonemia       Plan:  Her blood pressure is stable low at this time. Renal following and we discussed the case. Palliative consulted and Dr Hewitt and I discussed. I have spoken to multiple family multiple times today. Her prognosis is very poor at this time. She could not current sustain or be a candidate for dialysis in the current state. I have recommended home with hospice as an option. The family wants to discuss further meeting tomorrow evening with Dr Hewitt. Continue other medications for now.     Increased lactulose today to see if any improvements can be made. Family reported satisfaction with current treatment plan.     Continue INR and coumadin monitoring daily for Afib.    Very poor prognosis and expect soon further progressive function decline soon. She is hardly eating or drinking.     Medication risks and benefits were discussed in detail.     Bettie Casarez,  DO  12/19/17  10:36 PM

## 2017-12-20 NOTE — PLAN OF CARE
Family refused for iv to be changed at this time states son '' she has been poked enough '' iv flushes well rn notified educated on Importance of maintaining iv will continue to monitor

## 2017-12-21 NOTE — PROGRESS NOTES
Dr Hewitt met with pt's family last pm to discuss Goals of Care.  Dr Hewitt reported family in agreement for plan of Home with Hospice Care.  She informed me family request DC not be until 12/22/17 when daughter can be home with pt.   HCP notified of plan and request for hospice RN to visit for equipment needs.  Updated clinicals and Palliative Care MD consult faxed to HCP.  Visited with pt.  She appeared sl more alert today.  Noted pt did not focus on person speaking.  She denies pain and no non-verbal indication of pain observed.   Daughter reported she did eat some ice cream and drank some juice for breakfast.   Informed daughter RN from hospice would be visiting today.  She replied she has been here all night and two other daughters would be around 11am.     Received message from Melisa León RN/Hospice that she would be visiting pt & family around 11:30 or a little later.  Notified daughters present of plan.    Melisa León RN/Alexi here to speak with family    Was informed per Ms León, daughters requesting gel mattress for bed.  Arrangements being made for equipment delivery.   Will need to call [facility] on Friday if discharged.

## 2017-12-21 NOTE — CONSULTS
[facility]     PALLIATIVE CARE FOLLOW UP NOTE    Name:  Hodan Carter   Age:  77 y.o.  Sex:  female  :  1940  MRN:  [id number]   Visit Number:  [id number]  Admission Date:  @ADMITDT  Date Of Service:  17  Primary Care Physician:  Pieter Farias DO    Mrs. Carter is a 77 years old female who I am familiar with through prior palliative care encounters.  Patient has a complicated clinical course with ESRD, liver cirrhosis, atrial fibrillation, malnutrition and multifactorial metabolic encephalopathy.    Patient evaluated, record reviewed including details of this hospitalizations, case discussed with staff.  Patient appeared pale and lethargic; repeatedly refused her medications despite encouragement by RN and daughters who were at the bedside.    As planned, I met with Mrs. Carter's family today to discuss goals of care and relevant plans.    Attendees:  Two daughters, son, grand-daughter and sister in law.  Two other sons were (not interested in attending); one delegated any decisions to his siblings and the other was unsure.    Location: Staff room on 4th floor    Summary of the discussion:    -  Family were encouraged to present their perception of patient's condition.  They are aware of her progressive decline and concerned regarding decreased PO intake and increasing confusion.  Based on discussions with her primary attending and medical team, they understand the sequence of events and exhaustion of curative measures.    -  I reiterated above and highlighted optimal medical management provided during multiple hospitalizations leading to current.  Unfortunately, despite the same, patient is progressively declining with an end of life trajectory as evident by weight loss, confusion, lethargy and minimal PO intake.    -  Accordingly, I encouraged them to reconsider goals of care.  In response to my question regarding their expectation of patient's wishes should she be aware of  her poor prognosis and impaired quality of life, they collectively stated that she would elect for differ aggressive/life prolonging measures and would want to be comfortable at home with her family..   -  I proceeded with presenting hospice services to ensure alignment with patient's wishes.  As they agreed with the same, we discussed services and levels of care.  On one hand, they felt that transfer to the compassionate care center will ensure more consistent comfort; on the other hand, patient repeatedly expressed her wishes to spend [place] at home.  I recommended a private family conversation to reflect on this decision.  After about 20 minutes, they informed me RE their conclusion to proceed with discharge home with home based hospice.  They are aware of the [facility] care center as an option in the event of intractable symptoms poorly responsive to out patient management.  They requested planning for discharge Friday 12/22nd to ensure family availability to attend to her needs while off work for the ho;susy.   -  Family expressed appreciation for the support and services provided by staff and medical team.   -  Above will be relayed to primary attending who will follow on further plans.  -  Will relay to [facility] to initiate coordination of DME delivery; recommend hospital bed with air mattress (currently has one from home health), O2, and other equipment as clinically indicated.      Time spent during this encounter 90 minutes; 50 minutes in counseling.    Dr. Thompson, I appreciate the opportunity to participate in the care of Mrs. Carter during this transition.

## 2017-12-21 NOTE — TELEPHONE ENCOUNTER
Jennifer from Hospice called requesting a call back to discuss if Dr. Farias will follow this patient.

## 2017-12-21 NOTE — DISCHARGE SUMMARY
[facility] HOSPITALIST   DISCHARGE SUMMARY      Name:  Hodan Carter   Age:  77 y.o.  Sex:  female  :  1940  MRN:  [id number]   Visit Number:  [id number]  Primary Care Physician:  Pieter Farias DO  Date of Discharge:  2017  Admission Date:  2017    Discharge Diagnosis:   Principal Problem:    ESRF (end stage renal failure)  Active Problems:    Permanent atrial fibrillation    Cirrhosis of liver with ascites    Chronic kidney disease, stage IV (severe)    Lymphedema of both lower extremities chronic     Dehydration, improved    Acute renal failure, improved     Hyperkalemia    Atrial fibrillation with RVR, resolved to paroxymal rate controlled afib   Poor prognosis  Malnutrition with failure to thrive  Acute encephalopathy, multifactoral with ESRD and liver disease and hyperammonemia        History of Present Illness/Hospital Course:    The patient is a 77 yr old debilitated lady with cirrhosis, portal hypertension and ascies,chronic kidney disease, chronic afib on coumadin, who presented to the ER with weakness, low blood pressure. She has progressively declined over the past few months. She has history of pneumonia, hepatic encephalopathy and has been followed by palliative care. The patient was seen in ER with blood pressure 90s systolic. Creatinine has continued to worsen. INR was supratherapeutic.CT scan abdomen showed no acute findings. She was bolused with one liter normal saline and admitted to the hospitalist service.     The patient has been seen by nephrology in consultation. She has continued to decline, with reduced oral intake and is barely eating or drinking. With her end stage renal disease, with cirrhosis and continued malnutrition, family meeting occurred. Nephrology service did not feel she would be a candidate for hemodialysis due to cirrhosis and malnutrition.  The patient and family desired palliative care options only. She has reduced communication  despite ammonia level. She has intermittent pain in her legs with persistent severe leg lymphedema. She has difficulty complaining of pain, though has intermittent grimace and moans. She was started on morphine as needed for pain. Ativan was given as needed for anxiety.     The patient and family wished to continue DNR and comfort care measures. Hospice consultation was made at family request. The patient will be discharged home tomorrow with Hospice.    The patient was seen today. She was more communicative today than yesterday. She has had some pain in her legs. She denies chest pain, shortness of breath, abdominal pain.       Consults:     Consults     Date and Time Order Name Status Description    12/18/2017 1533 Inpatient Consult to Palliative Care MD Completed     12/15/2017 0509 Inpatient Consult to Cardiology      12/14/2017 0908 Inpatient Consult to General Surgery Completed     12/8/2017 0455 Inpatient Consult to Nephrology Completed     11/25/2017 1328 Inpatient Consult to Nephrology Completed           Procedures Performed:    Procedure(s):  HEMODIALYSIS CATHETER INSERTION         Vital Signs:    Temp:  [96.5 °F (35.8 °C)-97.4 °F (36.3 °C)] 97.4 °F (36.3 °C)  Heart Rate:  [64-72] 64  Resp:  [18-20] 18  BP: ()/(29-46) 82/43    Physical Exam:      General Appearance:    Alert, cooperative, in no acute distress   Head:    Normocephalic, without obvious abnormality, atraumatic       Ears:    Ears appear intact with no abnormalities noted   Throat:   No oral lesions, no thrush, oral mucosa moist   Neck:   No adenopathy, supple, trachea midline, no thyromegaly       Lungs:     Clear to auscultation reduced breath sounds,respirations regular, even and unlabored    Heart:    Regular rhythm and normal rate, normal S1 and S2, no            murmur, no gallop, no rub, no click   Breast Exam:    Deferred   Abdomen:     Normal bowel sounds, no masses, no organomegaly, soft        non-tender, non-distended, no  guarding, no rebound                 tenderness   Genitalia:    Deferred   Extremities:   Moves all extremities well, diffuse anasarca edema of hand right, abdomen legs , no cyanosis, no redness   Pulses:   Pulses palpable and equal bilaterally   Skin:   No bleeding, bruising or rash. Bilateral leg ulcers   Lymph nodes:   No palpable adenopathy   Neurologic:   sensation intact, no tremor         Pertinent Lab Results:     Lab Results (all)     Procedure Component Value Units Date/Time    CBC & Differential [[id number]] Collected:  12/08/17 0006    Specimen:  Blood Updated:  12/08/17 0017    Narrative:       The following orders were created for panel order CBC & Differential.  Procedure                               Abnormality         Status                     ---------                               -----------         ------                     CBC Auto Differential[[id number]]        Abnormal            Final result                 Please view results for these tests on the individual orders.    CBC Auto Differential [[id number]]  (Abnormal) Collected:  12/08/17 0006    Specimen:  Blood Updated:  12/08/17 0017     WBC 7.88 10*3/mm3      RBC 3.72 (L) 10*6/mm3      Hemoglobin 11.1 (L) g/dL      Hematocrit 33.8 (L) %      MCV 90.9 fL      MCH 29.8 pg      MCHC 32.8 g/dL      RDW 17.6 (H) %      RDW-SD 57.4 (H) fl      MPV 9.7 fL      Platelets 141 10*3/mm3      Neutrophil % 71.9 %      Lymphocyte % 15.9 %      Monocyte % 11.0 %      Eosinophil % 0.3 %      Basophil % 0.3 %      Immature Grans % 0.6 %      Neutrophils, Absolute 5.67 10*3/mm3      Lymphocytes, Absolute 1.25 10*3/mm3      Monocytes, Absolute 0.87 10*3/mm3      Eosinophils, Absolute 0.02 10*3/mm3      Basophils, Absolute 0.02 10*3/mm3      Immature Grans, Absolute 0.05 10*3/mm3      nRBC 0.0 /100 WBC     Ammonia [[id number]]  (Abnormal) Collected:  12/08/17 0002    Specimen:  Blood Updated:  12/08/17 0034     Ammonia <9 (L) umol/L     Digoxin Level  [[id number]]  (Normal) Collected:  12/08/17 0002    Specimen:  Blood Updated:  12/08/17 0034     Digoxin 1.30 ng/mL     Troponin [[id number]]  (Abnormal) Collected:  12/08/17 0002    Specimen:  Blood Updated:  12/08/17 0038     Troponin I 0.047 (H) ng/mL     Narrative:       Normal Patient Upper Reference Limit (URL) (99th Percentile)=0.03 ng/mL   Non-AMI Illness Reference Limit=0.03-0.11 ng/mL   AMI Confirmation=0.12 ng/mL and above    Lactic Acid, Plasma [[id number]]  (Abnormal) Collected:  12/08/17 0002    Specimen:  Blood Updated:  12/08/17 0042     Lactate 3.8 (C) mmol/L     Magnesium [[id number]]  (Abnormal) Collected:  12/08/17 0002    Specimen:  Blood Updated:  12/08/17 0057     Magnesium 2.4 (H) mg/dL     Comprehensive Metabolic Panel [[id number]]  (Abnormal) Collected:  12/08/17 0002    Specimen:  Blood Updated:  12/08/17 0058     Glucose 102 (H) mg/dL      BUN 78 (C) mg/dL      Creatinine 4.60 (H) mg/dL      Sodium 125 (C) mmol/L      Potassium 5.5 (H) mmol/L      Chloride 98 mmol/L      CO2 13.0 (L) mmol/L      Calcium 9.2 mg/dL      Total Protein 6.9 g/dL      Albumin 2.60 (L) g/dL      ALT (SGPT) 40 U/L      AST (SGOT) 43 U/L      Alkaline Phosphatase 217 (H) U/L      Total Bilirubin 0.8 mg/dL      eGFR Non African Amer 9 (L) mL/min/1.73      Globulin 4.3 gm/dL      A/G Ratio 0.6 (L) g/dL      BUN/Creatinine Ratio 17.0     Anion Gap 19.5 mmol/L     Narrative:       The MDRD GFR formula is only valid for adults with stable renal function between ages 18 and 70.    Lavender Top [[id number]] Collected:  12/08/17 0006    Specimen:  Blood Updated:  12/08/17 0116     Extra Tube hold for add-on      Auto resulted       Gold Top - SST [[id number]] Collected:  12/08/17 0002    Specimen:  Blood Updated:  12/08/17 0116     Extra Tube Hold for add-ons.      Auto resulted.       Urinalysis With / Culture If Indicated - Urine, Clean Catch [[id number]]  (Abnormal) Collected:  12/08/17 0049    Specimen:  Urine from Urine,  Clean Catch Updated:  12/08/17 0116     Color, UA Yellow     Appearance, UA Cloudy (A)     pH, UA <=5.0     Specific Gravity, UA 1.015     Glucose, UA Negative     Ketones, UA Negative     Bilirubin, UA Negative     Blood, UA Negative     Protein, UA Negative     Leuk Esterase, UA Trace (A)     Nitrite, UA Negative     Urobilinogen, UA 0.2 E.U./dL    Urinalysis, Microscopic Only - Urine, Clean Catch [[id number]] Collected:  12/08/17 0049    Specimen:  Urine from Urine, Clean Catch Updated:  12/08/17 0124     RBC, UA None Seen /HPF      WBC, UA None Seen /HPF      Bacteria, UA None Seen /HPF      Squamous Epithelial Cells, UA 0-2 /HPF      Yeast, UA Large/3+ Budding Yeast /HPF      Hyaline Casts, UA None Seen /LPF      Methodology Manual Light Microscopy    Protime-INR [[id number]]  (Abnormal) Collected:  12/08/17 0106    Specimen:  Blood Updated:  12/08/17 0154     Protime 63.1 (H) Seconds      INR 5.45 (C)    POC Glucose Fingerstick [[id number]]  (Normal) Collected:  12/08/17 0207    Specimen:  Blood Updated:  12/08/17 0215     Glucose 87 mg/dL       Serial Number: [id number]:  [id number]       [place] Draw [[id number]] Collected:  12/08/17 0002    Specimen:  Blood Updated:  12/08/17 0216    Narrative:       The following orders were created for panel order [place] Draw.  Procedure                               Abnormality         Status                     ---------                               -----------         ------                     Light Blue Top[[id number]]                                   Final result               Lavender Top[[id number]]                                     Final result               Gold Top - SST[[id number]]                                   Final result               Green Top (No Gel)[[id number]]                               Final result                 Please view results for these tests on the individual orders.    Light Blue Top [[id number]] Collected:  12/08/17 0106    Specimen:   Blood Updated:  12/08/17 0216     Extra Tube hold for add-on      Auto resulted       Green Top (No Gel) [[id number]] Collected:  12/08/17 0106    Specimen:  Blood Updated:  12/08/17 0216     Extra Tube Hold for add-ons.      Auto resulted.       Lactic Acid, Reflex Timer [[id number]] Collected:  12/08/17 0002    Specimen:  Blood Updated:  12/08/17 0346     Extra Tube Hold for add-ons.      Auto resulted.       CBC & Differential [[id number]] Collected:  12/08/17 0530    Specimen:  Blood Updated:  12/08/17 0611    Narrative:       The following orders were created for panel order CBC & Differential.  Procedure                               Abnormality         Status                     ---------                               -----------         ------                     CBC Auto Differential[[id number]]        Abnormal            Final result                 Please view results for these tests on the individual orders.    CBC Auto Differential [[id number]]  (Abnormal) Collected:  12/08/17 0530    Specimen:  Blood Updated:  12/08/17 0611     WBC 6.86 10*3/mm3      RBC 3.21 (L) 10*6/mm3      Hemoglobin 9.7 (L) g/dL      Hematocrit 29.0 (L) %      MCV 90.3 fL      MCH 30.2 pg      MCHC 33.4 g/dL      RDW 17.5 (H) %      RDW-SD 56.9 (H) fl      MPV 9.5 fL      Platelets 128 (L) 10*3/mm3      Neutrophil % 72.4 %      Lymphocyte % 16.3 %      Monocyte % 9.9 %      Eosinophil % 0.3 %      Basophil % 0.4 %      Immature Grans % 0.7 (H) %      Neutrophils, Absolute 4.96 10*3/mm3      Lymphocytes, Absolute 1.12 10*3/mm3      Monocytes, Absolute 0.68 10*3/mm3      Eosinophils, Absolute 0.02 10*3/mm3      Basophils, Absolute 0.03 10*3/mm3      Immature Grans, Absolute 0.05 10*3/mm3      nRBC 0.0 /100 WBC     Lactic Acid, Reflex [[id number]]  (Abnormal) Collected:  12/08/17 0542    Specimen:  Blood Updated:  12/08/17 0626     Lactate 2.8 (C) mmol/L     Protime-INR [[id number]]  (Abnormal) Collected:  12/08/17 0530    Specimen:  Blood  Updated:  12/08/17 0627     Protime 61.5 (H) Seconds      INR 5.39 (C)    Renal Function Panel [[id number]]  (Abnormal) Collected:  12/08/17 0530    Specimen:  Blood Updated:  12/08/17 0645     Glucose 81 mg/dL      BUN 74 (H) mg/dL      Creatinine 4.40 (H) mg/dL      Sodium 125 (C) mmol/L      Potassium 5.0 mmol/L      Chloride 101 mmol/L      CO2 13.0 (L) mmol/L      Calcium 8.3 (L) mg/dL      Albumin 2.20 (L) g/dL      Phosphorus 6.8 (C) mg/dL      Anion Gap 16.0 mmol/L      BUN/Creatinine Ratio 16.8     eGFR Non African Amer 10 (L) mL/min/1.73     Narrative:       The MDRD GFR formula is only valid for adults with stable renal function between ages 18 and 70.    Basic Metabolic Panel [[id number]]  (Abnormal) Collected:  12/08/17 1208    Specimen:  Blood Updated:  12/08/17 1234     Glucose 98 mg/dL      BUN 75 (H) mg/dL      Creatinine 4.50 (H) mg/dL      Sodium 124 (C) mmol/L      Potassium 5.1 mmol/L      Chloride 101 mmol/L      CO2 13.0 (L) mmol/L      Calcium 8.4 mg/dL      eGFR Non African Amer 9 (L) mL/min/1.73      BUN/Creatinine Ratio 16.7     Anion Gap 15.1 mmol/L     Narrative:       The MDRD GFR formula is only valid for adults with stable renal function between ages 18 and 70.    Sodium, Urine, Random - Urine, Clean Catch [[id number]]  (Abnormal) Collected:  12/08/17 1319    Specimen:  Urine from Urine, Catheter Updated:  12/08/17 1409     Sodium, Urine 28 (L) mmol/L     CBC (No Diff) [[id number]]  (Abnormal) Collected:  12/09/17 0639    Specimen:  Blood Updated:  12/09/17 0704     WBC 7.30 10*3/mm3      RBC 3.03 (L) 10*6/mm3      Hemoglobin 9.2 (L) g/dL      Hematocrit 27.3 (L) %      MCV 90.1 fL      MCH 30.4 pg      MCHC 33.7 g/dL      RDW 18.0 (H) %      RDW-SD 57.5 (H) fl      MPV 9.6 fL      Platelets 126 (L) 10*3/mm3     Magnesium [[id number]]  (Abnormal) Collected:  12/09/17 0639    Specimen:  Blood Updated:  12/09/17 0722     Magnesium 2.4 (H) mg/dL     Comprehensive Metabolic Panel [[id number]]   (Abnormal) Collected:  12/09/17 0639    Specimen:  Blood Updated:  12/09/17 0734     Glucose 87 mg/dL      BUN 79 (C) mg/dL      Creatinine 4.40 (H) mg/dL      Sodium 126 (C) mmol/L      Potassium 5.2 (H) mmol/L      Chloride 102 mmol/L      CO2 12.0 (L) mmol/L      Calcium 8.3 (L) mg/dL      Total Protein 5.8 (L) g/dL      Albumin 2.00 (L) g/dL      ALT (SGPT) 33 U/L      AST (SGOT) 34 U/L      Alkaline Phosphatase 173 (H) U/L      Total Bilirubin 0.6 mg/dL      eGFR Non African Amer 10 (L) mL/min/1.73      Globulin 3.8 gm/dL      A/G Ratio 0.5 (L) g/dL      BUN/Creatinine Ratio 18.0     Anion Gap 17.2 mmol/L     Narrative:       The MDRD GFR formula is only valid for adults with stable renal function between ages 18 and 70.    Protime-INR [[id number]]  (Abnormal) Collected:  12/09/17 1015    Specimen:  Blood Updated:  12/09/17 1109     Protime 59.3 (H) Seconds      INR 5.20 (C)    Sodium, Urine, Random - Urine, Clean Catch [[id number]]  (Abnormal) Collected:  12/09/17 1335    Specimen:  Urine from Urine, Clean Catch Updated:  12/09/17 1418     Sodium, Urine 19 (L) mmol/L     CBC & Differential [[id number]] Collected:  12/10/17 0610    Specimen:  Blood Updated:  12/10/17 0649    Narrative:       The following orders were created for panel order CBC & Differential.  Procedure                               Abnormality         Status                     ---------                               -----------         ------                     CBC Auto Differential[[id number]]        Abnormal            Final result                 Please view results for these tests on the individual orders.    CBC Auto Differential [[id number]]  (Abnormal) Collected:  12/10/17 0610    Specimen:  Blood Updated:  12/10/17 0649     WBC 5.79 10*3/mm3      RBC 2.86 (L) 10*6/mm3      Hemoglobin 8.8 (L) g/dL      Hematocrit 25.9 (L) %      MCV 90.6 fL      MCH 30.8 pg      MCHC 34.0 g/dL      RDW 17.9 (H) %      RDW-SD 57.2 (H) fl      MPV 9.6 fL       Platelets 110 (L) 10*3/mm3      Neutrophil % 65.7 %      Lymphocyte % 21.1 %      Monocyte % 11.2 %      Eosinophil % 1.2 %      Basophil % 0.5 %      Immature Grans % 0.3 %      Neutrophils, Absolute 3.80 10*3/mm3      Lymphocytes, Absolute 1.22 10*3/mm3      Monocytes, Absolute 0.65 10*3/mm3      Eosinophils, Absolute 0.07 10*3/mm3      Basophils, Absolute 0.03 10*3/mm3      Immature Grans, Absolute 0.02 10*3/mm3      nRBC 0.0 /100 WBC     Magnesium [[id number]]  (Abnormal) Collected:  12/10/17 0610    Specimen:  Blood Updated:  12/10/17 0700     Magnesium 2.5 (H) mg/dL     Phosphorus [[id number]]  (Abnormal) Collected:  12/10/17 0610    Specimen:  Blood Updated:  12/10/17 0700     Phosphorus 6.0 (H) mg/dL     Uric Acid [[id number]]  (Abnormal) Collected:  12/10/17 0610    Specimen:  Blood Updated:  12/10/17 0700     Uric Acid 10.4 (H) mg/dL     Comprehensive Metabolic Panel [[id number]]  (Abnormal) Collected:  12/10/17 0610    Specimen:  Blood Updated:  12/10/17 0708     Glucose 81 mg/dL      BUN 82 (C) mg/dL      Creatinine 4.40 (H) mg/dL      Sodium 127 (C) mmol/L      Potassium 5.1 mmol/L      Chloride 103 mmol/L      CO2 15.0 (L) mmol/L      Calcium 8.4 mg/dL      Total Protein 5.4 (L) g/dL      Albumin 1.90 (L) g/dL      ALT (SGPT) 27 U/L      AST (SGOT) 32 U/L      Alkaline Phosphatase 177 (H) U/L      Total Bilirubin 0.7 mg/dL      eGFR Non African Amer 10 (L) mL/min/1.73      Globulin 3.5 gm/dL      A/G Ratio 0.5 (L) g/dL      BUN/Creatinine Ratio 18.6     Anion Gap 14.1 mmol/L     Narrative:       The MDRD GFR formula is only valid for adults with stable renal function between ages 18 and 70.    VRE Culture - Swab, Per Rectum [[id number]]  (Normal) Collected:  12/08/17 0417    Specimen:  Swab from Per Rectum Updated:  12/10/17 0719     VRE SCREEN CX No Vancomycin Resistant Enterococcus Isolated    Protime-INR [[id number]]  (Abnormal) Collected:  12/10/17 0610    Specimen:  Blood Updated:  12/10/17 0724      Protime 45.1 (H) Seconds      INR 3.96 (H)    Urine Culture - Urine, Urine, Clean Catch [[id number]]  (Abnormal) Collected:  12/08/17 0049    Specimen:  Urine from Urine, Clean Catch Updated:  12/10/17 0725     Urine Culture --      >100,000 CFU/mL Yeast, Not Candida albicans (A)    Acinetobacter Screen - Swab, Axilla, Right [[id number]]  (Normal) Collected:  12/08/17 0416    Specimen:  Swab from Axilla, Right Updated:  12/11/17 0603     ACINETOBACTER SCREEN CX No growth    MRSA Screen Culture - Swab, Nares [[id number]]  (Normal) Collected:  12/08/17 0417    Specimen:  Swab from Nares Updated:  12/11/17 0603     MRSA SCREEN CX No growth    CBC (No Diff) [[id number]]  (Abnormal) Collected:  12/11/17 0536    Specimen:  Blood Updated:  12/11/17 0607     WBC 6.14 10*3/mm3      RBC 3.00 (L) 10*6/mm3      Hemoglobin 9.1 (L) g/dL      Hematocrit 27.9 (L) %      MCV 93.0 fL      MCH 30.3 pg      MCHC 32.6 g/dL      RDW 18.6 (H) %      RDW-SD 61.4 (H) fl      MPV 9.6 fL      Platelets 120 (L) 10*3/mm3     Uric Acid [[id number]]  (Abnormal) Collected:  12/11/17 0536    Specimen:  Blood Updated:  12/11/17 0616     Uric Acid 10.2 (H) mg/dL     Magnesium [[id number]]  (Abnormal) Collected:  12/11/17 0536    Specimen:  Blood Updated:  12/11/17 0616     Magnesium 2.6 (H) mg/dL     Protime-INR [[id number]]  (Abnormal) Collected:  12/11/17 0536    Specimen:  Blood Updated:  12/11/17 0623     Protime 31.8 (H) Seconds      INR 2.81 (H)    Comprehensive Metabolic Panel [[id number]]  (Abnormal) Collected:  12/11/17 0536    Specimen:  Blood Updated:  12/11/17 0627     Glucose 97 mg/dL      BUN 90 (C) mg/dL      Creatinine 4.40 (H) mg/dL      Sodium 129 (L) mmol/L      Potassium 5.3 (H) mmol/L      Chloride 103 mmol/L      CO2 17.0 (L) mmol/L      Calcium 8.4 mg/dL      Total Protein 5.6 (L) g/dL      Albumin 2.00 (L) g/dL      ALT (SGPT) 35 U/L      AST (SGOT) 32 U/L      Alkaline Phosphatase 171 (H) U/L      Total Bilirubin 0.7 mg/dL      eGFR  Non  Amer 10 (L) mL/min/1.73      Globulin 3.6 gm/dL      A/G Ratio 0.6 (L) g/dL      BUN/Creatinine Ratio 20.5     Anion Gap 14.3 mmol/L     Narrative:       The MDRD GFR formula is only valid for adults with stable renal function between ages 18 and 70.    Phosphorus [[id number]]  (Abnormal) Collected:  12/11/17 0536    Specimen:  Blood Updated:  12/11/17 0628     Phosphorus 6.3 (C) mg/dL     Wound Culture - Wound, Leg, Left [[id number]]  (Abnormal) Collected:  12/08/17 0418    Specimen:  Wound from Leg, Left Updated:  12/11/17 0636     Wound Culture --      Light growth (2+) Candida albicans (A)    Sodium, Urine, Random - Urine, Clean Catch [[id number]]  (Normal) Collected:  12/11/17 1308    Specimen:  Urine from Urine, Clean Catch Updated:  12/11/17 1322     Sodium, Urine 73 mmol/L     CBC (No Diff) [[id number]]  (Abnormal) Collected:  12/12/17 0611    Specimen:  Blood Updated:  12/12/17 0641     WBC 4.94 10*3/mm3      RBC 2.86 (L) 10*6/mm3      Hemoglobin 8.8 (L) g/dL      Hematocrit 27.1 (L) %      MCV 94.8 fL      MCH 30.8 pg      MCHC 32.5 g/dL      RDW 18.7 (H) %      RDW-SD 62.7 (H) fl      MPV 9.5 fL      Platelets 97 (L) 10*3/mm3     Protime-INR [[id number]]  (Abnormal) Collected:  12/12/17 0611    Specimen:  Blood Updated:  12/12/17 0652     Protime 24.5 (H) Seconds      INR 2.17 (H)    Renal Function Panel [[id number]]  (Abnormal) Collected:  12/12/17 0611    Specimen:  Blood Updated:  12/12/17 0712     Glucose 76 mg/dL      BUN 91 (C) mg/dL      Creatinine 4.30 (H) mg/dL      Sodium 129 (L) mmol/L      Potassium 5.8 (C) mmol/L      Chloride 102 mmol/L      CO2 18.0 (L) mmol/L      Calcium 8.2 (L) mg/dL      Albumin 2.00 (L) g/dL      Phosphorus 5.8 (H) mg/dL      Anion Gap 14.8 mmol/L      BUN/Creatinine Ratio 21.2     eGFR Non African Amer 10 (L) mL/min/1.73     Narrative:       The MDRD GFR formula is only valid for adults with stable renal function between ages 18 and 70.    Blood Culture With GABRIEL  - Blood, [[id number]]  (Normal) Collected:  12/08/17 0002    Specimen:  Blood from Arm, Left Updated:  12/13/17 0008     Blood Culture No growth at 5 days    CBC (No Diff) [[id number]]  (Abnormal) Collected:  12/13/17 0556    Specimen:  Blood Updated:  12/13/17 0619     WBC 5.39 10*3/mm3      RBC 2.84 (L) 10*6/mm3      Hemoglobin 8.5 (L) g/dL      Hematocrit 26.8 (L) %      MCV 94.4 fL      MCH 29.9 pg      MCHC 31.7 g/dL      RDW 18.9 (H) %      RDW-SD 63.6 (H) fl      MPV 9.7 fL      Platelets 113 (L) 10*3/mm3     Protime-INR [[id number]]  (Abnormal) Collected:  12/13/17 0556    Specimen:  Blood Updated:  12/13/17 0627     Protime 19.9 (H) Seconds      INR 1.76 (H)    Renal Function Panel [[id number]]  (Abnormal) Collected:  12/13/17 0556    Specimen:  Blood Updated:  12/13/17 0639     Glucose 92 mg/dL      BUN 95 (C) mg/dL      Creatinine 4.10 (H) mg/dL      Sodium 127 (C) mmol/L      Potassium 5.8 (C) mmol/L      Chloride 101 mmol/L      CO2 18.0 (L) mmol/L      Calcium 7.9 (L) mg/dL      Albumin 2.00 (L) g/dL      Phosphorus 5.5 (H) mg/dL      Anion Gap 13.8 mmol/L      BUN/Creatinine Ratio 23.2     eGFR Non African Amer 10 (L) mL/min/1.73     Narrative:       The MDRD GFR formula is only valid for adults with stable renal function between ages 18 and 70.    Renal Function Panel [[id number]]  (Abnormal) Collected:  12/13/17 1424    Specimen:  Blood Updated:  12/13/17 1522     Glucose 96 mg/dL      BUN 95 (C) mg/dL      Creatinine 4.00 (H) mg/dL      Sodium 128 (L) mmol/L      Potassium 5.6 (C) mmol/L      Chloride 101 mmol/L      CO2 15.0 (L) mmol/L      Calcium 7.8 (L) mg/dL      Albumin 2.10 (L) g/dL      Phosphorus 5.4 (H) mg/dL      Anion Gap 17.6 mmol/L      BUN/Creatinine Ratio 23.8 (H)     eGFR Non  Amer 11 (L) mL/min/1.73     Narrative:       The MDRD GFR formula is only valid for adults with stable renal function between ages 18 and 70.    CBC (No Diff) [[id number]]  (Abnormal) Collected:  12/14/17  0649    Specimen:  Blood Updated:  12/14/17 0739     WBC 6.41 10*3/mm3      RBC 2.76 (L) 10*6/mm3      Hemoglobin 8.3 (L) g/dL      Hematocrit 25.4 (L) %      MCV 92.0 fL      MCH 30.1 pg      MCHC 32.7 g/dL      RDW 18.9 (H) %      RDW-SD 61.7 (H) fl      MPV 9.8 fL      Platelets 131 10*3/mm3     Protime-INR [[id number]]  (Abnormal) Collected:  12/14/17 0649    Specimen:  Blood Updated:  12/14/17 0754     Protime 16.4 (H) Seconds      INR 1.46 (H)    Renal Function Panel [[id number]]  (Abnormal) Collected:  12/14/17 0649    Specimen:  Blood Updated:  12/14/17 0808     Glucose 79 mg/dL      BUN 97 (C) mg/dL      Creatinine 3.80 (H) mg/dL      Sodium 128 (L) mmol/L      Potassium 5.1 mmol/L      Chloride 101 mmol/L      CO2 16.0 (L) mmol/L      Calcium 7.5 (L) mg/dL      Albumin 2.00 (L) g/dL      Phosphorus 5.6 (H) mg/dL      Anion Gap 16.1 mmol/L      BUN/Creatinine Ratio 25.5 (H)     eGFR Non  Amer 11 (L) mL/min/1.73     Narrative:       The MDRD GFR formula is only valid for adults with stable renal function between ages 18 and 70.    Hepatitis B Surface Antibody [[id number]] Collected:  12/14/17 0649    Specimen:  Blood Updated:  12/15/17 0717     Hep B S Ab Non Reactive                    Non Reactive: Inconsistent with immunity,                              less than 10 mIU/mL                Reactive:     Consistent with immunity,                              greater than 9.9 mIU/mL       Narrative:       Performed at:  [address] [place] [address]  [id number]  : Juanjo Hernandez PhD, Phone:  [phone]    Protime-INR [[id number]]  (Abnormal) Collected:  12/15/17 0658    Specimen:  Blood Updated:  12/15/17 0723     Protime 16.0 (H) Seconds      INR 1.42 (H)    CBC (No Diff) [[id number]]  (Abnormal) Collected:  12/15/17 0658    Specimen:  Blood Updated:  12/15/17 0728     WBC 7.76 10*3/mm3      RBC 2.98 (L) 10*6/mm3      Hemoglobin 9.1 (L) g/dL      Hematocrit 28.0 (L) %       MCV 94.0 fL      MCH 30.5 pg      MCHC 32.5 g/dL      RDW 19.1 (H) %      RDW-SD 64.1 (H) fl      MPV 9.3 fL      Platelets 116 (L) 10*3/mm3     Renal Function Panel [[id number]]  (Abnormal) Collected:  12/15/17 0658    Specimen:  Blood Updated:  12/15/17 0741     Glucose 124 (H) mg/dL      BUN 96 (C) mg/dL      Creatinine 3.50 (H) mg/dL      Sodium 130 (L) mmol/L      Potassium 4.2 mmol/L      Chloride 101 mmol/L      CO2 17.0 (L) mmol/L      Calcium 7.5 (L) mg/dL      Albumin 2.00 (L) g/dL      Phosphorus 5.8 (H) mg/dL      Anion Gap 16.2 mmol/L      BUN/Creatinine Ratio 27.4 (H)     eGFR Non  Amer 13 (L) mL/min/1.73     Narrative:       The MDRD GFR formula is only valid for adults with stable renal function between ages 18 and 70.    Troponin [[id number]]  (Abnormal) Collected:  12/15/17 0658    Specimen:  Blood Updated:  12/15/17 0921     Troponin I 0.109 (H) ng/mL     Narrative:       Normal Patient Upper Reference Limit (URL) (99th Percentile)=0.03 ng/mL   Non-AMI Illness Reference Limit=0.03-0.11 ng/mL   AMI Confirmation=0.12 ng/mL and above    CBC & Differential [[id number]] Collected:  12/16/17 0541    Specimen:  Blood Updated:  12/16/17 0614    Narrative:       The following orders were created for panel order CBC & Differential.  Procedure                               Abnormality         Status                     ---------                               -----------         ------                     CBC Auto Differential[[id number]]        Abnormal            Final result                 Please view results for these tests on the individual orders.    CBC Auto Differential [[id number]]  (Abnormal) Collected:  12/16/17 0541    Specimen:  Blood Updated:  12/16/17 0614     WBC 6.16 10*3/mm3      RBC 2.79 (L) 10*6/mm3      Hemoglobin 8.5 (L) g/dL      Hematocrit 26.5 (L) %      MCV 95.0 fL      MCH 30.5 pg      MCHC 32.1 g/dL      RDW 19.3 (H) %      RDW-SD 64.9 (H) fl      MPV 10.0 fL      Platelets 124  (L) 10*3/mm3      Neutrophil % 70.9 %      Lymphocyte % 16.4 %      Monocyte % 10.1 %      Eosinophil % 1.8 %      Basophil % 0.3 %      Immature Grans % 0.5 %      Neutrophils, Absolute 4.37 10*3/mm3      Lymphocytes, Absolute 1.01 10*3/mm3      Monocytes, Absolute 0.62 10*3/mm3      Eosinophils, Absolute 0.11 10*3/mm3      Basophils, Absolute 0.02 10*3/mm3      Immature Grans, Absolute 0.03 10*3/mm3      nRBC 0.0 /100 WBC     Protime-INR [[id number]]  (Abnormal) Collected:  12/16/17 0541    Specimen:  Blood Updated:  12/16/17 0621     Protime 16.1 (H) Seconds      INR 1.43 (H)    Phosphorus [[id number]]  (Abnormal) Collected:  12/16/17 0541    Specimen:  Blood Updated:  12/16/17 0625     Phosphorus 5.8 (H) mg/dL     Comprehensive Metabolic Panel [[id number]]  (Abnormal) Collected:  12/16/17 0541    Specimen:  Blood Updated:  12/16/17 0632     Glucose 87 mg/dL      BUN 93 (C) mg/dL      Creatinine 3.20 (H) mg/dL      Sodium 131 (L) mmol/L      Potassium 4.0 mmol/L      Chloride 102 mmol/L      CO2 18.0 (L) mmol/L      Calcium 7.4 (L) mg/dL      Total Protein 5.1 (L) g/dL      Albumin 1.90 (L) g/dL      ALT (SGPT) 30 U/L      AST (SGOT) 36 U/L      Alkaline Phosphatase 165 (H) U/L      Total Bilirubin 0.6 mg/dL      eGFR Non African Amer 14 (L) mL/min/1.73      Globulin 3.2 gm/dL      A/G Ratio 0.6 (L) g/dL      BUN/Creatinine Ratio 29.1 (H)     Anion Gap 15.0 mmol/L     Narrative:       The MDRD GFR formula is only valid for adults with stable renal function between ages 18 and 70.    Protein, Urine, 24 Hour - Urine, Clean Catch [[id number]] Collected:  12/16/17 1250    Specimen:  24 Hour Urine from Urine, Clean Catch Updated:  12/16/17 1326     Protein, 24H Urine 105.6 mg/24hours      Total Protein, Urine 8.0 mg/dL      24H Urine Volume 1320 mL      Time (Hours) 24 hrs     CBC (No Diff) [[id number]]  (Abnormal) Collected:  12/17/17 0413    Specimen:  Blood Updated:  12/17/17 0516     WBC 6.80 10*3/mm3      RBC 2.77 (L)  10*6/mm3      Hemoglobin 8.4 (L) g/dL      Hematocrit 26.1 (L) %      MCV 94.2 fL      MCH 30.3 pg      MCHC 32.2 g/dL      RDW 19.2 (H) %      RDW-SD 65.1 (H) fl      MPV 9.8 fL      Platelets 133 10*3/mm3     Protime-INR [[id number]]  (Abnormal) Collected:  12/17/17 0413    Specimen:  Blood Updated:  12/17/17 0528     Protime 15.6 (H) Seconds      INR 1.39 (H)    Renal Function Panel [[id number]]  (Abnormal) Collected:  12/17/17 0413    Specimen:  Blood Updated:  12/17/17 0614     Glucose 88 mg/dL      BUN 99 (C) mg/dL      Creatinine 3.00 (H) mg/dL      Sodium 130 (L) mmol/L      Potassium 3.7 mmol/L      Chloride 101 mmol/L      CO2 18.0 (L) mmol/L      Calcium 7.7 (L) mg/dL      Albumin 1.80 (L) g/dL      Phosphorus 5.8 (H) mg/dL      Anion Gap 14.7 mmol/L      BUN/Creatinine Ratio 33.0 (H)     eGFR Non  Amer 15 (L) mL/min/1.73     Narrative:       The MDRD GFR formula is only valid for adults with stable renal function between ages 18 and 70.    CBC (No Diff) [[id number]]  (Abnormal) Collected:  12/18/17 0434    Specimen:  Blood Updated:  12/18/17 0505     WBC 5.71 10*3/mm3      RBC 2.87 (L) 10*6/mm3      Hemoglobin 8.9 (L) g/dL      Hematocrit 27.0 (L) %      MCV 94.1 fL      MCH 31.0 pg      MCHC 33.0 g/dL      RDW 19.4 (H) %      RDW-SD 64.8 (H) fl      MPV 9.6 fL      Platelets 120 (L) 10*3/mm3     Protime-INR [[id number]]  (Abnormal) Collected:  12/18/17 0434    Specimen:  Blood Updated:  12/18/17 0521     Protime 17.5 (H) Seconds      INR 1.55 (H)    Renal Function Panel [[id number]]  (Abnormal) Collected:  12/18/17 0434    Specimen:  Blood Updated:  12/18/17 0604     Glucose 78 mg/dL       (C) mg/dL      Creatinine 2.90 (H) mg/dL      Sodium 131 (L) mmol/L      Potassium 3.8 mmol/L      Chloride 103 mmol/L      CO2 18.0 (L) mmol/L      Calcium 7.8 (L) mg/dL      Albumin 1.80 (L) g/dL      Phosphorus 5.6 (H) mg/dL      Anion Gap 13.8 mmol/L      BUN/Creatinine Ratio 34.5 (H)     eGFR Non   Amer 16 (L) mL/min/1.73     Narrative:       The MDRD GFR formula is only valid for adults with stable renal function between ages 18 and 70.    Protime-INR [[id number]]  (Abnormal) Collected:  12/19/17 0557    Specimen:  Blood Updated:  12/19/17 0636     Protime 24.0 (H) Seconds      INR 2.12 (H)    Renal Function Panel [[id number]]  (Abnormal) Collected:  12/19/17 0557    Specimen:  Blood Updated:  12/19/17 0657     Glucose 70 (L) mg/dL       (C) mg/dL      Creatinine 2.90 (H) mg/dL      Sodium 130 (L) mmol/L      Potassium 4.0 mmol/L      Chloride 102 mmol/L      CO2 16.0 (L) mmol/L      Calcium 8.0 (L) mg/dL      Albumin 1.90 (L) g/dL      Phosphorus 5.7 (H) mg/dL      Anion Gap 16.0 mmol/L      BUN/Creatinine Ratio 35.5 (H)     eGFR Non  Amer 16 (L) mL/min/1.73     Narrative:       The MDRD GFR formula is only valid for adults with stable renal function between ages 18 and 70.    CBC (No Diff) [[id number]]  (Abnormal) Collected:  12/19/17 0557    Specimen:  Blood Updated:  12/19/17 0710     WBC 9.57 10*3/mm3      RBC 3.05 (L) 10*6/mm3      Hemoglobin 9.4 (L) g/dL      Hematocrit 28.7 (L) %      MCV 94.1 fL      MCH 30.8 pg      MCHC 32.8 g/dL      RDW 19.3 (H) %      RDW-SD 64.8 (H) fl      MPV 9.6 fL      Platelets 150 10*3/mm3     Creatinine Clearance - Urine, Clean Catch [[id number]] Collected:  12/19/17 1107    Specimen:  24 Hour Urine from Urine, Clean Catch Updated:  12/19/17 1145    Narrative:       The following orders were created for panel order Creatinine Clearance - Urine, Clean Catch.  Procedure                               Abnormality         Status                     ---------                               -----------         ------                     Creatinine Clearance, Ur...[[id number]]  Abnormal            Final result                 Please view results for these tests on the individual orders.    Creatinine Clearance, Urine, 24 Hour - Urine, Clean Catch [[id number]]  (Abnormal)  Collected:  12/19/17 1107    Specimen:  24 Hour Urine from Urine, Catheter Updated:  12/19/17 1145     Creatinine Clearance 6.1 (L) ml/min      Creatinine, Urine 105.4 mg/dL      Time (Hours) 24 hrs      Creatinine, 24H 0.32 (L) g/24 hr      CREATININE CLEARANCE L/24 HOUR 8.8 (L) L/24 hr     Ammonia [[id number]]  (Abnormal) Collected:  12/19/17 1346    Specimen:  Blood Updated:  12/19/17 1421     Ammonia 69 (H) umol/L     Creatine, 24 Hr Ur - Urine, Clean Catch [[id number]] Collected:  12/16/17 1249    Specimen:  24 Hour Urine from Urine, Clean Catch Updated:  12/19/17 1618     Creatine, U, mg/dL 2.5 mg/dL      Creatine, 24H Ur 31 mg/24 hr       This test was developed and its performance characteristics  determined by [facility]. It has not been cleared or approved  by the Food and Drug Administration.       Narrative:       Performed at:  [address] [place] [address]  [id number]  : Cole Simmons MD, Phone:  [phone]    Protime-INR [[id number]]  (Abnormal) Collected:  12/20/17 0544    Specimen:  Blood Updated:  12/20/17 0634     Protime 28.5 (H) Seconds      INR 2.52 (H)    CBC (No Diff) [[id number]]  (Abnormal) Collected:  12/20/17 0544    Specimen:  Blood Updated:  12/20/17 0636     WBC 11.34 (H) 10*3/mm3      RBC 2.94 (L) 10*6/mm3      Hemoglobin 9.2 (L) g/dL      Hematocrit 28.2 (L) %      MCV 95.9 fL      MCH 31.3 (H) pg      MCHC 32.6 g/dL      RDW 19.7 (H) %      RDW-SD 67.0 (H) fl      MPV 9.7 fL      Platelets 160 10*3/mm3     Renal Function Panel [[id number]]  (Abnormal) Collected:  12/20/17 0544    Specimen:  Blood Updated:  12/20/17 0647     Glucose 79 mg/dL       (C) mg/dL      Creatinine 3.30 (H) mg/dL      Sodium 131 (L) mmol/L      Potassium 4.3 mmol/L      Chloride 103 mmol/L      CO2 13.0 (L) mmol/L      Calcium 8.4 mg/dL      Albumin 2.00 (L) g/dL      Phosphorus 6.4 (C) mg/dL      Anion Gap 19.3 mmol/L      BUN/Creatinine Ratio 34.2 (H)     eGFR Non   Amer 13 (L) mL/min/1.73     Narrative:       The MDRD GFR formula is only valid for adults with stable renal function between ages 18 and 70.    Ammonia [[id number]]  (Normal) Collected:  12/20/17 0921    Specimen:  Blood Updated:  12/20/17 0950     Ammonia 20 umol/L     CBC (No Diff) [[id number]]  (Abnormal) Collected:  12/21/17 0600    Specimen:  Blood Updated:  12/21/17 0622     WBC 11.14 (H) 10*3/mm3      RBC 2.85 (L) 10*6/mm3      Hemoglobin 8.7 (L) g/dL      Hematocrit 27.6 (L) %      MCV 96.8 fL      MCH 30.5 pg      MCHC 31.5 g/dL      RDW 19.8 (H) %      RDW-SD 68.4 (H) fl      MPV 9.6 fL      Platelets 152 10*3/mm3     Protime-INR [[id number]]  (Abnormal) Collected:  12/21/17 0600    Specimen:  Blood Updated:  12/21/17 0641     Protime 40.6 (H) Seconds      INR 3.57 (H)    Renal Function Panel [[id number]]  (Abnormal) Collected:  12/21/17 0559    Specimen:  Blood Updated:  12/21/17 0701     Glucose 78 mg/dL       (C) mg/dL      Creatinine 3.80 (H) mg/dL      Sodium 130 (L) mmol/L      Potassium 4.9 mmol/L      Chloride 101 mmol/L      CO2 17.0 (L) mmol/L      Calcium 8.5 mg/dL      Albumin 1.90 (L) g/dL      Phosphorus 6.7 (C) mg/dL      Anion Gap 16.9 mmol/L      BUN/Creatinine Ratio 31.1 (H)     eGFR Non  Amer 11 (L) mL/min/1.73     Narrative:       The MDRD GFR formula is only valid for adults with stable renal function between ages 18 and 70.          Pertinent Radiology Results:    Imaging Results (all)     Procedure Component Value Units Date/Time    CT Abdomen Pelvis Without Contrast [[id number]] Collected:  12/08/17 0718     Updated:  12/08/17 0728    Narrative:       PROCEDURE: CT ABDOMEN PELVIS WO CONTRAST-     HISTORY: N/V, MODESTA, weakness     COMPARISON: None .     PROCEDURE: Axial images were obtained from the lung bases through the  pubic symphysis without intravenous contrast.    .      FINDINGS:      ABDOMEN: Lack of intravenous contrast limits evaluation of the  solid  organs, the mediastinum, and the vasculature. Mild atelectasis in the  bibasilar regions.The limited noncontrast images of the liver  demonstrate a nodular border suggestive of cirrhosis. There are  subcentimeter nodular areas that are not well assessed without contrast.  Esophageal and splenic varices are noted hiatal hernia is noted.  Additionally there is a ventral hernia containing nondistended loops of  transverse colon. The gallbladder is absent . The spleen is normal. 1.7  cm right adrenal adenoma.  The pancreas is atrophic. Nonobstructing  stone present within the right collecting system. There is no  hydronephrosis. The aorta is normal in caliber. There is no significant  free fluid or adenopathy.  No abnormally dilated loops of bowel are  appreciated. Postoperative changes are seen to small bowel. There is  retained stool throughout the colon.  Nonspecific body wall edema is  noted.     PELVIS: The appendix is not identified. The urinary bladder is  unremarkable. There is no significant fluid or adenopathy. There are  degenerative changes of the spine.           Impression:       1. No evidence of an obstructive uropathy.  2. Significant interval improvement of ascites and pleural effusions  compared to prior.   3. Ventral wall defect containing nondistended loop of transverse colon.  4. Cirrhotic appearance to the liver with subcentimeter nodular  densities, dedicated follow-up imaging of the liver is recommended..  5. 1.7 cm right adrenal adenoma.                   .          This study was performed with techniques to keep radiation doses as low  as reasonably achievable (ALARA). Individualized dose reduction  techniques using automated exposure control or adjustment of mA and/or  kV according to the patient size were employed.      This report was finalized on 12/8/2017 7:26 AM by Shabbir Sher DO.    XR Chest 1 View [[id number]] Collected:  12/08/17 0726     Updated:  12/08/17 0730    Narrative:        PROCEDURE: XR CHEST 1 VW-     HISTORY: Weak/Dizzy/AMS triage protocol     COMPARISON: November 27, 2017.     FINDINGS: Electrodes overlie the chest. The heart is normal in size. The  mediastinum is unremarkable. There are chronic interstitial changes.  There is no pneumothorax.  There are no acute osseous abnormalities.           Impression:       No acute cardiopulmonary process.     Continued followup is recommended.     This report was finalized on 12/8/2017 7:28 AM by Shabbir Sher DO.          Condition on Discharge:  Stable        Code status during the hospital stay:        Discharge Disposition:        Discharge Medication:     Raul Hodan   Home Medication Instructions AG:[id number]    Printed on:12/21/17 9080   Medication Information                      atorvastatin (LIPITOR) 10 MG tablet  Take 1 tablet by mouth Every Night.             bumetanide (BUMEX) 1 MG tablet  Take 1 tablet by mouth 2 (Two) Times a Day.             cyanocobalamin 1000 MCG/ML injection  Inject 1 mL into the shoulder, thigh, or buttocks Every 28 (Twenty-Eight) Days.             digoxin (LANOXIN) 125 MCG tablet  Take 1 tablet by mouth Every Other Day.             epoetin parveen (EPOGEN,PROCRIT) 28158 UNIT/ML injection  Inject 1 mL under the skin Every 14 (Fourteen) Days. Indications: Chronic Hemolytic Anemia             HYDROcodone-acetaminophen (NORCO)  MG per tablet  Take 1 tablet by mouth Every 6 (Six) Hours As Needed for Moderate Pain .             ipratropium-albuterol (DUO-NEB) 0.5-2.5 mg/mL nebulizer  Take 3 mL by nebulization Every 6 (Six) Hours.             lactulose (CHRONULAC) 10 GM/15ML solution  Take 15 mL by mouth 3 (Three) Times a Day.             levoFLOXacin (LEVAQUIN) 750 MG tablet  Take 1 tablet by mouth Daily.             Nebulizers (COMPRESSOR/NEBULIZER) misc  For use with nebulizer solution, Q6H prn soa             ondansetron (ZOFRAN) 4 MG tablet  Take 4 mg by mouth Every 8 (Eight) Hours As Needed  for Nausea or Vomiting.             pantoprazole (PROTONIX) 40 MG EC tablet  Take 40 mg by mouth Daily.             propranolol (INDERAL) 40 MG tablet  Take 1 tablet by mouth Every 8 (Eight) Hours.             rifaximin (XIFAXAN) 550 MG tablet  Take 1 tablet by mouth Every 12 (Twelve) Hours.             spironolactone (ALDACTONE) 100 MG tablet  Take 1 tablet by mouth 2 (Two) Times a Day.             warfarin (COUMADIN) 3 MG tablet  Take 1 tablet by mouth once daily                 Discharge Diet:         Activity at Discharge:         Follow-up Appointments:    No future appointments.      Test Results Pending at Discharge:           Betite Casarez DO  12/21/17  4:14 PM      Medication risks and benefits were discussed in great detail. The patient reported being satisfied with the current treatment plan and the care delivered while hospitalized. JOSE DANIEL reviewed.    Time:  I spent 21 minutes preparing discharge counseling and teaching.

## 2017-12-21 NOTE — SIGNIFICANT NOTE
12/21/17 0849   Rehab Treatment   Discipline physical therapist   Treatment Not Performed other (see comments)  (Pt and family have decided to pursue comfort measures/hospice care so she is being D/C'ed from skilled PT services.)

## 2017-12-21 NOTE — PLAN OF CARE
Problem: Skin Integrity Impairment, Risk/Actual (Adult)  Goal: Identify Related Risk Factors and Signs and Symptoms  Outcome: Outcome(s) achieved Date Met: 12/21/17

## 2017-12-21 NOTE — PLAN OF CARE
Problem: Patient Care Overview (Adult)  Goal: Plan of Care Review  Outcome: Ongoing (interventions implemented as appropriate)   12/21/17 0055   Outcome Evaluation   Outcome Summary/Follow up Plan Patient is Patient is less interactive with me and family. Patient has been unable to take her PO medications. She has rested comfortably and had a bowel movement. Plan for patient to go home with hospice soon. Daughter is at bedside.    Coping/Psychosocial Response Interventions   Plan Of Care Reviewed With daughter   Patient Care Overview   Progress declining     Goal: Adult Individualization and Mutuality  Outcome: Ongoing (interventions implemented as appropriate)    Goal: Discharge Needs Assessment  Outcome: Ongoing (interventions implemented as appropriate)      Problem: Pain, Acute (Adult)  Goal: Acceptable Pain Control/Comfort Level  Outcome: Ongoing (interventions implemented as appropriate)      Problem: Renal Failure/Kidney Injury, Acute (Adult)  Goal: Signs and Symptoms of Listed Potential Problems Will be Absent or Manageable (Renal Failure/Kidney Injury, Acute)  Outcome: Ongoing (interventions implemented as appropriate)      Problem: Pressure Ulcer (Adult)  Goal: Signs and Symptoms of Listed Potential Problems Will be Absent or Manageable (Pressure Ulcer)  Outcome: Ongoing (interventions implemented as appropriate)

## 2017-12-21 NOTE — THERAPY DISCHARGE NOTE
Acute Care - Occupational Therapy Discharge Summary   [place]     Patient Name: Hodan Carter  : 1940  MRN: [id number]    Today's Date: 2017  Onset of Illness/Injury or Date of Surgery Date: 17    Date of Referral to OT: 17  Referring Physician: Dr. Morales      Admit Date: 2017        OT Recommendation and Plan    Visit Dx:    ICD-10-CM ICD-9-CM   1. Dehydration E86.0 276.51   2. MODESTA (acute kidney injury) N17.9 584.9   3. Weakness generalized R53.1 780.79   4. Hyponatremia E87.1 276.1   5. Impaired mobility and ADLs Z74.09 799.89   6. Impaired functional mobility, balance, gait, and endurance Z74.09 V49.89   7. ESRF (end stage renal failure) N18.6 585.6                     OT Goals       17 1616 17 1454 17 1408    Bed Mobility OT LTG    Bed Mobility OT LTG, Date Goal Reviewed  17  -SD 17  -    Bed Mobility OT LTG, Outcome  goal met  -SD     Transfer Training OT LTG    Transfer Training OT LTG, Date Goal Reviewed  17  -SD 17  -    Transfer Training OT LTG, Outcome  goal met  -SD     Strength OT LTG    Strength Goal OT LTG, Date Goal Reviewed  17  -SD 17  -    Strength Goal OT LTG, Outcome  goal met  -SD goal ongoing  -HE    LB Dressing OT LTG    LB Dressing Goal OT LTG, Date Goal Reviewed 17  - 17  -SD 17  -    LB Dressing Goal OT LTG, Outcome goal ongoing  - goal ongoing  -SD goal ongoing  -HE    Functional Mobility OT LTG    Functional Mobility Goal OT LTG, Date Goal Reviewed 17  - 17  -SD 17  -    Functional Mobility Goal OT LTG, Outcome goal ongoing  - goal ongoing  -SD goal ongoing  -HE      17 1315          Bed Mobility OT LTG    Bed Mobility OT LTG, Date Established 17  -      Bed Mobility OT LTG, Activity Type supine to sit/sit to supine  -AH      Bed Mobility OT LTG, [place] Level contact guard assist  -AH      Bed Mobility OT LTG, Assist Device bed  rails  -      Bed Mobility OT LTG, Outcome goal ongoing  -      Transfer Training OT LTG    Transfer Training OT LTG, Date Established 12/08/17  -      Transfer Training OT LTG, Time to Achieve by discharge  -      Transfer Training OT LTG, Activity Type sit to stand/stand to sit  -      Transfer Training OT LTG, [place] Level contact guard assist  -      Transfer Training OT LTG, Assist Device walker, rolling  -      Transfer Training OT LTG, Outcome goal ongoing  -      Strength OT LTG    Strength Goal OT LTG, Date Established 12/08/17  -      Strength Goal OT LTG, Time to Achieve by discharge  -      Strength Goal OT LTG, Functional Goal Pt will perform 15 reps BUE strengthening ex using theraband for resistance  -      Strength Goal OT LTG, Outcome goal ongoing  -      LB Dressing OT LTG    LB Dressing Goal OT LTG, Date Established 12/08/17  -      LB Dressing Goal OT LTG, Time to Achieve by discharge  -      LB Dressing Goal OT LTG, [place] Level minimum assist (75% patient effort)  -      LB Dressing Goal OT LTG, Adaptive Equipment reacher;sock-aid  -      LB Dressing Goal OT LTG, Outcome goal ongoing  -      Functional Mobility OT LTG    Functional Mobility Goal OT LTG, Date Established 12/08/17  -      Functional Mobility Goal OT LTG, Time to Achieve by discharge  -      Functional Mobility Goal OT LTG, [place] Level contact guard  -      Functional Mobility Goal OT LTG, Assist Device rolling walker  -      Functional Mobility Goal OT LTG, Distance to Achieve to the bathroom  -      Functional Mobility Goal OT LTG, Outcome goal ongoing  -        User Key  (r) = Recorded By, (t) = Taken By, (c) = Cosigned By    Initials Name Provider Type    EUGENIE Sepulveda Occupational Therapist    JENELLE Galicia Occupational Therapist    JOSE Logan, OT Occupational Therapist                  OT Discharge Summary  Anticipated Discharge Disposition: home  with home health  Reason for Discharge: Discharge from facility  Outcomes Achieved: Patient able to partially acheive established goals  Discharge Destination: other (comment) (Pt being d/c home with hospice.)      Rosalia Sepulveda  12/21/2017

## 2017-12-21 NOTE — SIGNIFICANT NOTE
12/21/17 0851   Rehab Treatment   Discipline occupational therapist   Treatment Not Performed unable to treat, medical status change  (Per chart review, pt is being d/c home with hospice.  OT will be d/c at this time as pt is comfort measures only.)

## 2017-12-21 NOTE — PHARMACY RECOMMENDATION
"Pharmacy Consult  -  Warfarin    Hodan Carter is a  77 y.o. female  Height - 170.2 cm (67\")  Weight - 104 kg (229 lb 3.2 oz)    Indication: - atrial fibrillation   Goal INR: -  2-3  Home Regimen:   - 3 mg daily        Drug-Drug Interactions with current regimen:  Cirrhosis: The response to warfarin may be markedly enhanced  Allopurinol may increase the INR  Lactulose may increase the INR  Rifaximin may decrease the INR      Warfarin Dosing During Admission:    Date  12/16 12/17 12/18 12/19 12/20 12/21         INR  1.43 1.39 1.55 2.12 2.52  3.57         Dose  3 mg 3 mg 3 mg 3 mg 3 mg  HOLD             Labs:    Results from last 7 days  Lab Units 12/21/17  0600 12/20/17  0544 12/19/17  0557 12/18/17  0434 12/17/17  0413 12/16/17  0541 12/15/17  0658   INR  3.57* 2.52* 2.12* 1.55* 1.39* 1.43* 1.42*   HEMOGLOBIN g/dL 8.7* 9.2* 9.4* 8.9* 8.4* 8.5* 9.1*   HEMATOCRIT % 27.6* 28.2* 28.7* 27.0* 26.1* 26.5* 28.0*   PLATELETS 10*3/mm3 152 160 150 120* 133 124* 116*         Assessment/Plan:   INR of 3.57 is supratherapeutic and an increase of >1 from yesterday. Recommend to hold warfarin today and resume with a lower dose once INR begins trending down. Pharmacy will continue to follow PT/INR results.    Thank you  Marvin Reyes, Pharmacy Intern  12/21/2017  9:59 AM     Ninfa Hong, CarloD, BCPS  12/21/17 10:28 AM        "

## 2017-12-22 PROBLEM — N18.6 ESRF (END STAGE RENAL FAILURE) (HCC): Status: ACTIVE | Noted: 2017-01-01

## 2017-12-22 NOTE — PROGRESS NOTES
Planned DC home with hospice today.  Dr Casarez reported plan to DC to home today with hospice.   Visited with pt.  Pt sleeping.  No non-verbal indication of pain.  Documentation reveals poor appetite.  Daughter at bedside.  Reviewed plan of care for today.  Daughter reported hospice has delivered the gel mattress and will change the hospital bed next week.   Denied other equipment needs.  Hospice RN obtained Roxanol Rx from Dr Hewitt yesterday so could be filled and brought to home today.   Discussed with daughter, Melisa, pt will transport per EMS to home after discharge summary completed.   She requested to plan for 2pm so her sisters would be here and she would go to the home to open the door.   Informed pt's primary nurse, Gopi FARIAS, of plan.  HCP called to request on call nurse call me.  [facility] called.  Updated her on plan and anticipated time of DC.   DC summary faxed to HCP.    Discharged to home with hospice care

## 2017-12-22 NOTE — DISCHARGE SUMMARY
[facility]   DISCHARGE SUMMARY      Name:  Hodan Carter   Age:  77 y.o.  Sex:  female  :  1940  MRN:  [id number]   Visit Number:  [id number]  Primary Care Physician:  Pieter Farias DO  Date of Discharge:  2017  Admission Date:  2017    Discharge Diagnosis:   Principal Problem:    ESRF (end stage renal failure)  Active Problems:    Permanent atrial fibrillation    Cirrhosis of liver with ascites    Chronic kidney disease, stage IV (severe)    Lymphedema of both lower extremities chronic     Dehydration, improved    Acute renal failure, improved     Hyperkalemia    Atrial fibrillation with RVR, resolved to paroxymal rate controlled afib   Poor prognosis  Malnutrition with failure to thrive  Acute encephalopathy, multifactoral with ESRD and liver disease and hyperammonemia    ADDENDUM: Patient stayed overnight awaiting hospice care today.   No acute events occurred overnight. The patient was seen today and her family remains at bedside. She  denies chest pain, shortness of breath, abdominal pain. She awakens long enough to answer yes and no questions then quickly falls back to sleep. She had wanted to get up last night, when she needed a bowel movement, but was physically unable to move much other than to be turned. Currently she is resting peacefully in bed. I have reviewed palliative note again and family at bedside ready to take her home with hospice care.    History of Present Illness/Hospital Course:    The patient is a 77 yr old debilitated lady with cirrhosis, portal hypertension and ascies,chronic kidney disease, chronic hypotension, chronic afib on coumadin, who presented to the ER with weakness, low blood pressure. She has progressively declined over the past few months. She has history of pneumonia, hepatic encephalopathy and has been followed by palliative care. The patient was seen in ER with blood pressure 90s systolic. Creatinine has continued to  worsen. She was getting to weak at home.  INR was supratherapeutic.CT scan abdomen showed no acute findings. She was bolused with one liter normal saline and admitted to the hospitalist service.     The patient has been seen by nephrology in consultation. She has continued to decline, with reduced oral intake and is barely eating or drinking. She is in a malnourished state and so weak that now at times she sleeps most of the day and is having difficulty taking her medications. With her end stage renal disease, with cirrhosis and continued malnutrition, family meeting occurred. Nephrology service did not feel she would be a candidate for hemodialysis due to cirrhosis and malnutrition. The patient and family desired palliative care options only and were not interested in pursuing more aggressive measures in her current state. She has reduced communication despite ammonia level improvement, multifactoral with ESRD. She has intermittent pain in her legs with persistent severe leg lymphedema which may be controlled with morphine as needed for pain.  She has difficulty complaining of pain, though has intermittent grimace and moans.     The patient and family wished to continue DNR and comfort care measures. Hospice consultation was made at family request. The patient will be discharged home today  with Hospice. Pain medication per hospice. She will be continued on renal medications if tolerated. She is continued on chronic warfarin as prior to admission as well. She is recommended to have weekly INR and coumadin titrated but it patient cannot tolerate the oral medication further then hospice may discontinue oral medications and INR.     Follow up hospice provider next week.        Consults:     Consults     Date and Time Order Name Status Description    12/18/2017 1533 Inpatient Consult to Palliative Care MD Completed     12/15/2017 9976 Inpatient Consult to Cardiology      12/14/2017 0972 Inpatient Consult to General  Surgery Completed     12/8/2017 0455 Inpatient Consult to Nephrology Completed     11/25/2017 1328 Inpatient Consult to Nephrology Completed           Procedures Performed:    Procedure(s):  HEMODIALYSIS CATHETER INSERTION         Vital Signs:    Temp:  [97.4 °F (36.3 °C)-98.5 °F (36.9 °C)] 98.5 °F (36.9 °C)  Heart Rate:  [58-71] 58  Resp:  [16-20] 16  BP: ()/(39-62) 88/45    Physical Exam:      General Appearance:    Alert to voice, as possible she is  cooperative, in no acute distress. Sitting up in bed in no acute distress   Head:    Normocephalic, without obvious abnormality, atraumatic       Ears:    Ears appear intact with no abnormalities noted   Throat:   No oral lesions, no thrush, oral mucosa moist   Neck:   No adenopathy, supple, trachea midline, no thyromegaly       Lungs:     Clear to auscultation reduced breath sounds,respirations regular, even and unlabored    Heart:    Regular rhythm and normal rate, normal S1 and S2, no            murmur, no gallop, no rub, no click   Breast Exam:    Deferred   Abdomen:     Normal bowel sounds, no masses, no organomegaly, soft        non-tender, non-distended, no guarding, no rebound                 tenderness   Genitalia:    Deferred   Extremities:   Moves arms and legs very little, diffucult due to stable diffuse anasarca edema of hands, abdomen legs , no cyanosis, no redness   Pulses:   Pulses palpable and equal bilaterally   Skin:   No bleeding, bruising or rash. Bilateral leg ulcers stable dry without erythema or discharge   Lymph nodes:   No palpable adenopathy   Neurologic:   sensation intact, no tremor         Pertinent Lab Results:     Lab Results (all)     Procedure Component Value Units Date/Time    CBC & Differential [[id number]] Collected:  12/08/17 0006    Specimen:  Blood Updated:  12/08/17 0017    Narrative:       The following orders were created for panel order CBC & Differential.  Procedure                               Abnormality          Status                     ---------                               -----------         ------                     CBC Auto Differential[[id number]]        Abnormal            Final result                 Please view results for these tests on the individual orders.    CBC Auto Differential [[id number]]  (Abnormal) Collected:  12/08/17 0006    Specimen:  Blood Updated:  12/08/17 0017     WBC 7.88 10*3/mm3      RBC 3.72 (L) 10*6/mm3      Hemoglobin 11.1 (L) g/dL      Hematocrit 33.8 (L) %      MCV 90.9 fL      MCH 29.8 pg      MCHC 32.8 g/dL      RDW 17.6 (H) %      RDW-SD 57.4 (H) fl      MPV 9.7 fL      Platelets 141 10*3/mm3      Neutrophil % 71.9 %      Lymphocyte % 15.9 %      Monocyte % 11.0 %      Eosinophil % 0.3 %      Basophil % 0.3 %      Immature Grans % 0.6 %      Neutrophils, Absolute 5.67 10*3/mm3      Lymphocytes, Absolute 1.25 10*3/mm3      Monocytes, Absolute 0.87 10*3/mm3      Eosinophils, Absolute 0.02 10*3/mm3      Basophils, Absolute 0.02 10*3/mm3      Immature Grans, Absolute 0.05 10*3/mm3      nRBC 0.0 /100 WBC     Ammonia [[id number]]  (Abnormal) Collected:  12/08/17 0002    Specimen:  Blood Updated:  12/08/17 0034     Ammonia <9 (L) umol/L     Digoxin Level [[id number]]  (Normal) Collected:  12/08/17 0002    Specimen:  Blood Updated:  12/08/17 0034     Digoxin 1.30 ng/mL     Troponin [[id number]]  (Abnormal) Collected:  12/08/17 0002    Specimen:  Blood Updated:  12/08/17 0038     Troponin I 0.047 (H) ng/mL     Narrative:       Normal Patient Upper Reference Limit (URL) (99th Percentile)=0.03 ng/mL   Non-AMI Illness Reference Limit=0.03-0.11 ng/mL   AMI Confirmation=0.12 ng/mL and above    Lactic Acid, Plasma [[id number]]  (Abnormal) Collected:  12/08/17 0002    Specimen:  Blood Updated:  12/08/17 0042     Lactate 3.8 (C) mmol/L     Magnesium [[id number]]  (Abnormal) Collected:  12/08/17 0002    Specimen:  Blood Updated:  12/08/17 0057     Magnesium 2.4 (H) mg/dL     Comprehensive Metabolic Panel  [[id number]]  (Abnormal) Collected:  12/08/17 0002    Specimen:  Blood Updated:  12/08/17 0058     Glucose 102 (H) mg/dL      BUN 78 (C) mg/dL      Creatinine 4.60 (H) mg/dL      Sodium 125 (C) mmol/L      Potassium 5.5 (H) mmol/L      Chloride 98 mmol/L      CO2 13.0 (L) mmol/L      Calcium 9.2 mg/dL      Total Protein 6.9 g/dL      Albumin 2.60 (L) g/dL      ALT (SGPT) 40 U/L      AST (SGOT) 43 U/L      Alkaline Phosphatase 217 (H) U/L      Total Bilirubin 0.8 mg/dL      eGFR Non African Amer 9 (L) mL/min/1.73      Globulin 4.3 gm/dL      A/G Ratio 0.6 (L) g/dL      BUN/Creatinine Ratio 17.0     Anion Gap 19.5 mmol/L     Narrative:       The MDRD GFR formula is only valid for adults with stable renal function between ages 18 and 70.    Lavender Top [[id number]] Collected:  12/08/17 0006    Specimen:  Blood Updated:  12/08/17 0116     Extra Tube hold for add-on      Auto resulted       Gold Top - SST [[id number]] Collected:  12/08/17 0002    Specimen:  Blood Updated:  12/08/17 0116     Extra Tube Hold for add-ons.      Auto resulted.       Urinalysis With / Culture If Indicated - Urine, Clean Catch [[id number]]  (Abnormal) Collected:  12/08/17 0049    Specimen:  Urine from Urine, Clean Catch Updated:  12/08/17 0116     Color, UA Yellow     Appearance, UA Cloudy (A)     pH, UA <=5.0     Specific Gravity, UA 1.015     Glucose, UA Negative     Ketones, UA Negative     Bilirubin, UA Negative     Blood, UA Negative     Protein, UA Negative     Leuk Esterase, UA Trace (A)     Nitrite, UA Negative     Urobilinogen, UA 0.2 E.U./dL    Urinalysis, Microscopic Only - Urine, Clean Catch [[id number]] Collected:  12/08/17 0049    Specimen:  Urine from Urine, Clean Catch Updated:  12/08/17 0124     RBC, UA None Seen /HPF      WBC, UA None Seen /HPF      Bacteria, UA None Seen /HPF      Squamous Epithelial Cells, UA 0-2 /HPF      Yeast, UA Large/3+ Budding Yeast /HPF      Hyaline Casts, UA None Seen /LPF      Methodology Manual Light  Microscopy    Protime-INR [[id number]]  (Abnormal) Collected:  12/08/17 0106    Specimen:  Blood Updated:  12/08/17 0154     Protime 63.1 (H) Seconds      INR 5.45 (C)    POC Glucose Fingerstick [[id number]]  (Normal) Collected:  12/08/17 0207    Specimen:  Blood Updated:  12/08/17 0215     Glucose 87 mg/dL       Serial Number: [id number]  [id number]       [place] Draw [[id number]] Collected:  12/08/17 0002    Specimen:  Blood Updated:  12/08/17 0216    Narrative:       The following orders were created for panel order [place] Draw.  Procedure                               Abnormality         Status                     ---------                               -----------         ------                     Light Blue Top[[id number]]                                   Final result               Lavender Top[[id number]]                                     Final result               Gold Top - SST[[id number]]                                   Final result               Green Top (No Gel)[[id number]]                               Final result                 Please view results for these tests on the individual orders.    Light Blue Top [[id number]] Collected:  12/08/17 0106    Specimen:  Blood Updated:  12/08/17 0216     Extra Tube hold for add-on      Auto resulted       Green Top (No Gel) [[id number]] Collected:  12/08/17 0106    Specimen:  Blood Updated:  12/08/17 0216     Extra Tube Hold for add-ons.      Auto resulted.       Lactic Acid, Reflex Timer [[id number]] Collected:  12/08/17 0002    Specimen:  Blood Updated:  12/08/17 0346     Extra Tube Hold for add-ons.      Auto resulted.       CBC & Differential [[id number]] Collected:  12/08/17 0530    Specimen:  Blood Updated:  12/08/17 0611    Narrative:       The following orders were created for panel order CBC & Differential.  Procedure                               Abnormality         Status                     ---------                               -----------          ------                     CBC Auto Differential[[id number]]        Abnormal            Final result                 Please view results for these tests on the individual orders.    CBC Auto Differential [[id number]]  (Abnormal) Collected:  12/08/17 0530    Specimen:  Blood Updated:  12/08/17 0611     WBC 6.86 10*3/mm3      RBC 3.21 (L) 10*6/mm3      Hemoglobin 9.7 (L) g/dL      Hematocrit 29.0 (L) %      MCV 90.3 fL      MCH 30.2 pg      MCHC 33.4 g/dL      RDW 17.5 (H) %      RDW-SD 56.9 (H) fl      MPV 9.5 fL      Platelets 128 (L) 10*3/mm3      Neutrophil % 72.4 %      Lymphocyte % 16.3 %      Monocyte % 9.9 %      Eosinophil % 0.3 %      Basophil % 0.4 %      Immature Grans % 0.7 (H) %      Neutrophils, Absolute 4.96 10*3/mm3      Lymphocytes, Absolute 1.12 10*3/mm3      Monocytes, Absolute 0.68 10*3/mm3      Eosinophils, Absolute 0.02 10*3/mm3      Basophils, Absolute 0.03 10*3/mm3      Immature Grans, Absolute 0.05 10*3/mm3      nRBC 0.0 /100 WBC     Lactic Acid, Reflex [[id number]]  (Abnormal) Collected:  12/08/17 0542    Specimen:  Blood Updated:  12/08/17 0626     Lactate 2.8 (C) mmol/L     Protime-INR [[id number]]  (Abnormal) Collected:  12/08/17 0530    Specimen:  Blood Updated:  12/08/17 0627     Protime 61.5 (H) Seconds      INR 5.39 (C)    Renal Function Panel [[id number]]  (Abnormal) Collected:  12/08/17 0530    Specimen:  Blood Updated:  12/08/17 0645     Glucose 81 mg/dL      BUN 74 (H) mg/dL      Creatinine 4.40 (H) mg/dL      Sodium 125 (C) mmol/L      Potassium 5.0 mmol/L      Chloride 101 mmol/L      CO2 13.0 (L) mmol/L      Calcium 8.3 (L) mg/dL      Albumin 2.20 (L) g/dL      Phosphorus 6.8 (C) mg/dL      Anion Gap 16.0 mmol/L      BUN/Creatinine Ratio 16.8     eGFR Non African Amer 10 (L) mL/min/1.73     Narrative:       The MDRD GFR formula is only valid for adults with stable renal function between ages 18 and 70.    Basic Metabolic Panel [[id number]]  (Abnormal) Collected:  12/08/17 1208     Specimen:  Blood Updated:  12/08/17 1234     Glucose 98 mg/dL      BUN 75 (H) mg/dL      Creatinine 4.50 (H) mg/dL      Sodium 124 (C) mmol/L      Potassium 5.1 mmol/L      Chloride 101 mmol/L      CO2 13.0 (L) mmol/L      Calcium 8.4 mg/dL      eGFR Non African Amer 9 (L) mL/min/1.73      BUN/Creatinine Ratio 16.7     Anion Gap 15.1 mmol/L     Narrative:       The MDRD GFR formula is only valid for adults with stable renal function between ages 18 and 70.    Sodium, Urine, Random - Urine, Clean Catch [[id number]]  (Abnormal) Collected:  12/08/17 1319    Specimen:  Urine from Urine, Catheter Updated:  12/08/17 1409     Sodium, Urine 28 (L) mmol/L     CBC (No Diff) [[id number]]  (Abnormal) Collected:  12/09/17 0639    Specimen:  Blood Updated:  12/09/17 0704     WBC 7.30 10*3/mm3      RBC 3.03 (L) 10*6/mm3      Hemoglobin 9.2 (L) g/dL      Hematocrit 27.3 (L) %      MCV 90.1 fL      MCH 30.4 pg      MCHC 33.7 g/dL      RDW 18.0 (H) %      RDW-SD 57.5 (H) fl      MPV 9.6 fL      Platelets 126 (L) 10*3/mm3     Magnesium [[id number]]  (Abnormal) Collected:  12/09/17 0639    Specimen:  Blood Updated:  12/09/17 0722     Magnesium 2.4 (H) mg/dL     Comprehensive Metabolic Panel [[id number]]  (Abnormal) Collected:  12/09/17 0639    Specimen:  Blood Updated:  12/09/17 0734     Glucose 87 mg/dL      BUN 79 (C) mg/dL      Creatinine 4.40 (H) mg/dL      Sodium 126 (C) mmol/L      Potassium 5.2 (H) mmol/L      Chloride 102 mmol/L      CO2 12.0 (L) mmol/L      Calcium 8.3 (L) mg/dL      Total Protein 5.8 (L) g/dL      Albumin 2.00 (L) g/dL      ALT (SGPT) 33 U/L      AST (SGOT) 34 U/L      Alkaline Phosphatase 173 (H) U/L      Total Bilirubin 0.6 mg/dL      eGFR Non African Amer 10 (L) mL/min/1.73      Globulin 3.8 gm/dL      A/G Ratio 0.5 (L) g/dL      BUN/Creatinine Ratio 18.0     Anion Gap 17.2 mmol/L     Narrative:       The MDRD GFR formula is only valid for adults with stable renal function between ages 18 and 70.     Protime-INR [[id number]]  (Abnormal) Collected:  12/09/17 1015    Specimen:  Blood Updated:  12/09/17 1109     Protime 59.3 (H) Seconds      INR 5.20 (C)    Sodium, Urine, Random - Urine, Clean Catch [[id number]]  (Abnormal) Collected:  12/09/17 1335    Specimen:  Urine from Urine, Clean Catch Updated:  12/09/17 1418     Sodium, Urine 19 (L) mmol/L     CBC & Differential [[id number]] Collected:  12/10/17 0610    Specimen:  Blood Updated:  12/10/17 0649    Narrative:       The following orders were created for panel order CBC & Differential.  Procedure                               Abnormality         Status                     ---------                               -----------         ------                     CBC Auto Differential[[id number]]        Abnormal            Final result                 Please view results for these tests on the individual orders.    CBC Auto Differential [[id number]]  (Abnormal) Collected:  12/10/17 0610    Specimen:  Blood Updated:  12/10/17 0649     WBC 5.79 10*3/mm3      RBC 2.86 (L) 10*6/mm3      Hemoglobin 8.8 (L) g/dL      Hematocrit 25.9 (L) %      MCV 90.6 fL      MCH 30.8 pg      MCHC 34.0 g/dL      RDW 17.9 (H) %      RDW-SD 57.2 (H) fl      MPV 9.6 fL      Platelets 110 (L) 10*3/mm3      Neutrophil % 65.7 %      Lymphocyte % 21.1 %      Monocyte % 11.2 %      Eosinophil % 1.2 %      Basophil % 0.5 %      Immature Grans % 0.3 %      Neutrophils, Absolute 3.80 10*3/mm3      Lymphocytes, Absolute 1.22 10*3/mm3      Monocytes, Absolute 0.65 10*3/mm3      Eosinophils, Absolute 0.07 10*3/mm3      Basophils, Absolute 0.03 10*3/mm3      Immature Grans, Absolute 0.02 10*3/mm3      nRBC 0.0 /100 WBC     Magnesium [[id number]]  (Abnormal) Collected:  12/10/17 0610    Specimen:  Blood Updated:  12/10/17 0700     Magnesium 2.5 (H) mg/dL     Phosphorus [[id number]]  (Abnormal) Collected:  12/10/17 0610    Specimen:  Blood Updated:  12/10/17 0700     Phosphorus 6.0 (H) mg/dL     Uric Acid  [[id number]]  (Abnormal) Collected:  12/10/17 0610    Specimen:  Blood Updated:  12/10/17 0700     Uric Acid 10.4 (H) mg/dL     Comprehensive Metabolic Panel [[id number]]  (Abnormal) Collected:  12/10/17 0610    Specimen:  Blood Updated:  12/10/17 0708     Glucose 81 mg/dL      BUN 82 (C) mg/dL      Creatinine 4.40 (H) mg/dL      Sodium 127 (C) mmol/L      Potassium 5.1 mmol/L      Chloride 103 mmol/L      CO2 15.0 (L) mmol/L      Calcium 8.4 mg/dL      Total Protein 5.4 (L) g/dL      Albumin 1.90 (L) g/dL      ALT (SGPT) 27 U/L      AST (SGOT) 32 U/L      Alkaline Phosphatase 177 (H) U/L      Total Bilirubin 0.7 mg/dL      eGFR Non African Amer 10 (L) mL/min/1.73      Globulin 3.5 gm/dL      A/G Ratio 0.5 (L) g/dL      BUN/Creatinine Ratio 18.6     Anion Gap 14.1 mmol/L     Narrative:       The MDRD GFR formula is only valid for adults with stable renal function between ages 18 and 70.    VRE Culture - Swab, Per Rectum [[id number]]  (Normal) Collected:  12/08/17 0417    Specimen:  Swab from Per Rectum Updated:  12/10/17 0719     VRE SCREEN CX No Vancomycin Resistant Enterococcus Isolated    Protime-INR [[id number]]  (Abnormal) Collected:  12/10/17 0610    Specimen:  Blood Updated:  12/10/17 0724     Protime 45.1 (H) Seconds      INR 3.96 (H)    Urine Culture - Urine, Urine, Clean Catch [[id number]]  (Abnormal) Collected:  12/08/17 0049    Specimen:  Urine from Urine, Clean Catch Updated:  12/10/17 0725     Urine Culture --      >100,000 CFU/mL Yeast, Not Candida albicans (A)    Acinetobacter Screen - Swab, Axilla, Right [[id number]]  (Normal) Collected:  12/08/17 0416    Specimen:  Swab from Axilla, Right Updated:  12/11/17 0603     ACINETOBACTER SCREEN CX No growth    MRSA Screen Culture - Swab, Nares [[id number]]  (Normal) Collected:  12/08/17 0417    Specimen:  Swab from Nares Updated:  12/11/17 0603     MRSA SCREEN CX No growth    CBC (No Diff) [[id number]]  (Abnormal) Collected:  12/11/17 0536    Specimen:  Blood  Updated:  12/11/17 0607     WBC 6.14 10*3/mm3      RBC 3.00 (L) 10*6/mm3      Hemoglobin 9.1 (L) g/dL      Hematocrit 27.9 (L) %      MCV 93.0 fL      MCH 30.3 pg      MCHC 32.6 g/dL      RDW 18.6 (H) %      RDW-SD 61.4 (H) fl      MPV 9.6 fL      Platelets 120 (L) 10*3/mm3     Uric Acid [[id number]]  (Abnormal) Collected:  12/11/17 0536    Specimen:  Blood Updated:  12/11/17 0616     Uric Acid 10.2 (H) mg/dL     Magnesium [[id number]]  (Abnormal) Collected:  12/11/17 0536    Specimen:  Blood Updated:  12/11/17 0616     Magnesium 2.6 (H) mg/dL     Protime-INR [[id number]]  (Abnormal) Collected:  12/11/17 0536    Specimen:  Blood Updated:  12/11/17 0623     Protime 31.8 (H) Seconds      INR 2.81 (H)    Comprehensive Metabolic Panel [[id number]]  (Abnormal) Collected:  12/11/17 0536    Specimen:  Blood Updated:  12/11/17 0627     Glucose 97 mg/dL      BUN 90 (C) mg/dL      Creatinine 4.40 (H) mg/dL      Sodium 129 (L) mmol/L      Potassium 5.3 (H) mmol/L      Chloride 103 mmol/L      CO2 17.0 (L) mmol/L      Calcium 8.4 mg/dL      Total Protein 5.6 (L) g/dL      Albumin 2.00 (L) g/dL      ALT (SGPT) 35 U/L      AST (SGOT) 32 U/L      Alkaline Phosphatase 171 (H) U/L      Total Bilirubin 0.7 mg/dL      eGFR Non African Amer 10 (L) mL/min/1.73      Globulin 3.6 gm/dL      A/G Ratio 0.6 (L) g/dL      BUN/Creatinine Ratio 20.5     Anion Gap 14.3 mmol/L     Narrative:       The MDRD GFR formula is only valid for adults with stable renal function between ages 18 and 70.    Phosphorus [[id number]]  (Abnormal) Collected:  12/11/17 0536    Specimen:  Blood Updated:  12/11/17 0628     Phosphorus 6.3 (C) mg/dL     Wound Culture - Wound, Leg, Left [[id number]]  (Abnormal) Collected:  12/08/17 0418    Specimen:  Wound from Leg, Left Updated:  12/11/17 0636     Wound Culture --      Light growth (2+) Candida albicans (A)    Sodium, Urine, Random - Urine, Clean Catch [[id number]]  (Normal) Collected:  12/11/17 1308    Specimen:  Urine from  Urine, Clean Catch Updated:  12/11/17 1322     Sodium, Urine 73 mmol/L     CBC (No Diff) [[id number]]  (Abnormal) Collected:  12/12/17 0611    Specimen:  Blood Updated:  12/12/17 0641     WBC 4.94 10*3/mm3      RBC 2.86 (L) 10*6/mm3      Hemoglobin 8.8 (L) g/dL      Hematocrit 27.1 (L) %      MCV 94.8 fL      MCH 30.8 pg      MCHC 32.5 g/dL      RDW 18.7 (H) %      RDW-SD 62.7 (H) fl      MPV 9.5 fL      Platelets 97 (L) 10*3/mm3     Protime-INR [[id number]]  (Abnormal) Collected:  12/12/17 0611    Specimen:  Blood Updated:  12/12/17 0652     Protime 24.5 (H) Seconds      INR 2.17 (H)    Renal Function Panel [[id number]]  (Abnormal) Collected:  12/12/17 0611    Specimen:  Blood Updated:  12/12/17 0712     Glucose 76 mg/dL      BUN 91 (C) mg/dL      Creatinine 4.30 (H) mg/dL      Sodium 129 (L) mmol/L      Potassium 5.8 (C) mmol/L      Chloride 102 mmol/L      CO2 18.0 (L) mmol/L      Calcium 8.2 (L) mg/dL      Albumin 2.00 (L) g/dL      Phosphorus 5.8 (H) mg/dL      Anion Gap 14.8 mmol/L      BUN/Creatinine Ratio 21.2     eGFR Non African Amer 10 (L) mL/min/1.73     Narrative:       The MDRD GFR formula is only valid for adults with stable renal function between ages 18 and 70.    Blood Culture With GABRIEL - Blood, [[id number]]  (Normal) Collected:  12/08/17 0002    Specimen:  Blood from Arm, Left Updated:  12/13/17 0008     Blood Culture No growth at 5 days    CBC (No Diff) [[id number]]  (Abnormal) Collected:  12/13/17 0556    Specimen:  Blood Updated:  12/13/17 0619     WBC 5.39 10*3/mm3      RBC 2.84 (L) 10*6/mm3      Hemoglobin 8.5 (L) g/dL      Hematocrit 26.8 (L) %      MCV 94.4 fL      MCH 29.9 pg      MCHC 31.7 g/dL      RDW 18.9 (H) %      RDW-SD 63.6 (H) fl      MPV 9.7 fL      Platelets 113 (L) 10*3/mm3     Protime-INR [[id number]]  (Abnormal) Collected:  12/13/17 0556    Specimen:  Blood Updated:  12/13/17 0627     Protime 19.9 (H) Seconds      INR 1.76 (H)    Renal Function Panel [[id number]]  (Abnormal)  Collected:  12/13/17 0556    Specimen:  Blood Updated:  12/13/17 0639     Glucose 92 mg/dL      BUN 95 (C) mg/dL      Creatinine 4.10 (H) mg/dL      Sodium 127 (C) mmol/L      Potassium 5.8 (C) mmol/L      Chloride 101 mmol/L      CO2 18.0 (L) mmol/L      Calcium 7.9 (L) mg/dL      Albumin 2.00 (L) g/dL      Phosphorus 5.5 (H) mg/dL      Anion Gap 13.8 mmol/L      BUN/Creatinine Ratio 23.2     eGFR Non African Amer 10 (L) mL/min/1.73     Narrative:       The MDRD GFR formula is only valid for adults with stable renal function between ages 18 and 70.    Renal Function Panel [[id number]]  (Abnormal) Collected:  12/13/17 1424    Specimen:  Blood Updated:  12/13/17 1522     Glucose 96 mg/dL      BUN 95 (C) mg/dL      Creatinine 4.00 (H) mg/dL      Sodium 128 (L) mmol/L      Potassium 5.6 (C) mmol/L      Chloride 101 mmol/L      CO2 15.0 (L) mmol/L      Calcium 7.8 (L) mg/dL      Albumin 2.10 (L) g/dL      Phosphorus 5.4 (H) mg/dL      Anion Gap 17.6 mmol/L      BUN/Creatinine Ratio 23.8 (H)     eGFR Non  Amer 11 (L) mL/min/1.73     Narrative:       The MDRD GFR formula is only valid for adults with stable renal function between ages 18 and 70.    CBC (No Diff) [[id number]]  (Abnormal) Collected:  12/14/17 0649    Specimen:  Blood Updated:  12/14/17 0739     WBC 6.41 10*3/mm3      RBC 2.76 (L) 10*6/mm3      Hemoglobin 8.3 (L) g/dL      Hematocrit 25.4 (L) %      MCV 92.0 fL      MCH 30.1 pg      MCHC 32.7 g/dL      RDW 18.9 (H) %      RDW-SD 61.7 (H) fl      MPV 9.8 fL      Platelets 131 10*3/mm3     Protime-INR [[id number]]  (Abnormal) Collected:  12/14/17 0649    Specimen:  Blood Updated:  12/14/17 0754     Protime 16.4 (H) Seconds      INR 1.46 (H)    Renal Function Panel [[id number]]  (Abnormal) Collected:  12/14/17 0649    Specimen:  Blood Updated:  12/14/17 0808     Glucose 79 mg/dL      BUN 97 (C) mg/dL      Creatinine 3.80 (H) mg/dL      Sodium 128 (L) mmol/L      Potassium 5.1 mmol/L      Chloride 101 mmol/L       CO2 16.0 (L) mmol/L      Calcium 7.5 (L) mg/dL      Albumin 2.00 (L) g/dL      Phosphorus 5.6 (H) mg/dL      Anion Gap 16.1 mmol/L      BUN/Creatinine Ratio 25.5 (H)     eGFR Non  Amer 11 (L) mL/min/1.73     Narrative:       The MDRD GFR formula is only valid for adults with stable renal function between ages 18 and 70.    Hepatitis B Surface Antibody [[id number]] Collected:  12/14/17 0649    Specimen:  Blood Updated:  12/15/17 0717     Hep B S Ab Non Reactive                    Non Reactive: Inconsistent with immunity,                              less than 10 mIU/mL                Reactive:     Consistent with immunity,                              greater than 9.9 mIU/mL       Narrative:       Performed at:  [address] [place] [address]  [id number]  : Juanjo Hernandez PhD, Phone:  [phone]    Protime-INR [[id number]]  (Abnormal) Collected:  12/15/17 0658    Specimen:  Blood Updated:  12/15/17 0723     Protime 16.0 (H) Seconds      INR 1.42 (H)    CBC (No Diff) [[id number]]  (Abnormal) Collected:  12/15/17 0658    Specimen:  Blood Updated:  12/15/17 0728     WBC 7.76 10*3/mm3      RBC 2.98 (L) 10*6/mm3      Hemoglobin 9.1 (L) g/dL      Hematocrit 28.0 (L) %      MCV 94.0 fL      MCH 30.5 pg      MCHC 32.5 g/dL      RDW 19.1 (H) %      RDW-SD 64.1 (H) fl      MPV 9.3 fL      Platelets 116 (L) 10*3/mm3     Renal Function Panel [[id number]]  (Abnormal) Collected:  12/15/17 0658    Specimen:  Blood Updated:  12/15/17 0741     Glucose 124 (H) mg/dL      BUN 96 (C) mg/dL      Creatinine 3.50 (H) mg/dL      Sodium 130 (L) mmol/L      Potassium 4.2 mmol/L      Chloride 101 mmol/L      CO2 17.0 (L) mmol/L      Calcium 7.5 (L) mg/dL      Albumin 2.00 (L) g/dL      Phosphorus 5.8 (H) mg/dL      Anion Gap 16.2 mmol/L      BUN/Creatinine Ratio 27.4 (H)     eGFR Non  Amer 13 (L) mL/min/1.73     Narrative:       The MDRD GFR formula is only valid for adults with stable renal  function between ages 18 and 70.    Troponin [[id number]]  (Abnormal) Collected:  12/15/17 0658    Specimen:  Blood Updated:  12/15/17 0921     Troponin I 0.109 (H) ng/mL     Narrative:       Normal Patient Upper Reference Limit (URL) (99th Percentile)=0.03 ng/mL   Non-AMI Illness Reference Limit=0.03-0.11 ng/mL   AMI Confirmation=0.12 ng/mL and above    CBC & Differential [[id number]] Collected:  12/16/17 0541    Specimen:  Blood Updated:  12/16/17 0614    Narrative:       The following orders were created for panel order CBC & Differential.  Procedure                               Abnormality         Status                     ---------                               -----------         ------                     CBC Auto Differential[[id number]]        Abnormal            Final result                 Please view results for these tests on the individual orders.    CBC Auto Differential [[id number]]  (Abnormal) Collected:  12/16/17 0541    Specimen:  Blood Updated:  12/16/17 0614     WBC 6.16 10*3/mm3      RBC 2.79 (L) 10*6/mm3      Hemoglobin 8.5 (L) g/dL      Hematocrit 26.5 (L) %      MCV 95.0 fL      MCH 30.5 pg      MCHC 32.1 g/dL      RDW 19.3 (H) %      RDW-SD 64.9 (H) fl      MPV 10.0 fL      Platelets 124 (L) 10*3/mm3      Neutrophil % 70.9 %      Lymphocyte % 16.4 %      Monocyte % 10.1 %      Eosinophil % 1.8 %      Basophil % 0.3 %      Immature Grans % 0.5 %      Neutrophils, Absolute 4.37 10*3/mm3      Lymphocytes, Absolute 1.01 10*3/mm3      Monocytes, Absolute 0.62 10*3/mm3      Eosinophils, Absolute 0.11 10*3/mm3      Basophils, Absolute 0.02 10*3/mm3      Immature Grans, Absolute 0.03 10*3/mm3      nRBC 0.0 /100 WBC     Protime-INR [[id number]]  (Abnormal) Collected:  12/16/17 0541    Specimen:  Blood Updated:  12/16/17 0621     Protime 16.1 (H) Seconds      INR 1.43 (H)    Phosphorus [[id number]]  (Abnormal) Collected:  12/16/17 0541    Specimen:  Blood Updated:  12/16/17 0625     Phosphorus 5.8 (H)  mg/dL     Comprehensive Metabolic Panel [[id number]]  (Abnormal) Collected:  12/16/17 0541    Specimen:  Blood Updated:  12/16/17 0632     Glucose 87 mg/dL      BUN 93 (C) mg/dL      Creatinine 3.20 (H) mg/dL      Sodium 131 (L) mmol/L      Potassium 4.0 mmol/L      Chloride 102 mmol/L      CO2 18.0 (L) mmol/L      Calcium 7.4 (L) mg/dL      Total Protein 5.1 (L) g/dL      Albumin 1.90 (L) g/dL      ALT (SGPT) 30 U/L      AST (SGOT) 36 U/L      Alkaline Phosphatase 165 (H) U/L      Total Bilirubin 0.6 mg/dL      eGFR Non African Amer 14 (L) mL/min/1.73      Globulin 3.2 gm/dL      A/G Ratio 0.6 (L) g/dL      BUN/Creatinine Ratio 29.1 (H)     Anion Gap 15.0 mmol/L     Narrative:       The MDRD GFR formula is only valid for adults with stable renal function between ages 18 and 70.    Protein, Urine, 24 Hour - Urine, Clean Catch [[id number]] Collected:  12/16/17 1250    Specimen:  24 Hour Urine from Urine, Clean Catch Updated:  12/16/17 1326     Protein, 24H Urine 105.6 mg/24hours      Total Protein, Urine 8.0 mg/dL      24H Urine Volume 1320 mL      Time (Hours) 24 hrs     CBC (No Diff) [[id number]]  (Abnormal) Collected:  12/17/17 0413    Specimen:  Blood Updated:  12/17/17 0516     WBC 6.80 10*3/mm3      RBC 2.77 (L) 10*6/mm3      Hemoglobin 8.4 (L) g/dL      Hematocrit 26.1 (L) %      MCV 94.2 fL      MCH 30.3 pg      MCHC 32.2 g/dL      RDW 19.2 (H) %      RDW-SD 65.1 (H) fl      MPV 9.8 fL      Platelets 133 10*3/mm3     Protime-INR [[id number]]  (Abnormal) Collected:  12/17/17 0413    Specimen:  Blood Updated:  12/17/17 0528     Protime 15.6 (H) Seconds      INR 1.39 (H)    Renal Function Panel [[id number]]  (Abnormal) Collected:  12/17/17 0413    Specimen:  Blood Updated:  12/17/17 0614     Glucose 88 mg/dL      BUN 99 (C) mg/dL      Creatinine 3.00 (H) mg/dL      Sodium 130 (L) mmol/L      Potassium 3.7 mmol/L      Chloride 101 mmol/L      CO2 18.0 (L) mmol/L      Calcium 7.7 (L) mg/dL      Albumin 1.80 (L) g/dL       Phosphorus 5.8 (H) mg/dL      Anion Gap 14.7 mmol/L      BUN/Creatinine Ratio 33.0 (H)     eGFR Non  Amer 15 (L) mL/min/1.73     Narrative:       The MDRD GFR formula is only valid for adults with stable renal function between ages 18 and 70.    CBC (No Diff) [[id number]]  (Abnormal) Collected:  12/18/17 0434    Specimen:  Blood Updated:  12/18/17 0505     WBC 5.71 10*3/mm3      RBC 2.87 (L) 10*6/mm3      Hemoglobin 8.9 (L) g/dL      Hematocrit 27.0 (L) %      MCV 94.1 fL      MCH 31.0 pg      MCHC 33.0 g/dL      RDW 19.4 (H) %      RDW-SD 64.8 (H) fl      MPV 9.6 fL      Platelets 120 (L) 10*3/mm3     Protime-INR [[id number]]  (Abnormal) Collected:  12/18/17 0434    Specimen:  Blood Updated:  12/18/17 0521     Protime 17.5 (H) Seconds      INR 1.55 (H)    Renal Function Panel [[id number]]  (Abnormal) Collected:  12/18/17 0434    Specimen:  Blood Updated:  12/18/17 0604     Glucose 78 mg/dL       (C) mg/dL      Creatinine 2.90 (H) mg/dL      Sodium 131 (L) mmol/L      Potassium 3.8 mmol/L      Chloride 103 mmol/L      CO2 18.0 (L) mmol/L      Calcium 7.8 (L) mg/dL      Albumin 1.80 (L) g/dL      Phosphorus 5.6 (H) mg/dL      Anion Gap 13.8 mmol/L      BUN/Creatinine Ratio 34.5 (H)     eGFR Non  Amer 16 (L) mL/min/1.73     Narrative:       The MDRD GFR formula is only valid for adults with stable renal function between ages 18 and 70.    Protime-INR [[id number]]  (Abnormal) Collected:  12/19/17 0557    Specimen:  Blood Updated:  12/19/17 0636     Protime 24.0 (H) Seconds      INR 2.12 (H)    Renal Function Panel [[id number]]  (Abnormal) Collected:  12/19/17 0557    Specimen:  Blood Updated:  12/19/17 0657     Glucose 70 (L) mg/dL       (C) mg/dL      Creatinine 2.90 (H) mg/dL      Sodium 130 (L) mmol/L      Potassium 4.0 mmol/L      Chloride 102 mmol/L      CO2 16.0 (L) mmol/L      Calcium 8.0 (L) mg/dL      Albumin 1.90 (L) g/dL      Phosphorus 5.7 (H) mg/dL      Anion Gap 16.0 mmol/L       BUN/Creatinine Ratio 35.5 (H)     eGFR Non  Amer 16 (L) mL/min/1.73     Narrative:       The MDRD GFR formula is only valid for adults with stable renal function between ages 18 and 70.    CBC (No Diff) [[id number]]  (Abnormal) Collected:  12/19/17 0557    Specimen:  Blood Updated:  12/19/17 0710     WBC 9.57 10*3/mm3      RBC 3.05 (L) 10*6/mm3      Hemoglobin 9.4 (L) g/dL      Hematocrit 28.7 (L) %      MCV 94.1 fL      MCH 30.8 pg      MCHC 32.8 g/dL      RDW 19.3 (H) %      RDW-SD 64.8 (H) fl      MPV 9.6 fL      Platelets 150 10*3/mm3     Creatinine Clearance - Urine, Clean Catch [[id number]] Collected:  12/19/17 1107    Specimen:  24 Hour Urine from Urine, Clean Catch Updated:  12/19/17 1145    Narrative:       The following orders were created for panel order Creatinine Clearance - Urine, Clean Catch.  Procedure                               Abnormality         Status                     ---------                               -----------         ------                     Creatinine Clearance, Ur...[[id number]]  Abnormal            Final result                 Please view results for these tests on the individual orders.    Creatinine Clearance, Urine, 24 Hour - Urine, Clean Catch [[id number]]  (Abnormal) Collected:  12/19/17 1107    Specimen:  24 Hour Urine from Urine, Catheter Updated:  12/19/17 1145     Creatinine Clearance 6.1 (L) ml/min      Creatinine, Urine 105.4 mg/dL      Time (Hours) 24 hrs      Creatinine, 24H 0.32 (L) g/24 hr      CREATININE CLEARANCE L/24 HOUR 8.8 (L) L/24 hr     Ammonia [[id number]]  (Abnormal) Collected:  12/19/17 1346    Specimen:  Blood Updated:  12/19/17 1421     Ammonia 69 (H) umol/L     Creatine, 24 Hr Ur - Urine, Clean Catch [[id number]] Collected:  12/16/17 1249    Specimen:  24 Hour Urine from Urine, Clean Catch Updated:  12/19/17 1618     Creatine, U, mg/dL 2.5 mg/dL      Creatine, 24H Ur 31 mg/24 hr       This test was developed and its performance  characteristics  determined by [facility]. It has not been cleared or approved  by the Food and Drug Administration.       Narrative:       Performed at:  [address] [facility]  [id number]  : Cole Simmons MD, Phone:  [phone]    Protime-INR [[id number]]  (Abnormal) Collected:  12/20/17 0544    Specimen:  Blood Updated:  12/20/17 0634     Protime 28.5 (H) Seconds      INR 2.52 (H)    CBC (No Diff) [[id number]]  (Abnormal) Collected:  12/20/17 0544    Specimen:  Blood Updated:  12/20/17 0636     WBC 11.34 (H) 10*3/mm3      RBC 2.94 (L) 10*6/mm3      Hemoglobin 9.2 (L) g/dL      Hematocrit 28.2 (L) %      MCV 95.9 fL      MCH 31.3 (H) pg      MCHC 32.6 g/dL      RDW 19.7 (H) %      RDW-SD 67.0 (H) fl      MPV 9.7 fL      Platelets 160 10*3/mm3     Renal Function Panel [[id number]]  (Abnormal) Collected:  12/20/17 0544    Specimen:  Blood Updated:  12/20/17 0647     Glucose 79 mg/dL       (C) mg/dL      Creatinine 3.30 (H) mg/dL      Sodium 131 (L) mmol/L      Potassium 4.3 mmol/L      Chloride 103 mmol/L      CO2 13.0 (L) mmol/L      Calcium 8.4 mg/dL      Albumin 2.00 (L) g/dL      Phosphorus 6.4 (C) mg/dL      Anion Gap 19.3 mmol/L      BUN/Creatinine Ratio 34.2 (H)     eGFR Non  Amer 13 (L) mL/min/1.73     Narrative:       The MDRD GFR formula is only valid for adults with stable renal function between ages 18 and 70.    Ammonia [[id number]]  (Normal) Collected:  12/20/17 0921    Specimen:  Blood Updated:  12/20/17 0950     Ammonia 20 umol/L     CBC (No Diff) [[id number]]  (Abnormal) Collected:  12/21/17 0600    Specimen:  Blood Updated:  12/21/17 0622     WBC 11.14 (H) 10*3/mm3      RBC 2.85 (L) 10*6/mm3      Hemoglobin 8.7 (L) g/dL      Hematocrit 27.6 (L) %      MCV 96.8 fL      MCH 30.5 pg      MCHC 31.5 g/dL      RDW 19.8 (H) %      RDW-SD 68.4 (H) fl      MPV 9.6 fL      Platelets 152 10*3/mm3     Protime-INR [[id number]]  (Abnormal) Collected:   12/21/17 0600    Specimen:  Blood Updated:  12/21/17 0641     Protime 40.6 (H) Seconds      INR 3.57 (H)    Renal Function Panel [[id number]]  (Abnormal) Collected:  12/21/17 0559    Specimen:  Blood Updated:  12/21/17 0701     Glucose 78 mg/dL       (C) mg/dL      Creatinine 3.80 (H) mg/dL      Sodium 130 (L) mmol/L      Potassium 4.9 mmol/L      Chloride 101 mmol/L      CO2 17.0 (L) mmol/L      Calcium 8.5 mg/dL      Albumin 1.90 (L) g/dL      Phosphorus 6.7 (C) mg/dL      Anion Gap 16.9 mmol/L      BUN/Creatinine Ratio 31.1 (H)     eGFR Non  Amer 11 (L) mL/min/1.73     Narrative:       The MDRD GFR formula is only valid for adults with stable renal function between ages 18 and 70.          Pertinent Radiology Results:    Imaging Results (all)     Procedure Component Value Units Date/Time    CT Abdomen Pelvis Without Contrast [[id number]] Collected:  12/08/17 0718     Updated:  12/08/17 0728    Narrative:       PROCEDURE: CT ABDOMEN PELVIS WO CONTRAST-     HISTORY: N/V, MODESTA, weakness     COMPARISON: None .     PROCEDURE: Axial images were obtained from the lung bases through the  pubic symphysis without intravenous contrast.    .      FINDINGS:      ABDOMEN: Lack of intravenous contrast limits evaluation of the solid  organs, the mediastinum, and the vasculature. Mild atelectasis in the  bibasilar regions.The limited noncontrast images of the liver  demonstrate a nodular border suggestive of cirrhosis. There are  subcentimeter nodular areas that are not well assessed without contrast.  Esophageal and splenic varices are noted hiatal hernia is noted.  Additionally there is a ventral hernia containing nondistended loops of  transverse colon. The gallbladder is absent . The spleen is normal. 1.7  cm right adrenal adenoma.  The pancreas is atrophic. Nonobstructing  stone present within the right collecting system. There is no  hydronephrosis. The aorta is normal in caliber. There is no significant  free  fluid or adenopathy.  No abnormally dilated loops of bowel are  appreciated. Postoperative changes are seen to small bowel. There is  retained stool throughout the colon.  Nonspecific body wall edema is  noted.     PELVIS: The appendix is not identified. The urinary bladder is  unremarkable. There is no significant fluid or adenopathy. There are  degenerative changes of the spine.           Impression:       1. No evidence of an obstructive uropathy.  2. Significant interval improvement of ascites and pleural effusions  compared to prior.   3. Ventral wall defect containing nondistended loop of transverse colon.  4. Cirrhotic appearance to the liver with subcentimeter nodular  densities, dedicated follow-up imaging of the liver is recommended..  5. 1.7 cm right adrenal adenoma.                   .          This study was performed with techniques to keep radiation doses as low  as reasonably achievable (ALARA). Individualized dose reduction  techniques using automated exposure control or adjustment of mA and/or  kV according to the patient size were employed.      This report was finalized on 12/8/2017 7:26 AM by Shabbir Sher DO.    XR Chest 1 View [[id number]] Collected:  12/08/17 0726     Updated:  12/08/17 0730    Narrative:       PROCEDURE: XR CHEST 1 VW-     HISTORY: Weak/Dizzy/AMS triage protocol     COMPARISON: November 27, 2017.     FINDINGS: Electrodes overlie the chest. The heart is normal in size. The  mediastinum is unremarkable. There are chronic interstitial changes.  There is no pneumothorax.  There are no acute osseous abnormalities.           Impression:       No acute cardiopulmonary process.     Continued followup is recommended.     This report was finalized on 12/8/2017 7:28 AM by Shabbir Sher DO.          Condition on Discharge:  Stable        Code status during the hospital stay:  Conditional/DNR and comfort Hospice care       Discharge Disposition: HOME HOSPICE         Discharge  Medication:     Hodan Carter   Home Medication Instructions AG:[id number]    Printed on:12/22/17 9916   Medication Information                      allopurinol (ZYLOPRIM) 100 MG tablet  Take 1 tablet by mouth Daily.             b complex-vitamin c-folic acid (NEPHRO-QUANG) 0.8 MG tablet tablet  Take 1 tablet by mouth Daily. May sub similar vitamin             bumetanide (BUMEX) 1 MG tablet  Take 1 tablet by mouth 2 (Two) Times a Day.             calcium acetate (PHOS BINDER,) 667 MG capsule capsule  Take 1 capsule by mouth 3 (Three) Times a Day With Meals.             cyanocobalamin 1000 MCG/ML injection  Inject 1 mL into the shoulder, thigh, or buttocks Every 28 (Twenty-Eight) Days.             digoxin (LANOXIN) 125 MCG tablet  Take 1 tablet by mouth Every Other Day.             epoetin parveen (EPOGEN,PROCRIT) 82904 UNIT/ML injection  Inject 1 mL under the skin Every 14 (Fourteen) Days. Indications: Chronic Hemolytic Anemia             HYDROcodone-acetaminophen (NORCO)  MG per tablet  Take 1 tablet by mouth Every 6 (Six) Hours As Needed for Moderate Pain .             ipratropium-albuterol (DUO-NEB) 0.5-2.5 mg/mL nebulizer  Take 3 mL by nebulization Every 6 (Six) Hours.             lactulose (CHRONULAC) 10 GM/15ML solution  Take 15 mL by mouth 3 (Three) Times a Day.             midodrine (PROAMATINE) 10 MG tablet  Take 1 tablet by mouth 3 (Three) Times a Day Before Meals.             Nebulizers (COMPRESSOR/NEBULIZER) misc  For use with nebulizer solution, Q6H prn soa             ondansetron (ZOFRAN) 4 MG tablet  Take 4 mg by mouth Every 8 (Eight) Hours As Needed for Nausea or Vomiting.             pantoprazole (PROTONIX) 40 MG EC tablet  Take 40 mg by mouth Daily.             rifaximin (XIFAXAN) 550 MG tablet  Take 1 tablet by mouth Every 12 (Twelve) Hours.             warfarin (COUMADIN) 3 MG tablet  Take 1 tablet by mouth once daily                 Discharge Diet:     Diet Instructions     Diet: Regular;  Thin       Discharge Diet:  Regular   Fluid Consistency:  Thin                 Activity at Discharge:     Activity Instructions     Other Instructions (Specify)       BEDREST                 Follow-up Appointments:    No future appointments.  Additional Instructions for the Follow-ups that You Need to Schedule     Discharge Follow-up with Specialty: Hospice provider; 1 Week    As directed    Specialty:  Hospice provider    Follow Up:  1 Week                     Test Results Pending at Discharge: none           Bettie Casarez DO  12/22/17  11:33 AM      Medication risks and benefits were discussed in great detail. The patient reported being satisfied with the current treatment plan and the care delivered while hospitalized. JOSE DANIEL reviewed.    Time:  I spent 21 minutes preparing discharge counseling and teaching.

## 2017-12-22 NOTE — PLAN OF CARE
Problem: Patient Care Overview (Adult)  Goal: Plan of Care Review  Outcome: Ongoing (interventions implemented as appropriate)   12/22/17 0411   Outcome Evaluation   Outcome Summary/Follow up Plan The patient is continuing to decline and is expected to go home with hospice care today. The family is at bedside and agree with the plan of care. Will continue to monitor.    Coping/Psychosocial Response Interventions   Plan Of Care Reviewed With patient;family   Patient Care Overview   Progress declining

## 2017-12-22 NOTE — PROGRESS NOTES
Case Management Discharge Note    Final Note: Discharge to home with Hospice     Discharge Placement     No information found        Ambulance: [facility]    Discharge Codes: 50  Hospice - home

## 2019-06-08 NOTE — PLAN OF CARE
Problem: Patient Care Overview (Adult)  Goal: Plan of Care Review  Outcome: Ongoing (interventions implemented as appropriate)   12/21/17 2962   Outcome Evaluation   Outcome Summary/Follow up Plan Pt. is heading home with Hospice tomorrow d/t decline of health status.   Coping/Psychosocial Response Interventions   Plan Of Care Reviewed With patient   Patient Care Overview   Progress declining       Problem: Pain, Acute (Adult)  Goal: Acceptable Pain Control/Comfort Level  Outcome: Ongoing (interventions implemented as appropriate)      Problem: Skin Integrity Impairment, Risk/Actual (Adult)  Goal: Skin Integrity/Wound Healing  Outcome: Ongoing (interventions implemented as appropriate)      Problem: Renal Failure/Kidney Injury, Acute (Adult)  Goal: Signs and Symptoms of Listed Potential Problems Will be Absent or Manageable (Renal Failure/Kidney Injury, Acute)  Outcome: Ongoing (interventions implemented as appropriate)      Problem: Pressure Ulcer (Adult)  Goal: Signs and Symptoms of Listed Potential Problems Will be Absent or Manageable (Pressure Ulcer)  Outcome: Ongoing (interventions implemented as appropriate)         Yes Statement Selected

## 2020-09-16 NOTE — H&P
[facility]   HISTORY AND PHYSICAL      Name:  Hodan Carter   Age:  77 y.o.  Sex:  female  :  1940  MRN:  [id number]   Visit Number:  [id number]  Admission Date:  10/10/2017  Date Of Service:  10/10/17  Primary Care Physician:  Juan Espino MD    History Obtained From:    patient and family    Chief Complaint:     Hepatic encephalopathy     History Of Presenting Illness:      Patient is a 77-year-old  female just released from the hospital on 2017 for problems related to her cirrhosis who comes in with confusion since Saturday.  She is able to communicate but has trouble relating doing a good history.  Most of the history is from her daughters.  She has not had a bowel movement for 3 days until she was given lactulose in the emergency room.  Her daughter states she had worsening confusion to the point where it was moderately severe.  Nothing was improving it.  She also has had a 19 pound weight loss since the last hospital stay with adjustment of her diuretics.  She is still very edematous.  She has ascites as well as peripheral edema.  She is very fatigued at the present time.  She's had decreased appetite and has not been taking in much to eat or drink.  She has seen the transplant team at the [facility], but due to her advanced age she is not a candidate for a liver transplant.  She never drank alcohol, and the etiology of her liver failure is unknown.  Specialist have told her that they thought was from taking Tylenol over the course of many years.  However, her sister had  of cirrhosis.  She currently denies any chest pressure, shortness breath, nausea or vomiting.  She denies any pain.  She denies any fevers or chills.  She saw Dr. Franco from nephrology last time she was here, and had a follow-up appointment with him.  Her creatinine is 2.3, it was 1.8 when she left here 12 days ago.  She also had a urinary tract infection at that  Discussed with patient time for which she was treated with Rocephin and Diflucan.  She again has increased WBCs and yeast in her urine.  However the specimen does have 7-12 squamous epithelial cells and will need repeating.  Her INR today is 2.01, she is on Coumadin for PE as well as permanent atrial fibrillation.  She also has a history of uterine cancer many many years ago for which had a hysterectomy.  She had a PE many many years ago as well.  She has chronic pancytopenia from her cirrhosis.  However her bilirubin is 1.8 today.  She has been told also she has esophageal varices, although she has never had bleeding varices.  She is on Protonix as well as Inderal.  She has never been told to take lactulose in the past, and has never had hepatic encephalopathy in the past.  When she did have a bowel movement today reportedly there was blood in the stool.  She is also very weak, and having trouble ambulating at the present time.    Review Of Systems:     General ROS: positive for  - fatigue and malaise  Psychological ROS: positive for - concentration difficulties  Ophthalmic ROS: negative  ENT ROS: negative  Allergy and Immunology ROS: negative  Hematological and Lymphatic ROS: negative  Endocrine ROS: negative  Breast ROS: negative  Respiratory ROS: negative  Cardiovascular ROS: negative  Gastrointestinal ROS: positive for - blood in stools  Genito-Urinary ROS: negative  Musculoskeletal ROS: positive for - muscular weakness  Neurological ROS: negative  Dermatological ROS: negative       Past Medical History:    Uterine cancer, cirrhosis, GERD, abdominal hernia, coronary artery disease, pulmonary embolism, hypertension, permanent atrial fibrillation, chronic pancytopenia    Past Surgical history:    EGD, cholecystectomy, hernia repair [facility] last year, total abdominal hysterectomy, right TKA      Social History:    Never smoked, does not drink or use drugs.  Lives with her son.  She wants to be a full code at this point    Family  History:    Mother  of heart disease.  Father had prostate cancer and hypertension.  Sister with diabetes mellitus hypertension.  Sister  of cirrhosis.  Daughter with breast cancer    Allergies:      Review of patient's allergies indicates no known allergies.    Home Medications:    Prior to Admission Medications     Prescriptions Last Dose Informant Patient Reported? Taking?    atorvastatin (LIPITOR) 10 MG tablet 10/9/2017  No Yes    Take 1 tablet by mouth Every Night.    bumetanide (BUMEX) 1 MG tablet 10/10/2017  No Yes    Take 1 tablet by mouth 2 (Two) Times a Day.    hydrALAZINE (APRESOLINE) 25 MG tablet  Medication Bottle Yes Yes    Take 12.5 mg by mouth Daily.    HYDROcodone-acetaminophen (NORCO)  MG per tablet 10/9/2017  Yes Yes    Take 1 tablet by mouth Every 6 (Six) Hours As Needed for Moderate Pain .    ondansetron (ZOFRAN) 4 MG tablet 10/9/2017  Yes Yes    Take 4 mg by mouth Every 8 (Eight) Hours As Needed for Nausea or Vomiting.    pantoprazole (PROTONIX) 40 MG EC tablet 10/10/2017  Yes Yes    40 mg Daily.    propranolol (INDERAL) 20 MG tablet 10/10/2017 Medication Bottle Yes Yes    40 mg 2 (Two) Times a Day.    spironolactone (ALDACTONE) 100 MG tablet 10/10/2017  No Yes    Take 1 tablet by mouth 2 (Two) Times a Day.    warfarin (COUMADIN) 2.5 MG tablet 10/9/2017 Medication Bottle Yes Yes    Take 1 mg by mouth Daily.    cyanocobalamin 1000 MCG/ML injection Unknown  No No    Inject 1 mL into the shoulder, thigh, or buttocks Every 28 (Twenty-Eight) Days.    epoetin parveen (EPOGEN,PROCRIT) 91107 UNIT/ML injection Unknown  No No    Inject 1 mL under the skin Every 14 (Fourteen) Days. Indications: Chronic Hemolytic Anemia    warfarin (COUMADIN) 2 MG tablet Not Taking  No No    2 mg PO daily    Patient not taking:  Reported on 10/10/2017             Hospital Scheduled Meds:      bumetanide 1 mg Oral BID   [START ON 10/11/2017] ceftriaxone 1 g Intravenous Q24H   cyanocobalamin 1,000 mcg  Intramuscular Q28 Days   [START ON 10/11/2017] epoetin parveen 20,000 Units Subcutaneous Q14 Days   fluconazole 200 mg Intravenous Daily   hydrALAZINE 12.5 mg Oral Daily   lactulose 20 g Oral TID   [START ON 10/11/2017] pantoprazole 40 mg Oral Q AM   propranolol 40 mg Oral Q12H   rifaximin 550 mg Oral Q12H   spironolactone 100 mg Oral BID   warfarin 1 mg Oral Daily             Vital Signs:    Temp:  [97.6 °F (36.4 °C)] 97.6 °F (36.4 °C)  Heart Rate:  [74-89] 89  Resp:  [18] 18  BP: (106-121)/(71-76) 121/75    Last 3 weights    10/10/17  1142   Weight: 248 lb (112 kg)       Body mass index is 38.84 kg/(m^2).    Physical Exam:      General Appearance:    Alert, cooperative, in no acute distress, Somewhat confused at times.     Head:    Normocephalic, without obvious abnormality, atraumatic   Eyes:            Lids and lashes normal, conjunctivae and sclerae normal, no   icterus, no pallor, corneas clear, PERRLA   Ears:    Ears appear intact with no abnormalities noted   Throat:   No oral lesions, no thrush, oral mucosa moist   Neck:   No adenopathy, supple, trachea midline, no thyromegaly, no     carotid bruit, no JVD   Back:     No kyphosis present, no scoliosis present, no skin lesions,       erythema or scars, no tenderness to percussion or                   palpation,   range of motion normal   Lungs:     Clear to auscultation,respirations regular, even and                   unlabored    Heart:    Regular rhythm and normal rate, normal S1 and S2, no            murmur, no gallop, no rub, no click   Breast Exam:    Deferred   Abdomen:     Normal bowel sounds, no masses, no organomegaly, soft        non-tender, non-distended, no guarding, no rebound                 Tenderness Ascites noted.     Genitalia:    Deferred   Extremities:   4/5 strength in upper/lower extremities bilaterally.  Pitting edema noted bilaterally.     Pulses:   Pulses palpable and equal bilaterally   Skin:   No bleeding, bruising or rash   Lymph  nodes:   No palpable adenopathy   Neurologic:   Cranial nerves 2 - 12 grossly intact, sensation intact, DTR        present and equal bilaterally         Telemetry:      nsr    I have personally looked at  the telemetry strips.    Labs:    Results from last 7 days  Lab Units 10/10/17  1230 10/10/17  1148   LACTATE mmol/L 3.1*  --    WBC 10*3/mm3  --  3.51*   HEMOGLOBIN g/dL  --  9.9*   HEMATOCRIT %  --  31.2*   MCV fL  --  94.8   MCHC g/dL  --  31.7   PLATELETS 10*3/mm3  --  161   INR   --  2.01*       Results from last 7 days  Lab Units 10/10/17  1224   PH, ARTERIAL pH units 7.507*   PO2 ART mm Hg 75.6   PCO2, ARTERIAL mm Hg 35.0   HCO3 ART mmol/L 27.7       Results from last 7 days  Lab Units 10/10/17  1148   SODIUM mmol/L 137   POTASSIUM mmol/L 4.4   CHLORIDE mmol/L 102   CO2 mmol/L 25.0*   BUN mg/dL 33*   CREATININE mg/dL 2.30*   EGFR IF NONAFRICN AM mL/min/1.73 20*   CALCIUM mg/dL 8.1*   GLUCOSE mg/dL 121*   ALBUMIN g/dL 2.60*   BILIRUBIN mg/dL 1.8*   ALK PHOS U/L 182*   AST (SGOT) U/L 51*   ALT (SGPT) U/L 28   Estimated Creatinine Clearance: 25.6 mL/min (by C-G formula based on Cr of 2.3).  Ammonia   Date Value Ref Range Status   10/10/2017 41 (H) 9 - 30 umol/L Final       Results from last 7 days  Lab Units 10/10/17  1148   TROPONIN I ng/mL 0.051*         No results found for: HGBA1C  No results found for: TSH, FREET4  No results found for: PREGTESTUR, PREGSERUM, HCG, HCGQUANT  Pain Management Panel     There is no flowsheet data to display.                          Radiology:    Imaging Results (last 7 days)     Procedure Component Value Units Date/Time    CT Head Without Contrast [[id number]] Collected:  10/10/17 1325     Updated:  10/10/17 1328    Narrative:       PROCEDURE: CT HEAD WO CONTRAST-     HISTORY: confusion     TECHNIQUE: Axial images were obtained from the skull vertex through the  base without contrast.      COMPARISON: None.     FINDINGS: There is generalized cerebral volume loss. There are  patchy  hypodensities in the periventricular white matter consistent with  chronic small vessel ischemic change. There is no CT evidence of acute  hemorrhage. There is no mass, mass effect, or midline shift. There is no  hydrocephalus. Bone windows reveal no osseous abnormalities. The  visualized paranasal sinuses are clear.       Impression:       Findings consistent with chronic small vessel ischemic  change with no acute intracranial abnormality.        This study was performed with techniques to keep radiation doses as low  as reasonably achievable (ALARA). Individualized dose reduction  techniques using automated exposure control or adjustment of mA and/or  kV according to the patient size were employed.      This report was finalized on 10/10/2017 1:26 PM by Mae Barrios M.D..    XR Chest 2 View [[id number]] Collected:  10/10/17 1326     Updated:  10/10/17 1334    Narrative:       PROCEDURE: XR CHEST 2 VW-        HISTORY: recent pleural effusion     COMPARISON: September 28, 2017.     FINDINGS: The heart is normal in size. The mediastinum is unremarkable.  The lungs are clear. There is no pneumothorax. There are no acute  osseous abnormalities.           Impression:       No acute cardiopulmonary process.           This report was finalized on 10/10/2017 1:31 PM by Mae Barrios M.D..          Assessment:    1.  Hepatic encephalopathy  2.  Cirrhosis of unknown etiology   3.  Chronic kidney disease stage IV, worsening  4.  UTI, present on admission, recurrent  5.  Permanent atrial fibrillation on Coumadin  6.  History of PE many years ago; Coumadin  7.  History of coronary artery disease  8.  History of uterine cancer status post hysterectomy in the past  9.  Chronic pancytopenia due to #2  10.  Increased lactic acid  11.  Debility  12.  Hematochezia    Plan:     We will admit this patient to telemetry.  We will add lactulose 20 g 3 times a day to ensure bowel movements 3-4 times per day.  Add  rifaximin 550 mg twice a day and monitor lab work.  We will get a catheter specimen UA.  Continue with her home medications.  Consult Dr. Franco regarding her kidney disease.  Check stool for occult blood.  If her hemoglobin drops and she continues to bleed would consult surgery for endoscopy.  Have PT and OT see this patient for weakness.  Check ammonia level in the a.m.  Continue with Rocephin as well as Diflucan for her UTI.  Continue with her diuresis.  Anticipated stay is greater than  2 midnights.  Further recommendations will depend on the clinical course.    Frankie Winchester,   10/10/17  4:35 PM

## 2020-12-22 NOTE — TELEPHONE ENCOUNTER
Patient is scheduled to see Dr Farias on 10/16/2017.  [facility] called for verbal orders to see patient 3 x week x 2 weeks then 2 x week for the remainder of treatment.  
Abnormal EKG possibly c/w STEMI.   Plan: Emergent cardiac catheterization plus/minus percutaneous coronary intervention/Aspirin/Plavix loading/Beta blockers as dictated by heart rate and blood pressure/oxygen/informed consent for cardiac catheterization with/without percutaneous coronary intervention.  Risks, benefits, and alternatives of cardiac catheterization with percutaneous coronary intervention discussed with the patient/family including but not limited to death, myocardial infarction, stroke, bleeding, infection, or vascular injury. Patient expressed understanding of these risks and signed informed consent for procedure.

## 2022-07-06 NOTE — PROGRESS NOTES
Chief Complaint   Patient presents with   • Follow-up     patient has blister on LLE; skin tear with drainage on RLE        Subjective     History of Present Illness   Hodan Carter is a 77 y.o. female presenting for follow-up on medical problems.  Patient states that he was recently making down to  something when her finger nail accidentally pressed into her leg causing a cough.  He has noticed some redness around this lesion over the last several days.  Doxycycline was called in for cellulitis treatment.  Redness remained stable.  He does note that she has clear yellow drainage from the wound.  On the left lower extremity, patient has developed a large round blistering lesion which occasionally leaks red fluid.  Home health is following for wound care.  Patient denies any recent confusion or fever.    The following portions of the patient's history were reviewed and updated as appropriate: allergies, current medications, past family history, past medical history, past social history, past surgical history and problem list.    Review of Systems   Constitutional: Negative for chills, fatigue and fever.   HENT: Negative for congestion, ear pain, rhinorrhea, sinus pressure and sore throat.    Eyes: Negative for visual disturbance.   Respiratory: Negative for cough, chest tightness, shortness of breath and wheezing.    Cardiovascular: Negative for chest pain, palpitations and leg swelling.   Gastrointestinal: Negative for abdominal pain, blood in stool, constipation, diarrhea, nausea and vomiting.   Endocrine: Negative for polydipsia and polyuria.   Genitourinary: Negative for dysuria and hematuria.   Musculoskeletal: Negative for back pain.   Skin: Positive for wound. Negative for rash.   Neurological: Negative for dizziness, light-headedness, numbness and headaches.   Psychiatric/Behavioral: Negative for dysphoric mood and sleep disturbance. The patient is not nervous/anxious.        No Known Allergies    Past  Medical History:   Diagnosis Date   • Arthritis    • Cancer     uterin    • Cirrhosis of liver    • Effect, radiation    • GERD (gastroesophageal reflux disease)    • Hernia of abdominal wall     JUST BELOW UMBILICUS   • History of pneumonia    • Hypertension    • NSTEMI (non-ST elevated myocardial infarction) 9/24/2017   • Pulmonary embolism    • Wears dentures     UPPER ONLY       Social History     Social History   • Marital status:      Spouse name: N/A   • Number of children: N/A   • Years of education: N/A     Occupational History   • Not on file.     Social History Main Topics   • Smoking status: Never Smoker   • Smokeless tobacco: Never Used   • Alcohol use No   • Drug use: Defer   • Sexual activity: Defer     Other Topics Concern   • Not on file     Social History Narrative        Past Surgical History:   Procedure Laterality Date   • ENDOSCOPY     • GALLBLADDER SURGERY     • HERNIA REPAIR     • HYSTERECTOMY     • REPLACEMENT TOTAL KNEE Right        Family History   Problem Relation Age of Onset   • Hypertension Mother    • Cancer Father      prostate   • Hypertension Father    • Diabetes Sister    • Hypertension Sister    • Hypertension Brother    • Cancer Daughter      breast         Current Outpatient Prescriptions:   •  atorvastatin (LIPITOR) 10 MG tablet, Take 1 tablet by mouth Every Night., Disp: 30 tablet, Rfl: 0  •  bumetanide (BUMEX) 0.5 MG tablet, Take 1 tablet by mouth 2 (Two) Times a Day., Disp: 60 tablet, Rfl: 0  •  cyanocobalamin 1000 MCG/ML injection, Inject 1 mL into the shoulder, thigh, or buttocks Every 28 (Twenty-Eight) Days., Disp: 1 mL, Rfl: 0  •  doxycycline (MONODOX) 100 MG capsule, Take 1 capsule by mouth 2 (Two) Times a Day for 7 days., Disp: 14 capsule, Rfl: 0  •  epoetin parveen (EPOGEN,PROCRIT) 41123 UNIT/ML injection, Inject 1 mL under the skin Every 14 (Fourteen) Days. Indications: Chronic Hemolytic Anemia, Disp: 1 each, Rfl: 0  •  hydrALAZINE (APRESOLINE) 25 MG tablet,  "Take 12.5 mg by mouth Daily., Disp: , Rfl:   •  HYDROcodone-acetaminophen (NORCO)  MG per tablet, Take 1 tablet by mouth Every 6 (Six) Hours As Needed for Moderate Pain ., Disp: , Rfl:   •  lactulose (CHRONULAC) 10 GM/15ML solution, Take 30 mL (2 Tablespoonfuls) by mouth 2 (Two) Times a Day for 30 days., Disp: 1892 mL, Rfl: 0  •  ondansetron (ZOFRAN) 4 MG tablet, Take 4 mg by mouth Every 8 (Eight) Hours As Needed for Nausea or Vomiting., Disp: , Rfl:   •  pantoprazole (PROTONIX) 40 MG EC tablet, 40 mg Daily., Disp: , Rfl:   •  propranolol (INDERAL) 40 MG tablet, Take 1 tablet by mouth Every 8 (Eight) Hours., Disp: 90 tablet, Rfl: 0  •  spironolactone (ALDACTONE) 100 MG tablet, Take 1 tablet by mouth 2 (Two) Times a Day., Disp: 60 tablet, Rfl: 0  •  warfarin (COUMADIN) 2.5 MG tablet, Take 1 mg by mouth Daily., Disp: , Rfl:     Current Facility-Administered Medications:   •  cyanocobalamin injection 1,000 mcg, 1,000 mcg, Intramuscular, Q28 Days, Pieter Farias DO, 1,000 mcg at 10/24/17 1002    Objective   /65 (BP Location: Right arm, Patient Position: Sitting)  Pulse 74  Temp 97.9 °F (36.6 °C)  Ht 67\" (170.2 cm)  Wt 245 lb (111 kg)  SpO2 98%  BMI 38.37 kg/m2    Physical Exam   Constitutional: She is oriented to person, place, and time. She appears well-developed and well-nourished.   HENT:   Head: Normocephalic and atraumatic.   Eyes: Conjunctivae are normal.   Pulmonary/Chest: Effort normal.   Musculoskeletal: Normal range of motion.   Neurological: She is alert and oriented to person, place, and time.   Skin:   RLE with skin tear and redness across anterior leg.  LLE with 3cm round bloody blister.    Psychiatric: She has a normal mood and affect. Her behavior is normal.   Nursing note and vitals reviewed.      Assessment/Plan   Hodan was seen today for follow-up.    Diagnoses and all orders for this visit:    Vitamin B12 deficiency  -     cyanocobalamin injection 1,000 mcg; Inject 1 mL into the " shoulder, thigh, or buttocks Every 28 (Twenty-Eight) Days.    Lymphedema of both lower extremities    Cellulitis of right lower extremity          Discussion Summary:    77-year-old white female presenting for follow-up on medical problems.    1.  Right lower extremity cellulitis  -Reassured and encouraged to continue taking her antibiotics.  Wound appears well healing although there is a fair amount of serosanguineous drainage.  I recommend changing bandages twice a day.    2.  Left lower extremity blister  -Lesion was drained in the office an 18-gauge syringe.  A small amount of bloody fluid was removed and drained.  Patient may continue wound care with twice a day bandage dressing.    3.  B12 deficiency-injection given today.    Follow up:  No Follow-up on file.     Patient Instructions:  Patient instructions were provided.   06-Jul-2022

## 2022-11-29 NOTE — MEDICAL STUDENT
[facility]    Name:  Hodan Carter   Age:  77 y.o.  Sex:  female  :  1940  MRN:  [id number]   Visit Number:  [id number]  Admission Date:  10/10/2017  Date Of Service:  10/11/17  Primary Care Physician:  Juan Espino MD     LOS: 1 day :  Patient Care Team:  Juan Espino MD as PCP - General:    Chief Complaint:      Confusion    Subjective / Interval History:     Patient is 77 year old female admitted on 10/10/17 for confusion due to acute hepatic encephalopathy. She has history of liver cirrhosis but is not a transplant candidate. She was given lactulose for management of elevated ammonia level. She also had a urinary tract infection upon admission and was placed on Rocephin. Today she is alert and oriented to person and confusion has significantly improved. She had episodes atrial fibrillation this morning and was given cardizem. She was also hypotensive. She also complains of diarrhea but denies blood in the stool. Patient admits to shortness of breath and weakness. Patient denies any pain, headache, fever, palpitations, or chest pain.     Review of Systems:     General ROS: Patient denies any fevers, chills or loss of consciousness.  HEENT ROS: Denies headache, vision changes, hearing loss, rhinorrhea, sore throat.   Respiratory ROS: Patient admits to shortness of breath. Denies cough and wheezing.  Cardiovascular ROS: Patient admits to edema. Denies chest pain or palpitations. No history of exertional chest pain.  Gastrointestinal ROS: Patient admits to diarrhea. Denies nausea and vomiting. Denies any abdominal pain. Denies blood in stool.  Genitourinary ROS: Denies dysuria, pain on urination, blood in urine.   Neurological ROS: Patient admits to weakness. No loss of consciousness. Denies any numbness.  Psychological ROS: Patient admits to confusion. Denies any changes in mood.     Vital Signs:    Temp:  [97.4 °F (36.3 °C)-98.2 °F  (36.8 °C)] 98.2 °F (36.8 °C)  Heart Rate:  [] 110  Resp:  [16-18] 18  BP: ()/(58-79) 118/60    Intake and output:    I/O last 3 completed shifts:  In: 155 [I.V.:155]  Out: 400 [Urine:400]  I/O this shift:  In: 240 [P.O.:240]  Out: -     Physical Examination:    General Appearance:  Alert and cooperative, not in any acute distress. Pleasant. Laying in bed.    Head:  Atraumatic and normocephalic, without obvious abnormality.   Eyes:          PERRLA, conjunctivae and sclerae normal, no Icterus. No pallor. Extra-occular movements are within normal limits.   Neck: Supple, trachea midline, no thyromegaly. No adenopathy.    Lungs:   Clear to auscultate bilaterally. Chest shape is normal. Breath sounds heard bilaterally equally.  No crackles or wheezing. No Pleural rub or bronchial breathing. No rhonchi or rales.    Heart:  Irregular rate and rhythm. Normal S1 and S2, no murmur, no gallop, no rub. No JVD   Abdomen:   Normal bowel sounds, no masses, no organomegaly. Soft, non-tender, non-distended, no guarding, no rebound tenderness. Ascites present.    Extremities: 3+ pitting edema in LE. 4/5 strength in LE. 5/5 strength in UE. no cyanosis, no clubbing.   Skin: No bleeding, bruising or rash.   Neurologic: Awake, alert. Oriented to person and place but not time. Sensation intact.    Laboratory results:      Results from last 7 days  Lab Units 10/11/17  0631 10/10/17  1148   SODIUM mmol/L 139 137   POTASSIUM mmol/L 3.8 4.4   CHLORIDE mmol/L 103 102   CO2 mmol/L 26.0 25.0*   BUN mg/dL 33* 33*   CREATININE mg/dL 2.20* 2.30*   CALCIUM mg/dL 8.0* 8.1*   BILIRUBIN mg/dL 1.2 1.8*   ALK PHOS U/L 156* 182*   ALT (SGPT) U/L 26 28   AST (SGOT) U/L 32 51*   GLUCOSE mg/dL 82 121*       Results from last 7 days  Lab Units 10/11/17  0631 10/10/17  1148   WBC 10*3/mm3 2.88* 3.51*   HEMOGLOBIN g/dL 8.7* 9.9*   HEMATOCRIT % 27.1* 31.2*   PLATELETS 10*3/mm3 119* 161       Results from last 7 days  Lab Units 10/11/17  0631  10/10/17  1148   INR  2.03* 2.01*       Results from last 7 days  Lab Units 10/10/17  1148   TROPONIN I ng/mL 0.051*           I have reviewed the patient's laboratory results.    Radiology results:    Imaging Results (last 24 hours)     Procedure Component Value Units Date/Time    CT Head Without Contrast [[id number]] Collected:  10/10/17 1325     Updated:  10/10/17 1328    Narrative:       PROCEDURE: CT HEAD WO CONTRAST-     HISTORY: confusion     TECHNIQUE: Axial images were obtained from the skull vertex through the  base without contrast.      COMPARISON: None.     FINDINGS: There is generalized cerebral volume loss. There are patchy  hypodensities in the periventricular white matter consistent with  chronic small vessel ischemic change. There is no CT evidence of acute  hemorrhage. There is no mass, mass effect, or midline shift. There is no  hydrocephalus. Bone windows reveal no osseous abnormalities. The  visualized paranasal sinuses are clear.       Impression:       Findings consistent with chronic small vessel ischemic  change with no acute intracranial abnormality.        This study was performed with techniques to keep radiation doses as low  as reasonably achievable (ALARA). Individualized dose reduction  techniques using automated exposure control or adjustment of mA and/or  kV according to the patient size were employed.      This report was finalized on 10/10/2017 1:26 PM by Mae Barrios M.D..    XR Chest 2 View [[id number]] Collected:  10/10/17 1326     Updated:  10/10/17 1334    Narrative:       PROCEDURE: XR CHEST 2 VW-        HISTORY: recent pleural effusion     COMPARISON: September 28, 2017.     FINDINGS: The heart is normal in size. The mediastinum is unremarkable.  The lungs are clear. There is no pneumothorax. There are no acute  osseous abnormalities.           Impression:       No acute cardiopulmonary process.           This report was finalized on 10/10/2017 1:31 PM by Mae  JOCELYN Barrios.          I have reviewed the patient's radiology reports.    Medication Review:     I have reviewed the patients active and prn medications.     Active Problems:    Acute hepatic encephalopathy      Assessment:    1.) Acute hepatic encephalopathy secondary to liver cirrhosis  2.) Atrial fibrillation  3.) Hypotension   4.) Urinary tract infection-present upon admission   5.) Chronic Kidney Disease, Stage IV  6.) Chronic LE edema  7.) Ascites  8.) Essential hypertension  9.) Chronic anemia       Plan:    Patient has continued to receive lactulose which has resulted in significant cognitive improvement. Lactulose dose will be decreased due to patients diarrhea. Cardizem was initiated for a fib which has controlled the rate. Cardiology consult has been placed for Dr. Donis to assess her chronic heart issues. Dr. Franco saw patient and discontinued diuretics due to hypotension. Patient on telemetry and vitals be monitored. Rocephin is currently prescribed for urinary tract infection. Patient receiving hydralazine and propanolol for hypertension. Iron panel and reticulocyte count have been ordered. Plan was discussed with patient and family which they agreed to.   DVT prophylaxis and anti-coagulation with coumadin.   Assessment and plan have been discussed with Dr. Winchester.    Evon Low  10/11/17  11:40 AM    Dictated utilizing Dragon dictation.     Additional Notes: Patient consent was obtained to proceed with the visit and recommended plan of care after discussion of all risks and benefits, including the risks of COVID-19 exposure. Render Risk Assessment In Note?: yes Detail Level: Simple

## 2024-03-10 NOTE — PROGRESS NOTES
"   LOS: 13 days    Patient Care Team:  Pieter Farias DO as PCP - General (Internal Medicine)    Chief Complaint:    Chief Complaint   Patient presents with   • Fatigue       Subjective   F/u MODESTA CKD4  Interval History:   Seen and examined. Worsening mental status. Poor oral intake.     Objective     Vital Signs  Temp:  [96.5 °F (35.8 °C)-97.5 °F (36.4 °C)] 97.2 °F (36.2 °C)  Heart Rate:  [65-80] 65  Resp:  [18-20] 18  BP: ()/(29-46) 80/29    Flowsheet Rows         First Filed Value    Admission Height  170.2 cm (67\") Documented at 12/07/2017 2247    Admission Weight  109 kg (240 lb) Documented at 12/07/2017 2247             I/O last 3 completed shifts:  In: 460 [P.O.:460]  Out: 200 [Urine:200]    Intake/Output Summary (Last 24 hours) at 12/21/17 0750  Last data filed at 12/20/17 1500   Gross per 24 hour   Intake              460 ml   Output                0 ml   Net              460 ml       Physical Exam:  gen - frail WF in no acute distress, confused, blank stare  Neck supple no JVD   Lungs CTA bilat no rales   CV RRR no M/G   abd soft NT/ND   vasc 2+ BLE edema   scant urine in guerrero     Results Review:      Results from last 7 days  Lab Units 12/21/17  0559 12/20/17  0544 12/19/17  0557  12/16/17  0541   SODIUM mmol/L 130* 131* 130*  < > 131*   POTASSIUM mmol/L 4.9 4.3 4.0  < > 4.0   CHLORIDE mmol/L 101 103 102  < > 102   CO2 mmol/L 17.0* 13.0* 16.0*  < > 18.0*   BUN mg/dL 118* 113* 103*  < > 93*   CREATININE mg/dL 3.80* 3.30* 2.90*  < > 3.20*   CALCIUM mg/dL 8.5 8.4 8.0*  < > 7.4*   BILIRUBIN mg/dL  --   --   --   --  0.6   ALK PHOS U/L  --   --   --   --  165*   ALT (SGPT) U/L  --   --   --   --  30   AST (SGOT) U/L  --   --   --   --  36   GLUCOSE mg/dL 78 79 70*  < > 87   < > = values in this interval not displayed.    Estimated Creatinine Clearance: 14.9 mL/min (by C-G formula based on Cr of 3.8).      Results from last 7 days  Lab Units 12/21/17  0559 12/20/17  0544 12/19/17  0557   PHOSPHORUS " mg/dL 6.7* 6.4* 5.7*               Results from last 7 days  Lab Units 12/21/17  0600 12/20/17  0544 12/19/17  0557 12/18/17  0434 12/17/17  0413   WBC 10*3/mm3 11.14* 11.34* 9.57 5.71 6.80   HEMOGLOBIN g/dL 8.7* 9.2* 9.4* 8.9* 8.4*   PLATELETS 10*3/mm3 152 160 150 120* 133         Results from last 7 days  Lab Units 12/21/17  0600 12/20/17  0544 12/19/17  0557 12/18/17  0434 12/17/17  0413   INR  3.57* 2.52* 2.12* 1.55* 1.39*         Imaging Results (last 24 hours)     ** No results found for the last 24 hours. **          allopurinol 100 mg Oral Daily   b complex-vitamin c-folic acid 1 tablet Oral Daily   calcium acetate 667 mg Oral TID With Meals   digoxin 125 mcg Oral Every Other Day   lactulose 10 g Oral TID   midodrine 10 mg Oral TID AC   pantoprazole 40 mg Oral Daily   propranolol 10 mg Oral Q12H   rifaximin 550 mg Oral Q12H   warfarin 3 mg Oral Daily          Medication Review:   Current Facility-Administered Medications   Medication Dose Route Frequency Provider Last Rate Last Dose   • acetaminophen (TYLENOL) tablet 650 mg  650 mg Oral Q4H PRN Twin Morales MD   650 mg at 12/11/17 1625   • allopurinol (ZYLOPRIM) tablet 100 mg  100 mg Oral Daily Julio Franco MD   100 mg at 12/20/17 1047   • b complex-vitamin c-folic acid (NEPHRO-QUANG) tablet 1 tablet  1 tablet Oral Daily Julio Franco MD   1 tablet at 12/20/17 1048   • calcium acetate (PHOS BINDER)) capsule 667 mg  667 mg Oral TID With Meals Carlos Owen DO   667 mg at 12/20/17 1200   • digoxin (LANOXIN) tablet 125 mcg  125 mcg Oral Every Other Day Twin Morales MD   125 mcg at 12/20/17 1048   • HYDROcodone-acetaminophen (NORCO)  MG per tablet 0.5 tablet  0.5 tablet Oral Q6H PRN Tae Irby MD   0.5 tablet at 12/20/17 0408   • ipratropium-albuterol (DUO-NEB) nebulizer solution 3 mL  3 mL Nebulization Q4H PRN Twin Morales MD       • lactulose (CHRONULAC) 10 GM/15ML solution 10 g  10 g Oral TID Twin Morales MD   10 g at 12/20/17 1700   •  LORazepam (ATIVAN) tablet 0.25 mg  0.25 mg Oral Q6H PRN Bettie Casarez DO       • midodrine (PROAMATINE) tablet 10 mg  10 mg Oral TID AC Tigre Alegre MD   10 mg at 12/21/17 0632   • morphine injection 1 mg  1 mg Intravenous Q4H PRN Bettie Casarez DO       • ondansetron (ZOFRAN) tablet 4 mg  4 mg Oral Q6H PRN Twin Morales MD   4 mg at 12/11/17 1625    Or   • ondansetron ODT (ZOFRAN-ODT) disintegrating tablet 4 mg  4 mg Oral Q6H PRN Twin Morales MD        Or   • ondansetron (ZOFRAN) injection 4 mg  4 mg Intravenous Q6H PRN Twin Morales MD   4 mg at 12/08/17 2140   • pantoprazole (PROTONIX) EC tablet 40 mg  40 mg Oral Daily Twin Morales MD   40 mg at 12/20/17 1047   • propranolol (INDERAL) tablet 10 mg  10 mg Oral Q12H Tigre Alegre MD   10 mg at 12/20/17 1047   • rifaximin (XIFAXAN) tablet 550 mg  550 mg Oral Q12H Twin Morales MD   550 mg at 12/20/17 1048   • sodium chloride 0.9 % bolus 250 mL  250 mL Intravenous PRN Pieter Farias DO   250 mL at 12/15/17 2121   • sodium chloride 0.9 % flush 1-10 mL  1-10 mL Intravenous PRN Twin Morales MD   10 mL at 12/09/17 1639   • warfarin (COUMADIN) tablet 3 mg  3 mg Oral Daily Carlos Owen DO   3 mg at 12/20/17 1755       Assessment/Plan   1.   Acute kidney injury - Cr trending down but BUN up to 100 and UOP poor.  I discussed with patient and daughter that she is not a candidate for dialysis due to comorbidities (namely cirrhosis with assoc severe hypotension).   2.   Chronic kidney disease, stage IV  3.   Cirrhosis of liver with ascites and esophageal varices  4.   Hyponatremia   5.   Hyperkalemia  6.   Type IV RTA  7.   Permanent atrial fibrillation  8.   Lymphedema of both lower extremities   9.   Hypotension - due to cirrhosis; inc'd midodrine over weekend to 10 mg TID  10.   Thrombocytopenia  11.   coagulopathy  12.   Anemia of chronic kidney disease - Hgb stable 9.2  13.   Acute encephalopathy - hepatic vs uremic or both; on lactulose &  xifaxan    Plan  Palliative is very appropriate.  Plan for home with hospice              Enrrique Emerson MD  12/21/17  7:50 AM     Home